# Patient Record
Sex: FEMALE | Race: WHITE | Employment: OTHER | URBAN - METROPOLITAN AREA
[De-identification: names, ages, dates, MRNs, and addresses within clinical notes are randomized per-mention and may not be internally consistent; named-entity substitution may affect disease eponyms.]

---

## 2017-02-11 ENCOUNTER — HOSPITAL ENCOUNTER (INPATIENT)
Age: 72
LOS: 3 days | Discharge: HOME | DRG: 603 | End: 2017-02-14
Attending: EMERGENCY MEDICINE | Admitting: INTERNAL MEDICINE
Payer: MEDICARE

## 2017-02-11 DIAGNOSIS — N18.9 ACUTE ON CHRONIC KIDNEY FAILURE (HCC): ICD-10-CM

## 2017-02-11 DIAGNOSIS — E16.2 HYPOGLYCEMIA: ICD-10-CM

## 2017-02-11 DIAGNOSIS — L03.116 CELLULITIS OF LEFT LOWER EXTREMITY: Primary | ICD-10-CM

## 2017-02-11 DIAGNOSIS — N17.9 ACUTE ON CHRONIC KIDNEY FAILURE (HCC): ICD-10-CM

## 2017-02-11 PROBLEM — L03.90 CELLULITIS: Status: ACTIVE | Noted: 2017-02-11

## 2017-02-11 LAB
ALBUMIN SERPL BCP-MCNC: 2.7 G/DL (ref 3.5–5)
ALBUMIN/GLOB SERPL: 0.6 {RATIO} (ref 1.1–2.2)
ALP SERPL-CCNC: 104 U/L (ref 45–117)
ALT SERPL-CCNC: 26 U/L (ref 12–78)
ANION GAP BLD CALC-SCNC: 8 MMOL/L (ref 5–15)
AST SERPL W P-5'-P-CCNC: 26 U/L (ref 15–37)
BASOPHILS # BLD AUTO: 0 K/UL (ref 0–0.1)
BASOPHILS # BLD: 0 % (ref 0–1)
BILIRUB SERPL-MCNC: 0.3 MG/DL (ref 0.2–1)
BUN SERPL-MCNC: 89 MG/DL (ref 6–20)
BUN/CREAT SERPL: 28 (ref 12–20)
CALCIUM SERPL-MCNC: 9 MG/DL (ref 8.5–10.1)
CHLORIDE SERPL-SCNC: 101 MMOL/L (ref 97–108)
CO2 SERPL-SCNC: 31 MMOL/L (ref 21–32)
CREAT SERPL-MCNC: 3.19 MG/DL (ref 0.55–1.02)
EOSINOPHIL # BLD: 0.5 K/UL (ref 0–0.4)
EOSINOPHIL NFR BLD: 5 % (ref 0–7)
ERYTHROCYTE [DISTWIDTH] IN BLOOD BY AUTOMATED COUNT: 15.2 % (ref 11.5–14.5)
GLOBULIN SER CALC-MCNC: 4.7 G/DL (ref 2–4)
GLUCOSE BLD STRIP.AUTO-MCNC: 121 MG/DL (ref 65–100)
GLUCOSE BLD STRIP.AUTO-MCNC: 134 MG/DL (ref 65–100)
GLUCOSE BLD STRIP.AUTO-MCNC: 145 MG/DL (ref 65–100)
GLUCOSE BLD STRIP.AUTO-MCNC: 175 MG/DL (ref 65–100)
GLUCOSE BLD STRIP.AUTO-MCNC: 39 MG/DL (ref 65–100)
GLUCOSE BLD STRIP.AUTO-MCNC: 48 MG/DL (ref 65–100)
GLUCOSE BLD STRIP.AUTO-MCNC: 49 MG/DL (ref 65–100)
GLUCOSE SERPL-MCNC: 74 MG/DL (ref 65–100)
HCT VFR BLD AUTO: 30.3 % (ref 35–47)
HGB BLD-MCNC: 9.2 G/DL (ref 11.5–16)
LYMPHOCYTES # BLD AUTO: 7 % (ref 12–49)
LYMPHOCYTES # BLD: 0.6 K/UL (ref 0.8–3.5)
MCH RBC QN AUTO: 27.9 PG (ref 26–34)
MCHC RBC AUTO-ENTMCNC: 30.4 G/DL (ref 30–36.5)
MCV RBC AUTO: 91.8 FL (ref 80–99)
MONOCYTES # BLD: 0.5 K/UL (ref 0–1)
MONOCYTES NFR BLD AUTO: 5 % (ref 5–13)
NEUTS SEG # BLD: 7.4 K/UL (ref 1.8–8)
NEUTS SEG NFR BLD AUTO: 83 % (ref 32–75)
PLATELET # BLD AUTO: 277 K/UL (ref 150–400)
POTASSIUM SERPL-SCNC: 3.8 MMOL/L (ref 3.5–5.1)
PROT SERPL-MCNC: 7.4 G/DL (ref 6.4–8.2)
RBC # BLD AUTO: 3.3 M/UL (ref 3.8–5.2)
RBC MORPH BLD: ABNORMAL
SERVICE CMNT-IMP: ABNORMAL
SODIUM SERPL-SCNC: 140 MMOL/L (ref 136–145)
WBC # BLD AUTO: 9 K/UL (ref 3.6–11)

## 2017-02-11 PROCEDURE — 74011250636 HC RX REV CODE- 250/636: Performed by: INTERNAL MEDICINE

## 2017-02-11 PROCEDURE — 96375 TX/PRO/DX INJ NEW DRUG ADDON: CPT

## 2017-02-11 PROCEDURE — 36415 COLL VENOUS BLD VENIPUNCTURE: CPT | Performed by: EMERGENCY MEDICINE

## 2017-02-11 PROCEDURE — 74011000250 HC RX REV CODE- 250: Performed by: INTERNAL MEDICINE

## 2017-02-11 PROCEDURE — 87040 BLOOD CULTURE FOR BACTERIA: CPT | Performed by: EMERGENCY MEDICINE

## 2017-02-11 PROCEDURE — 74011000250 HC RX REV CODE- 250

## 2017-02-11 PROCEDURE — 80053 COMPREHEN METABOLIC PANEL: CPT | Performed by: EMERGENCY MEDICINE

## 2017-02-11 PROCEDURE — 74011250637 HC RX REV CODE- 250/637: Performed by: INTERNAL MEDICINE

## 2017-02-11 PROCEDURE — 82962 GLUCOSE BLOOD TEST: CPT

## 2017-02-11 PROCEDURE — 74011250636 HC RX REV CODE- 250/636: Performed by: EMERGENCY MEDICINE

## 2017-02-11 PROCEDURE — 96365 THER/PROPH/DIAG IV INF INIT: CPT

## 2017-02-11 PROCEDURE — 74011000258 HC RX REV CODE- 258: Performed by: EMERGENCY MEDICINE

## 2017-02-11 PROCEDURE — 85025 COMPLETE CBC W/AUTO DIFF WBC: CPT | Performed by: EMERGENCY MEDICINE

## 2017-02-11 PROCEDURE — 65270000029 HC RM PRIVATE

## 2017-02-11 PROCEDURE — 99284 EMERGENCY DEPT VISIT MOD MDM: CPT

## 2017-02-11 PROCEDURE — 74011000258 HC RX REV CODE- 258: Performed by: FAMILY MEDICINE

## 2017-02-11 PROCEDURE — 83036 HEMOGLOBIN GLYCOSYLATED A1C: CPT | Performed by: INTERNAL MEDICINE

## 2017-02-11 RX ORDER — GUAIFENESIN 100 MG/5ML
81 LIQUID (ML) ORAL DAILY
Status: DISCONTINUED | OUTPATIENT
Start: 2017-02-12 | End: 2017-02-14 | Stop reason: HOSPADM

## 2017-02-11 RX ORDER — DIPHENHYDRAMINE HCL 25 MG
25 CAPSULE ORAL
Status: DISCONTINUED | OUTPATIENT
Start: 2017-02-11 | End: 2017-02-14 | Stop reason: HOSPADM

## 2017-02-11 RX ORDER — FENOFIBRATE 145 MG/1
145 TABLET, COATED ORAL DAILY
Status: DISCONTINUED | OUTPATIENT
Start: 2017-02-12 | End: 2017-02-14 | Stop reason: HOSPADM

## 2017-02-11 RX ORDER — AMOXICILLIN 250 MG
2 CAPSULE ORAL DAILY
Status: DISCONTINUED | OUTPATIENT
Start: 2017-02-12 | End: 2017-02-14 | Stop reason: HOSPADM

## 2017-02-11 RX ORDER — DEXTROSE MONOHYDRATE AND SODIUM CHLORIDE 5; .9 G/100ML; G/100ML
50 INJECTION, SOLUTION INTRAVENOUS CONTINUOUS
Status: DISCONTINUED | OUTPATIENT
Start: 2017-02-11 | End: 2017-02-12

## 2017-02-11 RX ORDER — NALOXONE HYDROCHLORIDE 0.4 MG/ML
0.4 INJECTION, SOLUTION INTRAMUSCULAR; INTRAVENOUS; SUBCUTANEOUS AS NEEDED
Status: DISCONTINUED | OUTPATIENT
Start: 2017-02-11 | End: 2017-02-14 | Stop reason: HOSPADM

## 2017-02-11 RX ORDER — DOCUSATE SODIUM 100 MG/1
100 CAPSULE, LIQUID FILLED ORAL 2 TIMES DAILY
Status: DISCONTINUED | OUTPATIENT
Start: 2017-02-11 | End: 2017-02-11

## 2017-02-11 RX ORDER — LOSARTAN POTASSIUM 25 MG/1
25 TABLET ORAL DAILY
COMMUNITY

## 2017-02-11 RX ORDER — SODIUM CHLORIDE 0.9 % (FLUSH) 0.9 %
5-10 SYRINGE (ML) INJECTION AS NEEDED
Status: DISCONTINUED | OUTPATIENT
Start: 2017-02-11 | End: 2017-02-14 | Stop reason: HOSPADM

## 2017-02-11 RX ORDER — DEXTROSE 50 % IN WATER (D50W) INTRAVENOUS SYRINGE
Status: COMPLETED
Start: 2017-02-11 | End: 2017-02-11

## 2017-02-11 RX ORDER — MAGNESIUM SULFATE 100 %
4 CRYSTALS MISCELLANEOUS AS NEEDED
Status: DISCONTINUED | OUTPATIENT
Start: 2017-02-11 | End: 2017-02-14 | Stop reason: HOSPADM

## 2017-02-11 RX ORDER — DEXTROSE 50 % IN WATER (D50W) INTRAVENOUS SYRINGE
25
Status: COMPLETED | OUTPATIENT
Start: 2017-02-11 | End: 2017-02-11

## 2017-02-11 RX ORDER — ONDANSETRON 2 MG/ML
4 INJECTION INTRAMUSCULAR; INTRAVENOUS
Status: DISCONTINUED | OUTPATIENT
Start: 2017-02-11 | End: 2017-02-14 | Stop reason: HOSPADM

## 2017-02-11 RX ORDER — AMLODIPINE BESYLATE 5 MG/1
5 TABLET ORAL DAILY
COMMUNITY
End: 2017-06-10

## 2017-02-11 RX ORDER — ACETAMINOPHEN 325 MG/1
650 TABLET ORAL
Status: DISCONTINUED | OUTPATIENT
Start: 2017-02-11 | End: 2017-02-14 | Stop reason: HOSPADM

## 2017-02-11 RX ORDER — SODIUM CHLORIDE 0.9 % (FLUSH) 0.9 %
5-10 SYRINGE (ML) INJECTION EVERY 8 HOURS
Status: DISCONTINUED | OUTPATIENT
Start: 2017-02-11 | End: 2017-02-14 | Stop reason: HOSPADM

## 2017-02-11 RX ORDER — HEPARIN SODIUM 5000 [USP'U]/ML
5000 INJECTION, SOLUTION INTRAVENOUS; SUBCUTANEOUS EVERY 8 HOURS
Status: DISCONTINUED | OUTPATIENT
Start: 2017-02-11 | End: 2017-02-14 | Stop reason: HOSPADM

## 2017-02-11 RX ORDER — CARVEDILOL 3.12 MG/1
3.12 TABLET ORAL 2 TIMES DAILY WITH MEALS
Status: DISCONTINUED | OUTPATIENT
Start: 2017-02-11 | End: 2017-02-14 | Stop reason: HOSPADM

## 2017-02-11 RX ORDER — DOXAZOSIN 2 MG/1
2 TABLET ORAL DAILY
COMMUNITY

## 2017-02-11 RX ORDER — DEXTROSE 50 % IN WATER (D50W) INTRAVENOUS SYRINGE
12.5-25 AS NEEDED
Status: DISCONTINUED | OUTPATIENT
Start: 2017-02-11 | End: 2017-02-14 | Stop reason: HOSPADM

## 2017-02-11 RX ORDER — BISMUTH SUBSALICYLATE 262 MG
1 TABLET,CHEWABLE ORAL DAILY
COMMUNITY

## 2017-02-11 RX ORDER — PANTOPRAZOLE SODIUM 40 MG/1
40 TABLET, DELAYED RELEASE ORAL
Status: DISCONTINUED | OUTPATIENT
Start: 2017-02-12 | End: 2017-02-14 | Stop reason: HOSPADM

## 2017-02-11 RX ORDER — INSULIN GLARGINE 100 [IU]/ML
20 INJECTION, SOLUTION SUBCUTANEOUS DAILY
Status: DISCONTINUED | OUTPATIENT
Start: 2017-02-12 | End: 2017-02-11

## 2017-02-11 RX ORDER — IPRATROPIUM BROMIDE AND ALBUTEROL SULFATE 2.5; .5 MG/3ML; MG/3ML
3 SOLUTION RESPIRATORY (INHALATION)
Status: DISCONTINUED | OUTPATIENT
Start: 2017-02-11 | End: 2017-02-14 | Stop reason: HOSPADM

## 2017-02-11 RX ORDER — AMOXICILLIN 250 MG
1 CAPSULE ORAL DAILY
Status: DISCONTINUED | OUTPATIENT
Start: 2017-02-12 | End: 2017-02-11

## 2017-02-11 RX ORDER — HYDROCODONE BITARTRATE AND ACETAMINOPHEN 5; 325 MG/1; MG/1
1 TABLET ORAL
Status: DISCONTINUED | OUTPATIENT
Start: 2017-02-11 | End: 2017-02-14 | Stop reason: HOSPADM

## 2017-02-11 RX ORDER — LEVOFLOXACIN 5 MG/ML
750 INJECTION, SOLUTION INTRAVENOUS
Status: DISCONTINUED | OUTPATIENT
Start: 2017-02-11 | End: 2017-02-14 | Stop reason: HOSPADM

## 2017-02-11 RX ORDER — FLUTICASONE FUROATE AND VILANTEROL 100; 25 UG/1; UG/1
1 POWDER RESPIRATORY (INHALATION) DAILY
Status: DISCONTINUED | OUTPATIENT
Start: 2017-02-12 | End: 2017-02-14 | Stop reason: HOSPADM

## 2017-02-11 RX ORDER — SERTRALINE HYDROCHLORIDE 50 MG/1
25 TABLET, FILM COATED ORAL DAILY
Status: DISCONTINUED | OUTPATIENT
Start: 2017-02-12 | End: 2017-02-11

## 2017-02-11 RX ORDER — INSULIN LISPRO 100 [IU]/ML
INJECTION, SOLUTION INTRAVENOUS; SUBCUTANEOUS
Status: DISCONTINUED | OUTPATIENT
Start: 2017-02-11 | End: 2017-02-14 | Stop reason: HOSPADM

## 2017-02-11 RX ORDER — ATORVASTATIN CALCIUM 10 MG/1
40 TABLET, FILM COATED ORAL
Status: DISCONTINUED | OUTPATIENT
Start: 2017-02-11 | End: 2017-02-14 | Stop reason: HOSPADM

## 2017-02-11 RX ORDER — ALPRAZOLAM 0.25 MG/1
0.25 TABLET ORAL
Status: DISCONTINUED | OUTPATIENT
Start: 2017-02-11 | End: 2017-02-14 | Stop reason: HOSPADM

## 2017-02-11 RX ADMIN — DEXTROSE MONOHYDRATE AND SODIUM CHLORIDE 50 ML/HR: 5; .9 INJECTION, SOLUTION INTRAVENOUS at 16:59

## 2017-02-11 RX ADMIN — HYDROCODONE BITARTRATE AND ACETAMINOPHEN 1 TABLET: 5; 325 TABLET ORAL at 23:56

## 2017-02-11 RX ADMIN — DEXTROSE 50 % IN WATER (D50W) INTRAVENOUS SYRINGE 25 G: at 12:53

## 2017-02-11 RX ADMIN — CEFAZOLIN SODIUM 1 G: 1 INJECTION, POWDER, FOR SOLUTION INTRAMUSCULAR; INTRAVENOUS at 16:57

## 2017-02-11 RX ADMIN — CARVEDILOL 3.12 MG: 3.12 TABLET, FILM COATED ORAL at 16:56

## 2017-02-11 RX ADMIN — LEVOFLOXACIN 750 MG: 5 INJECTION, SOLUTION INTRAVENOUS at 17:46

## 2017-02-11 RX ADMIN — HEPARIN SODIUM 5000 UNITS: 5000 INJECTION, SOLUTION INTRAVENOUS; SUBCUTANEOUS at 15:41

## 2017-02-11 RX ADMIN — ATORVASTATIN CALCIUM 40 MG: 10 TABLET, FILM COATED ORAL at 21:39

## 2017-02-11 RX ADMIN — HYDROCODONE BITARTRATE AND ACETAMINOPHEN 1 TABLET: 5; 325 TABLET ORAL at 15:41

## 2017-02-11 RX ADMIN — DEXTROSE MONOHYDRATE 25 G: 25 INJECTION, SOLUTION INTRAVENOUS at 16:27

## 2017-02-11 RX ADMIN — HEPARIN SODIUM 5000 UNITS: 5000 INJECTION, SOLUTION INTRAVENOUS; SUBCUTANEOUS at 23:00

## 2017-02-11 RX ADMIN — DEXTROSE MONOHYDRATE 25 G: 25 INJECTION, SOLUTION INTRAVENOUS at 12:53

## 2017-02-11 RX ADMIN — VANCOMYCIN HYDROCHLORIDE 2500 MG: 10 INJECTION, POWDER, LYOPHILIZED, FOR SOLUTION INTRAVENOUS at 11:54

## 2017-02-11 NOTE — ED TRIAGE NOTES
Patient arrived with c/o ongoing cellulitis to left leg with vomiting and fever x 3 days.   Has had cellulitis and sepsis in the past.

## 2017-02-11 NOTE — IP AVS SNAPSHOT
Shirin Salinas [address] 
[phone] Patient: Wil Lay MRN: [id number] You are allergic to the following Allergen Reactions Latex, Natural Rubber Rash Per pt, had a reaction to latex gloves when an RN administered lotion Darvon (Propoxyphene) Itching Peanut Cough Pineapple Itching Sulfa (Sulfonamide Antibiotics) Swelling Tape (Adhesive) Itching Recent Documentation Height Weight BMI OB Status Smoking Status 1.549 m 126.6 kg 52.72 kg/m2 Hysterectomy Never Smoker Unresulted Labs Order Current Status CULTURE, BLOOD Preliminary result Emergency Contacts Name Discharge Info Relation Home Work Mobile Ronnie Campbell DISCHARGE CAREGIVER [3] Spouse [3] About your hospitalization You were admitted on:  February 11, 2017 You last received care in the:  [facility]  MED SURG 2 You were discharged on:  February 14, 2017 Unit phone number:  [phone] Why you were hospitalized Your primary diagnosis was:  Cellulitis Your diagnoses also included:  Acute Renal Failure (Arf) ([facility]), Chronic Bilateral Low Back Pain Without Sciatica, Ckd (Chronic Kidney Disease) Stage 4, Gfr 15-29 Ml/Min ([facility]), Dm Type 2, Uncontrolled, With Renal Complications (Hcc), Generalized Anxiety Disorder, Hypertension, Iron Deficiency Anemia, Saul (Obstructive Sleep Apnea), Saul On Cpap, Hyperlipidemia, Asthma, Anxiety And Depression, Hypoglycemia Providers Seen During Your Hospitalizations Provider Role Specialty Primary office phone Colin Roth MD Attending Provider Emergency Medicine [phone] Ami Raman MD Attending Provider [facility] [phone] Your Primary Care Physician (PCP) Primary Care Physician Office Phone Office Fax Kimberley Gould [phone] Follow-up Information Follow up With Details Comments Contact Info Davina Johnson MD   [address] 
[phone] Current Discharge Medication List  
  
START taking these medications Dose & Instructions Dispensing Information Comments Morning Noon Evening Bedtime  
 levoFLOXacin 500 mg tablet Commonly known as:  Elizabeth Cantwell Your next dose is: Today, Tomorrow Other:  _________ Dose:  500 mg Take 1 Tab by mouth daily for 7 days. Quantity:  7 Tab Refills:  0  
     
   
   
   
  
 mupirocin 2 % ointment Commonly known as:  [employer] Your next dose is: Today, Tomorrow Other:  _________ Apply  to affected area three (3) times daily. Quantity:  66 g Refills:  3 CONTINUE these medications which have NOT CHANGED Dose & Instructions Dispensing Information Comments Morning Noon Evening Bedtime  
 acetaminophen 500 mg tablet Commonly known as:  TYLENOL Your next dose is: Today, Tomorrow Other:  _________ Dose:  1000 mg Take 1,000 mg by mouth two (2) times daily as needed for Pain. Refills:  0  
     
   
   
   
  
 albuterol 90 mcg/actuation inhaler Commonly known as:  PROVENTIL HFA, VENTOLIN HFA, PROAIR HFA Your next dose is: Today, Tomorrow Other:  _________ Dose:  2 Puff Take 2 Puffs by inhalation every four (4) hours as needed for Wheezing. Refills:  0 ALPRAZolam 0.25 mg tablet Commonly known as:  Martinez January Your next dose is: Today, Tomorrow Other:  _________ Dose:  0.25 mg Take 0.25 mg by mouth two (2) times a day. Refills:  0  
     
   
   
   
  
 amLODIPine 5 mg tablet Commonly known as:  Rut Silva Your next dose is: Today, Tomorrow Other:  _________ Dose:  5 mg Take 5 mg by mouth daily. Refills:  0  
     
   
   
   
  
 aspirin 81 mg chewable tablet Your next dose is: Today, Tomorrow Other:  _________ Dose:  81 mg Take 81 mg by mouth daily. Refills:  0  
     
   
   
   
  
 atorvastatin 40 mg tablet Commonly known as:  LIPITOR Your next dose is: Today, Tomorrow Other:  _________ Dose:  40 mg Take 40 mg by mouth nightly. Refills:  0  
     
   
   
   
  
 bumetanide 2 mg tablet Commonly known as:  Buelah Bake Your next dose is: Today, Tomorrow Other:  _________ Dose:  2 mg Take 1 Tab by mouth two (2) times a day. Quantity:  100 Tab Refills:  0  
     
   
   
   
  
 calcium-cholecalciferol (D3) tablet Commonly known as:  CALTRATE 600+D Your next dose is: Today, Tomorrow Other:  _________ Dose:  1 Tab Take 1 Tab by mouth two (2) times a day. Refills:  0 COREG 3.125 mg tablet Generic drug:  carvedilol Your next dose is: Today, Tomorrow Other:  _________ Dose:  3.125 mg Take 3.125 mg by mouth two (2) times daily (with meals). Refills:  0  
     
   
   
   
  
 doxazosin 2 mg tablet Commonly known as:  CARDURA Your next dose is: Today, Tomorrow Other:  _________ Dose:  2 mg Take 2 mg by mouth daily. Refills:  0  
     
   
   
   
  
 ergocalciferol 50,000 unit capsule Commonly known as:  ERGOCALCIFEROL Your next dose is: Today, Tomorrow Other:  _________ Dose:  33679 Units Take 50,000 Units by mouth every month. Refills:  0  
     
   
   
   
  
 fenofibrate nanocrystallized 145 mg tablet Commonly known as:  [employer] Your next dose is: Today, Tomorrow Other:  _________ Dose:  145 mg Take 145 mg by mouth daily. Refills:  0 FIBER LAXATIVE (CA POLYCARBO) 625 mg tablet Generic drug:  calcium polycarbophil Your next dose is: Today, Tomorrow Other:  _________ Dose:  625 mg Take 625 mg by mouth daily. Refills:  0  
     
   
   
   
  
 fluticasone-vilanterol 100-25 mcg/dose inhaler Commonly known as:  BREO ELLIPTA Your next dose is: Today, Tomorrow Other:  _________ Dose:  1 Puff Take 1 Puff by inhalation daily. Quantity:  1 Inhaler Refills:  0 HumuLIN R U-100 100 unit/mL injection Generic drug:  insulin regular Your next dose is: Today, Tomorrow Other:  _________ Dose:  6 Units 6 Units by SubCUTAneous route Before breakfast and dinner. Refills:  0  
     
   
   
   
  
 loratadine 10 mg tablet Commonly known as:  Rommel Blowers Your next dose is: Today, Tomorrow Other:  _________ Dose:  10 mg Take 10 mg by mouth daily. Refills:  0  
     
   
   
   
  
 losartan 25 mg tablet Commonly known as:  COZAAR Your next dose is: Today, Tomorrow Other:  _________ Dose:  25 mg Take 25 mg by mouth daily. Refills:  0  
     
   
   
   
  
 multivitamin tablet Commonly known as:  ONE A DAY Your next dose is: Today, Tomorrow Other:  _________ Dose:  1 Tab Take 1 Tab by mouth daily. Refills:  0  
     
   
   
   
  
 nystatin 500,000 unit Tab Commonly known as:  MYCOSTATIN Your next dose is: Today, Tomorrow Other:  _________ Dose:  1 Tab Take 1 Tab by mouth daily. Refills:  0  
     
   
   
   
  
 nystatin-triamcinolone 100,000-0.1 unit/gram-% ointment Commonly known as:  Lizzie Del Your next dose is: Today, Tomorrow Other:  _________ Apply  to affected area daily. Apply to sores Refills:  0 Omeprazole delayed release 20 mg tablet Commonly known as:  PRILOSEC D/R Your next dose is: Today, Tomorrow Other:  _________ Dose:  20 mg Take 20 mg by mouth daily. Refills:  0 Senna-C 8.6-50 mg per tablet Generic drug:  senna-docusate Your next dose is: Today, Tomorrow Other:  _________ Dose:  2 Tab Take 2 Tabs by mouth every evening. Refills:  0  
     
   
   
   
  
 TOUJEO SOLOSTAR 300 unit/mL (1.5 mL) Inpn Generic drug:  insulin glargine Your next dose is: Today, Tomorrow Other:  _________ Dose:  26 Units 26 Units by SubCUTAneous route daily. Refills:  0  
     
   
   
   
  
 triamcinolone acetonide 0.1 % ointment Commonly known as:  KENALOG Your next dose is: Today, Tomorrow Other:  _________ Apply  to affected area daily as needed. use thin layer on affected areas Refills:  0 Where to Get Your Medications Information on where to get these meds will be given to you by the nurse or doctor. ! Ask your nurse or doctor about these medications  
  levoFLOXacin 500 mg tablet  
 mupirocin 2 % ointment Discharge Instructions Cellulitis: Care Instructions Your Care Instructions Cellulitis is a skin infection. It often occurs after a break in the skin from a scrape, cut, bite, or puncture, or after a rash. The doctor has checked you carefully, but problems can develop later. If you notice any problems or new symptoms, get medical treatment right away. Follow-up care is a key part of your treatment and safety. Be sure to make and go to all appointments, and call your doctor if you are having problems. It's also a good idea to know your test results and keep a list of the medicines you take. How can you care for yourself at home? · Take your antibiotics as directed. Do not stop taking them just because you feel better. You need to take the full course of antibiotics. · Prop up the infected area on pillows to reduce pain and swelling. Try to keep the area above the level of your heart as often as you can. · If your doctor told you how to care for your wound, follow your doctor's instructions. If you did not get instructions, follow this general advice: ¨ Wash the wound with clean water 2 times a day. Don't use hydrogen peroxide or alcohol, which can slow healing. ¨ You may cover the wound with a thin layer of petroleum jelly, such as Vaseline, and a nonstick bandage. ¨ Apply more petroleum jelly and replace the bandage as needed. · Be safe with medicines. Take pain medicines exactly as directed. ¨ If the doctor gave you a prescription medicine for pain, take it as prescribed. ¨ If you are not taking a prescription pain medicine, ask your doctor if you can take an over-the-counter medicine. To prevent cellulitis in the future · Try to prevent cuts, scrapes, or other injuries to your skin. Cellulitis most often occurs where there is a break in the skin. · If you get a scrape, cut, mild burn, or bite, wash the wound with clean water as soon as you can to help avoid infection. Don't use hydrogen peroxide or alcohol, which can slow healing. · If you have swelling in your legs (edema), support stockings and good skin care may help prevent leg sores and cellulitis. · Take care of your feet, especially if you have diabetes or other conditions that increase the risk of infection. Wear shoes and socks. Do not go barefoot. If you have athlete's foot or other skin problems on your feet, talk to your doctor about how to treat them. When should you call for help? Call your doctor now or seek immediate medical care if: 
· You have signs that your infection is getting worse, such as: 
¨ Increased pain, swelling, warmth, or redness. ¨ Red streaks leading from the area. ¨ Pus draining from the area. ¨ A fever. · You get a rash. Watch closely for changes in your health, and be sure to contact your doctor if: 
· You are not getting better after 1 day (24 hours). · You do not get better as expected. Where can you learn more? Go to http://trish-anastacia.info/. Nancy Roche in the search box to learn more about \"Cellulitis: Care Instructions. \" Current as of: February 5, 2016 Content Version: 11.1 © 3610-6949 [employer]. Care instructions adapted under license by [employer] (which disclaims liability or warranty for this information). If you have questions about a medical condition or this instruction, always ask your healthcare professional. [address] any warranty or liability for your use of this information. We hope that we have addressed all of your medical concerns. The examination and treatment you received in the Emergency Department were for an emergent problem and were not intended as complete care. It is important that you follow up with your healthcare provider(s) for ongoing care. If your symptoms worsen or do not improve as expected, and you are unable to reach your usual health care provider(s), you should return to the Emergency Department. Today's healthcare is undergoing tremendous change, and patient satisfaction surveys are one of the many tools to assess the quality of medical care. You may receive a survey from the [employer] regarding your experience in the Emergency Department. I hope that your experience has been completely positive, particularly the medical care that I provided. As such, please participate in the survey; anything less than excellent does not meet my expectations or intentions. [facility] and [facility] participate in nationally recognized quality of care measures.   If your blood pressure is greater than 120/80, as reported below, we urge that you seek medical care to address the potential of high blood pressure, commonly known as hypertension. Hypertension can be hereditary or can be caused by certain medical conditions, pain, stress, or \"white coat syndrome. \" Please make an appointment with your health care provider(s) for follow up of your Emergency Department visit. VITALS:  
Patient Vitals for the past 8 hrs: 
 Temp Pulse Resp BP SpO2  
02/11/17 1034 97.5 °F (36.4 °C) 76 16 134/60 100 % Thank you for allowing us to provide you with medical care today. We realize that you have many choices for your emergency care needs. Please choose us in the future for any continued health care needs. Bevelyn Nyhan Tyrus Loan, [address] [place] [address] 20.  
Office: [phone] Recent Results (from the past 24 hour(s)) CBC WITH AUTOMATED DIFF Collection Time: 02/11/17 11:17 AM  
Result Value Ref Range WBC 9.0 3.6 - 11.0 K/uL  
 RBC 3.30 (L) 3.80 - 5.20 M/uL HGB 9.2 (L) 11.5 - 16.0 g/dL HCT 30.3 (L) 35.0 - 47.0 % MCV 91.8 80.0 - 99.0 FL  
 MCH 27.9 26.0 - 34.0 PG  
 MCHC 30.4 30.0 - 36.5 g/dL  
 RDW 15.2 (H) 11.5 - 14.5 % PLATELET 234 922 - 423 K/uL NEUTROPHILS 83 (H) 32 - 75 % LYMPHOCYTES 7 (L) 12 - 49 % MONOCYTES 5 5 - 13 % EOSINOPHILS 5 0 - 7 % BASOPHILS 0 0 - 1 %  
 ABS. NEUTROPHILS 7.4 1.8 - 8.0 K/UL  
 ABS. LYMPHOCYTES 0.6 (L) 0.8 - 3.5 K/UL  
 ABS. MONOCYTES 0.5 0.0 - 1.0 K/UL  
 ABS. EOSINOPHILS 0.5 (H) 0.0 - 0.4 K/UL  
 ABS. BASOPHILS 0.0 0.0 - 0.1 K/UL  
 RBC COMMENTS NORMOCYTIC, NORMOCHROMIC METABOLIC PANEL, COMPREHENSIVE Collection Time: 02/11/17 11:17 AM  
Result Value Ref Range Sodium 140 136 - 145 mmol/L Potassium 3.8 3.5 - 5.1 mmol/L Chloride 101 97 - 108 mmol/L  
 CO2 31 21 - 32 mmol/L Anion gap 8 5 - 15 mmol/L Glucose 74 65 - 100 mg/dL BUN 89 (H) 6 - 20 MG/DL  Creatinine 3.19 (H) 0.55 - 1.02 MG/DL  
 BUN/Creatinine ratio 28 (H) 12 - 20 GFR est AA 17 (L) >60 ml/min/1.73m2 GFR est non-AA 14 (L) >60 ml/min/1.73m2 Calcium 9.0 8.5 - 10.1 MG/DL Bilirubin, total 0.3 0.2 - 1.0 MG/DL  
 ALT (SGPT) 26 12 - 78 U/L  
 AST (SGOT) 26 15 - 37 U/L Alk. phosphatase 104 45 - 117 U/L Protein, total 7.4 6.4 - 8.2 g/dL Albumin 2.7 (L) 3.5 - 5.0 g/dL Globulin 4.7 (H) 2.0 - 4.0 g/dL A-G Ratio 0.6 (L) 1.1 - 2.2 No results found. Patient Discharge Instructions Gianni Mere / [id number] : 1945 Admitted 2017 Discharged: 2017 7:23 AM  
 
ACUTE DIAGNOSES: 
Cellulitis CHRONIC MEDICAL DIAGNOSES: 
Problem List as of 2017  Date Reviewed: 2017 Codes Class Noted - Resolved * (Principal)Cellulitis ICD-10-CM: L03.90 ICD-9-CM: 682.9  2017 - Present Asthma ICD-10-CM: F51.871 ICD-9-CM: 493.90  Unknown - Present Hyperlipidemia ICD-10-CM: E78.5 ICD-9-CM: 272.4  Unknown - Present RAI on CPAP ICD-10-CM: G47.33 
ICD-9-CM: 327.23  Unknown - Present Anxiety and depression ICD-10-CM: F41.9, F32.9 ICD-9-CM: 300.00, 311  Unknown - Present Hypoglycemia ICD-10-CM: E16.2 ICD-9-CM: 251.2  2017 - Present Chronic bilateral low back pain without sciatica ICD-10-CM: M54.5, G89.29 ICD-9-CM: 724.2, 338.29  11/3/2016 - Present Generalized anxiety disorder ICD-10-CM: F41.1 ICD-9-CM: 300.02  11/3/2016 - Present Acute renal failure (ARF) (HCC) ICD-10-CM: N17.9 ICD-9-CM: 584.9  2016 - Present CKD (chronic kidney disease) stage 4, GFR 15-29 ml/min (HCC) (Chronic) ICD-10-CM: N18.4 ICD-9-CM: 585.4  2016 - Present Hypertension (Chronic) ICD-10-CM: I10 
ICD-9-CM: 401.9  2016 - Present DM type 2, uncontrolled, with renal complications (HCC) (Chronic) ICD-10-CM: E11.29, E11.65 ICD-9-CM: 250.42  2016 - Present Iron deficiency anemia (Chronic) ICD-10-CM: D50.9 ICD-9-CM: 280.9  8/14/2016 - Present RESOLVED: CKD stage 4 due to type 2 diabetes mellitus ([email or web address] 75.) ICD-10-CM: E11.22, N18.4 ICD-9-CM: 250.40, 585.4  Unknown - 2/11/2017 RESOLVED: DM type 2 causing renal disease ([facility] [[email or web address] 75.) ICD-10-CM: E11.29 ICD-9-CM: 250.40  Unknown - 2/11/2017 RESOLVED: Hyperkalemia ICD-10-CM: E87.5 ICD-9-CM: 276.7  11/3/2016 - 2/11/2017 RESOLVED: Elevated serum creatinine ICD-10-CM: R79.89 ICD-9-CM: 790.99  11/3/2016 - 2/11/2017 RESOLVED: Left leg cellulitis ICD-10-CM: L03.116 ICD-9-CM: 682.6  8/14/2016 - 2/11/2017 RESOLVED: Sepsis ([facility] [[email or web address] 75.) ICD-10-CM: A41.9 ICD-9-CM: 038.9, 995.91  8/14/2016 - 2/11/2017 RESOLVED: RAI (obstructive sleep apnea) (Chronic) ICD-10-CM: I05.23 
ICD-9-CM: 327.23  8/14/2016 - 2/11/2017 DISCHARGE MEDICATIONS:  
 
 
 
· It is important that you take the medication exactly as they are prescribed. · Keep your medication in the bottles provided by the pharmacist and keep a list of the medication names, dosages, and times to be taken in your wallet. · Do not take other medications without consulting your doctor. DIET:  Diabetic Diet ACTIVITY: Activity as tolerated ADDITIONAL INFORMATION: If you experience any of the following symptoms then please call your primary care physician or return to the emergency room if you cannot get hold of your doctor: Fever, chills, nausea, vomiting, diarrhea, change in mentation, falling, bleeding, shortness of breath. FOLLOW UP CARE: 
Dr. Jose Panda MD  you are to call and set up an appointment to see them with in 1 week. Follow-up with specialists at directed by them Information obtained by : 
I understand that if any problems occur once I am at home I am to contact my physician. I understand and acknowledge receipt of the instructions indicated above. Physician's or R.N.'s Signature                                                                  Date/Time Patient or Representative Signature                                                          Date/Time Discharge Orders None [employer] Announcement We are excited to announce that we are making your provider's discharge notes available to you in [employer]. You will see these notes when they are completed and signed by the physician that discharged you from your recent hospital stay. If you have any questions or concerns about any information you see in [employer], please call the Health Information Department where you were seen or reach out to your Primary Care Provider for more information about your plan of care. Introducing [address]! Camilo Tam introduces [employer] patient portal. Now you can access parts of your medical record, email your doctor's office, and request medication refills online. 1. In your internet browser, go to [email or web address] 2. Click on the First Time User? Click Here link in the Sign In box. You will see the New Member Sign Up page. 3. Enter your [employer] Access Code exactly as it appears below. You will not need to use this code after youve completed the sign-up process. If you do not sign up before the expiration date, you must request a new code. · [employer] Access Code: [email or web address] Expires: 5/15/2017  9:37 AM 
 
4. Enter the last four digits of your Social Security Number (xxxx) and Date of Birth (mm/dd/yyyy) as indicated and click Submit. You will be taken to the next sign-up page. 5. Create a [employer] ID. This will be your [employer] login ID and cannot be changed, so think of one that is secure and easy to remember. 6. Create a StayNTouch password. You can change your password at any time. 7. Enter your Password Reset Question and Answer. This can be used at a later time if you forget your password. 8. Enter your e-mail address. You will receive e-mail notification when new information is available in [address] 9. Click Sign Up. You can now view and download portions of your medical record. 10. Click the Download Summary menu link to download a portable copy of your medical information. If you have questions, please visit the Frequently Asked Questions section of the [employer] website. Remember, Stay[employer]ouch is NOT to be used for urgent needs. For medical emergencies, dial 911. Now available from your iPhone and Android! General Information Please provide this summary of care documentation to your next provider. Patient Signature:  ____________________________________________________________ Date:  ____________________________________________________________  
  
Julio Chandra Provider Signature:  ____________________________________________________________ Date:  ____________________________________________________________

## 2017-02-11 NOTE — H&P
[address]  [phone]    Hospitalist Admission Note      NAME:  Mary Nguyen   :   1945   MRN:  [id number]     PCP:  Isaac Quijano MD     Date/Time:  2017 1:12 PM          Subjective:     CHIEF COMPLAINT: leg infection    HISTORY OF PRESENT ILLNESS:     Ms. Belinda Irby is a 80 y.o.  female who presented to the Emergency Department complaining of leg infection. She was sent to ER by her PCP who wanted admission for IV Abx. She had gone to PCP due to worsening pain and redness over days, not responding to local steroids cream  Patient has persistent skin issues, and recurrent Dx of cellulitis, with recurrent Rx of Abx. She picks at her leg skin constantly, due to itching. She meets no SIRS criteria. ER workup shows mildly worse CKD, and she had mild hypoglycemia on presentation. We will admit the patient for management.       Past Medical History   Diagnosis Date    Anxiety and depression     Asthma     CKD stage 4 due to type 2 diabetes mellitus ([facility] [address]     DM type 2 causing renal disease (HCC)     Hyperlipidemia     Hypertension     RAI on CPAP         Past Surgical History   Procedure Laterality Date    Hx cholecystectomy      Hx heent       tonsilectomy    Hx gyn       hysterectomy    Pr breast surgery procedure unlisted       mastectomy, bilat       Social History   Substance Use Topics    Smoking status: Never Smoker    Smokeless tobacco: Not on file    Alcohol use No        Family History   Problem Relation Age of Onset    Hypertension Other     Diabetes Other         Allergies   Allergen Reactions    Latex, Natural Rubber Rash     Per pt, had a reaction to latex gloves when an RN administered lotion     Darvon [Propoxyphene] Itching    Peanut Cough    Pineapple Itching    Sulfa (Sulfonamide Antibiotics) Swelling    Tape [Adhesive] Itching        Prior to Admission medications    Medication Sig Start Date End Date Taking? Authorizing Provider   insulin glargine (TOUJEO SOLOSTAR) 300 unit/mL (1.5 mL) inpn 20 Units by SubCUTAneous route daily. 11/17/16   Cassy Ramirez MD   albuterol-ipratropium (DUO-NEB) 2.5 mg-0.5 mg/3 ml nebu 3 mL by Nebulization route every 4 hours daily while awake resp. 11/17/16   Cassy Ramirez MD   bumetanide (BUMEX) 2 mg tablet Take 1 Tab by mouth two (2) times a day. 11/17/16   Cassy Ramirez MD   docusate sodium (COLACE) 100 mg capsule Take 1 Cap by mouth two (2) times a day for 90 days. 11/17/16 2/15/17  Cassy Ramirez MD   epoetin casey (EPOGEN;PROCRIT) 10,000 unit/mL injection 1 mL by SubCUTAneous route Every Tuesday, Thursday and Saturday. Indications: RENAL DIALYSIS 11/17/16   Cassy Ramirez MD   nystatin (MYCOSTATIN) 100,000 unit/mL suspension Take 10 mL by mouth four (4) times daily. swish and spit 11/17/16   Cassy Ramirez MD   oxyCODONE IR (ROXICODONE) 5 mg immediate release tablet Take 0.5 Tabs by mouth every four (4) hours as needed. Max Daily Amount: 15 mg. 11/17/16   Cassy Ramirez MD   polyethylene glycol [facility]) 17 gram packet Take 1 Packet by mouth daily. 11/17/16   Cassy Ramirez MD   sertraline (ZOLOFT) 25 mg tablet Take 1 Tab by mouth daily. 11/17/16   Cassy Ramirez MD   acetaminophen (TYLENOL) 500 mg tablet Take 1,000 mg by mouth two (2) times daily as needed for Pain. Historical Provider   nystatin (MYCOSTATIN) 500,000 unit tab Take 1 Tab by mouth daily. Historical Provider   HYDROcodone-acetaminophen (NORCO) 5-325 mg per tablet Take 1 Tab by mouth every four (4) hours as needed for Pain. Historical Provider   ALPRAZolam Dorean Floro) 0.25 mg tablet Take 0.25 mg by mouth daily as needed for Anxiety. Historical Provider   carvedilol (COREG) 3.125 mg tablet Take 3.125 mg by mouth two (2) times daily (with meals).     Historical Provider   fluticasone-vilanterol (BREO ELLIPTA) 100-25 mcg/dose inhaler Take 1 Puff by inhalation daily. 8/23/16   Abdon Black MD   calcium-cholecalciferol, D3, (CALTRATE 600+D) tablet Take 1 Tab by mouth two (2) times a day. Historical Provider   fenofibrate nanocrystallized (TRICOR) 145 mg tablet Take 145 mg by mouth daily. Historical Provider   loratadine (CLARITIN) 10 mg tablet Take 10 mg by mouth daily. Historical Provider   Omeprazole delayed release (PRILOSEC D/R) 20 mg tablet Take 20 mg by mouth daily. Historical Provider   triamcinolone acetonide (KENALOG) 0.1 % ointment Apply  to affected area daily as needed. use thin layer on affected areas     Historical Provider   calcium polycarbophil (FIBER LAXATIVE, CA POLYCARBO,) 625 mg tablet Take 625 mg by mouth daily. Historical Provider   atorvastatin (LIPITOR) 40 mg tablet Take 40 mg by mouth nightly. Ruben Romero MD   insulin regular (HUMULIN R) 100 unit/mL injection 6 Units by SubCUTAneous route Daily (before breakfast). Ruben Romero MD   nystatin-triamcinolone (MYCOLOG) 100,000-0.1 unit/gram-% ointment Apply  to affected area daily as needed. Ruben Romero MD   ergocalciferol (ERGOCALCIFEROL) 50,000 unit capsule Take 50,000 Units by mouth every month. Ruben Romero MD   aspirin 81 mg chewable tablet Take 81 mg by mouth daily. Ruben Romero MD   senna-docusate (SENNA-C) 8.6-50 mg per tablet Take 1 Tab by mouth daily. Ruben Romero MD   albuterol (PROVENTIL HFA, VENTOLIN HFA) 90 mcg/actuation inhaler Take 2 Puffs by inhalation every four (4) hours as needed for Wheezing.     Ruben Romero MD       Review of Systems:  (bold if positive, if negative)    Gen:  fatigueEyes:  ENT:  CVS:  Pulm:  GI:    :    MS:  arthritisSkin:  Rash, erythema,woundPsych:  Endo:    Hem:  Renal:    Neuro:        Objective:      VITALS:    Vital signs reviewed; most recent are:    Visit Vitals    /53    Pulse 76    Temp 97.5 °F (36.4 °C)    Resp 16    Ht 5' 1\" (1.549 m)    Wt 126.6 kg (279 lb)    SpO2 98%    BMI 52.72 kg/m2     SpO2 Readings from Last 6 Encounters:   02/11/17 98%   11/17/16 97%   08/23/16 96%   01/02/14 95%        No intake or output data in the 24 hours ending 02/11/17 1312     Exam:     Physical Exam:    Gen: Morbidly obese, in no acute distress  HEENT:  Pink conjunctivae, PERRL, hearing intact to voice, moist mucous membranes  Neck:  Supple, without masses, thyroid non-tender  Resp:  No accessory muscle use, clear breath sounds without wheezes rales or rhonchi  Card:  No murmurs, normal S1, S2 without thrills, bruits or peripheral edema  Abd:  Soft, non-tender, non-distended, normoactive bowel sounds are present, no mass  Lymph:  No cervical or inguinal adenopathy  Musc:  No cyanosis or clubbing  Skin:  LLE redness into thigh, chronic lymphedema changes, multiple excoriations, skin turgor is good  Neuro:  Cranial nerves are grossly intact, general motor weakness, follows commands appropriately  Psych:  Good insight, oriented to person, place and time, alert     Labs:    Recent Labs      02/11/17   1117   WBC  9.0   HGB  9.2*   HCT  30.3*   PLT  277     Recent Labs      02/11/17   1117   NA  140   K  3.8   CL  101   CO2  31   GLU  74   BUN  89*   CREA  3.19*   CA  9.0   ALB  2.7*   TBILI  0.3   SGOT  26   ALT  26     Lab Results   Component Value Date/Time    Glucose (POC) 121 02/11/2017 01:06 PM    Glucose (POC) 39 02/11/2017 12:44 PM     No results for input(s): PH, PCO2, PO2, HCO3, FIO2 in the last 72 hours. No results for input(s): INR in the last 72 hours. No lab exists for component: INREXT  All Micro Results     Procedure Component Value Units Date/Time    CULTURE, MRSA [[id number]]     Order Status:  Sent Specimen:  Nares     CULTURE, BLOOD [[id number]] Collected:  02/11/17 1117    Order Status:  Completed Specimen:  Blood from Blood Updated:  02/11/17 1150          I have reviewed previous records       Assessment and Plan:      Cellulitis, LLE - POA, recurrent.  She meets no SIRS criteria at all. May have started with excoriations due to scratching herself. Difficult to tell if really cellulitis at all. May just reflect skin irritation from chronic edema, steroids, etc.  For now, empiric Vancomycin and levaquin. Wound care consult. Acute renal failure/ CKD (chronic kidney disease) stage 4 - Mildly worse Cr and BUN. For now, with edema, no IVF, but I will hold her bumex and not restrict fluid. Consult renal.    DM type 2, uncontrolled, with renal complications / Hypoglycemia - Low BG on presentation. Hold mealtime scheduled insulin, but only give SSI. Diabetic diet and counseling. SSI per protocol. Continue home Lantus. Check A1c. RAI on CPAP - Continue home CPAP. Oxygen if needed    Hypertension - Continue low dose coreg    Iron deficiency anemia - Renal to manage EPO. Monitor. Chronic bilateral low back pain without sciatica - Continue tylenol or prn norco, with laxatives. NO NSAIDS. Generalized anxiety disorder / Anxiety and depression - Continue zoloft and prn xanax. Asthma - Per HX. Not sure if true asthma or COPD, rather may reflect obesity and reflux. Continue usual Breo, claritin, and prn duonebs.     Hyperlipidemia - Continue tricor and atorvastatin       Telemetry reviewed:   normal sinus rhythm    Risk of deterioration: high      Total time spent with patient: 80 [address] discussed with: Patient, Family, Nursing Staff, Consultant/Specialist and >50% of time spent in counseling and coordination of care    Discussed:  Care Plan       ___________________________________________________    Attending Physician: Chidi Larose MD

## 2017-02-11 NOTE — ED PROVIDER NOTES
HPI Comments: 80 y.o. female with past medical history significant for HTN, DM, asthma, sleep apnea, sepsis, bilateral mastectomy d/t breast CA, tonsillectomy, cholecystectomy, hysterectomy, and  (x2) who presents from home with chief complaint of skin problem. Pt reports to the ED c/o L-leg redness with accompanying swelling and pain that has been more intense over the past week. Pt has also been experiencing vomiting and fever. Pt was seen by her PCP this morning who advised her to visit the ED. Pt's last course of abx was ~6 weeks ago. Pt has Hx of kidney problems d/t diabetes. Pt is allergic to Sulfa. Pt has no pertinent negative symptoms. There are no other acute medical concerns at this time. PCP: Davina Johnson MD    Note written by Kelise Dean, as dictated by Heath Seip, MD 10:50 AM      The history is provided by the patient and the spouse. Past Medical History:   Diagnosis Date    Asthma     Diabetes ([facility] [address]     Hypertension     Sleep disorder      sleep apnea       Past Surgical History:   Procedure Laterality Date    Hx cholecystectomy      Hx heent       tonsilectomy    Hx gyn       hysterectomy    Pr breast surgery procedure unlisted       mastectomy, bilat         Family History:   Problem Relation Age of Onset    Hypertension Other     Diabetes Other        Social History     Social History    Marital status:      Spouse name: N/A    Number of children: N/A    Years of education: N/A     Occupational History    Not on file. Social History Main Topics    Smoking status: Never Smoker    Smokeless tobacco: Not on file    Alcohol use No    Drug use: No    Sexual activity: Not on file     Other Topics Concern    Not on file     Social History Narrative         ALLERGIES: Latex, natural rubber; Darvon [propoxyphene];  Peanut; Pineapple; Sulfa (sulfonamide antibiotics); and Tape [adhesive]    Review of Systems   Constitutional: Positive for fever. Negative for chills. HENT: Negative for ear pain and sore throat. Eyes: Negative for photophobia and pain. Respiratory: Negative for chest tightness and shortness of breath. Cardiovascular: Positive for leg swelling. Negative for chest pain. Gastrointestinal: Positive for vomiting. Negative for abdominal pain and nausea. Genitourinary: Negative for dysuria and flank pain. Musculoskeletal: Negative for back pain and neck pain. Skin: Negative for rash and wound. Neurological: Negative for dizziness, light-headedness and headaches. All other systems reviewed and are negative. Vitals:    02/11/17 1034   BP: 134/60   Pulse: 76   Resp: 16   Temp: 97.5 °F (36.4 °C)   SpO2: 100%   Weight: 126.6 kg (279 lb)   Height: 5' 1\" (1.549 m)            Physical Exam   Constitutional: She is oriented to person, place, and time. She appears well-developed and well-nourished. She appears distressed. HENT:   Head: Normocephalic and atraumatic. Mouth/Throat: Oropharynx is clear and moist.   Eyes: Conjunctivae and EOM are normal.   Neck: Normal range of motion. Cardiovascular: Normal rate, regular rhythm, normal heart sounds and intact distal pulses. No murmur heard. Pulmonary/Chest: Effort normal and breath sounds normal. No stridor. No respiratory distress. Abdominal: Soft. Bowel sounds are normal. There is no tenderness. Musculoskeletal: Normal range of motion. She exhibits edema. She exhibits no deformity. Left lower leg: She exhibits tenderness, swelling and edema. Legs:  Neurological: She is alert and oriented to person, place, and time. No cranial nerve deficit. Skin: Skin is warm and dry. She is not diaphoretic. There is erythema. Multiple excoriations of the lower legs, left leg and foot with erythema   Psychiatric: She has a normal mood and affect. Nursing note and vitals reviewed.        MDM  Number of Diagnoses or Management Options  Acute on chronic kidney failure [facility]):   Cellulitis of left lower extremity:   Hypoglycemia:   Diagnosis management comments: Left leg with cellulitis - check labs and give IV ABX and re-eval.    1300 - admit to hospitalist for further care       Amount and/or Complexity of Data Reviewed  Clinical lab tests: ordered and reviewed    Patient Progress  Patient progress: improved    ED Course       Procedures    CONSULT NOTE:  1:02 PM Jose Armando Her MD spoke with Dr. Tasha Rosa, Consult for Hospitalist.  Discussed available diagnostic tests and clinical findings. He is in agreement with care plans as outlined. Dr. Tasha Rosa will see pt and evaluate for admission.

## 2017-02-11 NOTE — PROGRESS NOTES
[facility]: MED RECONCILIATION    Comments/Recommendations:    Patient is awake, alert, and spouse is knowledgeable about home medications     Verifies allergies   Reports compliance to prescribed regimen   Pharmacy: [facility], Mail order: [facility]   Blood sugar  o Morning fasting 100  o Pre meal 150 - 200   Blood pressure  o Checked regularly at home  o BP range 130/60  to 140/70   Recent changes to home medication regimen  o Toujeo recently reduced from 30 units daily to 26 units daily for low blood sugar  o On Tuesday the Nephrologist stopped Torsemide    Nystatin 500,000 unit tablet is an ongoing prescription for a chronic problem with yeast   Tuojeo (Lantus 300unit/ml) is non-formulary the pharmacy carries Lantus 100units/ml.  To convert Toujeo(R) to Lantus(R): Initiate at 80% of the Toujeo(R) dose   Medications added:     · Amlodipine  · Doxazosin  · Losartan   · Senna/plus    Medications removed:    · Duo-Neb prn  · Docusate  · Epogen  · Hydrocodone/apap  · Nystatin suspension  · Oxycodone  · Sertraline    Medications adjusted:    · Alprazolam changed from prn to scheduled  · Toujeo changed from 20 units daily to 30 units daily  · Mycolog changed from prn to daily    Information obtained from: spouse and medication list    Significant PMH/Disease States:   Patient Active Problem List   Diagnosis Code    Acute renal failure (ARF) ([facility] [address] N17.9    CKD (chronic kidney disease) stage 4, GFR 15-29 ml/min ([facility]) N18.4    Hypertension I10    DM type 2, uncontrolled, with renal complications ([facility]) F18.33, E11.65    Iron deficiency anemia D50.9    Chronic bilateral low back pain without sciatica M54.5, G89.29    Generalized anxiety disorder F41.1    Cellulitis L03.90    Asthma J45.909    Hyperlipidemia E78.5    RAI on CPAP G47.33    Anxiety and depression F41.9, F32.9    Hypoglycemia E16.2     Past Medical History   Diagnosis Date    Anxiety and depression     Asthma     CKD stage 4 due to type 2 diabetes mellitus ([facility] [address]     DM type 2 causing renal disease ([facility] [address]     Hyperlipidemia     Hypertension     RAI on CPAP      Chief Complaint for this Admission:   Chief Complaint   Patient presents with    Skin Problem    Vomiting    Fever     Allergies: Latex, natural rubber; Darvon [propoxyphene]; Peanut; Pineapple; Sulfa (sulfonamide antibiotics); and Tape [adhesive]    Prior to Admission Medications:     Medication Documentation Review Audit       Reviewed by Dion Chacko PHARMD (Pharmacist) on 02/11/17 at 1522         Medication Sig Documenting Provider Last Dose Status Taking?      acetaminophen (TYLENOL) 500 mg tablet Take 1,000 mg by mouth two (2) times daily as needed for Pain. Historical Provider 2/11/2017 Unknown time Active Yes    albuterol (PROVENTIL HFA, VENTOLIN HFA) 90 mcg/actuation inhaler Take 2 Puffs by inhalation every four (4) hours as needed for Wheezing. Ruben Romero MD  Active Yes    ALPRAZolam (XANAX) 0.25 mg tablet Take 0.25 mg by mouth two (2) times a day. Historical Provider 2/11/2017 Unknown time Active Yes    amLODIPine (NORVASC) 5 mg tablet Take 5 mg by mouth daily. Historical Provider 2/11/2017 Unknown time Active Yes    aspirin 81 mg chewable tablet Take 81 mg by mouth daily. Ruben Romero MD 2/11/2017 Unknown time Active Yes    atorvastatin (LIPITOR) 40 mg tablet Take 40 mg by mouth nightly. Ruben Romero MD 2/10/2017 Unknown time Active Yes    bumetanide (BUMEX) 2 mg tablet Take 1 Tab by mouth two (2) times a day. Abdon Black MD 2/11/2017 Unknown time Active Yes    calcium polycarbophil (FIBER LAXATIVE, CA POLYCARBO,) 625 mg tablet Take 625 mg by mouth daily. Historical Provider 2/11/2017 Unknown time Active Yes    calcium-cholecalciferol, D3, (CALTRATE 600+D) tablet Take 1 Tab by mouth two (2) times a day. Historical Provider 2/11/2017 Unknown time Active Yes    carvedilol (COREG) 3.125 mg tablet Take 3.125 mg by mouth two (2) times daily (with meals). Historical Provider 2/11/2017 Unknown time Active Yes    doxazosin (CARDURA) 2 mg tablet Take 2 mg by mouth daily. Historical Provider 2/11/2017 Unknown time Active Yes    ergocalciferol (ERGOCALCIFEROL) 50,000 unit capsule Take 50,000 Units by mouth every month. Ruben Romero MD 2/1/2017 Unknown time Active Yes    fenofibrate nanocrystallized (TRICOR) 145 mg tablet Take 145 mg by mouth daily. Historical Provider 2/11/2017 Unknown time Active Yes    fluticasone-vilanterol (BREO ELLIPTA) 100-25 mcg/dose inhaler Take 1 Puff by inhalation daily. Win John MD 2/11/2017 Unknown time Active Yes    insulin glargine (TOUJEO SOLOSTAR) 300 unit/mL (1.5 mL) inpn 26 Units by SubCUTAneous route daily. Historical Provider 2/11/2017 Unknown time Active Yes    insulin regular (HUMULIN R) 100 unit/mL injection 6 Units by SubCUTAneous route Before breakfast and dinner. Ruben Romero MD 2/11/2017 Unknown time Active Yes    loratadine (CLARITIN) 10 mg tablet Take 10 mg by mouth daily. Historical Provider 2/11/2017 Unknown time Active Yes    losartan (COZAAR) 25 mg tablet Take 25 mg by mouth daily. Historical Provider 2/11/2017 Unknown time Active Yes    multivitamin (ONE A DAY) tablet Take 1 Tab by mouth daily. Historical Provider 2/11/2017 Unknown time Active Yes    nystatin (MYCOSTATIN) 500,000 unit tab Take 1 Tab by mouth daily. Historical Provider 2/11/2017 Unknown time Active Yes             Med Timmy Hairston Nov 3, 2016 10:53 AM): .      nystatin-triamcinolone (MYCOLOG) 100,000-0.1 unit/gram-% ointment Apply  to affected area daily. Apply to sores Ruben Romero MD 2/11/2017 Unknown time Active Yes    Omeprazole delayed release (PRILOSEC D/R) 20 mg tablet Take 20 mg by mouth daily. Historical Provider 2/11/2017 Unknown time Active Yes    senna-docusate (SENNA-C) 8.6-50 mg per tablet Take 2 Tabs by mouth every evening.  Ruben Romero MD 2/10/2017 Unknown time Active Yes    triamcinolone acetonide (KENALOG) 0.1 % ointment Apply  to affected area daily as needed.  use thin layer on affected areas  Historical Provider 2/10/2017 Unknown time Active Yes                  Thank you,    Mary Garrett, PharmD, BCPS

## 2017-02-11 NOTE — ED NOTES
TRANSFER - OUT REPORT:    Verbal report given to Sharla SANDY (name) on Elio Sena  being transferred to 5th floor (unit) for routine progression of care       Report consisted of patients Situation, Background, Assessment and   Recommendations(SBAR). Information from the following report(s) SBAR, ED Summary and MAR was reviewed with the receiving nurse. Lines:   Triple Lumen RT IJ TLC 11/08/16 Right Neck (Active)       Peripheral IV 02/11/17 Right Antecubital (Active)   Site Assessment Clean, dry, & intact 2/11/2017 11:18 AM   Phlebitis Assessment 0 2/11/2017 11:18 AM   Infiltration Assessment 0 2/11/2017 11:18 AM   Dressing Status Clean, dry, & intact 2/11/2017 11:18 AM   Dressing Type Tape;Transparent 2/11/2017 11:18 AM   Hub Color/Line Status Blue 2/11/2017 11:18 AM        Opportunity for questions and clarification was provided.       Patient transported with:   Miroslava Yoo, transport

## 2017-02-11 NOTE — PROGRESS NOTES
[facility] Dosing Services: Antimicrobial Stewardship Progress Note    Consult for antibiotic dosing of Vancomycin by Dr. Graham Pollard. Levofloxacin - dose adjusted per P&T renal protocol. Pharmacist reviewed antibiotic appropriateness for 80year old , female  for indication of cellulitis  Day of Therapy: 1    Plan:  Vancomycin therapy:  Start Vancomycin therapy, with loading dose of 2,500 mg IV - administered at 1154 on 2/11/2017. Maintenance dosing will be based on levels per protocol for patient with CrCl less than 30 mL/min. Dose calculated to approximate a therapeutic trough of 10-15 mcg/mL. Plan for level: Random level ordered to be drawn at 1200 on 2/12/2017. Pharmacist will follow daily and will make changes to dose and/or frequency based on clinical status. Non-Kinetic Antimicrobial Dosing:   Assessment/Plan: CrCl 20.5 mL/min. Begin Levofloxacin 750 mg IV every 48 hours per protocol for patient with CrCl 20-49 mL/min. Serum Creatinine     Lab Results   Component Value Date/Time    Creatinine 3.19 02/11/2017 11:17 AM       Creatinine Clearance Estimated Creatinine Clearance: 20.5 mL/min (based on Cr of 3.19).      Temp   97.5 °F (36.4 °C)    WBC   Lab Results   Component Value Date/Time    WBC 9.0 02/11/2017 11:17 AM       H/H   Lab Results   Component Value Date/Time    HGB 9.2 02/11/2017 11:17 AM        Platelets   Lab Results   Component Value Date/Time    PLATELET 836 10/93/9643 11:17 AM      Mikal De La Cruz, Pharmacist

## 2017-02-11 NOTE — IP AVS SNAPSHOT
Current Discharge Medication List  
  
Take these medications at their scheduled times Dose & Instructions Dispensing Information Comments Morning Noon Evening Bedtime ALPRAZolam 0.25 mg tablet Commonly known as:  Leanna Whatleyin Your next dose is: Today, Tomorrow Other:  ____________ Dose:  0.25 mg Take 0.25 mg by mouth two (2) times a day. Refills:  0  
     
   
   
   
  
 amLODIPine 5 mg tablet Commonly known as:  Arlyss Erick Your next dose is: Today, Tomorrow Other:  ____________ Dose:  5 mg Take 5 mg by mouth daily. Refills:  0  
     
   
   
   
  
 aspirin 81 mg chewable tablet Your next dose is: Today, Tomorrow Other:  ____________ Dose:  81 mg Take 81 mg by mouth daily. Refills:  0  
     
   
   
   
  
 atorvastatin 40 mg tablet Commonly known as:  LIPITOR Your next dose is: Today, Tomorrow Other:  ____________ Dose:  40 mg Take 40 mg by mouth nightly. Refills:  0  
     
   
   
   
  
 bumetanide 2 mg tablet Commonly known as:  Marylen Baas Your next dose is: Today, Tomorrow Other:  ____________ Dose:  2 mg Take 1 Tab by mouth two (2) times a day. Quantity:  100 Tab Refills:  0  
     
   
   
   
  
 calcium-cholecalciferol (D3) tablet Commonly known as:  CALTRATE 600+D Your next dose is: Today, Tomorrow Other:  ____________ Dose:  1 Tab Take 1 Tab by mouth two (2) times a day. Refills:  0 COREG 3.125 mg tablet Generic drug:  carvedilol Your next dose is: Today, Tomorrow Other:  ____________ Dose:  3.125 mg Take 3.125 mg by mouth two (2) times daily (with meals). Refills:  0  
     
   
   
   
  
 doxazosin 2 mg tablet Commonly known as:  CARDURA Your next dose is: Today, Tomorrow Other:  ____________ Dose:  2 mg Take 2 mg by mouth daily. Refills:  0  
     
   
   
   
  
 ergocalciferol 50,000 unit capsule Commonly known as:  ERGOCALCIFEROL Your next dose is: Today, Tomorrow Other:  ____________ Dose:  20434 Units Take 50,000 Units by mouth every month. Refills:  0  
     
   
   
   
  
 fenofibrate nanocrystallized 145 mg tablet Commonly known as:  [employer] Your next dose is: Today, Tomorrow Other:  ____________ Dose:  145 mg Take 145 mg by mouth daily. Refills:  0 FIBER LAXATIVE (CA POLYCARBO) 625 mg tablet Generic drug:  calcium polycarbophil Your next dose is: Today, Tomorrow Other:  ____________ Dose:  625 mg Take 625 mg by mouth daily. Refills:  0  
     
   
   
   
  
 fluticasone-vilanterol 100-25 mcg/dose inhaler Commonly known as:  BREO ELLIPTA Your next dose is: Today, Tomorrow Other:  ____________ Dose:  1 Puff Take 1 Puff by inhalation daily. Quantity:  1 Inhaler Refills:  0 HumuLIN R U-100 100 unit/mL injection Generic drug:  insulin regular Your next dose is: Today, Tomorrow Other:  ____________ Dose:  6 Units 6 Units by SubCUTAneous route Before breakfast and dinner. Refills:  0  
     
   
   
   
  
 levoFLOXacin 500 mg tablet Commonly known as:  Namita Weber Your next dose is: Today, Tomorrow Other:  ____________ Dose:  500 mg Take 1 Tab by mouth daily for 7 days. Quantity:  7 Tab Refills:  0  
     
   
   
   
  
 loratadine 10 mg tablet Commonly known as:  Amber Cecy Your next dose is: Today, Tomorrow Other:  ____________ Dose:  10 mg Take 10 mg by mouth daily. Refills:  0  
     
   
   
   
  
 losartan 25 mg tablet Commonly known as:  COZAAR Your next dose is: Today, Tomorrow Other:  ____________  Dose:  25 mg  
 Take 25 mg by mouth daily. Refills:  0  
     
   
   
   
  
 multivitamin tablet Commonly known as:  ONE A DAY Your next dose is: Today, Tomorrow Other:  ____________ Dose:  1 Tab Take 1 Tab by mouth daily. Refills:  0  
     
   
   
   
  
 mupirocin 2 % ointment Commonly known as:  [employer] Your next dose is: Today, Tomorrow Other:  ____________ Apply  to affected area three (3) times daily. Quantity:  66 g Refills:  3  
     
   
   
   
  
 nystatin 500,000 unit Tab Commonly known as:  MYCOSTATIN Your next dose is: Today, Tomorrow Other:  ____________ Dose:  1 Tab Take 1 Tab by mouth daily. Refills:  0  
     
   
   
   
  
 nystatin-triamcinolone 100,000-0.1 unit/gram-% ointment Commonly known as:  Bulverde Sicks Your next dose is: Today, Tomorrow Other:  ____________ Apply  to affected area daily. Apply to sores Refills:  0 Omeprazole delayed release 20 mg tablet Commonly known as:  PRILOSEC D/R Your next dose is: Today, Tomorrow Other:  ____________ Dose:  20 mg Take 20 mg by mouth daily. Refills:  0 Senna-C 8.6-50 mg per tablet Generic drug:  senna-docusate Your next dose is: Today, Tomorrow Other:  ____________ Dose:  2 Tab Take 2 Tabs by mouth every evening. Refills:  0  
     
   
   
   
  
 TOUJEO SOLOSTAR 300 unit/mL (1.5 mL) Inpn Generic drug:  insulin glargine Your next dose is: Today, Tomorrow Other:  ____________ Dose:  26 Units 26 Units by SubCUTAneous route daily. Refills:  0 Take these medications as needed Dose & Instructions Dispensing Information Comments Morning Noon Evening Bedtime  
 acetaminophen 500 mg tablet Commonly known as:  TYLENOL Your next dose is: Today, Tomorrow Other:  ____________ Dose:  1000 mg Take 1,000 mg by mouth two (2) times daily as needed for Pain. Refills:  0  
     
   
   
   
  
 albuterol 90 mcg/actuation inhaler Commonly known as:  PROVENTIL HFA, VENTOLIN HFA, PROAIR HFA Your next dose is: Today, Tomorrow Other:  ____________ Dose:  2 Puff Take 2 Puffs by inhalation every four (4) hours as needed for Wheezing. Refills:  0  
     
   
   
   
  
 triamcinolone acetonide 0.1 % ointment Commonly known as:  KENALOG Your next dose is: Today, Tomorrow Other:  ____________ Apply  to affected area daily as needed. use thin layer on affected areas Refills:  0 Where to Get Your Medications Information about where to get these medications is not yet available ! Ask your nurse or doctor about these medications  
  levoFLOXacin 500 mg tablet  
 mupirocin 2 % ointment

## 2017-02-11 NOTE — PROGRESS NOTES
TRANSFER - IN REPORT:    Verbal report received from [facility] REHAB MEDICINE, [facility] (name) on Karmen Rios  being received from ED (unit) for routine progression of care      Report consisted of patients Situation, Background, Assessment and   Recommendations(SBAR). Information from the following report(s) SBAR, Kardex, ED Summary, Procedure Summary, Intake/Output, MAR, Accordion, Recent Results and Med Rec Status was reviewed with the receiving nurse. Opportunity for questions and clarification was provided. Per transferring RN, patients antibiotics are behind schedule due to patients IV. Upon transfer, antibiotics were distally occluded. Patients arm situated and arm guard placed on patient. Will continue to administer antibiotics in order of schedule. Per patient she ambulates at home with walker. She stated, \"i am too weak to walk here and get back and forth to the bathroom and I won't use a bed pan. Put a diaper and one of those blue pads underneath me\". Patient and family educated on importance of keeping patient dry to decrease risk of skin breakdown. Patient placed on mobility team to be turned q 2 hours as preventative measure. Wound Care previously consulted. Primary Nurse Irene Bahena RN and Héctor Good RN performed a dual skin assessment on this patient Impairment noted- see wound doc flow sheet  Several open sores on WHOLE BODY.   Patient states she \"itches because of the kidney disease\"'; sacral redness; small open area on patient R gluteus;  L- lower and upper leg cellulitis; abdominal/pannus rash  Luis Felipe score is 14

## 2017-02-11 NOTE — PROGRESS NOTES
[facility] Dosing Services    Pharmacist Dosing Progress Note    Toujeo insulin was changed to Lantus (Insulin glargine) per [facility] P[facility]T Committee Protocol, with respect to renal function. Assessment/Plan: Consult provided for this  80 y.o. , female. Per medication reconciliation, patient takes Toujeo 26 units SC daily. Pharmacist substituted Lantus at ~80% of Toujeo dose. Order entered for Lantus 20 units SC daily. Pt Weight:   Wt Readings from Last 1 Encounters:   02/11/17 126.6 kg (279 lb)     New Regimen Lantus 20 units SC daily   Previous Regimen Toujeo 26 units SC daily   Serum Creatinine Lab Results   Component Value Date/Time    Creatinine 3.19 02/11/2017 11:17 AM       Creatinine Clearance Estimated Creatinine Clearance: 20.5 mL/min (based on Cr of 3.19).    BUN Lab Results   Component Value Date/Time    BUN 89 02/11/2017 11:17 AM       Elina Kassy, Pharmacist

## 2017-02-11 NOTE — PROGRESS NOTES
1619  Patient in bed stating, \"I think my sugar has dropped again\". Patient alert and oriented with no signs or symptoms of low blood sugar. Patients blood sugar 49. Per protocol, blood sugar immediately retaken and BS 48. Per orders 50 ml of D50 given. Dr. Tory Jon called. Orders received to hold 1600 insulin dose, cancel Lantus order, check blood sugars q 4 hours and start patient on D5 NS @ 50 ml/hour. 1637  Patients blood sugar 145.

## 2017-02-12 LAB
ANION GAP BLD CALC-SCNC: 7 MMOL/L (ref 5–15)
BACTERIA SPEC CULT: NORMAL
BACTERIA SPEC CULT: NORMAL
BUN SERPL-MCNC: 84 MG/DL (ref 6–20)
BUN/CREAT SERPL: 27 (ref 12–20)
CALCIUM SERPL-MCNC: 8.3 MG/DL (ref 8.5–10.1)
CHLORIDE SERPL-SCNC: 101 MMOL/L (ref 97–108)
CO2 SERPL-SCNC: 28 MMOL/L (ref 21–32)
CREAT SERPL-MCNC: 3.11 MG/DL (ref 0.55–1.02)
ERYTHROCYTE [DISTWIDTH] IN BLOOD BY AUTOMATED COUNT: 15 % (ref 11.5–14.5)
EST. AVERAGE GLUCOSE BLD GHB EST-MCNC: 160 MG/DL
GLUCOSE BLD STRIP.AUTO-MCNC: 177 MG/DL (ref 65–100)
GLUCOSE BLD STRIP.AUTO-MCNC: 195 MG/DL (ref 65–100)
GLUCOSE BLD STRIP.AUTO-MCNC: 218 MG/DL (ref 65–100)
GLUCOSE BLD STRIP.AUTO-MCNC: 222 MG/DL (ref 65–100)
GLUCOSE BLD STRIP.AUTO-MCNC: 256 MG/DL (ref 65–100)
GLUCOSE SERPL-MCNC: 165 MG/DL (ref 65–100)
HBA1C MFR BLD: 7.2 % (ref 4.2–6.3)
HCT VFR BLD AUTO: 26 % (ref 35–47)
HGB BLD-MCNC: 8.2 G/DL (ref 11.5–16)
MAGNESIUM SERPL-MCNC: 1.8 MG/DL (ref 1.6–2.4)
MCH RBC QN AUTO: 28.2 PG (ref 26–34)
MCHC RBC AUTO-ENTMCNC: 31.5 G/DL (ref 30–36.5)
MCV RBC AUTO: 89.3 FL (ref 80–99)
PHOSPHATE SERPL-MCNC: 3.3 MG/DL (ref 2.6–4.7)
PLATELET # BLD AUTO: 278 K/UL (ref 150–400)
POTASSIUM SERPL-SCNC: 4.2 MMOL/L (ref 3.5–5.1)
RBC # BLD AUTO: 2.91 M/UL (ref 3.8–5.2)
SERVICE CMNT-IMP: ABNORMAL
SERVICE CMNT-IMP: NORMAL
SODIUM SERPL-SCNC: 136 MMOL/L (ref 136–145)
VANCOMYCIN SERPL-MCNC: 21.6 UG/ML
WBC # BLD AUTO: 7.9 K/UL (ref 3.6–11)

## 2017-02-12 PROCEDURE — 84100 ASSAY OF PHOSPHORUS: CPT | Performed by: INTERNAL MEDICINE

## 2017-02-12 PROCEDURE — 74011636637 HC RX REV CODE- 636/637: Performed by: INTERNAL MEDICINE

## 2017-02-12 PROCEDURE — 97116 GAIT TRAINING THERAPY: CPT

## 2017-02-12 PROCEDURE — 74011250637 HC RX REV CODE- 250/637: Performed by: INTERNAL MEDICINE

## 2017-02-12 PROCEDURE — 97161 PT EVAL LOW COMPLEX 20 MIN: CPT

## 2017-02-12 PROCEDURE — 80202 ASSAY OF VANCOMYCIN: CPT | Performed by: INTERNAL MEDICINE

## 2017-02-12 PROCEDURE — 82962 GLUCOSE BLOOD TEST: CPT

## 2017-02-12 PROCEDURE — 74011636637 HC RX REV CODE- 636/637: Performed by: FAMILY MEDICINE

## 2017-02-12 PROCEDURE — 85027 COMPLETE CBC AUTOMATED: CPT | Performed by: INTERNAL MEDICINE

## 2017-02-12 PROCEDURE — 93971 EXTREMITY STUDY: CPT

## 2017-02-12 PROCEDURE — 74011250636 HC RX REV CODE- 250/636: Performed by: INTERNAL MEDICINE

## 2017-02-12 PROCEDURE — 80048 BASIC METABOLIC PNL TOTAL CA: CPT | Performed by: INTERNAL MEDICINE

## 2017-02-12 PROCEDURE — 65270000029 HC RM PRIVATE

## 2017-02-12 PROCEDURE — 83735 ASSAY OF MAGNESIUM: CPT | Performed by: INTERNAL MEDICINE

## 2017-02-12 PROCEDURE — 36415 COLL VENOUS BLD VENIPUNCTURE: CPT | Performed by: INTERNAL MEDICINE

## 2017-02-12 RX ORDER — INSULIN GLARGINE 100 [IU]/ML
10 INJECTION, SOLUTION SUBCUTANEOUS
Status: DISCONTINUED | OUTPATIENT
Start: 2017-02-12 | End: 2017-02-14 | Stop reason: HOSPADM

## 2017-02-12 RX ADMIN — HEPARIN SODIUM 5000 UNITS: 5000 INJECTION, SOLUTION INTRAVENOUS; SUBCUTANEOUS at 15:00

## 2017-02-12 RX ADMIN — ASPIRIN 81 MG CHEWABLE TABLET 81 MG: 81 TABLET CHEWABLE at 09:27

## 2017-02-12 RX ADMIN — HEPARIN SODIUM 5000 UNITS: 5000 INJECTION, SOLUTION INTRAVENOUS; SUBCUTANEOUS at 06:40

## 2017-02-12 RX ADMIN — CARVEDILOL 3.12 MG: 3.12 TABLET, FILM COATED ORAL at 18:11

## 2017-02-12 RX ADMIN — CARVEDILOL 3.12 MG: 3.12 TABLET, FILM COATED ORAL at 09:27

## 2017-02-12 RX ADMIN — Medication 2 TABLET: at 09:27

## 2017-02-12 RX ADMIN — INSULIN LISPRO 4 UNITS: 100 INJECTION, SOLUTION INTRAVENOUS; SUBCUTANEOUS at 12:29

## 2017-02-12 RX ADMIN — FLUTICASONE FUROATE AND VILANTEROL TRIFENATATE 1 PUFF: 100; 25 POWDER RESPIRATORY (INHALATION) at 09:27

## 2017-02-12 RX ADMIN — HEPARIN SODIUM 5000 UNITS: 5000 INJECTION, SOLUTION INTRAVENOUS; SUBCUTANEOUS at 22:25

## 2017-02-12 RX ADMIN — Medication 10 ML: at 09:31

## 2017-02-12 RX ADMIN — Medication 10 ML: at 21:51

## 2017-02-12 RX ADMIN — FENOFIBRATE 145 MG: 145 TABLET ORAL at 09:27

## 2017-02-12 RX ADMIN — INSULIN LISPRO 7 UNITS: 100 INJECTION, SOLUTION INTRAVENOUS; SUBCUTANEOUS at 18:10

## 2017-02-12 RX ADMIN — Medication 10 ML: at 15:01

## 2017-02-12 RX ADMIN — INSULIN GLARGINE 10 UNITS: 100 INJECTION, SOLUTION SUBCUTANEOUS at 22:25

## 2017-02-12 RX ADMIN — INSULIN LISPRO 2 UNITS: 100 INJECTION, SOLUTION INTRAVENOUS; SUBCUTANEOUS at 21:50

## 2017-02-12 RX ADMIN — PANTOPRAZOLE SODIUM 40 MG: 40 TABLET, DELAYED RELEASE ORAL at 06:40

## 2017-02-12 RX ADMIN — ATORVASTATIN CALCIUM 40 MG: 10 TABLET, FILM COATED ORAL at 21:50

## 2017-02-12 NOTE — PROGRESS NOTES
Problem: Mobility Impaired (Adult and Pediatric)  Goal: *Therapy Goal (Edit Goal, Insert Text)  Physical Therapy Goals  Initiated 2/12/2017  1. Patient will move from supine to sit and sit to supine , scoot up and down and roll side to side in bed with modified independence within 3-5 day(s). 2. Patient will transfer from bed to chair and chair to bed with modified independence using the least restrictive device within 3-5 day(s). 3. Patient will perform sit to stand with modified independence within 3-5 day(s). 4. Patient will ambulate with modified independence for 150 feet with the least restrictive device within 3-5 day(s). PHYSICAL THERAPY EVALUATION  Patient: Jeffry Mascorro (33 y.o. female)  Date: 2/12/2017  Primary Diagnosis: Cellulitis        Precautions: fall, L LE cellulitis, contact         ASSESSMENT :  Based on the objective data described below, the patient presents with L LE cellulitis with decreased functional mobility due to above illness. PLOF mod I with RW, spouse assisting her, CPAP at night. Patient had just finished HHPT prior to this admission. Patient lives in [facility] home with ramp entry and lives on 1st floor. Patient presents today with multiple wounds on UE/LE's due to \"picking\" and L LE with erythema from ankle towards groin. Patient c/o constant itching of the skin and has had topical steroid in past. Patient had LE doppler and per chart review noted L neg for DVT. Patient able to perform bed mobility mod A; sit to stand min/mod A x 1; SPT to chair Isrrael x 1. Although patient was min A x 1 she has been inconsistent with nursing staff and PT suggested 2 Assist for safety until she demonstrates consistent safe functional mobility. In the past patient has had a fall due to buckling legs. Patient asked to sit up in chair x 1 hour and to perform LE exercises per below. Patient given fall precautions and call bell nearby.   Patient will benefit from skilled PT to advance functional mobility. She will most likely benefit from HHPT vs SNF rehab. Patient will benefit from skilled intervention to address the above impairments. Patients rehabilitation potential is considered to be Fair  Factors which may influence rehabilitation potential include:   [ ]         None noted  [X]         Mental ability/status  [X]         Medical condition  [X]         Home/family situation and support systems  [ ]         Safety awareness  [ ]         Pain tolerance/management  [ ]         Other:        PLAN :  Recommendations and Planned Interventions:  [X]           Bed Mobility Training             [X]    Neuromuscular Re-Education  [X]           Transfer Training                   [ ]    Orthotic/Prosthetic Training  [X]           Gait Training                         [ ]    Modalities  [X]           Therapeutic Exercises           [X]    Edema Management/Control  [X]           Therapeutic Activities            [X]    Patient and Family Training/Education  [ ]           Other (comment):     Frequency/Duration: Patient will be followed by physical therapy  6 times a week to address goals. Discharge Recommendations: Rehab and Home Health  Further Equipment Recommendations for Discharge: has RW, Rollator       SUBJECTIVE:   Patient stated My legs are itching all the time.       OBJECTIVE DATA SUMMARY:   HISTORY:    Past Medical History   Diagnosis Date    Anxiety and depression      Asthma      CKD stage 4 due to type 2 diabetes mellitus ([facility]      DM type 2 causing renal disease (HCC)      Hyperlipidemia      Hypertension      RAI on CPAP       Past Surgical History   Procedure Laterality Date    Hx cholecystectomy        Hx heent           tonsilectomy    Hx gyn           hysterectomy    Pr breast surgery procedure unlisted           mastectomy, bilat     Prior Level of Function/Home Situation: mod I with RW  Personal factors and/or co morbidities impacting plan of care: several h/o cellulitis and hospitalizations for this same condition. Patient is reliant on spouse for assistance with ADL's due to body habitus. Patient with multiple wound areas on UE/LE's due to \"picking\" of skin and constant scratching. Home Situation  Home Environment: Private residence  Wheelchair Ramp: Yes  One/Two Story Residence: Two story, live on 1st floor  Living Alone: Yes  Support Systems: Spouse/Significant Other/Partner  Patient Expects to be Discharged to[de-identified] Private residence  Current DME Used/Available at Home: Vadnana Stuart, rolling, Vandana Stuart, rollator, Amina Rochester, straight, Wheelchair, [id number] chair, Grab bars, CPAP     EXAMINATION/PRESENTATION/DECISION MAKING:   Critical Behavior:  Neurologic State: Alert  Orientation Level: Oriented X4  Cognition: Appropriate decision making     Skin:  mutliple areas of open bleeding or scabbed areas UE/LE  Strength:    Strength: Generally decreased, functional                    Tone & Sensation:   Tone: Normal              Sensation: Intact               Range Of Motion:  AROM: Generally decreased, functional                       Coordination:  Coordination: Generally decreased, functional     Functional Mobility:  Bed Mobility:     Supine to Sit: Moderate assistance;Assist x1;Other (comment) (HOB elevated)        Transfers:  Sit to Stand: Moderate assistance  Stand to Sit: Minimum assistance                       Balance:   Sitting: Intact  Standing: Intact; With support  Ambulation/Gait Training:  Distance (ft): 3 Feet (ft)  Assistive Device: Walker, rolling;Gait belt  Ambulation - Level of Assistance: Assist x1;Minimal assistance     Gait Description (WDL): Exceptions to WDL           Base of Support: Widened     Speed/Rina: Shuffled  Step Length: Left shortened;Right shortened                                Stairs: Therapeutic Exercises:    Ankle pumps x 10; long arc quads 10     Functional Measure:  Barthel Index:      Bathin  Bladder: 5  Bowels: 5  Groomin  Dressin  Feeding: 10  Mobility: 0  Stairs: 0  Toilet Use: 0  Transfer (Bed to Chair and Back): 5  Total: 30         Barthel and G-code impairment scale:  Percentage of impairment CH  0% CI  1-19% CJ  20-39% CK  40-59% CL  60-79% CM  80-99% CN  100%   Barthel Score 0-100 100 99-80 79-60 59-40 20-39 1-19    0   Barthel Score 0-20 20 17-19 13-16 9-12 5-8 1-4 0      The Barthel ADL Index: Guidelines  1. The index should be used as a record of what a patient does, not as a record of what a patient could do. 2. The main aim is to establish degree of independence from any help, physical or verbal, however minor and for whatever reason. 3. The need for supervision renders the patient not independent. 4. A patient's performance should be established using the best available evidence. Asking the patient, friends/relatives and nurses are the usual sources, but direct observation and common sense are also important. However direct testing is not needed. 5. Usually the patient's performance over the preceding 24-48 hours is important, but occasionally longer periods will be relevant. 6. Middle categories imply that the patient supplies over 50 per cent of the effort. 7. Use of aids to be independent is allowed. Patricia Caceres., Barthel, D.W. (0153). Functional evaluation: the Barthel Index. [address] [facility] (14)2. [facility] leo JOVANNI Guzman, Gali Jay., [facility], [address] [place] [address] (). Measuring the change indisability after inpatient rehabilitation; comparison of the responsiveness of the Barthel Index and Functional Austin Measure. Journal of Neurology, Neurosurgery, and Psychiatry, 66(4), 025-938. Lesly Torres, N.J.A, SCOTTY Viera, & Chalo Aguilar MCLINTON. (2004.) Assessment of post-stroke quality of life in cost-effectiveness studies: The usefulness of the Barthel Index and the EuroQoL-5D. Quality of Life Research, 13, 395-36      G codes:   In compliance with CMSs Claims Based Outcome Reporting, the following G-code set was chosen for this patient based on their primary functional limitation being treated: The outcome measure chosen to determine the severity of the functional limitation was the Barthel  with a score of 30/100 which was correlated with the impairment scale. · Mobility - Walking and Moving Around:               - CURRENT STATUS:    CL - 60%-79% impaired, limited or restricted               - GOAL STATUS:           CK - 40%-59% impaired, limited or restricted               - D/C STATUS:                       ---------------To be determined---------------      Physical Therapy Evaluation Charge Determination   History Examination Presentation Decision-Making   MEDIUM  Complexity : 1-2 comorbidities / personal factors will impact the outcome/ POC  LOW Complexity : 1-2 Standardized tests and measures addressing body structure, function, activity limitation and / or participation in recreation  MEDIUM Complexity : Evolving with changing characteristics  Other outcome measures Barthel 30/100  MEDIUM      Based on the above components, the patient evaluation is determined to be of the following complexity level: LOW      Pain:                    Activity Tolerance:   See above  Please refer to the flowsheet for vital signs taken during this treatment. After treatment:   [X]         Patient left in no apparent distress sitting up in chair  [ ]         Patient left in no apparent distress in bed  [X]         Call bell left within reach  [X]         Nursing notified  [ ]         Caregiver present  [ ]         Bed alarm activated      COMMUNICATION/EDUCATION:   The patients plan of care was discussed with: Registered Nurse.  [X]         Fall prevention education was provided and the patient/caregiver indicated understanding. [ ]         Patient/family have participated as able in goal setting and plan of care.   [X]         Patient/family agree to work toward stated goals and plan of care. [ ]         Patient understands intent and goals of therapy, but is neutral about his/her participation. [ ]         Patient is unable to participate in goal setting and plan of care.      Thank you for this referral.  Maryellen Rhoeds, DPT   Time Calculation: 18 mins

## 2017-02-12 NOTE — PROGRESS NOTES
Physical Therapy  Order received and acknowledged. Spoke with nursing prior to attempting to see the patient for PT evaluation/treatment this day. Per nursing patient is currently having bedside US to rule out DVT. We will check back later today and follow this patient and attempt again as our schedule allows. Thank you.     Lord Sierra, PT, DPT, CLT

## 2017-02-12 NOTE — PROGRESS NOTES
Daily Progress Note: 2/12/2017  Tereza Brown MD    Assessment/Plan:   Cellulitis, LLE - POA, recurrent. Not septic. --empiric Vancomycin and levaquin. Wound care consult. --leg swelling/pain - check venous doppler to r/o DVT     Acute renal failure/ CKD (chronic kidney disease) stage 4 -   --holding bumex  ---renal following     DM type 2, uncontrolled, with renal complications / Hypoglycemia - Low BG on presentation. --holding lantus and stop D5 this morning and see how blood sugar does  - q4h accuchecks  --diabetic diet     RAI on CPAP - Continue home CPAP qhs and with naps     Hypertension - BP good - continue coreg     Iron deficiency anemia - gets EPO and apparently was getting set up for IV iron through [facility] soon [?]     Chronic bilateral low back pain without sciatica - Continue tylenol or prn norco, with laxatives. NO NSAIDS.     Generalized anxiety disorder / Anxiety and depression - Continue zoloft and prn xanax.     Asthma - Per HX. Not sure if true asthma or COPD, rather may reflect obesity and reflux.  Continue usual Breo, claritin, and prn duonebs.     Hyperlipidemia - Continue tricor and atorvastatin    DVT proph - heparin       Problem List:  Problem List as of 2/12/2017  Date Reviewed: 2/11/2017          Codes Class Noted - Resolved    * (Principal)Cellulitis ICD-10-CM: L03.90  ICD-9-CM: 682.9  2/11/2017 - Present        Asthma ICD-10-CM: J45.909  ICD-9-CM: 493.90  Unknown - Present        Hyperlipidemia ICD-10-CM: E78.5  ICD-9-CM: 272.4  Unknown - Present        RAI on CPAP ICD-10-CM: G47.33  ICD-9-CM: 327.23  Unknown - Present        Anxiety and depression ICD-10-CM: F41.9, F32.9  ICD-9-CM: 300.00, 311  Unknown - Present        Hypoglycemia ICD-10-CM: E16.2  ICD-9-CM: 251.2  2/11/2017 - Present        Chronic bilateral low back pain without sciatica ICD-10-CM: M54.5, G89.29  ICD-9-CM: 724.2, 338.29  11/3/2016 - Present        Generalized anxiety disorder ICD-10-CM: F41.1  ICD-9-CM: 300.02  11/3/2016 - Present        Acute renal failure (ARF) (HCC) ICD-10-CM: N17.9  ICD-9-CM: 584.9  8/14/2016 - Present        CKD (chronic kidney disease) stage 4, GFR 15-29 ml/min (HCC) (Chronic) ICD-10-CM: N18.4  ICD-9-CM: 585.4  8/14/2016 - Present        Hypertension (Chronic) ICD-10-CM: I10  ICD-9-CM: 401.9  8/14/2016 - Present        DM type 2, uncontrolled, with renal complications (HCC) (Chronic) ICD-10-CM: E11.29, E11.65  ICD-9-CM: 250.42  8/14/2016 - Present        Iron deficiency anemia (Chronic) ICD-10-CM: D50.9  ICD-9-CM: 280.9  8/14/2016 - Present        RESOLVED: CKD stage 4 due to type 2 diabetes mellitus ([facility] ICD-10-CM: E11.22, N18.4  ICD-9-CM: 250.40, 585.4  Unknown - 2/11/2017        RESOLVED: DM type 2 causing renal disease ([facility] ICD-10-CM: E11.29  ICD-9-CM: 250.40  Unknown - 2/11/2017        RESOLVED: Hyperkalemia ICD-10-CM: E87.5  ICD-9-CM: 276.7  11/3/2016 - 2/11/2017        RESOLVED: Elevated serum creatinine ICD-10-CM: R79.89  ICD-9-CM: 790.99  11/3/2016 - 2/11/2017        RESOLVED: Left leg cellulitis ICD-10-CM: L03.116  ICD-9-CM: 682.6  8/14/2016 - 2/11/2017        RESOLVED: Sepsis ([facility] ICD-10-CM: A41.9  ICD-9-CM: 038.9, 995.91  8/14/2016 - 2/11/2017        RESOLVED: RAI (obstructive sleep apnea) (Chronic) ICD-10-CM: E90.09  ICD-9-CM: 327.23  8/14/2016 - 2/11/2017              Subjective:   Ms. Mikayla Sidhu is a 80 y.o.  female who presented to the Emergency Department complaining of leg infection. She was sent to ER by her PCP who wanted admission for IV Abx. She had gone to PCP due to worsening pain and redness over days, not responding to local steroids cream Patient has persistent skin issues, and recurrent Dx of cellulitis, with recurrent Rx of Abx. She picks at her leg skin constantly, due to itching. She meets no SIRS criteria. ER workup shows mildly worse CKD, and she had mild hypoglycemia on presentation.  We will admit the patient for management. :   She c/o pain in the left leg. She is anxious and upset about being back in the hospital.  She denies CP, SOB at baseline. Adamantly refuses going back to SNF so wants to get out of hospital ASAP before she loses muscle tone in her legs. Review of Systems:   A comprehensive review of systems was negative except for that written in the HPI. Objective:   Physical Exam:     Visit Vitals    /60 (BP 1 Location: Right arm, BP Patient Position: At rest)    Pulse 80    Temp 99.1 °F (37.3 °C)    Resp 16    Ht 5' 1\" (1.549 m)    Wt 126.6 kg (279 lb)    SpO2 93%    BMI 52.72 kg/m2      O2 Device: CPAP mask    Temp (24hrs), Av.2 °F (36.8 °C), Min:97.5 °F (36.4 °C), Max:99.1 °F (37.3 °C)        02/10 1901 -  0700  In: 880 [P.O.:480; I.V.:400]  Out: -     General:  Alert, cooperative, no distress, morbidly obese   Head:  Normocephalic, without obvious abnormality, atraumatic. Throat: Lips, mucosa, and tongue dry   Neck: Supple, no JVD   Lungs:   Clear to auscultation bilaterally. Heart:  Regular rate and rhythm, S1, S2 normal, 2/6 murmur   Abdomen:   Soft, non-tender. Bowel sounds normal. No masses,  No organomegaly. Extremities: Left LE with 1+ edema. Erythema starting at toes, dorsal foot and extending in patchy distribution up into her groin. +ttp diffuse and +warmth. Pulses: 1+ and symmetric all extremities. Skin: Skin color, texture, turgor normal. No rashes or lesions   Neurologic: CNII-XII intact. Alert and oriented X 3.        Data Review:       Recent Days:  Recent Labs      17   0416  17   1117   WBC  7.9  9.0   HGB  8.2*  9.2*   HCT  26.0*  30.3*   PLT  278  277     Recent Labs      17   0416  17   1117   NA  136  140   K  4.2  3.8   CL  101  101   CO2  28  31   GLU  165*  74   BUN  84*  89*   CREA  3.11*  3.19*   CA  8.3*  9.0   MG  1.8   --    PHOS  3.3   --    ALB   --   2.7*   SGOT   --   26   ALT   --   26     No results for input(s): PH, PCO2, PO2, HCO3, FIO2 in the last 72 hours. 24 Hour Results:  Recent Results (from the past 24 hour(s))   CBC WITH AUTOMATED DIFF    Collection Time: 02/11/17 11:17 AM   Result Value Ref Range    WBC 9.0 3.6 - 11.0 K/uL    RBC 3.30 (L) 3.80 - 5.20 M/uL    HGB 9.2 (L) 11.5 - 16.0 g/dL    HCT 30.3 (L) 35.0 - 47.0 %    MCV 91.8 80.0 - 99.0 FL    MCH 27.9 26.0 - 34.0 PG    MCHC 30.4 30.0 - 36.5 g/dL    RDW 15.2 (H) 11.5 - 14.5 %    PLATELET 231 302 - 783 K/uL    NEUTROPHILS 83 (H) 32 - 75 %    LYMPHOCYTES 7 (L) 12 - 49 %    MONOCYTES 5 5 - 13 %    EOSINOPHILS 5 0 - 7 %    BASOPHILS 0 0 - 1 %    ABS. NEUTROPHILS 7.4 1.8 - 8.0 K/UL    ABS. LYMPHOCYTES 0.6 (L) 0.8 - 3.5 K/UL    ABS. MONOCYTES 0.5 0.0 - 1.0 K/UL    ABS. EOSINOPHILS 0.5 (H) 0.0 - 0.4 K/UL    ABS. BASOPHILS 0.0 0.0 - 0.1 K/UL    RBC COMMENTS NORMOCYTIC, NORMOCHROMIC     METABOLIC PANEL, COMPREHENSIVE    Collection Time: 02/11/17 11:17 AM   Result Value Ref Range    Sodium 140 136 - 145 mmol/L    Potassium 3.8 3.5 - 5.1 mmol/L    Chloride 101 97 - 108 mmol/L    CO2 31 21 - 32 mmol/L    Anion gap 8 5 - 15 mmol/L    Glucose 74 65 - 100 mg/dL    BUN 89 (H) 6 - 20 MG/DL    Creatinine 3.19 (H) 0.55 - 1.02 MG/DL    BUN/Creatinine ratio 28 (H) 12 - 20      GFR est AA 17 (L) >60 ml/min/1.73m2    GFR est non-AA 14 (L) >60 ml/min/1.73m2    Calcium 9.0 8.5 - 10.1 MG/DL    Bilirubin, total 0.3 0.2 - 1.0 MG/DL    ALT (SGPT) 26 12 - 78 U/L    AST (SGOT) 26 15 - 37 U/L    Alk.  phosphatase 104 45 - 117 U/L    Protein, total 7.4 6.4 - 8.2 g/dL    Albumin 2.7 (L) 3.5 - 5.0 g/dL    Globulin 4.7 (H) 2.0 - 4.0 g/dL    A-G Ratio 0.6 (L) 1.1 - 2.2     GLUCOSE, POC    Collection Time: 02/11/17 12:44 PM   Result Value Ref Range    Glucose (POC) 39 (LL) 65 - 100 mg/dL    Performed by Alfa Westbrook, POC    Collection Time: 02/11/17  1:06 PM   Result Value Ref Range    Glucose (POC) 121 (H) 65 - 100 mg/dL    Performed by [address] [place] [address] Valentín, MRSA Collection Time: 02/11/17  2:39 PM   Result Value Ref Range    Special Requests: NO SPECIAL REQUESTS      Culture result: MRSA NOT PRESENT  AT 16 HOURS       GLUCOSE, POC    Collection Time: 02/11/17  4:19 PM   Result Value Ref Range    Glucose (POC) 49 (LL) 65 - 100 mg/dL    Performed by [employer]    GLUCOSE, POC    Collection Time: 02/11/17  4:20 PM   Result Value Ref Range    Glucose (POC) 48 (LL) 65 - 100 mg/dL    Performed by [employer]ohubial    GLUCOSE, POC    Collection Time: 02/11/17  4:37 PM   Result Value Ref Range    Glucose (POC) 145 (H) 65 - 100 mg/dL    Performed by Servoy    GLUCOSE, POC    Collection Time: 02/11/17  8:24 PM   Result Value Ref Range    Glucose (POC) 175 (H) 65 - 100 mg/dL    Performed by Mike George (PCT)    GLUCOSE, POC    Collection Time: 02/11/17 11:47 PM   Result Value Ref Range    Glucose (POC) 134 (H) 65 - 100 mg/dL    Performed by Mike George (PCT)    GLUCOSE, POC    Collection Time: 02/12/17  4:03 AM   Result Value Ref Range    Glucose (POC) 177 (H) 65 - 100 mg/dL    Performed by Mike George (PCT)    CBC W/O DIFF    Collection Time: 02/12/17  4:16 AM   Result Value Ref Range    WBC 7.9 3.6 - 11.0 K/uL    RBC 2.91 (L) 3.80 - 5.20 M/uL    HGB 8.2 (L) 11.5 - 16.0 g/dL    HCT 26.0 (L) 35.0 - 47.0 %    MCV 89.3 80.0 - 99.0 FL    MCH 28.2 26.0 - 34.0 PG    MCHC 31.5 30.0 - 36.5 g/dL    RDW 15.0 (H) 11.5 - 14.5 %    PLATELET 669 815 - 730 K/uL   MAGNESIUM    Collection Time: 02/12/17  4:16 AM   Result Value Ref Range    Magnesium 1.8 1.6 - 2.4 mg/dL   METABOLIC PANEL, BASIC    Collection Time: 02/12/17  4:16 AM   Result Value Ref Range    Sodium 136 136 - 145 mmol/L    Potassium 4.2 3.5 - 5.1 mmol/L    Chloride 101 97 - 108 mmol/L    CO2 28 21 - 32 mmol/L    Anion gap 7 5 - 15 mmol/L    Glucose 165 (H) 65 - 100 mg/dL    BUN 84 (H) 6 - 20 MG/DL    Creatinine 3.11 (H) 0.55 - 1.02 MG/DL    BUN/Creatinine ratio 27 (H) 12 - 20      GFR est AA 18 (L) >60 ml/min/1.73m2    GFR est non-AA 15 (L) >60 ml/min/1.73m2    Calcium 8.3 (L) 8.5 - 10.1 MG/DL   PHOSPHORUS    Collection Time: 02/12/17  4:16 AM   Result Value Ref Range    Phosphorus 3.3 2.6 - 4.7 MG/DL   GLUCOSE, POC    Collection Time: 02/12/17  8:11 AM   Result Value Ref Range    Glucose (POC) 195 (H) 65 - 100 mg/dL    Performed by Daisy Smith        Medications reviewed  Current Facility-Administered Medications   Medication Dose Route Frequency    sodium chloride (NS) flush 5-10 mL  5-10 mL IntraVENous Q8H    sodium chloride (NS) flush 5-10 mL  5-10 mL IntraVENous PRN    levoFLOXacin (LEVAQUIN) 750 mg in D5W IVPB  750 mg IntraVENous Q48H    albuterol-ipratropium (DUO-NEB) 2.5 MG-0.5 MG/3 ML  3 mL Nebulization Q6H PRN    ALPRAZolam (XANAX) tablet 0.25 mg  0.25 mg Oral DAILY PRN    aspirin chewable tablet 81 mg  81 mg Oral DAILY    atorvastatin (LIPITOR) tablet 40 mg  40 mg Oral QHS    carvedilol (COREG) tablet 3.125 mg  3.125 mg Oral BID WITH MEALS    fenofibrate nanocrystallized (TRICOR) tablet 145 mg  145 mg Oral DAILY    fluticasone-vilanterol (BREO ELLIPTA) 100mcg-25mcg/puff  1 Puff Inhalation DAILY    HYDROcodone-acetaminophen (NORCO) 5-325 mg per tablet 1 Tab  1 Tab Oral Q6H PRN    pantoprazole (PROTONIX) tablet 40 mg  40 mg Oral ACB    sodium chloride (NS) flush 5-10 mL  5-10 mL IntraVENous Q8H    sodium chloride (NS) flush 5-10 mL  5-10 mL IntraVENous PRN    naloxone (NARCAN) injection 0.4 mg  0.4 mg IntraVENous PRN    glucose chewable tablet 16 g  4 Tab Oral PRN    dextrose (D50W) injection syrg 12.5-25 g  12.5-25 g IntraVENous PRN    glucagon (GLUCAGEN) injection 1 mg  1 mg IntraMUSCular PRN    acetaminophen (TYLENOL) tablet 650 mg  650 mg Oral Q4H PRN    diphenhydrAMINE (BENADRYL) capsule 25 mg  25 mg Oral Q4H PRN    ondansetron (ZOFRAN) injection 4 mg  4 mg IntraVENous Q4H PRN    heparin (porcine) injection 5,000 Units  5,000 Units SubCUTAneous Q8H    insulin lispro (HUMALOG) injection   SubCUTAneous AC&HS    Vancomycin - Pharmacist dosing based on levels.    Other PRN    Vancomycin random level at 1200 on 2/12/2017   Other ONCE    senna-docusate (PERICOLACE) 8.6-50 mg per tablet 2 Tab  2 Tab Oral DAILY    dextrose 5% and 0.9% NaCl infusion  50 mL/hr IntraVENous Samy Nova MD

## 2017-02-12 NOTE — PROGRESS NOTES
[facility] Dosing Services: Antimicrobial Stewardship Daily Doc    Consult for antibiotic dosing of Vancomycin by Dr. Leoncio Billy. Pharmacist reviewed antibiotic appropriateness for 80year old , female for indication of cellulitis  Day of Therapy: 2    Ht Readings from Last 1 Encounters:   02/11/17 154.9 cm (61\")        Wt Readings from Last 1 Encounters:   02/11/17 126.6 kg (279 lb)          Vancomycin therapy:  Pharmacy dosing based on level due to a crcl less than 31 ml/min  Dose calculated to approximate a therapeutic trough of 10 - 15 mcg/mL. Last trough level 21.6 mcg/ml 2/12 Obtained 24 hours after the initial dose  Plan for level / Adjustment in Therapy: Level is supra-therapeutic. Plan for random level with am labs tomorrow      Cultures 2/11 Blood ng pending  2/11 nasal swab not present pending   Serum Creatinine Lab Results   Component Value Date/Time    Creatinine 3.11 02/12/2017 04:16 AM         Creatinine Clearance Estimated Creatinine Clearance: 21.1 mL/min (based on Cr of 3.11).      Temp Temp: 98.8 °F (37.1 °C)       WBC Lab Results   Component Value Date/Time    WBC 7.9 02/12/2017 04:16 AM        H/H Lab Results   Component Value Date/Time    HGB 8.2 02/12/2017 04:16 AM        Platelets    Lab Results   Component Value Date/Time    PLATELET 577 94/04/7257 04:16 AM            Pharmacist Annita Story, PHARMD Contact information: [phone]

## 2017-02-12 NOTE — PROCEDURES
[facility]  *** FINAL REPORT ***    Name: Keyona Bravo  MRN: [id number]    Inpatient  : 29 Sep 1945  HIS Order #: [id number]  [id number] [facility] Visit #: [id number]  Date: 2017    TYPE OF TEST: Peripheral Venous Testing    REASON FOR TEST  Pain in limb, Limb swelling    Left Leg:-  Deep venous thrombosis:           No  Superficial venous thrombosis:    No  Deep venous insufficiency:        No  Superficial venous insufficiency: No      INTERPRETATION/FINDINGS  PROCEDURE:  LEFT LOWER EXTREMITY VENOUS DUPLEX . Evaluation of lower  extremity veins with ultrasound (B-mode imaging, pulsed Doppler, color   Doppler). Includes the common femoral, deep femoral, femoral,  popliteal, posterior tibial, peroneal, and great saphenous veins. Other veins, for example the gastrocnemius and soleal veins, may also  be visualized. FINDINGS: Unable to fully evaluate the paired posterior tibial and  peroneal veins due to patient body habitus and pain during  compression. Gray scale and color flow duplex images of the  remainig  veins in the left lower extremity demonstrate normal compressibility,  spontaneous and augmented flow profiles, and absence of filling  defects throughout the deep and superficial veins in the left lower  extremity. CONCLUSION: Limited study due to technical diffuculty caused by  patient body habitus. Left lower extremity venous duplex negative for  deep venous thrombosis or thrombophlebitis. Right common femoral vein  is thrombus free. ADDITIONAL COMMENTS    I have personally reviewed the data relevant to the interpretation of  this  study. TECHNOLOGIST: Ann Braun RVT  Signed: 2017 11:12 AM    PHYSICIAN: Angeline Ortiz MD  Signed: 2017 08:18 AM

## 2017-02-12 NOTE — CONSULTS
[address]  YOB: 1945          Assessment & Plan:   ARF/CKD  · Pt well known to me. Cr was 2.7 in the office last week. Toward the higher end of her baseline range now. · No acute need for RRT. Has had acute RRT twice. Edema  · Diuretics on hold  · Watch for now. Low threshold to resume due to h/o severe edema. · Currently edema is much better. Cellulitis  · On Abx    HTN   · Controlled       Subjective:   CC: f/u CKD  HPI: Patient known to me. She has CKD 4 with baseline Cr around 3, last measured at 2.7 in the office last week. Admitted with cellulitis and is on ABX. HTN is controlled. She has a history of severe edema, for which she has had dialysis on two different admissions. Her edema is currently much better and her bumex is on hold due to Cr above baseline.    ROS: +anxious, +pain and redness LLE, no n/v/sob  Current Facility-Administered Medications   Medication Dose Route Frequency    sodium chloride (NS) flush 5-10 mL  5-10 mL IntraVENous Q8H    sodium chloride (NS) flush 5-10 mL  5-10 mL IntraVENous PRN    levoFLOXacin (LEVAQUIN) 750 mg in D5W IVPB  750 mg IntraVENous Q48H    albuterol-ipratropium (DUO-NEB) 2.5 MG-0.5 MG/3 ML  3 mL Nebulization Q6H PRN    ALPRAZolam (XANAX) tablet 0.25 mg  0.25 mg Oral DAILY PRN    aspirin chewable tablet 81 mg  81 mg Oral DAILY    atorvastatin (LIPITOR) tablet 40 mg  40 mg Oral QHS    carvedilol (COREG) tablet 3.125 mg  3.125 mg Oral BID WITH MEALS    fenofibrate nanocrystallized (TRICOR) tablet 145 mg  145 mg Oral DAILY    fluticasone-vilanterol (BREO ELLIPTA) 100mcg-25mcg/puff  1 Puff Inhalation DAILY    HYDROcodone-acetaminophen (NORCO) 5-325 mg per tablet 1 Tab  1 Tab Oral Q6H PRN    pantoprazole (PROTONIX) tablet 40 mg  40 mg Oral ACB    sodium chloride (NS) flush 5-10 mL  5-10 mL IntraVENous Q8H    sodium chloride (NS) flush 5-10 mL  5-10 mL IntraVENous PRN    naloxone (NARCAN) injection 0.4 mg  0.4 mg IntraVENous PRN    glucose chewable tablet 16 g  4 Tab Oral PRN    dextrose (D50W) injection syrg 12.5-25 g  12.5-25 g IntraVENous PRN    glucagon (GLUCAGEN) injection 1 mg  1 mg IntraMUSCular PRN    acetaminophen (TYLENOL) tablet 650 mg  650 mg Oral Q4H PRN    diphenhydrAMINE (BENADRYL) capsule 25 mg  25 mg Oral Q4H PRN    ondansetron (ZOFRAN) injection 4 mg  4 mg IntraVENous Q4H PRN    heparin (porcine) injection 5,000 Units  5,000 Units SubCUTAneous Q8H    insulin lispro (HUMALOG) injection   SubCUTAneous AC&HS    Vancomycin - Pharmacist dosing based on levels. Other PRN    Vancomycin random level at 1200 on 2017   Other ONCE    senna-docusate (PERICOLACE) 8.6-50 mg per tablet 2 Tab  2 Tab Oral DAILY          Objective:     Vitals:  Blood pressure 135/63, pulse 84, temperature 99.1 °F (37.3 °C), resp. rate 17, height 5' 1\" (1.549 m), weight 126.6 kg (279 lb), SpO2 96 %. Temp (24hrs), Av.3 °F (36.8 °C), Min:97.5 °F (36.4 °C), Max:99.1 °F (37.3 °C)      Intake and Output:     02/10 1901 -  0700  In: 880 [P.O.:480; I.V.:400]  Out: -     Physical Exam:               GENERAL ASSESSMENT: NAD  CHEST: CTA  HEART: S1S2, reg  ABDOMEN: Soft,NT  EXTREMITY: min EDEMA; erythema of LLE  NEURO:Grossly non focal          ECG/rhythm:    Data Review      No results for input(s): TNIPOC in the last 72 hours. No lab exists for component: ITNL   No results for input(s): CPK, CKMB, TROIQ in the last 72 hours. Recent Labs      17   0416  17   1117   NA  136  140   K  4.2  3.8   CL  101  101   CO2  28  31   BUN  84*  89*   CREA  3.11*  3.19*   GLU  165*  74   PHOS  3.3   --    MG  1.8   --    CA  8.3*  9.0   ALB   --   2.7*   WBC  7.9  9.0   HGB  8.2*  9.2*   HCT  26.0*  30.3*   PLT  278  277      No results for input(s): INR, PTP, APTT in the last 72 hours.     No lab exists for component: INREXT  Needs: urine analysis, urine sodium, protein and creatinine  Lab Results   Component Value Date/Time    Sodium urine, random 50 11/03/2016 12:55 PM    Creatinine, urine 53.84 11/03/2016 12:55 PM           : Livan Bernstein MD  2/12/2017        [facility]:  [email or web address]  Bhakti Daubs office:  [address], [address] [place] [address],[address]  [place], [address] [facility]  Phone: [phone]  Fax :     [phone]    [facility] office:  [address] [facility], [address]  Phone - [phone]  Fax - [phone]

## 2017-02-12 NOTE — PROGRESS NOTES
Bedside and Verbal shift change report given to VIKA Robbins (oncoming nurse) by Anibal Almeida RN (offgoing nurse). Report included the following information SBAR, Kardex, ED Summary, Procedure Summary, Intake/Output, MAR, Accordion, Recent Results and Med Rec Status.

## 2017-02-12 NOTE — PROGRESS NOTES
Patient states she wears diapers at home and believes \"part of her skin issues are that 'they' don't change her often enough. Patient states her  helps her a lot but thinks \"she just needs to go for a month or so without diapers\". Patient states she cannot tolerate the bedpan \"because it is painful\", so she prefers to \"just go\".

## 2017-02-12 NOTE — PROGRESS NOTES
Bedside and Verbal shift change report given to Sully Tang (oncoming nurse) by Elizabeth Manzo RN (offgoing nurse). Report included the following information SBAR, Intake/Output, MAR, Accordion and Recent Results.

## 2017-02-13 LAB
ALBUMIN SERPL BCP-MCNC: 2.3 G/DL (ref 3.5–5)
ANION GAP BLD CALC-SCNC: 7 MMOL/L (ref 5–15)
BASOPHILS # BLD AUTO: 0 K/UL (ref 0–0.1)
BASOPHILS # BLD: 0 % (ref 0–1)
BUN SERPL-MCNC: 84 MG/DL (ref 6–20)
BUN/CREAT SERPL: 29 (ref 12–20)
CALCIUM SERPL-MCNC: 8.3 MG/DL (ref 8.5–10.1)
CHLORIDE SERPL-SCNC: 103 MMOL/L (ref 97–108)
CO2 SERPL-SCNC: 29 MMOL/L (ref 21–32)
CREAT SERPL-MCNC: 2.91 MG/DL (ref 0.55–1.02)
EOSINOPHIL # BLD: 0.3 K/UL (ref 0–0.4)
EOSINOPHIL NFR BLD: 5 % (ref 0–7)
ERYTHROCYTE [DISTWIDTH] IN BLOOD BY AUTOMATED COUNT: 14.6 % (ref 11.5–14.5)
GLUCOSE BLD STRIP.AUTO-MCNC: 121 MG/DL (ref 65–100)
GLUCOSE BLD STRIP.AUTO-MCNC: 197 MG/DL (ref 65–100)
GLUCOSE BLD STRIP.AUTO-MCNC: 217 MG/DL (ref 65–100)
GLUCOSE BLD STRIP.AUTO-MCNC: 244 MG/DL (ref 65–100)
GLUCOSE SERPL-MCNC: 121 MG/DL (ref 65–100)
HCT VFR BLD AUTO: 24.7 % (ref 35–47)
HEMOCCULT STL QL: POSITIVE
HGB BLD-MCNC: 7.9 G/DL (ref 11.5–16)
LYMPHOCYTES # BLD AUTO: 19 % (ref 12–49)
LYMPHOCYTES # BLD: 1.2 K/UL (ref 0.8–3.5)
MAGNESIUM SERPL-MCNC: 1.9 MG/DL (ref 1.6–2.4)
MCH RBC QN AUTO: 28.4 PG (ref 26–34)
MCHC RBC AUTO-ENTMCNC: 32 G/DL (ref 30–36.5)
MCV RBC AUTO: 88.8 FL (ref 80–99)
MONOCYTES # BLD: 0.4 K/UL (ref 0–1)
MONOCYTES NFR BLD AUTO: 7 % (ref 5–13)
NEUTS SEG # BLD: 4.1 K/UL (ref 1.8–8)
NEUTS SEG NFR BLD AUTO: 69 % (ref 32–75)
PHOSPHATE SERPL-MCNC: 3.3 MG/DL (ref 2.6–4.7)
PLATELET # BLD AUTO: 283 K/UL (ref 150–400)
POTASSIUM SERPL-SCNC: 4 MMOL/L (ref 3.5–5.1)
RBC # BLD AUTO: 2.78 M/UL (ref 3.8–5.2)
SERVICE CMNT-IMP: ABNORMAL
SODIUM SERPL-SCNC: 139 MMOL/L (ref 136–145)
VANCOMYCIN SERPL-MCNC: 18.3 UG/ML
WBC # BLD AUTO: 6.1 K/UL (ref 3.6–11)

## 2017-02-13 PROCEDURE — 82962 GLUCOSE BLOOD TEST: CPT

## 2017-02-13 PROCEDURE — 82272 OCCULT BLD FECES 1-3 TESTS: CPT | Performed by: FAMILY MEDICINE

## 2017-02-13 PROCEDURE — 74011250637 HC RX REV CODE- 250/637: Performed by: INTERNAL MEDICINE

## 2017-02-13 PROCEDURE — 97535 SELF CARE MNGMENT TRAINING: CPT

## 2017-02-13 PROCEDURE — 36415 COLL VENOUS BLD VENIPUNCTURE: CPT | Performed by: FAMILY MEDICINE

## 2017-02-13 PROCEDURE — 80069 RENAL FUNCTION PANEL: CPT | Performed by: FAMILY MEDICINE

## 2017-02-13 PROCEDURE — 77030012930 HC DRSG ANTIMIC COLO -B

## 2017-02-13 PROCEDURE — 74011250637 HC RX REV CODE- 250/637: Performed by: FAMILY MEDICINE

## 2017-02-13 PROCEDURE — 97165 OT EVAL LOW COMPLEX 30 MIN: CPT

## 2017-02-13 PROCEDURE — 74011636637 HC RX REV CODE- 636/637: Performed by: FAMILY MEDICINE

## 2017-02-13 PROCEDURE — 80202 ASSAY OF VANCOMYCIN: CPT | Performed by: INTERNAL MEDICINE

## 2017-02-13 PROCEDURE — 65270000029 HC RM PRIVATE

## 2017-02-13 PROCEDURE — 85025 COMPLETE CBC W/AUTO DIFF WBC: CPT | Performed by: FAMILY MEDICINE

## 2017-02-13 PROCEDURE — 97116 GAIT TRAINING THERAPY: CPT

## 2017-02-13 PROCEDURE — 74011636637 HC RX REV CODE- 636/637: Performed by: INTERNAL MEDICINE

## 2017-02-13 PROCEDURE — 97530 THERAPEUTIC ACTIVITIES: CPT

## 2017-02-13 PROCEDURE — 74011250636 HC RX REV CODE- 250/636: Performed by: INTERNAL MEDICINE

## 2017-02-13 PROCEDURE — 83735 ASSAY OF MAGNESIUM: CPT | Performed by: FAMILY MEDICINE

## 2017-02-13 RX ORDER — MUPIROCIN 20 MG/G
OINTMENT TOPICAL 2 TIMES DAILY
Status: DISCONTINUED | OUTPATIENT
Start: 2017-02-13 | End: 2017-02-14 | Stop reason: HOSPADM

## 2017-02-13 RX ORDER — BUMETANIDE 1 MG/1
2 TABLET ORAL 2 TIMES DAILY
Status: DISCONTINUED | OUTPATIENT
Start: 2017-02-13 | End: 2017-02-14 | Stop reason: HOSPADM

## 2017-02-13 RX ORDER — HYDROCORTISONE 1 %
CREAM (GRAM) TOPICAL
Status: DISCONTINUED | OUTPATIENT
Start: 2017-02-13 | End: 2017-02-14 | Stop reason: HOSPADM

## 2017-02-13 RX ORDER — NYSTATIN 100000 [USP'U]/G
POWDER TOPICAL 2 TIMES DAILY
Status: DISCONTINUED | OUTPATIENT
Start: 2017-02-13 | End: 2017-02-14 | Stop reason: HOSPADM

## 2017-02-13 RX ADMIN — INSULIN LISPRO 3 UNITS: 100 INJECTION, SOLUTION INTRAVENOUS; SUBCUTANEOUS at 11:43

## 2017-02-13 RX ADMIN — Medication 10 ML: at 22:13

## 2017-02-13 RX ADMIN — HEPARIN SODIUM 5000 UNITS: 5000 INJECTION, SOLUTION INTRAVENOUS; SUBCUTANEOUS at 06:58

## 2017-02-13 RX ADMIN — INSULIN LISPRO 4 UNITS: 100 INJECTION, SOLUTION INTRAVENOUS; SUBCUTANEOUS at 16:45

## 2017-02-13 RX ADMIN — ASPIRIN 81 MG CHEWABLE TABLET 81 MG: 81 TABLET CHEWABLE at 08:24

## 2017-02-13 RX ADMIN — Medication 10 ML: at 13:59

## 2017-02-13 RX ADMIN — CARVEDILOL 3.12 MG: 3.12 TABLET, FILM COATED ORAL at 16:23

## 2017-02-13 RX ADMIN — Medication 2 TABLET: at 08:24

## 2017-02-13 RX ADMIN — FLUTICASONE FUROATE AND VILANTEROL TRIFENATATE 1 PUFF: 100; 25 POWDER RESPIRATORY (INHALATION) at 08:24

## 2017-02-13 RX ADMIN — BUMETANIDE 2 MG: 1 TABLET ORAL at 18:19

## 2017-02-13 RX ADMIN — PANTOPRAZOLE SODIUM 40 MG: 40 TABLET, DELAYED RELEASE ORAL at 06:58

## 2017-02-13 RX ADMIN — HEPARIN SODIUM 5000 UNITS: 5000 INJECTION, SOLUTION INTRAVENOUS; SUBCUTANEOUS at 22:08

## 2017-02-13 RX ADMIN — ACETAMINOPHEN 650 MG: 325 TABLET ORAL at 19:48

## 2017-02-13 RX ADMIN — INSULIN GLARGINE 10 UNITS: 100 INJECTION, SOLUTION SUBCUTANEOUS at 22:09

## 2017-02-13 RX ADMIN — MUPIROCIN: 20 OINTMENT TOPICAL at 19:52

## 2017-02-13 RX ADMIN — CARVEDILOL 3.12 MG: 3.12 TABLET, FILM COATED ORAL at 08:24

## 2017-02-13 RX ADMIN — HEPARIN SODIUM 5000 UNITS: 5000 INJECTION, SOLUTION INTRAVENOUS; SUBCUTANEOUS at 16:23

## 2017-02-13 RX ADMIN — Medication 10 ML: at 06:57

## 2017-02-13 RX ADMIN — INSULIN LISPRO 2 UNITS: 100 INJECTION, SOLUTION INTRAVENOUS; SUBCUTANEOUS at 22:09

## 2017-02-13 RX ADMIN — NYSTATIN: 100000 POWDER TOPICAL at 18:20

## 2017-02-13 RX ADMIN — ATORVASTATIN CALCIUM 40 MG: 10 TABLET, FILM COATED ORAL at 22:08

## 2017-02-13 RX ADMIN — LEVOFLOXACIN 750 MG: 5 INJECTION, SOLUTION INTRAVENOUS at 16:23

## 2017-02-13 RX ADMIN — FENOFIBRATE 145 MG: 145 TABLET ORAL at 08:24

## 2017-02-13 NOTE — PROGRESS NOTES
Problem: Falls - Risk of  Goal: *Absence of falls  Outcome: Progressing Towards Goal  Patient without fall.

## 2017-02-13 NOTE — PROGRESS NOTES
[address]  YOB: 1945          Assessment & Plan:   ARF/CKD  · Creatinine back to baseline. · No acute need for RRT. Has had acute RRT twice. Edema  · Resume loop diuretic at home dose    Cellulitis  · On Abx    HTN   · Controlled       Subjective:   CC: f/u CKD  HPI: Cellulitis better.  Cr down to 2.9  ROS: No sob/edema  Current Facility-Administered Medications   Medication Dose Route Frequency    bumetanide (BUMEX) tablet 2 mg  2 mg Oral BID    insulin glargine (LANTUS) injection 10 Units  10 Units SubCUTAneous QHS    sodium chloride (NS) flush 5-10 mL  5-10 mL IntraVENous Q8H    sodium chloride (NS) flush 5-10 mL  5-10 mL IntraVENous PRN    levoFLOXacin (LEVAQUIN) 750 mg in D5W IVPB  750 mg IntraVENous Q48H    albuterol-ipratropium (DUO-NEB) 2.5 MG-0.5 MG/3 ML  3 mL Nebulization Q6H PRN    ALPRAZolam (XANAX) tablet 0.25 mg  0.25 mg Oral DAILY PRN    aspirin chewable tablet 81 mg  81 mg Oral DAILY    atorvastatin (LIPITOR) tablet 40 mg  40 mg Oral QHS    carvedilol (COREG) tablet 3.125 mg  3.125 mg Oral BID WITH MEALS    fenofibrate nanocrystallized (TRICOR) tablet 145 mg  145 mg Oral DAILY    fluticasone-vilanterol (BREO ELLIPTA) 100mcg-25mcg/puff  1 Puff Inhalation DAILY    HYDROcodone-acetaminophen (NORCO) 5-325 mg per tablet 1 Tab  1 Tab Oral Q6H PRN    pantoprazole (PROTONIX) tablet 40 mg  40 mg Oral ACB    sodium chloride (NS) flush 5-10 mL  5-10 mL IntraVENous Q8H    sodium chloride (NS) flush 5-10 mL  5-10 mL IntraVENous PRN    naloxone (NARCAN) injection 0.4 mg  0.4 mg IntraVENous PRN    glucose chewable tablet 16 g  4 Tab Oral PRN    dextrose (D50W) injection syrg 12.5-25 g  12.5-25 g IntraVENous PRN    glucagon (GLUCAGEN) injection 1 mg  1 mg IntraMUSCular PRN    acetaminophen (TYLENOL) tablet 650 mg  650 mg Oral Q4H PRN    diphenhydrAMINE (BENADRYL) capsule 25 mg  25 mg Oral Q4H PRN  ondansetron (ZOFRAN) injection 4 mg  4 mg IntraVENous Q4H PRN    heparin (porcine) injection 5,000 Units  5,000 Units SubCUTAneous Q8H    insulin lispro (HUMALOG) injection   SubCUTAneous AC&HS    Vancomycin - Pharmacist dosing based on levels. Other PRN    senna-docusate (PERICOLACE) 8.6-50 mg per tablet 2 Tab  2 Tab Oral DAILY          Objective:     Vitals:  Blood pressure 140/63, pulse 77, temperature 99 °F (37.2 °C), resp. rate 16, height 5' 1\" (1.549 m), weight 126.6 kg (279 lb), SpO2 95 %. Temp (24hrs), Av.8 °F (37.1 °C), Min:98.1 °F (36.7 °C), Max:99.1 °F (37.3 °C)      Intake and Output:      1901 -  0700  In: 4361 [P.O.:1410; I.V.:420]  Out: -     Physical Exam:               GENERAL ASSESSMENT: NAD  CHEST: CTA  HEART: S1S2, reg  ABDOMEN: Soft,NT  EXTREMITY: min EDEMA; erythema of LLE is improved  NEURO:Grossly non focal          ECG/rhythm:    Data Review      No results for input(s): TNIPOC in the last 72 hours. No lab exists for component: ITNL   No results for input(s): CPK, CKMB, TROIQ in the last 72 hours. Recent Labs      17   0349  17   0416  17   1117   NA  139  136  140   K  4.0  4.2  3.8   CL  103  101  101   CO2  29  28  31   BUN  84*  84*  89*   CREA  2.91*  3.11*  3.19*   GLU  121*  165*  74   PHOS  3.3  3.3   --    MG  1.9  1.8   --    CA  8.3*  8.3*  9.0   ALB  2.3*   --   2.7*   WBC  6.1  7.9  9.0   HGB  7.9*  8.2*  9.2*   HCT  24.7*  26.0*  30.3*   PLT  283  278  277      No results for input(s): INR, PTP, APTT in the last 72 hours. No lab exists for component: INREXT, INREXT  Needs: urine analysis, urine sodium, protein and creatinine  Lab Results   Component Value Date/Time    Sodium urine, random 50 2016 12:55 PM    Creatinine, urine 53.84 2016 12:55 PM           : Anibal Traore MD  2017        [facility]:  [email or web address]  Shawna Moran office:  [address] [place] [address] [place], [address] [facility],[address]  [place], [address] [facility]  Phone: [phone]  Fax :     [phone]    [facility] office:  [address] [facility], [address]   Phone - [phone]  Fax - [phone]

## 2017-02-13 NOTE — PROGRESS NOTES
Problem: Falls - Risk of  Goal: *Knowledge of fall prevention  Outcome: Progressing Towards Goal  Patient and  provided education on fall prevention, bed alarm, and Call don't fall.

## 2017-02-13 NOTE — PROGRESS NOTES
Bedside and Verbal shift change report given to Elen Melendez (oncoming nurse) by Xiomara Frias RN (offgoing nurse). Report included the following information SBAR, Intake/Output, MAR and Accordion.

## 2017-02-13 NOTE — PROGRESS NOTES
2/13/2017   CARE MANAGEMENT NOTE:  CM is following pt for initial discharge planning. EMR reviewed. CM met with pt who was her own historian for this needs assessment. Reportedly, pt resides with her  in a two story home with bedroom on the ground floor, however she sleeps in a recliner. There is a ramp to the home. PTA, pt was ambulatory with a rollator, and she is indepn with ADLs. Pt has housekeeping assistance 1x monthly. She has RX drug coverage and uses [facility] pharmacy. Pt had home health services recently and those have now been  discontinued. She has been to [facility] for short rehab 2x in the past. DME in the home includes a cane, rolling walker, rollator, shower bench, BSC, and Cpap. She has a glucometer. PCP is Dr. Jose Eduardo Tobin. Plan is to return home when medically stable. She does not anticipate any post discharge needs.   Nestor

## 2017-02-13 NOTE — DIABETES MGMT
DTC Consult Note    Recommendations/ Comments: Met with pt and  for consult. Per pt her PCP recommended she adjust her Toujeo and Humulin R doses depending on how much food she is eating that day. Per pt if her meals are less than 45 gm of carbohydrates she will sometimes give 4-5 units of the Humulin R. Pt's  stated that Mrs. Aurora Carmona does not like for him to know how much insulin she is giving so he is unsure of her regimen. Reviewed hypoglycemia treatment as pt was using milkshakes to treat lows, pt verbalized understanding. Provided education material and outpatient DTC contact info for patient. Addendum @ [phone]: If appropriate, please consider increasing Lantus dose to 25 units daily    *Using weight based calculation 0.2 units of insulin/kg - 0.2 x 126.6 = 25 units basal insulin    Consult received for:  [x]             Assessment of home management                []      Medication Recommendations                []             Meter/monitoring     []             Insulin instruction     []             New diagnosis     []             Outpatient education     []             Insulin pump patient     []             Insulin infusion     []             DKA/HHS    Chart reviewed and initial evaluation complete on Karmen Rios. Patient is a 80 y.o. female with known history of Type 2 Diabetes on Toujeo 26 units daily and Humulin R - 6 units ac breakfast and dinner at home. BG monitoring at home 5-6 times per day. Patient reports BG levels at home \"all over the place\" per patient.     Assessed and instructed patient on the following:   ·  interpretation of lab results, blood sugar goals, hypoglycemia prevention and treatment, nutrition, referred to Diabetes Educator and use of insulin pen    Encouraged the following:   · increased exercise, dietary modifications: eliminating juice consumption with meals    Provided patient with the following: [x]             Dm Self-Care Guide education materials               []             Insulin education materials               []             CHO counting education materials               [x]             Outpatient DTC contact number               []             Glucometer                 Discussed with patient and/or family need for follow up appointment for diabetes management after discharge. A1c:   Lab Results   Component Value Date/Time    Hemoglobin A1c 7.2 02/11/2017 11:17 AM       Recent Glucose Results: Lab Results   Component Value Date/Time     (H) 02/13/2017 03:49 AM    GLUCPOC 197 (H) 02/13/2017 11:26 AM    GLUCPOC 121 (H) 02/13/2017 07:19 AM    GLUCPOC 222 (H) 02/12/2017 08:55 PM        Lab Results   Component Value Date/Time    Creatinine 2.91 02/13/2017 03:49 AM       Active Orders   Diet    DIET CARDIAC Regular; Consistent Carb 1800kcal        PO intake: Patient Vitals for the past 72 hrs:   % Diet Eaten   02/12/17 1810 75 %   02/12/17 1409 100 %       Current hospital DM medication:   -Humalog insulin resistant correction  -Lantus 10 units at bedtime    Will continue to follow as needed. Thank you.     Evert Alba, [address] [facility]

## 2017-02-13 NOTE — PROGRESS NOTES
Daily Progress Note: 2/13/2017  Harsh Ibarra MD    Assessment/Plan:   Cellulitis, LLE - POA, recurrent. Not septic. --empiric Vancomycin and levaquin. Wound care consult. --leg swelling/pain - LE Dopplers: negative with limited study     Acute renal failure/ CKD (chronic kidney disease) stage 4 -   --holding bumex  ---renal following     DM type 2, uncontrolled, with renal complications / Hypoglycemia - Low BG on presentation. --holding lantus   - q4h accuchecks  --diabetic diet     RAI on CPAP - Continue home CPAP qhs and with naps     Hypertension - BP good - continue coreg     Iron deficiency anemia - gets EPO and apparently was getting set up for IV iron through [facility] soon [?]     Chronic bilateral low back pain without sciatica - Continue tylenol or prn norco, with laxatives. NO NSAIDS.     Generalized anxiety disorder / Anxiety and depression - Continue zoloft and prn xanax.     Asthma - Per HX. Not sure if true asthma or COPD, rather may reflect obesity and reflux.  Continue usual Breo, claritin, and prn duonebs.     Hyperlipidemia - Continue tricor and atorvastatin    DVT proph - heparin       Problem List:  Problem List as of 2/13/2017  Date Reviewed: 2/11/2017          Codes Class Noted - Resolved    * (Principal)Cellulitis ICD-10-CM: L03.90  ICD-9-CM: 682.9  2/11/2017 - Present        Asthma ICD-10-CM: J45.909  ICD-9-CM: 493.90  Unknown - Present        Hyperlipidemia ICD-10-CM: E78.5  ICD-9-CM: 272.4  Unknown - Present        RAI on CPAP ICD-10-CM: G47.33  ICD-9-CM: 327.23  Unknown - Present        Anxiety and depression ICD-10-CM: F41.9, F32.9  ICD-9-CM: 300.00, 311  Unknown - Present        Hypoglycemia ICD-10-CM: E16.2  ICD-9-CM: 251.2  2/11/2017 - Present        Chronic bilateral low back pain without sciatica ICD-10-CM: M54.5, G89.29  ICD-9-CM: 724.2, 338.29  11/3/2016 - Present        Generalized anxiety disorder ICD-10-CM: F41.1  ICD-9-CM: 300.02 11/3/2016 - Present        Acute renal failure (ARF) (HCC) ICD-10-CM: N17.9  ICD-9-CM: 584.9  8/14/2016 - Present        CKD (chronic kidney disease) stage 4, GFR 15-29 ml/min (HCC) (Chronic) ICD-10-CM: N18.4  ICD-9-CM: 585.4  8/14/2016 - Present        Hypertension (Chronic) ICD-10-CM: I10  ICD-9-CM: 401.9  8/14/2016 - Present        DM type 2, uncontrolled, with renal complications (HCC) (Chronic) ICD-10-CM: E11.29, E11.65  ICD-9-CM: 250.42  8/14/2016 - Present        Iron deficiency anemia (Chronic) ICD-10-CM: D50.9  ICD-9-CM: 280.9  8/14/2016 - Present        RESOLVED: CKD stage 4 due to type 2 diabetes mellitus ([facility] ICD-10-CM: E11.22, N18.4  ICD-9-CM: 250.40, 585.4  Unknown - 2/11/2017        RESOLVED: DM type 2 causing renal disease ([facility] ICD-10-CM: E11.29  ICD-9-CM: 250.40  Unknown - 2/11/2017        RESOLVED: Hyperkalemia ICD-10-CM: E87.5  ICD-9-CM: 276.7  11/3/2016 - 2/11/2017        RESOLVED: Elevated serum creatinine ICD-10-CM: R79.89  ICD-9-CM: 790.99  11/3/2016 - 2/11/2017        RESOLVED: Left leg cellulitis ICD-10-CM: L03.116  ICD-9-CM: 682.6  8/14/2016 - 2/11/2017        RESOLVED: Sepsis ([facility] ICD-10-CM: A41.9  ICD-9-CM: 038.9, 995.91  8/14/2016 - 2/11/2017        RESOLVED: RAI (obstructive sleep apnea) (Chronic) ICD-10-CM: E71.13  ICD-9-CM: 327.23  8/14/2016 - 2/11/2017              Subjective:   Ms. Raine Augustin is a 80 y.o.  female who presented to the Emergency Department complaining of leg infection. She was sent to ER by her PCP who wanted admission for IV Abx. She had gone to PCP due to worsening pain and redness over days, not responding to local steroids cream Patient has persistent skin issues, and recurrent Dx of cellulitis, with recurrent Rx of Abx. She picks at her leg skin constantly, due to itching. She meets no SIRS criteria. ER workup shows mildly worse CKD, and she had mild hypoglycemia on presentation. We will admit the patient for management.     2/12:   She c/o pain in the left leg. She is anxious and upset about being back in the hospital.  She denies CP, SOB at baseline. Adamantly refuses going back to SNF so wants to get out of hospital ASAP before she loses muscle tone in her legs. :  Less left leg pain. Afeb. Cont antibiotics and add mupirocin. LE Doppler neg but limited study due to morbid obesity. Hb lower today - recheck and SFOB. Review of Systems:   A comprehensive review of systems was negative except for that written in the HPI. Objective:   Physical Exam:     Visit Vitals    /62 (BP 1 Location: Right arm, BP Patient Position: At rest)    Pulse 74    Temp 98.9 °F (37.2 °C)    Resp 16    Ht 5' 1\" (1.549 m)    Wt 126.6 kg (279 lb)    SpO2 94%    BMI 52.72 kg/m2      O2 Device: CPAP mask    Temp (24hrs), Av.8 °F (37.1 °C), Min:98.1 °F (36.7 °C), Max:99.1 °F (37.3 °C)    1901 -  0700  In: 250 [P.O.:250]  Out: -     07 -  190  In: 1360 [P.O.:960; I.V.:400]  Out: -     General:  Alert, cooperative, no distress, morbidly obese   Head:  Normocephalic, without obvious abnormality, atraumatic. Throat: Lips, mucosa, and tongue dry   Neck: Supple, no JVD   Lungs:   Clear to auscultation bilaterally. Heart:  Regular rate and rhythm, S1, S2 normal, 2/6 murmur   Abdomen:   Soft, non-tender. Bowel sounds normal. No masses,  No organomegaly. Extremities: Left LE with 1+ edema. Less Erythema at toes, dorsal foot and less in thigh. less diffuse tenderness, warmth. Pulses: 1+ and symmetric all extremities. Skin: Skin color, texture, turgor normal. No rashes or lesions   Neurologic: CNII-XII intact. Alert and oriented X 3.        Data Review:       Recent Days:  Recent Labs      17   0349  17   0416  17   1117   WBC  6.1  7.9  9.0   HGB  7.9*  8.2*  9.2*   HCT  24.7*  26.0*  30.3*   PLT  283  278  277     Recent Labs      17   0349  17   0416  17   1117   NA  139  136  140   K  4.0 4.2  3.8   CL  103  101  101   CO2  29  28  31   GLU  121*  165*  74   BUN  84*  84*  89*   CREA  2.91*  3.11*  3.19*   CA  8.3*  8.3*  9.0   MG  1.9  1.8   --    PHOS  3.3  3.3   --    ALB  2.3*   --   2.7*   SGOT   --    --   26   ALT   --    --   26     No results for input(s): PH, PCO2, PO2, HCO3, FIO2 in the last 72 hours. 24 Hour Results:  Recent Results (from the past 24 hour(s))   GLUCOSE, POC    Collection Time: 02/12/17  8:11 AM   Result Value Ref Range    Glucose (POC) 195 (H) 65 - 100 mg/dL    Performed by Opal Sheriff    GLUCOSE, POC    Collection Time: 02/12/17 11:27 AM   Result Value Ref Range    Glucose (POC) 218 (H) 65 - 100 mg/dL    Performed by Ryan Roy, RANDOM    Collection Time: 02/12/17 12:15 PM   Result Value Ref Range    VANCOMYCIN,RANDOM 21.6 UG/ML   GLUCOSE, POC    Collection Time: 02/12/17  3:58 PM   Result Value Ref Range    Glucose (POC) 256 (H) 65 - 100 mg/dL    Performed by Opal Sheriff    GLUCOSE, POC    Collection Time: 02/12/17  8:55 PM   Result Value Ref Range    Glucose (POC) 222 (H) 65 - 100 mg/dL    Performed by [facility] ([facility])    CBC WITH AUTOMATED DIFF    Collection Time: 02/13/17  3:49 AM   Result Value Ref Range    WBC 6.1 3.6 - 11.0 K/uL    RBC 2.78 (L) 3.80 - 5.20 M/uL    HGB 7.9 (L) 11.5 - 16.0 g/dL    HCT 24.7 (L) 35.0 - 47.0 %    MCV 88.8 80.0 - 99.0 FL    MCH 28.4 26.0 - 34.0 PG    MCHC 32.0 30.0 - 36.5 g/dL    RDW 14.6 (H) 11.5 - 14.5 %    PLATELET 669 382 - 227 K/uL    NEUTROPHILS 69 32 - 75 %    LYMPHOCYTES 19 12 - 49 %    MONOCYTES 7 5 - 13 %    EOSINOPHILS 5 0 - 7 %    BASOPHILS 0 0 - 1 %    ABS. NEUTROPHILS 4.1 1.8 - 8.0 K/UL    ABS. LYMPHOCYTES 1.2 0.8 - 3.5 K/UL    ABS. MONOCYTES 0.4 0.0 - 1.0 K/UL    ABS. EOSINOPHILS 0.3 0.0 - 0.4 K/UL    ABS.  BASOPHILS 0.0 0.0 - 0.1 K/UL   RENAL FUNCTION PANEL    Collection Time: 02/13/17  3:49 AM   Result Value Ref Range    Sodium 139 136 - 145 mmol/L    Potassium 4.0 3.5 - 5.1 mmol/L    Chloride 103 97 - 108 mmol/L    CO2 29 21 - 32 mmol/L    Anion gap 7 5 - 15 mmol/L    Glucose 121 (H) 65 - 100 mg/dL    BUN 84 (H) 6 - 20 MG/DL    Creatinine 2.91 (H) 0.55 - 1.02 MG/DL    BUN/Creatinine ratio 29 (H) 12 - 20      GFR est AA 19 (L) >60 ml/min/1.73m2    GFR est non-AA 16 (L) >60 ml/min/1.73m2    Calcium 8.3 (L) 8.5 - 10.1 MG/DL    Phosphorus 3.3 2.6 - 4.7 MG/DL    Albumin 2.3 (L) 3.5 - 5.0 g/dL   MAGNESIUM    Collection Time: 02/13/17  3:49 AM   Result Value Ref Range    Magnesium 1.9 1.6 - 2.4 mg/dL       Medications reviewed  Current Facility-Administered Medications   Medication Dose Route Frequency    Vancomycin Level with am labs on 2/13/17   Other ONCE    insulin glargine (LANTUS) injection 10 Units  10 Units SubCUTAneous QHS    sodium chloride (NS) flush 5-10 mL  5-10 mL IntraVENous Q8H    sodium chloride (NS) flush 5-10 mL  5-10 mL IntraVENous PRN    levoFLOXacin (LEVAQUIN) 750 mg in D5W IVPB  750 mg IntraVENous Q48H    albuterol-ipratropium (DUO-NEB) 2.5 MG-0.5 MG/3 ML  3 mL Nebulization Q6H PRN    ALPRAZolam (XANAX) tablet 0.25 mg  0.25 mg Oral DAILY PRN    aspirin chewable tablet 81 mg  81 mg Oral DAILY    atorvastatin (LIPITOR) tablet 40 mg  40 mg Oral QHS    carvedilol (COREG) tablet 3.125 mg  3.125 mg Oral BID WITH MEALS    fenofibrate nanocrystallized (TRICOR) tablet 145 mg  145 mg Oral DAILY    fluticasone-vilanterol (BREO ELLIPTA) 100mcg-25mcg/puff  1 Puff Inhalation DAILY    HYDROcodone-acetaminophen (NORCO) 5-325 mg per tablet 1 Tab  1 Tab Oral Q6H PRN    pantoprazole (PROTONIX) tablet 40 mg  40 mg Oral ACB    sodium chloride (NS) flush 5-10 mL  5-10 mL IntraVENous Q8H    sodium chloride (NS) flush 5-10 mL  5-10 mL IntraVENous PRN    naloxone (NARCAN) injection 0.4 mg  0.4 mg IntraVENous PRN    glucose chewable tablet 16 g  4 Tab Oral PRN    dextrose (D50W) injection syrg 12.5-25 g  12.5-25 g IntraVENous PRN    glucagon (GLUCAGEN) injection 1 mg  1 mg IntraMUSCular PRN    acetaminophen (TYLENOL) tablet 650 mg  650 mg Oral Q4H PRN    diphenhydrAMINE (BENADRYL) capsule 25 mg  25 mg Oral Q4H PRN    ondansetron (ZOFRAN) injection 4 mg  4 mg IntraVENous Q4H PRN    heparin (porcine) injection 5,000 Units  5,000 Units SubCUTAneous Q8H    insulin lispro (HUMALOG) injection   SubCUTAneous AC&HS    Vancomycin - Pharmacist dosing based on levels.    Other PRN    senna-docusate (PERICOLACE) 8.6-50 mg per tablet 2 Tab  2 Tab Oral DAILY       Moises Hill MD

## 2017-02-13 NOTE — PROGRESS NOTES
NUTRITION         Diet: Cardiac, Diabetic 1800  Body mass index is 52.72 kg/(m^2). Skin: Cellulitis  PO Intake:   Patient Vitals for the past 100 hrs:   % Diet Eaten   02/12/17 1810 75 %   02/12/17 1409 100 %       Estimated Daily Nutrition Requirements:  Kcals: 1982 (MSJ - 1524 x 1.3 AF)                 Protein:   113 g                Fluid:   2000 mL    Pt screened for MST received for wt loss and decreased appetite pta. Visited pt this afternoon. Reports wt loss was related to shifts in fluid status and hx of need for dialysis. States appetite is \"very healthy\" now and pta. Pt is tolerating po diet very well w/ adequate po intake and meeting estimated nutrition needs. Provided Diabetic diet education d/t pt stating BG is not controlled. Nutrition Diagnosis: Altered nutrition-related laboratory values related to endocrine dysfunction as evidenced by BG not WDL. Nutrition Intervention: Diabetic diet education  Goal: n/a  Monitoring and Evaluation: n/a     RD will continue to monitor and will f/u for further nutrition assessment and intervention as appropriate.     Education & Discharge Needs:   [x] Nutrition related discharge needs addressed:     [] Supplements (on d/c instruction &/or coupons provided)  [x] Education    []No nutrition related discharge needs at this time     Cultural, Latter day and ethnic food preferences identified    [x]None   [] Yes     Huan Amaya RD

## 2017-02-13 NOTE — PROGRESS NOTES
Problem: Mobility Impaired (Adult and Pediatric)  Goal: *Therapy Goal (Edit Goal, Insert Text)  Physical Therapy Goals  Initiated 2/12/2017  1. Patient will move from supine to sit and sit to supine , scoot up and down and roll side to side in bed with modified independence within 3-5 day(s). 2. Patient will transfer from bed to chair and chair to bed with modified independence using the least restrictive device within 3-5 day(s). 3. Patient will perform sit to stand with modified independence within 3-5 day(s). 4. Patient will ambulate with modified independence for 150 feet with the least restrictive device within 3-5 day(s). PHYSICAL THERAPY TREATMENT  Patient: Khoa Hughes (16 y.o. female)  Date: 2/13/2017  Diagnosis: Cellulitis Cellulitis       Precautions:        ASSESSMENT:  Patient able to increase her ambulation distance to 110 feet with RW and CGA. Patient now at a decreased assistance level, only required MIN A for bed mobility and CGA for transfers and upright activity. Patient is close to her baseline for mobility,  present and confirms. Patient recently ceased with home health PT, might benefit from home safety check via home health for consistency with performance with return to home. Progression toward goals:  [X]    Improving appropriately and progressing toward goals  [ ]    Improving slowly and progressing toward goals  [ ]    Not making progress toward goals and plan of care will be adjusted       PLAN:  Patient continues to benefit from skilled intervention to address the above impairments. Continue treatment per established plan of care. Discharge Recommendations:  Home Health  Further Equipment Recommendations for Discharge:         SUBJECTIVE:   Patient stated i am doing much better.       OBJECTIVE DATA SUMMARY:   Critical Behavior:  Neurologic State: Alert  Orientation Level: Oriented X4  Cognition: Appropriate for age attention/concentration, Follows commands  Safety/Judgement: Awareness of environment  Functional Mobility Training:  Bed Mobility:  Rolling: Supervision  Supine to Sit: Minimum assistance  Sit to Supine: Assist x1;Additional time; Moderate assistance (managment of bilateral LEs into bed )           Transfers:  Sit to Stand: Contact guard assistance  Stand to Sit: Contact guard assistance        Bed to Chair: Contact guard assistance      x3 to bedside commode and then to bed and back to chair at end of session              Balance:  Sitting: Intact  Standing: Intact  Ambulation/Gait Training:  Distance (ft): 110 Feet (ft)  Assistive Device: Walker, rolling;Gait belt  Ambulation - Level of Assistance: Contact guard assistance                       Speed/Rina: Slow                                  Stairs:                       Neuro Re-Education:     Therapeutic Exercises:      Pain:  Pain Scale 1: Numeric (0 - 10)  Pain Intensity 1: 0              Activity Tolerance:   Good- no complications with upright activity  Please refer to the flowsheet for vital signs taken during this treatment.   After treatment:   [X]    Patient left in no apparent distress sitting up in chair  [ ]    Patient left in no apparent distress in bed  [X]    Call bell left within reach  [X]    Nursing notified  [X]    Caregiver present  [X]    Chair alarm activated      COMMUNICATION/COLLABORATION:   The patients plan of care was discussed with: Registered Nurse     Yovany Haq PT, DPT   Time Calculation: 25 mins

## 2017-02-13 NOTE — ROUTINE PROCESS
Bedside shift change report given to [address] [place] [address],[address], RN (oncoming nurse) by Mihai Mckinley. Tanya Mccormick   (offgoing nurse). Report included the following information SBAR, Kardex and MAR.

## 2017-02-13 NOTE — PROGRESS NOTES
Interdisciplinary team rounds were held 2/13/2017 with the following team members:Care Management, Hospice, Nursing, Palliative Care, Pharmacy and Physician and the primary RN. Plan of care discussed. See clinical pathway and/or care plan for interventions and desired outcomes.     Discharge unknown at this time

## 2017-02-13 NOTE — PROGRESS NOTES
Problem: Self Care Deficits Care Plan (Adult)  Goal: *Acute Goals and Plan of Care (Insert Text)  Occupational Therapy Goals  Initiated 2/13/2017  1. Patient will perform lower body dressing with moderate assistance, using most appropriate DME, within 7 day(s). 2. Patient will perform upper body dressing with supervision/set-up within 7 day(s). 3. Patient will perform toilet transfers with supervision/set-up within 7 day(s). 4. Patient will perform all aspects of toileting with moderate assistance within 7 day(s). 5. Patient will participate in upper extremity therapeutic exercise/activities with supervision/set-up for 10 minutes within 7 day(s). OCCUPATIONAL THERAPY EVALUATION  Patient: Maria Torres (72 y.o. female)  Date: 2/13/2017  Primary Diagnosis: Cellulitis        Precautions: fall         ASSESSMENT :  Based on the objective data described below, the patient presents with total assistance for toileting, max assistance for LE dressing and bathing, min assistance for UE dressing, and minimum assistance for functional transfers. Per chart report, patient with knee buckling and past falls, but patient without incident during therapy evaluation. Patient spouse assists patient at home and patient without initiation of task today, but does not verbally request spouse assist.  He eagerly performs tasks for patient, though both report he has his own medical problems and is tired. Patient agreeable to attempting all tasks and knows she needs to get stronger. Patient will benefit from continued OT services to increase safety and independence with ADL routine. Patient will benefit from skilled intervention to address the above impairments.   Patients rehabilitation potential is considered to be Good  Factors which may influence rehabilitation potential include:   [ ]             None noted  [ ]             Mental ability/status  [X]             Medical condition  [ ]             Home/family situation and support systems  [ ]             Safety awareness  [ ]             Pain tolerance/management  [ ]             Other:        PLAN :  Recommendations and Planned Interventions:  [X]               Self Care Training                  [X]        Therapeutic Activities  [X]               Functional Mobility Training    [ ]        Cognitive Retraining  [X]               Therapeutic Exercises           [X]        Endurance Activities  [X]               Balance Training                   [ ]        Neuromuscular Re-Education  [ ]               Visual/Perceptual Training     [X]   Home Safety Training  [X]               Patient Education                 [X]        Family Training/Education  [ ]               Other (comment):     Frequency/Duration: Patient will be followed by occupational therapy 5 times a week to address goals. Discharge Recommendations: Rehab vs. Home Health pending patient progress   Further Equipment Recommendations for Discharge: none noted        SUBJECTIVE:   Patient stated I am making a blanket for my granddaugther.       OBJECTIVE DATA SUMMARY:   HISTORY:   Past Medical History   Diagnosis Date    Anxiety and depression      Asthma      CKD stage 4 due to type 2 diabetes mellitus ([facility]      DM type 2 causing renal disease (HCC)      Hyperlipidemia      Hypertension      RAI on CPAP       Past Surgical History   Procedure Laterality Date    Hx cholecystectomy        Hx heent           tonsilectomy    Hx gyn           hysterectomy    Pr breast surgery procedure unlisted           mastectomy, bilat        Prior Level of Function/Home Situation: Patient reports able to complete some ADL tasks but mostly has assistance with ADL tasks from spouse.   He completes all IADLs and they have cleaning services 1x per month  Expanded or extensive additional review of patient history:      Home Situation  Home Environment: Private residence  # Steps to Enter: 0  Wheelchair Ramp: Yes  One/Two Story Residence: Two story, live on 1st floor  Living Alone: No  Support Systems: Spouse/Significant Other/Partner  Patient Expects to be Discharged to[de-identified] Private residence  Current DME Used/Available at Home: Vikram Danker, rolling, Walker, rollator, Alli Ashlee, straight, Wheelchair, CPAP, Grab bars, Raised toilet seat, Shower chair  Tub or Shower Type: Tub/Shower combination  [X]  Right hand dominant             [ ]  Left hand dominant     EXAMINATION OF PERFORMANCE DEFICITS:  Cognitive/Behavioral Status:  Neurologic State: Alert  Orientation Level: Oriented X4  Cognition: Appropriate for age attention/concentration; Follows commands  Perception: Appears intact  Perseveration: No perseveration noted  Safety/Judgement: Awareness of environment  Skin: scratches to bilateral forearms, redness to LLE  (patient seen scratching back and arms while in room)   Edema: LLE   Vision/Perceptual:              Corrective Lenses: Glasses  Range of Motion:  AROM: Within functional limits      Strength:  Strength: Generally decreased, functional        Coordination:     Fine Motor Skills-Upper: Left Intact; Right Intact    Gross Motor Skills-Upper: Left Intact; Right Intact  Tone & Sensation:  Tone: Normal  Sensation: Intact        Balance:  Sitting: Intact  Standing: With support     Functional Mobility and Transfers for ADLs:  Bed Mobility:  Supine to Sit:  (not tested, OOB in chair )  Sit to Supine: Assist x1;Additional time; Moderate assistance (managment of bilateral LEs into bed )     Transfers:  Sit to Stand: Assist x1;Additional time;Minimum assistance (patient stands from sitting on 3)  Stand to Sit: Minimum assistance;Assist x1;Additional time  Toilet Transfer : Minimum assistance;Assist x1;Additional time     ADL Assessment:  Feeding: Independent     Oral Facial Hygiene/Grooming: Setup;Supervision     Bathing: Maximum assistance (patient able to perform anterior chest and under arms.  )     Upper Body Dressing: Minimum assistance     Lower Body Dressing: Maximum assistance     Toileting: Total assistance (spouse performs hygiene. reports bottom xander)     ADL Intervention and task modifications:     Cognitive Retraining  Safety/Judgement: Awareness of environment     Functional Measure:  Barthel Index:      Bathin  Bladder: 5  Bowels: 5  Groomin  Dressin  Feeding: 10  Mobility: 0  Stairs: 0  Toilet Use: 0  Transfer (Bed to Chair and Back): 5  Total: 30         Barthel and G-code impairment scale:  Percentage of impairment CH  0% CI  1-19% CJ  20-39% CK  40-59% CL  60-79% CM  80-99% CN  100%   Barthel Score 0-100 100 99-80 79-60 59-40 20-39 1-19    0   Barthel Score 0-20 20 17-19 13-16 9-12 5-8 1-4 0      The Barthel ADL Index: Guidelines  1. The index should be used as a record of what a patient does, not as a record of what a patient could do. 2. The main aim is to establish degree of independence from any help, physical or verbal, however minor and for whatever reason. 3. The need for supervision renders the patient not independent. 4. A patient's performance should be established using the best available evidence. Asking the patient, friends/relatives and nurses are the usual sources, but direct observation and common sense are also important. However direct testing is not needed. 5. Usually the patient's performance over the preceding 24-48 hours is important, but occasionally longer periods will be relevant. 6. Middle categories imply that the patient supplies over 50 per cent of the effort. 7. Use of aids to be independent is allowed. Eduardo Perkins., Barthel, D.W. (3099). Functional evaluation: the Barthel Index. [address] [facility] (14)2. Anna Woo leo JOVANNI Guzman, Vicenta Montanez., Alex Valencia., [facility], [address] (). Measuring the change indisability after inpatient rehabilitation; comparison of the responsiveness of the Barthel Index and Functional Lookout Measure.  Journal of Neurology, Neurosurgery, and Psychiatry, 66(4), 483-381. CHARU Lewis, SCOTTY Viera, & Wilda Arteaga M.A. (2004.) Assessment of post-stroke quality of life in cost-effectiveness studies: The usefulness of the Barthel Index and the EuroQoL-5D. Quality of Life Research, 13, 948-50         G codes: In compliance with CMSs Claims Based Outcome Reporting, the following G-code set was chosen for this patient based on their primary functional limitation being treated: The outcome measure chosen to determine the severity of the functional limitation was the Barthel Index with a score of 30/100 which was correlated with the impairment scale. · Self Care:               - CURRENT STATUS:    CL - 60%-79% impaired, limited or restricted               - GOAL STATUS:           CK - 40%-59% impaired, limited or restricted               - D/C STATUS:                       ---------------To be determined---------------      Occupational Therapy Evaluation Charge Determination   History Examination Decision-Making   LOW Complexity : Brief history review  LOW Complexity : 1-3 performance deficits relating to physical, cognitive , or psychosocial skils that result in activity limitations and / or participation restrictions  LOW Complexity : No comorbidities that affect functional and no verbal or physical assistance needed to complete eval tasks       Based on the above components, the patient evaluation is determined to be of the following complexity level: LOW   Pain:  Pain Scale 1: Numeric (0 - 10)  Pain Intensity 1: 0              Activity Tolerance:   vss  Please refer to the flowsheet for vital signs taken during this treatment.   After treatment:   [ ] Patient left in no apparent distress sitting up in chair  [X] Patient left in no apparent distress in bed  [X] Call bell left within reach  [X] Nursing notified  [X] Caregiver present  [ ] Bed alarm activated      COMMUNICATION/EDUCATION:   The patients plan of care was discussed with: Registered Nurse.  [X] Home safety education was provided and the patient/caregiver indicated understanding. [X] Patient/family have participated as able in goal setting and plan of care. [X] Patient/family agree to work toward stated goals and plan of care. [ ] Patient understands intent and goals of therapy, but is neutral about his/her participation. [ ] Patient is unable to participate in goal setting and plan of care. This patients plan of care is appropriate for delegation to \[facility]\"".      Thank you for this referral.  Rosanne Dillon OTR/L  Time Calculation: 34 mins

## 2017-02-14 VITALS
HEIGHT: 61 IN | WEIGHT: 279 LBS | TEMPERATURE: 98.1 F | RESPIRATION RATE: 17 BRPM | BODY MASS INDEX: 52.67 KG/M2 | OXYGEN SATURATION: 96 % | DIASTOLIC BLOOD PRESSURE: 62 MMHG | SYSTOLIC BLOOD PRESSURE: 149 MMHG | HEART RATE: 79 BPM

## 2017-02-14 LAB
ALBUMIN SERPL BCP-MCNC: 2.3 G/DL (ref 3.5–5)
ALBUMIN/GLOB SERPL: 0.5 {RATIO} (ref 1.1–2.2)
ALP SERPL-CCNC: 81 U/L (ref 45–117)
ALT SERPL-CCNC: 15 U/L (ref 12–78)
ANION GAP BLD CALC-SCNC: 8 MMOL/L (ref 5–15)
AST SERPL W P-5'-P-CCNC: 19 U/L (ref 15–37)
BASOPHILS # BLD AUTO: 0 K/UL (ref 0–0.1)
BASOPHILS # BLD: 0 % (ref 0–1)
BILIRUB SERPL-MCNC: 0.3 MG/DL (ref 0.2–1)
BUN SERPL-MCNC: 73 MG/DL (ref 6–20)
BUN/CREAT SERPL: 28 (ref 12–20)
CALCIUM SERPL-MCNC: 8.8 MG/DL (ref 8.5–10.1)
CHLORIDE SERPL-SCNC: 104 MMOL/L (ref 97–108)
CO2 SERPL-SCNC: 28 MMOL/L (ref 21–32)
CREAT SERPL-MCNC: 2.58 MG/DL (ref 0.55–1.02)
EOSINOPHIL # BLD: 0.3 K/UL (ref 0–0.4)
EOSINOPHIL NFR BLD: 5 % (ref 0–7)
ERYTHROCYTE [DISTWIDTH] IN BLOOD BY AUTOMATED COUNT: 14.5 % (ref 11.5–14.5)
GLOBULIN SER CALC-MCNC: 4.2 G/DL (ref 2–4)
GLUCOSE BLD STRIP.AUTO-MCNC: 167 MG/DL (ref 65–100)
GLUCOSE BLD STRIP.AUTO-MCNC: 233 MG/DL (ref 65–100)
GLUCOSE SERPL-MCNC: 149 MG/DL (ref 65–100)
HCT VFR BLD AUTO: 26.5 % (ref 35–47)
HGB BLD-MCNC: 8.3 G/DL (ref 11.5–16)
LYMPHOCYTES # BLD AUTO: 22 % (ref 12–49)
LYMPHOCYTES # BLD: 1.2 K/UL (ref 0.8–3.5)
MCH RBC QN AUTO: 27.9 PG (ref 26–34)
MCHC RBC AUTO-ENTMCNC: 31.3 G/DL (ref 30–36.5)
MCV RBC AUTO: 89.2 FL (ref 80–99)
MONOCYTES # BLD: 0.3 K/UL (ref 0–1)
MONOCYTES NFR BLD AUTO: 6 % (ref 5–13)
NEUTS SEG # BLD: 3.5 K/UL (ref 1.8–8)
NEUTS SEG NFR BLD AUTO: 67 % (ref 32–75)
PLATELET # BLD AUTO: 305 K/UL (ref 150–400)
POTASSIUM SERPL-SCNC: 4 MMOL/L (ref 3.5–5.1)
PROT SERPL-MCNC: 6.5 G/DL (ref 6.4–8.2)
RBC # BLD AUTO: 2.97 M/UL (ref 3.8–5.2)
SERVICE CMNT-IMP: ABNORMAL
SERVICE CMNT-IMP: ABNORMAL
SODIUM SERPL-SCNC: 140 MMOL/L (ref 136–145)
WBC # BLD AUTO: 5.3 K/UL (ref 3.6–11)

## 2017-02-14 PROCEDURE — 82962 GLUCOSE BLOOD TEST: CPT

## 2017-02-14 PROCEDURE — 36415 COLL VENOUS BLD VENIPUNCTURE: CPT | Performed by: FAMILY MEDICINE

## 2017-02-14 PROCEDURE — 74011250636 HC RX REV CODE- 250/636: Performed by: INTERNAL MEDICINE

## 2017-02-14 PROCEDURE — 74011250637 HC RX REV CODE- 250/637: Performed by: INTERNAL MEDICINE

## 2017-02-14 PROCEDURE — 74011636637 HC RX REV CODE- 636/637: Performed by: INTERNAL MEDICINE

## 2017-02-14 PROCEDURE — 80053 COMPREHEN METABOLIC PANEL: CPT | Performed by: FAMILY MEDICINE

## 2017-02-14 PROCEDURE — 85025 COMPLETE CBC W/AUTO DIFF WBC: CPT | Performed by: FAMILY MEDICINE

## 2017-02-14 RX ORDER — MUPIROCIN 20 MG/G
OINTMENT TOPICAL 3 TIMES DAILY
Qty: 66 G | Refills: 3 | Status: SHIPPED | OUTPATIENT
Start: 2017-02-14 | End: 2017-06-02

## 2017-02-14 RX ORDER — LEVOFLOXACIN 500 MG/1
500 TABLET, FILM COATED ORAL DAILY
Qty: 7 TAB | Refills: 0 | Status: SHIPPED | OUTPATIENT
Start: 2017-02-14 | End: 2017-02-21

## 2017-02-14 RX ADMIN — FENOFIBRATE 145 MG: 145 TABLET ORAL at 09:53

## 2017-02-14 RX ADMIN — INSULIN LISPRO 4 UNITS: 100 INJECTION, SOLUTION INTRAVENOUS; SUBCUTANEOUS at 12:07

## 2017-02-14 RX ADMIN — Medication 2 TABLET: at 09:54

## 2017-02-14 RX ADMIN — INSULIN LISPRO 3 UNITS: 100 INJECTION, SOLUTION INTRAVENOUS; SUBCUTANEOUS at 09:54

## 2017-02-14 RX ADMIN — FLUTICASONE FUROATE AND VILANTEROL TRIFENATATE 1 PUFF: 100; 25 POWDER RESPIRATORY (INHALATION) at 09:59

## 2017-02-14 RX ADMIN — NYSTATIN: 100000 POWDER TOPICAL at 09:59

## 2017-02-14 RX ADMIN — ASPIRIN 81 MG CHEWABLE TABLET 81 MG: 81 TABLET CHEWABLE at 09:54

## 2017-02-14 RX ADMIN — CARVEDILOL 3.12 MG: 3.12 TABLET, FILM COATED ORAL at 09:54

## 2017-02-14 RX ADMIN — HEPARIN SODIUM 5000 UNITS: 5000 INJECTION, SOLUTION INTRAVENOUS; SUBCUTANEOUS at 06:37

## 2017-02-14 RX ADMIN — PANTOPRAZOLE SODIUM 40 MG: 40 TABLET, DELAYED RELEASE ORAL at 06:37

## 2017-02-14 RX ADMIN — MUPIROCIN: 20 OINTMENT TOPICAL at 10:00

## 2017-02-14 RX ADMIN — Medication 10 ML: at 06:37

## 2017-02-14 RX ADMIN — BUMETANIDE 2 MG: 1 TABLET ORAL at 09:54

## 2017-02-14 NOTE — PROGRESS NOTES
Discussed discharge instructions with patient and her . They verbalized understanding. Copy given. Two prescriptions given. ELINOR DC'd. Discharged via wheelchair.

## 2017-02-14 NOTE — PROGRESS NOTES
Bedside and Verbal shift change report given to Bonnie Lantigua (oncoming nurse) by Anastasiya Santamaria RN (offgoing nurse). Report included the following information SBAR, Kardex, Intake/Output, MAR, Accordion and Recent Results.

## 2017-02-14 NOTE — DISCHARGE INSTRUCTIONS
Cellulitis: Care Instructions  Your Care Instructions    Cellulitis is a skin infection. It often occurs after a break in the skin from a scrape, cut, bite, or puncture, or after a rash. The doctor has checked you carefully, but problems can develop later. If you notice any problems or new symptoms, get medical treatment right away. Follow-up care is a key part of your treatment and safety. Be sure to make and go to all appointments, and call your doctor if you are having problems. It's also a good idea to know your test results and keep a list of the medicines you take. How can you care for yourself at home? · Take your antibiotics as directed. Do not stop taking them just because you feel better. You need to take the full course of antibiotics. · Prop up the infected area on pillows to reduce pain and swelling. Try to keep the area above the level of your heart as often as you can. · If your doctor told you how to care for your wound, follow your doctor's instructions. If you did not get instructions, follow this general advice:  ¨ Wash the wound with clean water 2 times a day. Don't use hydrogen peroxide or alcohol, which can slow healing. ¨ You may cover the wound with a thin layer of petroleum jelly, such as Vaseline, and a nonstick bandage. ¨ Apply more petroleum jelly and replace the bandage as needed. · Be safe with medicines. Take pain medicines exactly as directed. ¨ If the doctor gave you a prescription medicine for pain, take it as prescribed. ¨ If you are not taking a prescription pain medicine, ask your doctor if you can take an over-the-counter medicine. To prevent cellulitis in the future  · Try to prevent cuts, scrapes, or other injuries to your skin. Cellulitis most often occurs where there is a break in the skin. · If you get a scrape, cut, mild burn, or bite, wash the wound with clean water as soon as you can to help avoid infection.  Don't use hydrogen peroxide or alcohol, which can slow healing. · If you have swelling in your legs (edema), support stockings and good skin care may help prevent leg sores and cellulitis. · Take care of your feet, especially if you have diabetes or other conditions that increase the risk of infection. Wear shoes and socks. Do not go barefoot. If you have athlete's foot or other skin problems on your feet, talk to your doctor about how to treat them. When should you call for help? Call your doctor now or seek immediate medical care if:  · You have signs that your infection is getting worse, such as:  ¨ Increased pain, swelling, warmth, or redness. ¨ Red streaks leading from the area. ¨ Pus draining from the area. ¨ A fever. · You get a rash. Watch closely for changes in your health, and be sure to contact your doctor if:  · You are not getting better after 1 day (24 hours). · You do not get better as expected. Where can you learn more? Go to http://trish-anastacia.info/. Sawyer Small in the search box to learn more about \"Cellulitis: Care Instructions. \"  Current as of: February 5, 2016  Content Version: 11.1  © 6870-7932 [employer]. Care instructions adapted under license by [employer] (which disclaims liability or warranty for this information). If you have questions about a medical condition or this instruction, always ask your healthcare professional. [place] [address] any warranty or liability for your use of this information. We hope that we have addressed all of your medical concerns. The examination and treatment you received in the Emergency Department were for an emergent problem and were not intended as complete care. It is important that you follow up with your healthcare provider(s) for ongoing care. If your symptoms worsen or do not improve as expected, and you are unable to reach your usual health care provider(s), you should return to the Emergency Department. Today's healthcare is undergoing tremendous change, and patient satisfaction surveys are one of the many tools to assess the quality of medical care. You may receive a survey from the [employer] regarding your experience in the Emergency Department. I hope that your experience has been completely positive, particularly the medical care that I provided. As such, please participate in the survey; anything less than excellent does not meet my expectations or intentions. [facility] and [facility] participate in nationally recognized quality of care measures. If your blood pressure is greater than 120/80, as reported below, we urge that you seek medical care to address the potential of high blood pressure, commonly known as hypertension. Hypertension can be hereditary or can be caused by certain medical conditions, pain, stress, or \"white coat syndrome. \"       Please make an appointment with your health care provider(s) for follow up of your Emergency Department visit. VITALS:   Patient Vitals for the past 8 hrs:   Temp Pulse Resp BP SpO2   02/11/17 1034 97.5 °F (36.4 °C) 76 16 134/60 100 %          Thank you for allowing us to provide you with medical care today. We realize that you have many choices for your emergency care needs. Please choose us in the future for any continued health care needs. Olga Simons, [address] [place].   Office: [phone]            Recent Results (from the past 24 hour(s))   CBC WITH AUTOMATED DIFF    Collection Time: 02/11/17 11:17 AM   Result Value Ref Range    WBC 9.0 3.6 - 11.0 K/uL    RBC 3.30 (L) 3.80 - 5.20 M/uL    HGB 9.2 (L) 11.5 - 16.0 g/dL    HCT 30.3 (L) 35.0 - 47.0 %    MCV 91.8 80.0 - 99.0 FL    MCH 27.9 26.0 - 34.0 PG    MCHC 30.4 30.0 - 36.5 g/dL    RDW 15.2 (H) 11.5 - 14.5 %    PLATELET 881 809 - 156 K/uL    NEUTROPHILS 83 (H) 32 - 75 % LYMPHOCYTES 7 (L) 12 - 49 %    MONOCYTES 5 5 - 13 %    EOSINOPHILS 5 0 - 7 %    BASOPHILS 0 0 - 1 %    ABS. NEUTROPHILS 7.4 1.8 - 8.0 K/UL    ABS. LYMPHOCYTES 0.6 (L) 0.8 - 3.5 K/UL    ABS. MONOCYTES 0.5 0.0 - 1.0 K/UL    ABS. EOSINOPHILS 0.5 (H) 0.0 - 0.4 K/UL    ABS. BASOPHILS 0.0 0.0 - 0.1 K/UL    RBC COMMENTS NORMOCYTIC, NORMOCHROMIC     METABOLIC PANEL, COMPREHENSIVE    Collection Time: 17 11:17 AM   Result Value Ref Range    Sodium 140 136 - 145 mmol/L    Potassium 3.8 3.5 - 5.1 mmol/L    Chloride 101 97 - 108 mmol/L    CO2 31 21 - 32 mmol/L    Anion gap 8 5 - 15 mmol/L    Glucose 74 65 - 100 mg/dL    BUN 89 (H) 6 - 20 MG/DL    Creatinine 3.19 (H) 0.55 - 1.02 MG/DL    BUN/Creatinine ratio 28 (H) 12 - 20      GFR est AA 17 (L) >60 ml/min/1.73m2    GFR est non-AA 14 (L) >60 ml/min/1.73m2    Calcium 9.0 8.5 - 10.1 MG/DL    Bilirubin, total 0.3 0.2 - 1.0 MG/DL    ALT (SGPT) 26 12 - 78 U/L    AST (SGOT) 26 15 - 37 U/L    Alk. phosphatase 104 45 - 117 U/L    Protein, total 7.4 6.4 - 8.2 g/dL    Albumin 2.7 (L) 3.5 - 5.0 g/dL    Globulin 4.7 (H) 2.0 - 4.0 g/dL    A-G Ratio 0.6 (L) 1.1 - 2.2         No results found.      Patient Discharge Instructions    [place] Christie / [id number] : 1945    Admitted 2017 Discharged: 2017 7:23 AM     ACUTE DIAGNOSES:  Cellulitis    CHRONIC MEDICAL DIAGNOSES:  Problem List as of 2017  Date Reviewed: 2017          Codes Class Noted - Resolved    * (Principal)Cellulitis ICD-10-CM: L03.90  ICD-9-CM: 682.9  2017 - Present        Asthma ICD-10-CM: J45.909  ICD-9-CM: 493.90  Unknown - Present        Hyperlipidemia ICD-10-CM: E78.5  ICD-9-CM: 272.4  Unknown - Present        RAI on CPAP ICD-10-CM: G47.33  ICD-9-CM: 327.23  Unknown - Present        Anxiety and depression ICD-10-CM: F41.9, F32.9  ICD-9-CM: 300.00, 311  Unknown - Present        Hypoglycemia ICD-10-CM: E16.2  ICD-9-CM: 251.2  2017 - Present        Chronic bilateral low back pain without sciatica ICD-10-CM: M54.5, G89.29  ICD-9-CM: 724.2, 338.29  11/3/2016 - Present        Generalized anxiety disorder ICD-10-CM: F41.1  ICD-9-CM: 300.02  11/3/2016 - Present        Acute renal failure (ARF) (HCC) ICD-10-CM: N17.9  ICD-9-CM: 584.9  8/14/2016 - Present        CKD (chronic kidney disease) stage 4, GFR 15-29 ml/min (HCC) (Chronic) ICD-10-CM: N18.4  ICD-9-CM: 585.4  8/14/2016 - Present        Hypertension (Chronic) ICD-10-CM: I10  ICD-9-CM: 401.9  8/14/2016 - Present        DM type 2, uncontrolled, with renal complications (HCC) (Chronic) ICD-10-CM: E11.29, E11.65  ICD-9-CM: 250.42  8/14/2016 - Present        Iron deficiency anemia (Chronic) ICD-10-CM: D50.9  ICD-9-CM: 280.9  8/14/2016 - Present        RESOLVED: CKD stage 4 due to type 2 diabetes mellitus ([facility] ICD-10-CM: E11.22, N18.4  ICD-9-CM: 250.40, 585.4  Unknown - 2/11/2017        RESOLVED: DM type 2 causing renal disease ([facility] ICD-10-CM: E11.29  ICD-9-CM: 250.40  Unknown - 2/11/2017        RESOLVED: Hyperkalemia ICD-10-CM: E87.5  ICD-9-CM: 276.7  11/3/2016 - 2/11/2017        RESOLVED: Elevated serum creatinine ICD-10-CM: R79.89  ICD-9-CM: 790.99  11/3/2016 - 2/11/2017        RESOLVED: Left leg cellulitis ICD-10-CM: L03.116  ICD-9-CM: 682.6  8/14/2016 - 2/11/2017        RESOLVED: Sepsis ([facility] ICD-10-CM: A41.9  ICD-9-CM: 038.9, 995.91  8/14/2016 - 2/11/2017        RESOLVED: RAI (obstructive sleep apnea) (Chronic) ICD-10-CM: N75.99  ICD-9-CM: 327.23  8/14/2016 - 2/11/2017              DISCHARGE MEDICATIONS:         · It is important that you take the medication exactly as they are prescribed. · Keep your medication in the bottles provided by the pharmacist and keep a list of the medication names, dosages, and times to be taken in your wallet. · Do not take other medications without consulting your doctor.        DIET:  Diabetic Diet    ACTIVITY: Activity as tolerated    ADDITIONAL INFORMATION: If you experience any of the following symptoms then please call your primary care physician or return to the emergency room if you cannot get hold of your doctor: Fever, chills, nausea, vomiting, diarrhea, change in mentation, falling, bleeding, shortness of breath. FOLLOW UP CARE:  Dr. Angela Shetty MD  you are to call and set up an appointment to see them with in 1 week. Follow-up with specialists at directed by them      Information obtained by :  I understand that if any problems occur once I am at home I am to contact my physician. I understand and acknowledge receipt of the instructions indicated above.                                                                                                                                            Physician's or R.N.'s Signature                                                                  Date/Time                                                                                                                                              Patient or Representative Signature                                                          Date/Time

## 2017-02-14 NOTE — DISCHARGE SUMMARY
Physician Discharge Summary     Patient ID:    Carolina Petit  [id number]  80 y.o.  1945  Claudia Watson MD    Admit date: 2/11/2017    Discharge date and time: 2/14/2017    Admission Diagnoses: Cellulitis    Chronic Diagnoses:    Problem List as of 2/14/2017  Date Reviewed: 2/11/2017          Codes Class Noted - Resolved    * (Principal)Cellulitis ICD-10-CM: L03.90  ICD-9-CM: 682.9  2/11/2017 - Present        Asthma ICD-10-CM: J45.909  ICD-9-CM: 493.90  Unknown - Present        Hyperlipidemia ICD-10-CM: E78.5  ICD-9-CM: 272.4  Unknown - Present        RAI on CPAP ICD-10-CM: G47.33  ICD-9-CM: 327.23  Unknown - Present        Anxiety and depression ICD-10-CM: F41.9, F32.9  ICD-9-CM: 300.00, 311  Unknown - Present        Hypoglycemia ICD-10-CM: E16.2  ICD-9-CM: 251.2  2/11/2017 - Present        Chronic bilateral low back pain without sciatica ICD-10-CM: M54.5, G89.29  ICD-9-CM: 724.2, 338.29  11/3/2016 - Present        Generalized anxiety disorder ICD-10-CM: F41.1  ICD-9-CM: 300.02  11/3/2016 - Present        Acute renal failure (ARF) (HCC) ICD-10-CM: N17.9  ICD-9-CM: 584.9  8/14/2016 - Present        CKD (chronic kidney disease) stage 4, GFR 15-29 ml/min (HCC) (Chronic) ICD-10-CM: N18.4  ICD-9-CM: 585.4  8/14/2016 - Present        Hypertension (Chronic) ICD-10-CM: I10  ICD-9-CM: 401.9  8/14/2016 - Present        DM type 2, uncontrolled, with renal complications (HCC) (Chronic) ICD-10-CM: E11.29, E11.65  ICD-9-CM: 250.42  8/14/2016 - Present        Iron deficiency anemia (Chronic) ICD-10-CM: D50.9  ICD-9-CM: 280.9  8/14/2016 - Present        RESOLVED: CKD stage 4 due to type 2 diabetes mellitus ([facility] ICD-10-CM: E11.22, N18.4  ICD-9-CM: 250.40, 585.4  Unknown - 2/11/2017        RESOLVED: DM type 2 causing renal disease ([facility] ICD-10-CM: E11.29  ICD-9-CM: 250.40  Unknown - 2/11/2017        RESOLVED: Hyperkalemia ICD-10-CM: E87.5  ICD-9-CM: 276.7  11/3/2016 - 2/11/2017        RESOLVED: Elevated serum creatinine ICD-10-CM: R79.89  ICD-9-CM: 790.99  11/3/2016 - 2/11/2017        RESOLVED: Left leg cellulitis ICD-10-CM: L03.116  ICD-9-CM: 682.6  8/14/2016 - 2/11/2017        RESOLVED: Sepsis ([facility] ICD-10-CM: A41.9  ICD-9-CM: 038.9, 995.91  8/14/2016 - 2/11/2017        RESOLVED: RAI (obstructive sleep apnea) (Chronic) ICD-10-CM: G03.19  ICD-9-CM: 327.23  8/14/2016 - 2/11/2017              Discharge Medications:   Current Discharge Medication List      START taking these medications    Details   mupirocin (BACTROBAN) 2 % ointment Apply  to affected area three (3) times daily. Qty: 66 g, Refills: 3      levoFLOXacin (LEVAQUIN) 500 mg tablet Take 1 Tab by mouth daily for 7 days. Qty: 7 Tab, Refills: 0         CONTINUE these medications which have NOT CHANGED    Details   doxazosin (CARDURA) 2 mg tablet Take 2 mg by mouth daily. losartan (COZAAR) 25 mg tablet Take 25 mg by mouth daily. amLODIPine (NORVASC) 5 mg tablet Take 5 mg by mouth daily. insulin glargine (TOUJEO SOLOSTAR) 300 unit/mL (1.5 mL) inpn 26 Units by SubCUTAneous route daily. multivitamin (ONE A DAY) tablet Take 1 Tab by mouth daily. bumetanide (BUMEX) 2 mg tablet Take 1 Tab by mouth two (2) times a day. Qty: 100 Tab, Refills: 0      acetaminophen (TYLENOL) 500 mg tablet Take 1,000 mg by mouth two (2) times daily as needed for Pain. nystatin (MYCOSTATIN) 500,000 unit tab Take 1 Tab by mouth daily. ALPRAZolam (XANAX) 0.25 mg tablet Take 0.25 mg by mouth two (2) times a day. carvedilol (COREG) 3.125 mg tablet Take 3.125 mg by mouth two (2) times daily (with meals). fluticasone-vilanterol (BREO ELLIPTA) 100-25 mcg/dose inhaler Take 1 Puff by inhalation daily. Qty: 1 Inhaler, Refills: 0      calcium-cholecalciferol, D3, (CALTRATE 600+D) tablet Take 1 Tab by mouth two (2) times a day. fenofibrate nanocrystallized (TRICOR) 145 mg tablet Take 145 mg by mouth daily.       loratadine (CLARITIN) 10 mg tablet Take 10 mg by mouth daily. Omeprazole delayed release (PRILOSEC D/R) 20 mg tablet Take 20 mg by mouth daily. triamcinolone acetonide (KENALOG) 0.1 % ointment Apply  to affected area daily as needed. use thin layer on affected areas       calcium polycarbophil (FIBER LAXATIVE, CA POLYCARBO,) 625 mg tablet Take 625 mg by mouth daily. atorvastatin (LIPITOR) 40 mg tablet Take 40 mg by mouth nightly. insulin regular (HUMULIN R) 100 unit/mL injection 6 Units by SubCUTAneous route Before breakfast and dinner. nystatin-triamcinolone (MYCOLOG) 100,000-0.1 unit/gram-% ointment Apply  to affected area daily. Apply to sores      ergocalciferol (ERGOCALCIFEROL) 50,000 unit capsule Take 50,000 Units by mouth every month. aspirin 81 mg chewable tablet Take 81 mg by mouth daily. senna-docusate (SENNA-C) 8.6-50 mg per tablet Take 2 Tabs by mouth every evening. albuterol (PROVENTIL HFA, VENTOLIN HFA) 90 mcg/actuation inhaler Take 2 Puffs by inhalation every four (4) hours as needed for Wheezing. STOP taking these medications       albuterol-ipratropium (DUO-NEB) 2.5 mg-0.5 mg/3 ml nebu Comments:   Reason for Stopping:         HYDROcodone-acetaminophen (NORCO) 5-325 mg per tablet Comments:   Reason for Stopping: Follow up Care:    1. Vicenta Mireles MD with in 1 weeks  2.  specialists as directed. Diet:  Diabetic Diet    Disposition:  Home.     Advanced Directive:    Discharge Exam:  [See today's progress note.]    CONSULTATIONS: Neph    Significant Diagnostic Studies:   Recent Labs      02/14/17 0325 02/13/17 0349   WBC  5.3  6.1   HGB  8.3*  7.9*   HCT  26.5*  24.7*   PLT  305  283     Recent Labs      02/14/17 0325 02/13/17 0349 02/12/17   0416   NA  140  139  136   K  4.0  4.0  4.2   CL  104  103  101   CO2  28  29  28   BUN  73*  84*  84*   CREA  2.58*  2.91*  3.11*   GLU  149*  121*  165*   CA  8.8  8.3*  8.3*   MG   --   1.9  1.8   PHOS   --   3.3  3.3 Recent Labs      02/14/17   0325  02/13/17   0349  02/11/17   1117   SGOT  19   --   26   ALT  15   --   26   AP  81   --   104   TBILI  0.3   --   0.3   TP  6.5   --   7.4   ALB  2.3*  2.3*  2.7*   GLOB  4.2*   --   4.7*     No results for input(s): INR, PTP, APTT in the last 72 hours. No lab exists for component: INREXT   No results for input(s): FE, TIBC, PSAT, FERR in the last 72 hours. No results for input(s): PH, PCO2, PO2 in the last 72 hours. No results for input(s): CPK, CKMB in the last 72 hours. No lab exists for component: TROPONINI  Lab Results   Component Value Date/Time    Glucose (POC) 244 02/13/2017 09:17 PM    Glucose (POC) 217 02/13/2017 04:08 PM    Glucose (POC) 197 02/13/2017 11:26 AM    Glucose (POC) 121 02/13/2017 07:19 AM    Glucose (POC) 222 02/12/2017 08:55 PM     No results found for: TSH, TSH2, TSH3, TSHP, TSHELE, TT3, T3U, T3UP, FRT3, FT3, FT4, FT4P, T4, T4P, FT4T, TT7, TSHEXT          HOSPITAL COURSE & TREATMENT RENDERED:   1. See today's progress note:    Daily Progress Note and Discharge Note: 2/14/2017  Bairon Forman MD         Assessment/Plan:   Cellulitis, LLE - POA, recurrent. Not septic. --empiric Vancomycin and levaquin initially. Wound care consulted. Will DC on po Levaquin until seen by PCP  --leg swelling/pain - LE Dopplers: negative with limited study due to obesity      Acute renal failure/ CKD (chronic kidney disease) stage 4 - grad improving  -- bumex as ordered by renal  ---follow up with renal as they direct      DM type 2, uncontrolled, with renal complications / Hypoglycemia - Low BG on presentation.    --resume lantus at lower doses for now and follow up with PCP to titrate  --diabetic diet discussed  --dicussed diet and morbid obesity but admits she is not likely to be diet compliant.      RAI on CPAP - Continue home CPAP qhs and with naps  --discussed wt loss as a treatment for sleep apnea      Hypertension - BP good - continue home meds      Iron deficiency anemia - gets EPO and apparently was getting set up for IV iron through [employer] soon - follow up with them      Chronic bilateral low back pain without sciatica - Continue tylenol or prn norco, with laxatives. NO NSAIDS.      Generalized anxiety disorder / Anxiety and depression - Continue zoloft and prn xanax.      Asthma - Per HX. Not sure if true asthma or COPD, rather may reflect obesity and reflux. Continue usual Breo, claritin, and prn duonebs.      Hyperlipidemia - Continue tricor and atorvastatin     DVT proph - heparin      Problem List:  Problem List as of 2/14/2017  Date Reviewed: 2/11/2017             Codes Class Noted - Resolved     * (Principal)Cellulitis ICD-10-CM: L03.90  ICD-9-CM: 252. 9   2/11/2017 - Present           Asthma ICD-10-CM: J45.909  ICD-9-CM: 493.90   Unknown - Present           Hyperlipidemia ICD-10-CM: E78.5  ICD-9-CM: 272.4   Unknown - Present           RAI on CPAP ICD-10-CM: G47.33  ICD-9-CM: 327.23   Unknown - Present           Anxiety and depression ICD-10-CM: F41.9, F32.9  ICD-9-CM: 300.00, 311   Unknown - Present           Hypoglycemia ICD-10-CM: E16.2  ICD-9-CM: 251. 2   2/11/2017 - Present           Chronic bilateral low back pain without sciatica ICD-10-CM: M54.5, G89.29  ICD-9-CM: 724.2, 338.29   11/3/2016 - Present           Generalized anxiety disorder ICD-10-CM: F41.1  ICD-9-CM: 300.02   11/3/2016 - Present           Acute renal failure (ARF) ([facility] ICD-10-CM: N17.9  ICD-9-CM: 584. 9   8/14/2016 - Present           CKD (chronic kidney disease) stage 4, GFR 15-29 ml/min (HCC) (Chronic) ICD-10-CM: N18.4  ICD-9-CM: 585. 4   8/14/2016 - Present           Hypertension (Chronic) ICD-10-CM: I10  ICD-9-CM: 145. 9   8/14/2016 - Present           DM type 2, uncontrolled, with renal complications (HCC) (Chronic) ICD-10-CM: E11.29, E11.65  ICD-9-CM: 250.42   8/14/2016 - Present           Iron deficiency anemia (Chronic) ICD-10-CM: D50.9  ICD-9-CM: 280. 9   8/14/2016 - Present           RESOLVED: CKD stage 4 due to type 2 diabetes mellitus ([facility] ICD-10-CM: E11.22, N18.4  ICD-9-CM: 250.40, 585. 4   Unknown - 2/11/2017           RESOLVED: DM type 2 causing renal disease ([facility] ICD-10-CM: E11.29  ICD-9-CM: 250.40   Unknown - 2/11/2017           RESOLVED: Hyperkalemia ICD-10-CM: E87.5  ICD-9-CM: 276.7   11/3/2016 - 2/11/2017           RESOLVED: Elevated serum creatinine ICD-10-CM: R79.89  ICD-9-CM: 790.99   11/3/2016 - 2/11/2017           RESOLVED: Left leg cellulitis ICD-10-CM: L03.116  ICD-9-CM: 682. 6   8/14/2016 - 2/11/2017           RESOLVED: Sepsis ([facility] ICD-10-CM: A41.9  ICD-9-CM: 038.9, 995.91   8/14/2016 - 2/11/2017           RESOLVED: RAI (obstructive sleep apnea) (Chronic) ICD-10-CM: G47.33  ICD-9-CM: 327.23   8/14/2016 - 2/11/2017                  Subjective:   Ms. Catherine Marrero is a 80 y.o.  female who presented to the Emergency Department complaining of leg infection. She was sent to ER by her PCP who wanted admission for IV Abx. She had gone to PCP due to worsening pain and redness over days, not responding to local steroids cream Patient has persistent skin issues, and recurrent Dx of cellulitis, with recurrent Rx of Abx. She picks at her leg skin constantly, due to itching. She meets no SIRS criteria. ER workup shows mildly worse CKD, and she had mild hypoglycemia on presentation. We will admit the patient for management.     2/12: She c/o pain in the left leg. She is anxious and upset about being back in the hospital. She denies CP, SOB at baseline. Adamantly refuses going back to SNF so wants to get out of hospital ASAP before she loses muscle tone in her legs.     2/13: Less left leg pain. Afeb. Cont antibiotics and add mupirocin. LE Doppler neg but limited study due to morbid obesity. Hb lower today - recheck and SFOB.      2/14: She reports she is much better, no longer has leg pain and insists on leaving today.  Hb is stable at 7.8 and discussed w/u but she reports long hx of anemia, does not want further inpt work up and wants to follow up with this outpt. Discussed morbid obesity and wt loss. Discussed tx of her chronic cellulitis and will rx Levaquin and Mupirocin outpt until seen by PCP. Discussed there is a degree of stasis dermatitis and elevation and support hose recommended. Discussed follow up of elevated Creat and she will see PCP w/i a wk.         Review of Systems:   A comprehensive review of systems was negative except for that written in the HPI.     Objective:   Physical Exam:           Visit Vitals    /64 (BP 1 Location: Right arm, BP Patient Position: At rest)    Pulse 78    Temp 98.2 °F (36.8 °C)    Resp 17    Ht 5' 1\" (1.549 m)    Wt 126.6 kg (279 lb)    SpO2 97%    BMI 52.72 kg/m2      O2 Device: CPAP mask     Temp (24hrs), Av.5 °F (36.9 °C), Min:98.2 °F (36.8 °C), Max:99 °F (37.2 °C)      07 -  1900  In: 950 [P.O.:930; I.V.:20]  Out: -      General:  Alert, cooperative, no distress, morbidly obese   Head:  Normocephalic, without obvious abnormality, atraumatic. Throat: Lips, mucosa, and tongue dry   Neck: Supple, no JVD   Lungs:  Clear to auscultation bilaterally. Heart:  Regular rate and rhythm, S1, S2 normal, 2/6 murmur   Abdomen:  Soft, non-tender. Bowel sounds normal. No masses, No organomegaly. Extremities: Left LE with 1+ edema. Much Less Erythema at toes, dorsal foot and less in thigh. less diffuse tenderness, no warmth. Pulses: 1+ and symmetric all extremities. Skin: turgor normal. No rashes or lesions otherwise. Neurologic: CNII-XII intact.  Alert and oriented X 3.         Signed:  Carl Dahl MD  2017  7:24 AM

## 2017-02-14 NOTE — PROGRESS NOTES
Daily Progress Note and Discharge Note: 2/14/2017  Rj Carter MD    Assessment/Plan:   Cellulitis, LLE - POA, recurrent. Not septic. --empiric Vancomycin and levaquin initially. Wound care consulted. Will DC on po Levaquin until seen by PCP  --leg swelling/pain - LE Dopplers: negative with limited study due to obesity     Acute renal failure/ CKD (chronic kidney disease) stage 4 - grad improving  --bumex as ordered by renal  ---follow up with renal as they direct     DM type 2, uncontrolled, with renal complications / Hypoglycemia - Low BG on presentation. --resume lantus at lower doses for now and follow up with PCP to titrate  --diabetic diet discussed  --dicussed diet and morbid obesity but admits she is not likely to be diet compliant. RAI on CPAP - Continue home CPAP qhs and with naps  --discussed wt loss as a treatment for sleep apnea     Hypertension - BP good - continue home meds     Iron deficiency anemia - gets EPO and apparently was getting set up for IV iron through [facility] soon - follow up with them     Chronic bilateral low back pain without sciatica - Continue tylenol or prn norco, with laxatives. NO NSAIDS.     Generalized anxiety disorder / Anxiety and depression - Continue zoloft and prn xanax.     Asthma - Per HX. Not sure if true asthma or COPD, rather may reflect obesity and reflux.  Continue usual Breo, claritin, and prn duonebs.     Hyperlipidemia - Continue tricor and atorvastatin    DVT proph - heparin       Problem List:  Problem List as of 2/14/2017  Date Reviewed: 2/11/2017          Codes Class Noted - Resolved    * (Principal)Cellulitis ICD-10-CM: L03.90  ICD-9-CM: 682.9  2/11/2017 - Present        Asthma ICD-10-CM: J45.909  ICD-9-CM: 493.90  Unknown - Present        Hyperlipidemia ICD-10-CM: E78.5  ICD-9-CM: 272.4  Unknown - Present        RAI on CPAP ICD-10-CM: G47.33  ICD-9-CM: 327.23  Unknown - Present        Anxiety and depression ICD-10-CM: F41.9, F32.9  ICD-9-CM: 300.00, 311  Unknown - Present        Hypoglycemia ICD-10-CM: E16.2  ICD-9-CM: 251.2  2/11/2017 - Present        Chronic bilateral low back pain without sciatica ICD-10-CM: M54.5, G89.29  ICD-9-CM: 724.2, 338.29  11/3/2016 - Present        Generalized anxiety disorder ICD-10-CM: F41.1  ICD-9-CM: 300.02  11/3/2016 - Present        Acute renal failure (ARF) (HCC) ICD-10-CM: N17.9  ICD-9-CM: 584.9  8/14/2016 - Present        CKD (chronic kidney disease) stage 4, GFR 15-29 ml/min (HCC) (Chronic) ICD-10-CM: N18.4  ICD-9-CM: 585.4  8/14/2016 - Present        Hypertension (Chronic) ICD-10-CM: I10  ICD-9-CM: 401.9  8/14/2016 - Present        DM type 2, uncontrolled, with renal complications (HCC) (Chronic) ICD-10-CM: E11.29, E11.65  ICD-9-CM: 250.42  8/14/2016 - Present        Iron deficiency anemia (Chronic) ICD-10-CM: D50.9  ICD-9-CM: 280.9  8/14/2016 - Present        RESOLVED: CKD stage 4 due to type 2 diabetes mellitus ([facility] ICD-10-CM: E11.22, N18.4  ICD-9-CM: 250.40, 585.4  Unknown - 2/11/2017        RESOLVED: DM type 2 causing renal disease ([facility] ICD-10-CM: E11.29  ICD-9-CM: 250.40  Unknown - 2/11/2017        RESOLVED: Hyperkalemia ICD-10-CM: E87.5  ICD-9-CM: 276.7  11/3/2016 - 2/11/2017        RESOLVED: Elevated serum creatinine ICD-10-CM: R79.89  ICD-9-CM: 790.99  11/3/2016 - 2/11/2017        RESOLVED: Left leg cellulitis ICD-10-CM: L03.116  ICD-9-CM: 682.6  8/14/2016 - 2/11/2017        RESOLVED: Sepsis ([address] ICD-10-CM: A41.9  ICD-9-CM: 038.9, 995.91  8/14/2016 - 2/11/2017        RESOLVED: RAI (obstructive sleep apnea) (Chronic) ICD-10-CM: T99.02  ICD-9-CM: 327.23  8/14/2016 - 2/11/2017              Subjective:   Ms. Nicky Pritchett is a 80 y.o.  female who presented to the Emergency Department complaining of leg infection. She was sent to ER by her PCP who wanted admission for IV Abx.  She had gone to PCP due to worsening pain and redness over days, not responding to local steroids cream Patient has persistent skin issues, and recurrent Dx of cellulitis, with recurrent Rx of Abx. She picks at her leg skin constantly, due to itching. She meets no SIRS criteria. ER workup shows mildly worse CKD, and she had mild hypoglycemia on presentation. We will admit the patient for management. :   She c/o pain in the left leg. She is anxious and upset about being back in the hospital.  She denies CP, SOB at baseline. Adamantly refuses going back to SNF so wants to get out of hospital ASAP before she loses muscle tone in her legs. :  Less left leg pain. Afeb. Cont antibiotics and add mupirocin. LE Doppler neg but limited study due to morbid obesity. Hb lower today - recheck and SFOB. :  She reports she is much better, no longer has leg pain and insists on leaving today. Hb is stable at 7.8 and discussed w/u but she reports long hx of anemia, does not want further inpt work up and wants to follow up with this outpt. Discussed morbid obesity and wt loss. Discussed tx of her chronic cellulitis and will rx Levaquin and Mupirocin outpt until seen by PCP. Discussed there is a degree of stasis dermatitis and elevation and support hose recommended. Discussed follow up of elevated Creat and she will see PCP w/i a wk. Review of Systems:   A comprehensive review of systems was negative except for that written in the HPI. Objective:   Physical Exam:     Visit Vitals    /64 (BP 1 Location: Right arm, BP Patient Position: At rest)    Pulse 78    Temp 98.2 °F (36.8 °C)    Resp 17    Ht 5' 1\" (1.549 m)    Wt 126.6 kg (279 lb)    SpO2 97%    BMI 52.72 kg/m2      O2 Device: CPAP mask    Temp (24hrs), Av.5 °F (36.9 °C), Min:98.2 °F (36.8 °C), Max:99 °F (37.2 °C)         07 -  1900  In: 950 [P.O.:930; I.V.:20]  Out: -     General:  Alert, cooperative, no distress, morbidly obese   Head:  Normocephalic, without obvious abnormality, atraumatic. Throat: Lips, mucosa, and tongue dry   Neck: Supple, no JVD   Lungs:   Clear to auscultation bilaterally. Heart:  Regular rate and rhythm, S1, S2 normal, 2/6 murmur   Abdomen:   Soft, non-tender. Bowel sounds normal. No masses,  No organomegaly. Extremities: Left LE with 1+ edema. Much Less Erythema at toes, dorsal foot and less in thigh. less diffuse tenderness, no warmth. Pulses: 1+ and symmetric all extremities. Skin:  turgor normal. No rashes or lesions otherwise. Neurologic: CNII-XII intact. Alert and oriented X 3. Data Review:       Recent Days:  Recent Labs      02/14/17 0325 02/13/17 0349 02/12/17 0416   WBC  5.3  6.1  7.9   HGB  8.3*  7.9*  8.2*   HCT  26.5*  24.7*  26.0*   PLT  305  283  278     Recent Labs      02/14/17 0325 02/13/17 0349 02/12/17 0416  02/11/17   1117   NA  140  139  136  140   K  4.0  4.0  4.2  3.8   CL  104  103  101  101   CO2  28  29  28  31   GLU  149*  121*  165*  74   BUN  73*  84*  84*  89*   CREA  2.58*  2.91*  3.11*  3.19*   CA  8.8  8.3*  8.3*  9.0   MG   --   1.9  1.8   --    PHOS   --   3.3  3.3   --    ALB  2.3*  2.3*   --   2.7*   SGOT  19   --    --   26   ALT  15   --    --   26     No results for input(s): PH, PCO2, PO2, HCO3, FIO2 in the last 72 hours.     24 Hour Results:  Recent Results (from the past 24 hour(s))   GLUCOSE, POC    Collection Time: 02/13/17  7:19 AM   Result Value Ref Range    Glucose (POC) 121 (H) 65 - 100 mg/dL    Performed by Mitesh Gee    OCCULT BLOOD, STOOL    Collection Time: 02/13/17 10:37 AM   Result Value Ref Range    Occult blood, stool POSITIVE (A) NEG     GLUCOSE, POC    Collection Time: 02/13/17 11:26 AM   Result Value Ref Range    Glucose (POC) 197 (H) 65 - 100 mg/dL    Performed by Gini BIRD, POC    Collection Time: 02/13/17  4:08 PM   Result Value Ref Range    Glucose (POC) 217 (H) 65 - 100 mg/dL    Performed by Wilman Randolph, POC    Collection Time: 02/13/17  9:17 PM Result Value Ref Range    Glucose (POC) 244 (H) 65 - 100 mg/dL    Performed by Van Warner ([facility])    CBC WITH AUTOMATED DIFF    Collection Time: 02/14/17  3:25 AM   Result Value Ref Range    WBC 5.3 3.6 - 11.0 K/uL    RBC 2.97 (L) 3.80 - 5.20 M/uL    HGB 8.3 (L) 11.5 - 16.0 g/dL    HCT 26.5 (L) 35.0 - 47.0 %    MCV 89.2 80.0 - 99.0 FL    MCH 27.9 26.0 - 34.0 PG    MCHC 31.3 30.0 - 36.5 g/dL    RDW 14.5 11.5 - 14.5 %    PLATELET 760 983 - 347 K/uL    NEUTROPHILS 67 32 - 75 %    LYMPHOCYTES 22 12 - 49 %    MONOCYTES 6 5 - 13 %    EOSINOPHILS 5 0 - 7 %    BASOPHILS 0 0 - 1 %    ABS. NEUTROPHILS 3.5 1.8 - 8.0 K/UL    ABS. LYMPHOCYTES 1.2 0.8 - 3.5 K/UL    ABS. MONOCYTES 0.3 0.0 - 1.0 K/UL    ABS. EOSINOPHILS 0.3 0.0 - 0.4 K/UL    ABS. BASOPHILS 0.0 0.0 - 0.1 K/UL   METABOLIC PANEL, COMPREHENSIVE    Collection Time: 02/14/17  3:25 AM   Result Value Ref Range    Sodium 140 136 - 145 mmol/L    Potassium 4.0 3.5 - 5.1 mmol/L    Chloride 104 97 - 108 mmol/L    CO2 28 21 - 32 mmol/L    Anion gap 8 5 - 15 mmol/L    Glucose 149 (H) 65 - 100 mg/dL    BUN 73 (H) 6 - 20 MG/DL    Creatinine 2.58 (H) 0.55 - 1.02 MG/DL    BUN/Creatinine ratio 28 (H) 12 - 20      GFR est AA 22 (L) >60 ml/min/1.73m2    GFR est non-AA 18 (L) >60 ml/min/1.73m2    Calcium 8.8 8.5 - 10.1 MG/DL    Bilirubin, total 0.3 0.2 - 1.0 MG/DL    ALT (SGPT) 15 12 - 78 U/L    AST (SGOT) 19 15 - 37 U/L    Alk.  phosphatase 81 45 - 117 U/L    Protein, total 6.5 6.4 - 8.2 g/dL    Albumin 2.3 (L) 3.5 - 5.0 g/dL    Globulin 4.2 (H) 2.0 - 4.0 g/dL    A-G Ratio 0.5 (L) 1.1 - 2.2         Medications reviewed  Current Facility-Administered Medications   Medication Dose Route Frequency    bumetanide (BUMEX) tablet 2 mg  2 mg Oral BID    mupirocin (BACTROBAN) 2 % ointment   Topical BID    hydrocortisone (CORTAID) 1 % cream   Topical QID PRN    nystatin (MYCOSTATIN) 100,000 unit/gram powder   Topical BID    insulin glargine (LANTUS) injection 10 Units  10 Units SubCUTAneous QHS    sodium chloride (NS) flush 5-10 mL  5-10 mL IntraVENous Q8H    sodium chloride (NS) flush 5-10 mL  5-10 mL IntraVENous PRN    levoFLOXacin (LEVAQUIN) 750 mg in D5W IVPB  750 mg IntraVENous Q48H    albuterol-ipratropium (DUO-NEB) 2.5 MG-0.5 MG/3 ML  3 mL Nebulization Q6H PRN    ALPRAZolam (XANAX) tablet 0.25 mg  0.25 mg Oral DAILY PRN    aspirin chewable tablet 81 mg  81 mg Oral DAILY    atorvastatin (LIPITOR) tablet 40 mg  40 mg Oral QHS    carvedilol (COREG) tablet 3.125 mg  3.125 mg Oral BID WITH MEALS    fenofibrate nanocrystallized (TRICOR) tablet 145 mg  145 mg Oral DAILY    fluticasone-vilanterol (BREO ELLIPTA) 100mcg-25mcg/puff  1 Puff Inhalation DAILY    HYDROcodone-acetaminophen (NORCO) 5-325 mg per tablet 1 Tab  1 Tab Oral Q6H PRN    pantoprazole (PROTONIX) tablet 40 mg  40 mg Oral ACB    sodium chloride (NS) flush 5-10 mL  5-10 mL IntraVENous Q8H    sodium chloride (NS) flush 5-10 mL  5-10 mL IntraVENous PRN    naloxone (NARCAN) injection 0.4 mg  0.4 mg IntraVENous PRN    glucose chewable tablet 16 g  4 Tab Oral PRN    dextrose (D50W) injection syrg 12.5-25 g  12.5-25 g IntraVENous PRN    glucagon (GLUCAGEN) injection 1 mg  1 mg IntraMUSCular PRN    acetaminophen (TYLENOL) tablet 650 mg  650 mg Oral Q4H PRN    diphenhydrAMINE (BENADRYL) capsule 25 mg  25 mg Oral Q4H PRN    ondansetron (ZOFRAN) injection 4 mg  4 mg IntraVENous Q4H PRN    heparin (porcine) injection 5,000 Units  5,000 Units SubCUTAneous Q8H    insulin lispro (HUMALOG) injection   SubCUTAneous AC&HS    Vancomycin - Pharmacist dosing based on levels.    Other PRN    senna-docusate (PERICOLACE) 8.6-50 mg per tablet 2 Tab  2 Tab Oral DAILY       Win John MD

## 2017-02-14 NOTE — PROGRESS NOTES
Bedside and Verbal shift change report given to [facility] Ranjana (oncoming nurse) by Benito Osler (offgoing nurse). Report included the following information SBAR, Kardex, MAR and Recent Results.

## 2017-02-17 LAB
BACTERIA SPEC CULT: NORMAL
SERVICE CMNT-IMP: NORMAL

## 2017-06-02 ENCOUNTER — APPOINTMENT (OUTPATIENT)
Dept: GENERAL RADIOLOGY | Age: 72
DRG: 872 | End: 2017-06-02
Attending: EMERGENCY MEDICINE
Payer: MEDICARE

## 2017-06-02 ENCOUNTER — HOSPITAL ENCOUNTER (INPATIENT)
Age: 72
LOS: 8 days | Discharge: NURSING, REHAB OR LONG-TERM CARE | DRG: 872 | End: 2017-06-10
Attending: EMERGENCY MEDICINE | Admitting: INTERNAL MEDICINE
Payer: MEDICARE

## 2017-06-02 ENCOUNTER — APPOINTMENT (OUTPATIENT)
Dept: CT IMAGING | Age: 72
DRG: 872 | End: 2017-06-02
Attending: EMERGENCY MEDICINE
Payer: MEDICARE

## 2017-06-02 DIAGNOSIS — L03.115 CELLULITIS OF RIGHT LOWER EXTREMITY: Primary | ICD-10-CM

## 2017-06-02 DIAGNOSIS — A41.9 SEPSIS, DUE TO UNSPECIFIED ORGANISM: ICD-10-CM

## 2017-06-02 PROBLEM — J45.901 ASTHMA WITH ACUTE EXACERBATION: Status: ACTIVE | Noted: 2017-06-02

## 2017-06-02 LAB
ALBUMIN SERPL BCP-MCNC: 2.7 G/DL (ref 3.5–5)
ALBUMIN/GLOB SERPL: 0.6 {RATIO} (ref 1.1–2.2)
ALP SERPL-CCNC: 70 U/L (ref 45–117)
ALT SERPL-CCNC: 21 U/L (ref 12–78)
ANION GAP BLD CALC-SCNC: 13 MMOL/L (ref 5–15)
APPEARANCE UR: ABNORMAL
AST SERPL W P-5'-P-CCNC: 37 U/L (ref 15–37)
BACTERIA URNS QL MICRO: NEGATIVE /HPF
BASOPHILS # BLD AUTO: 0 K/UL (ref 0–0.1)
BASOPHILS # BLD: 0 % (ref 0–1)
BILIRUB SERPL-MCNC: 0.7 MG/DL (ref 0.2–1)
BILIRUB UR QL: NEGATIVE
BUN SERPL-MCNC: 84 MG/DL (ref 6–20)
BUN/CREAT SERPL: 24 (ref 12–20)
CALCIUM SERPL-MCNC: 8.6 MG/DL (ref 8.5–10.1)
CHLORIDE SERPL-SCNC: 95 MMOL/L (ref 97–108)
CO2 SERPL-SCNC: 26 MMOL/L (ref 21–32)
COLOR UR: YELLOW
CREAT SERPL-MCNC: 3.54 MG/DL (ref 0.55–1.02)
DIFFERENTIAL METHOD BLD: ABNORMAL
EOSINOPHIL # BLD: 0 K/UL (ref 0–0.4)
EOSINOPHIL NFR BLD: 0 % (ref 0–7)
EPITH CASTS URNS QL MICRO: ABNORMAL /LPF
ERYTHROCYTE [DISTWIDTH] IN BLOOD BY AUTOMATED COUNT: 15.1 % (ref 11.5–14.5)
GLOBULIN SER CALC-MCNC: 4.4 G/DL (ref 2–4)
GLUCOSE SERPL-MCNC: 245 MG/DL (ref 65–100)
GLUCOSE UR STRIP.AUTO-MCNC: NEGATIVE MG/DL
HCT VFR BLD AUTO: 28.4 % (ref 35–47)
HGB BLD-MCNC: 8.8 G/DL (ref 11.5–16)
HGB UR QL STRIP: ABNORMAL
KETONES UR QL STRIP.AUTO: NEGATIVE MG/DL
LACTATE SERPL-SCNC: 1.8 MMOL/L (ref 0.4–2)
LEUKOCYTE ESTERASE UR QL STRIP.AUTO: ABNORMAL
LYMPHOCYTES # BLD AUTO: 1 % (ref 12–49)
LYMPHOCYTES # BLD: 0.1 K/UL (ref 0.8–3.5)
MCH RBC QN AUTO: 28.7 PG (ref 26–34)
MCHC RBC AUTO-ENTMCNC: 31 G/DL (ref 30–36.5)
MCV RBC AUTO: 92.5 FL (ref 80–99)
MONOCYTES # BLD: 0.3 K/UL (ref 0–1)
MONOCYTES NFR BLD AUTO: 2 % (ref 5–13)
NEUTS SEG # BLD: 12.3 K/UL (ref 1.8–8)
NEUTS SEG NFR BLD AUTO: 97 % (ref 32–75)
NITRITE UR QL STRIP.AUTO: NEGATIVE
PH UR STRIP: 5 [PH] (ref 5–8)
PLATELET # BLD AUTO: 212 K/UL (ref 150–400)
POTASSIUM SERPL-SCNC: 3.7 MMOL/L (ref 3.5–5.1)
PROT SERPL-MCNC: 7.1 G/DL (ref 6.4–8.2)
PROT UR STRIP-MCNC: NEGATIVE MG/DL
RBC # BLD AUTO: 3.07 M/UL (ref 3.8–5.2)
RBC #/AREA URNS HPF: ABNORMAL /HPF (ref 0–5)
RBC MORPH BLD: ABNORMAL
SODIUM SERPL-SCNC: 134 MMOL/L (ref 136–145)
SP GR UR REFRACTOMETRY: 1.02 (ref 1–1.03)
UA: UC IF INDICATED,UAUC: ABNORMAL
UROBILINOGEN UR QL STRIP.AUTO: 0.2 EU/DL (ref 0.2–1)
WBC # BLD AUTO: 12.7 K/UL (ref 3.6–11)
WBC URNS QL MICRO: ABNORMAL /HPF (ref 0–4)

## 2017-06-02 PROCEDURE — 51702 INSERT TEMP BLADDER CATH: CPT

## 2017-06-02 PROCEDURE — 87077 CULTURE AEROBIC IDENTIFY: CPT | Performed by: EMERGENCY MEDICINE

## 2017-06-02 PROCEDURE — 81001 URINALYSIS AUTO W/SCOPE: CPT | Performed by: EMERGENCY MEDICINE

## 2017-06-02 PROCEDURE — 87086 URINE CULTURE/COLONY COUNT: CPT | Performed by: EMERGENCY MEDICINE

## 2017-06-02 PROCEDURE — 77030013140 HC MSK NEB VYRM -A

## 2017-06-02 PROCEDURE — 80053 COMPREHEN METABOLIC PANEL: CPT | Performed by: EMERGENCY MEDICINE

## 2017-06-02 PROCEDURE — 96361 HYDRATE IV INFUSION ADD-ON: CPT

## 2017-06-02 PROCEDURE — 36415 COLL VENOUS BLD VENIPUNCTURE: CPT | Performed by: EMERGENCY MEDICINE

## 2017-06-02 PROCEDURE — 74176 CT ABD & PELVIS W/O CONTRAST: CPT

## 2017-06-02 PROCEDURE — 71010 XR CHEST PORT: CPT

## 2017-06-02 PROCEDURE — 93971 EXTREMITY STUDY: CPT

## 2017-06-02 PROCEDURE — 74011000258 HC RX REV CODE- 258: Performed by: EMERGENCY MEDICINE

## 2017-06-02 PROCEDURE — 85025 COMPLETE CBC W/AUTO DIFF WBC: CPT | Performed by: EMERGENCY MEDICINE

## 2017-06-02 PROCEDURE — 99285 EMERGENCY DEPT VISIT HI MDM: CPT

## 2017-06-02 PROCEDURE — 93005 ELECTROCARDIOGRAM TRACING: CPT

## 2017-06-02 PROCEDURE — 74011250637 HC RX REV CODE- 250/637: Performed by: EMERGENCY MEDICINE

## 2017-06-02 PROCEDURE — 65660000000 HC RM CCU STEPDOWN

## 2017-06-02 PROCEDURE — 96375 TX/PRO/DX INJ NEW DRUG ADDON: CPT

## 2017-06-02 PROCEDURE — 74011250636 HC RX REV CODE- 250/636: Performed by: EMERGENCY MEDICINE

## 2017-06-02 PROCEDURE — 87186 SC STD MICRODIL/AGAR DIL: CPT | Performed by: EMERGENCY MEDICINE

## 2017-06-02 PROCEDURE — 83605 ASSAY OF LACTIC ACID: CPT | Performed by: EMERGENCY MEDICINE

## 2017-06-02 PROCEDURE — 77030034848

## 2017-06-02 PROCEDURE — 87040 BLOOD CULTURE FOR BACTERIA: CPT | Performed by: EMERGENCY MEDICINE

## 2017-06-02 PROCEDURE — 94640 AIRWAY INHALATION TREATMENT: CPT

## 2017-06-02 PROCEDURE — 87147 CULTURE TYPE IMMUNOLOGIC: CPT | Performed by: EMERGENCY MEDICINE

## 2017-06-02 PROCEDURE — 96365 THER/PROPH/DIAG IV INF INIT: CPT

## 2017-06-02 PROCEDURE — 96360 HYDRATION IV INFUSION INIT: CPT

## 2017-06-02 PROCEDURE — 74011250636 HC RX REV CODE- 250/636: Performed by: INTERNAL MEDICINE

## 2017-06-02 PROCEDURE — 74011000250 HC RX REV CODE- 250: Performed by: EMERGENCY MEDICINE

## 2017-06-02 RX ORDER — ACETAMINOPHEN 500 MG
1000 TABLET ORAL
Status: COMPLETED | OUTPATIENT
Start: 2017-06-02 | End: 2017-06-02

## 2017-06-02 RX ORDER — PROMETHAZINE HYDROCHLORIDE 25 MG/1
25 TABLET ORAL
COMMUNITY
End: 2017-11-02

## 2017-06-02 RX ORDER — IPRATROPIUM BROMIDE AND ALBUTEROL SULFATE 2.5; .5 MG/3ML; MG/3ML
3 SOLUTION RESPIRATORY (INHALATION)
Status: DISCONTINUED | OUTPATIENT
Start: 2017-06-03 | End: 2017-06-10 | Stop reason: HOSPADM

## 2017-06-02 RX ORDER — SODIUM CHLORIDE 0.9 % (FLUSH) 0.9 %
5-10 SYRINGE (ML) INJECTION AS NEEDED
Status: DISCONTINUED | OUTPATIENT
Start: 2017-06-02 | End: 2017-06-10 | Stop reason: HOSPADM

## 2017-06-02 RX ORDER — HEPARIN SODIUM 5000 [USP'U]/ML
5000 INJECTION, SOLUTION INTRAVENOUS; SUBCUTANEOUS EVERY 8 HOURS
Status: DISCONTINUED | OUTPATIENT
Start: 2017-06-02 | End: 2017-06-10 | Stop reason: HOSPADM

## 2017-06-02 RX ORDER — CITALOPRAM 40 MG/1
40 TABLET, FILM COATED ORAL DAILY
COMMUNITY

## 2017-06-02 RX ORDER — MUPIROCIN 20 MG/G
OINTMENT TOPICAL
Status: ON HOLD | COMMUNITY
End: 2017-06-03 | Stop reason: ALTCHOICE

## 2017-06-02 RX ORDER — NYSTATIN AND TRIAMCINOLONE ACETONIDE 100000; 1 [USP'U]/G; MG/G
OINTMENT TOPICAL DAILY
COMMUNITY
End: 2017-06-10

## 2017-06-02 RX ADMIN — HEPARIN SODIUM 5000 UNITS: 5000 INJECTION, SOLUTION INTRAVENOUS; SUBCUTANEOUS at 22:53

## 2017-06-02 RX ADMIN — METHYLPREDNISOLONE SODIUM SUCCINATE 40 MG: 40 INJECTION, POWDER, FOR SOLUTION INTRAMUSCULAR; INTRAVENOUS at 22:53

## 2017-06-02 RX ADMIN — SODIUM CHLORIDE 3741 ML: 900 INJECTION, SOLUTION INTRAVENOUS at 19:42

## 2017-06-02 RX ADMIN — SODIUM CHLORIDE 3 G: 900 INJECTION, SOLUTION INTRAVENOUS at 19:42

## 2017-06-02 RX ADMIN — ALBUTEROL SULFATE 1 DOSE: 2.5 SOLUTION RESPIRATORY (INHALATION) at 21:46

## 2017-06-02 RX ADMIN — ACETAMINOPHEN 1000 MG: 500 TABLET, FILM COATED ORAL at 20:48

## 2017-06-02 RX ADMIN — VANCOMYCIN HYDROCHLORIDE 2000 MG: 10 INJECTION, POWDER, LYOPHILIZED, FOR SOLUTION INTRAVENOUS at 21:16

## 2017-06-02 NOTE — IP AVS SNAPSHOT
Current Discharge Medication List  
  
START taking these medications Dose & Instructions Dispensing Information Comments Morning Noon Evening Bedtime  
 cephALEXin 250 mg capsule Commonly known as:  Daniela Griffin Your last dose was: Your next dose is:    
   
   
 Dose:  250 mg Take 1 Cap by mouth three (3) times daily for 7 days. Quantity:  21 Cap Refills:  0  
     
   
   
   
  
 * nystatin powder Commonly known as:  MYCOSTATIN Replaces:  nystatin 500,000 unit Tab Your last dose was: Your next dose is:    
   
   
 Apply  to affected area two (2) times a day. APPLY TO inner groins. Lower skin folds and perineal area. Quantity:  1 Bottle Refills:  0  
     
   
   
   
  
 * nystatin 100,000 unit/mL suspension Commonly known as:  MYCOSTATIN Your last dose was: Your next dose is:    
   
   
 Dose:  302498 Units Take 5 mL by mouth four (4) times daily. swish and spit Quantity:  10 mL Refills:  0  
     
   
   
   
  
 predniSONE 10 mg tablet Commonly known as:  Milus Mile Your last dose was: Your next dose is:    
   
   
 Dose:  10 mg Take 1 Tab by mouth daily (with breakfast) for 5 doses. Quantity:  5 Tab Refills:  0  
     
   
   
   
  
 * Notice: This list has 2 medication(s) that are the same as other medications prescribed for you. Read the directions carefully, and ask your doctor or other care provider to review them with you. CONTINUE these medications which have CHANGED Dose & Instructions Dispensing Information Comments Morning Noon Evening Bedtime  
 amLODIPine 5 mg tablet Commonly known as:  Codie Anthony What changed:  when to take this Your last dose was: Your next dose is:    
   
   
 Dose:  5 mg Take 1 Tab by mouth two (2) times a day. Quantity:  60 Tab Refills:  0 CONTINUE these medications which have NOT CHANGED Dose & Instructions Dispensing Information Comments Morning Noon Evening Bedtime  
 acetaminophen 500 mg tablet Commonly known as:  TYLENOL Your last dose was: Your next dose is:    
   
   
 Dose:  1000 mg Take 1,000 mg by mouth two (2) times daily as needed for Pain. Refills:  0  
     
   
   
   
  
 albuterol 90 mcg/actuation inhaler Commonly known as:  PROVENTIL HFA, VENTOLIN HFA, PROAIR HFA Your last dose was: Your next dose is:    
   
   
 Dose:  2 Puff Take 2 Puffs by inhalation every four (4) hours as needed for Wheezing. Refills:  0  
     
   
   
   
  
 aspirin 81 mg chewable tablet Your last dose was: Your next dose is:    
   
   
 Dose:  81 mg Take 81 mg by mouth daily. Refills:  0  
     
   
   
   
  
 atorvastatin 40 mg tablet Commonly known as:  LIPITOR Your last dose was: Your next dose is:    
   
   
 Dose:  40 mg Take 40 mg by mouth nightly. Refills:  0  
     
   
   
   
  
 bumetanide 2 mg tablet Commonly known as:  Shelva Mulligan Your last dose was: Your next dose is:    
   
   
 Dose:  2 mg Take 1 Tab by mouth two (2) times a day. Quantity:  100 Tab Refills:  0  
     
   
   
   
  
 calcium-cholecalciferol (D3) tablet Commonly known as:  CALTRATE 600+D Your last dose was: Your next dose is:    
   
   
 Dose:  1 Tab Take 1 Tab by mouth two (2) times a day. Refills:  0  
     
   
   
   
  
 citalopram 40 mg tablet Commonly known as:  Williams Martyr Your last dose was: Your next dose is:    
   
   
 Dose:  40 mg Take 40 mg by mouth daily. Refills:  0 COREG 3.125 mg tablet Generic drug:  carvedilol Your last dose was: Your next dose is:    
   
   
 Dose:  3.125 mg Take 3.125 mg by mouth two (2) times daily (with meals). Refills:  0  
     
   
   
   
  
 doxazosin 2 mg tablet Commonly known as:  CARDURA Your last dose was: Your next dose is:    
   
   
 Dose:  2 mg Take 2 mg by mouth daily. Refills:  0  
     
   
   
   
  
 ergocalciferol 50,000 unit capsule Commonly known as:  ERGOCALCIFEROL Your last dose was: Your next dose is:    
   
   
 Dose:  59390 Units Take 50,000 Units by mouth every month. Refills:  0  
     
   
   
   
  
 fenofibrate nanocrystallized 145 mg tablet Commonly known as:  [employer] Your last dose was: Your next dose is:    
   
   
 Dose:  145 mg Take 145 mg by mouth daily. Refills:  0 FIBER LAXATIVE (CA POLYCARBO) 625 mg tablet Generic drug:  calcium polycarbophil Your last dose was: Your next dose is:    
   
   
 Dose:  625 mg Take 625 mg by mouth daily. Refills:  0  
     
   
   
   
  
 fluticasone-vilanterol 100-25 mcg/dose inhaler Commonly known as:  BREO ELLIPTA Your last dose was: Your next dose is:    
   
   
 Dose:  1 Puff Take 1 Puff by inhalation daily. Quantity:  1 Inhaler Refills:  0 HumuLIN R U-100 100 unit/mL injection Generic drug:  insulin regular Your last dose was: Your next dose is:    
   
   
 Dose:  6 Units 6 Units by SubCUTAneous route Before breakfast and dinner. Refills:  0  
     
   
   
   
  
 loratadine 10 mg tablet Commonly known as:  Anderson Dominic Your last dose was: Your next dose is:    
   
   
 Dose:  10 mg Take 10 mg by mouth daily. Refills:  0  
     
   
   
   
  
 losartan 25 mg tablet Commonly known as:  COZAAR Your last dose was: Your next dose is:    
   
   
 Dose:  25 mg Take 25 mg by mouth daily. Refills:  0  
     
   
   
   
  
 multivitamin tablet Commonly known as:  ONE A DAY Your last dose was: Your next dose is:    
   
   
 Dose:  1 Tab Take 1 Tab by mouth daily. Refills:  0 Omeprazole delayed release 20 mg tablet Commonly known as:  PRILOSEC D/R Your last dose was: Your next dose is:    
   
   
 Dose:  20 mg Take 20 mg by mouth daily. Refills:  0  
     
   
   
   
  
 promethazine 25 mg tablet Commonly known as:  PHENERGAN Your last dose was: Your next dose is:    
   
   
 Dose:  25 mg Take 25 mg by mouth every six (6) hours as needed for Nausea. Refills:  0 Senna-C 8.6-50 mg per tablet Generic drug:  senna-docusate Your last dose was: Your next dose is:    
   
   
 Dose:  2 Tab Take 2 Tabs by mouth every evening. Refills:  0  
     
   
   
   
  
 TOUJEO SOLOSTAR 300 unit/mL (1.5 mL) Inpn Generic drug:  insulin glargine Your last dose was: Your next dose is:    
   
   
 Dose:  26 Units 26 Units by SubCUTAneous route daily. Refills:  0 STOP taking these medications ALPRAZolam 0.25 mg tablet Commonly known as:  XANAX  
   
  
 mupirocin 2 % ointment Commonly known as:  BACTROBAN  
   
  
 nystatin 500,000 unit Tab Commonly known as:  MYCOSTATIN Replaced by:  nystatin powder  
   
  
 nystatin-triamcinolone 100,000-0.1 unit/gram-% ointment Commonly known as:  MYCOLOG  
   
  
 triamcinolone acetonide 0.1 % ointment Commonly known as:  KENALOG Where to Get Your Medications Information on where to get these meds will be given to you by the nurse or doctor. ! Ask your nurse or doctor about these medications  
  amLODIPine 5 mg tablet  
 cephALEXin 250 mg capsule  
 nystatin 100,000 unit/mL suspension  
 nystatin powder  
 predniSONE 10 mg tablet

## 2017-06-02 NOTE — IP AVS SNAPSHOT
Favio Whitt 
 
 
 [address] [facility] [address] [facility] 
[phone] Patient: Alondra Cronin MRN: [id number] GLE:1/33/4311 You are allergic to the following Allergen Reactions Latex, Natural Rubber Rash Per pt, had a reaction to latex gloves when an RN administered lotion Darvon (Propoxyphene) Itching Peanut Cough Pineapple Itching Sulfa (Sulfonamide Antibiotics) Swelling Tape (Adhesive) Itching Recent Documentation Height Weight BMI OB Status Smoking Status 1.549 m 126.9 kg 52.86 kg/m2 Hysterectomy Never Smoker Unresulted Labs Order Current Status CULTURE, BLOOD, PAIRED In process Emergency Contacts Name Discharge Info Relation Home Work Mobile Ronnie Campbell DISCHARGE CAREGIVER [3] Spouse [3] About your hospitalization You were admitted on:  June 2, 2017 You last received care in the:  [facility]  MED SURG 2 You were discharged on:  Jessica 10, 2017 Unit phone number:  [phone] Why you were hospitalized Your primary diagnosis was:  Cellulitis Of Right Lower Extremity Your diagnoses also included:  Anxiety And Depression, Ckd (Chronic Kidney Disease) Stage 4, Gfr 15-29 Ml/Min (Hcc), Dm Type 2, Uncontrolled, With Renal Complications (Hcc), Generalized Anxiety Disorder, Hyperlipidemia, Htn (Hypertension), Benign, Saul On Cpap, Iron Deficiency Anemia, Asthma With Acute Exacerbation Providers Seen During Your Hospitalizations Provider Role Specialty Primary office phone Aileen Opitz. Debi Rowan MD Attending Provider Emergency Medicine [phone] Jayden Baker MD Attending Provider Internal Medicine [phone] George Mayes MD Attending Provider [facility] [phone] Your Primary Care Physician (PCP) Primary Care Physician Office Phone Office Fax Neha Kaylyn [phone] Follow-up Information Follow up With Details Comments Contact Info Zofia Cortez MD In 2 weeks  [address] 
[phone] Current Discharge Medication List  
  
START taking these medications Dose & Instructions Dispensing Information Comments Morning Noon Evening Bedtime  
 cephALEXin 250 mg capsule Commonly known as:  Kaden Callahan Your last dose was: Your next dose is:    
   
   
 Dose:  250 mg Take 1 Cap by mouth three (3) times daily for 7 days. Quantity:  21 Cap Refills:  0  
     
   
   
   
  
 * nystatin powder Commonly known as:  MYCOSTATIN Replaces:  nystatin 500,000 unit Tab Your last dose was: Your next dose is:    
   
   
 Apply  to affected area two (2) times a day. APPLY TO inner groins. Lower skin folds and perineal area. Quantity:  1 Bottle Refills:  0  
     
   
   
   
  
 * nystatin 100,000 unit/mL suspension Commonly known as:  MYCOSTATIN Your last dose was: Your next dose is:    
   
   
 Dose:  778295 Units Take 5 mL by mouth four (4) times daily. swish and spit Quantity:  10 mL Refills:  0  
     
   
   
   
  
 predniSONE 10 mg tablet Commonly known as:  Rebeca Barbie Your last dose was: Your next dose is:    
   
   
 Dose:  10 mg Take 1 Tab by mouth daily (with breakfast) for 5 doses. Quantity:  5 Tab Refills:  0  
     
   
   
   
  
 * Notice: This list has 2 medication(s) that are the same as other medications prescribed for you. Read the directions carefully, and ask your doctor or other care provider to review them with you. CONTINUE these medications which have CHANGED Dose & Instructions Dispensing Information Comments Morning Noon Evening Bedtime  
 amLODIPine 5 mg tablet Commonly known as:  Katherine De Paz What changed:  when to take this Your last dose was: Your next dose is:    
   
   
 Dose:  5 mg Take 1 Tab by mouth two (2) times a day. Quantity:  60 Tab Refills:  0 CONTINUE these medications which have NOT CHANGED Dose & Instructions Dispensing Information Comments Morning Noon Evening Bedtime  
 acetaminophen 500 mg tablet Commonly known as:  TYLENOL Your last dose was: Your next dose is:    
   
   
 Dose:  1000 mg Take 1,000 mg by mouth two (2) times daily as needed for Pain. Refills:  0  
     
   
   
   
  
 albuterol 90 mcg/actuation inhaler Commonly known as:  PROVENTIL HFA, VENTOLIN HFA, PROAIR HFA Your last dose was: Your next dose is:    
   
   
 Dose:  2 Puff Take 2 Puffs by inhalation every four (4) hours as needed for Wheezing. Refills:  0  
     
   
   
   
  
 aspirin 81 mg chewable tablet Your last dose was: Your next dose is:    
   
   
 Dose:  81 mg Take 81 mg by mouth daily. Refills:  0  
     
   
   
   
  
 atorvastatin 40 mg tablet Commonly known as:  LIPITOR Your last dose was: Your next dose is:    
   
   
 Dose:  40 mg Take 40 mg by mouth nightly. Refills:  0  
     
   
   
   
  
 bumetanide 2 mg tablet Commonly known as:  Amarjit Montero Your last dose was: Your next dose is:    
   
   
 Dose:  2 mg Take 1 Tab by mouth two (2) times a day. Quantity:  100 Tab Refills:  0  
     
   
   
   
  
 calcium-cholecalciferol (D3) tablet Commonly known as:  CALTRATE 600+D Your last dose was: Your next dose is:    
   
   
 Dose:  1 Tab Take 1 Tab by mouth two (2) times a day. Refills:  0  
     
   
   
   
  
 citalopram 40 mg tablet Commonly known as:  Indigo Couch Your last dose was: Your next dose is:    
   
   
 Dose:  40 mg Take 40 mg by mouth daily. Refills:  0 COREG 3.125 mg tablet Generic drug:  carvedilol Your last dose was: Your next dose is:    
   
   
 Dose:  3.125 mg Take 3.125 mg by mouth two (2) times daily (with meals). Refills:  0  
     
   
   
   
  
 doxazosin 2 mg tablet Commonly known as:  CARDURA Your last dose was: Your next dose is:    
   
   
 Dose:  2 mg Take 2 mg by mouth daily. Refills:  0  
     
   
   
   
  
 ergocalciferol 50,000 unit capsule Commonly known as:  ERGOCALCIFEROL Your last dose was: Your next dose is:    
   
   
 Dose:  04693 Units Take 50,000 Units by mouth every month. Refills:  0  
     
   
   
   
  
 fenofibrate nanocrystallized 145 mg tablet Commonly known as:  [employer] Your last dose was: Your next dose is:    
   
   
 Dose:  145 mg Take 145 mg by mouth daily. Refills:  0 FIBER LAXATIVE (CA POLYCARBO) 625 mg tablet Generic drug:  calcium polycarbophil Your last dose was: Your next dose is:    
   
   
 Dose:  625 mg Take 625 mg by mouth daily. Refills:  0  
     
   
   
   
  
 fluticasone-vilanterol 100-25 mcg/dose inhaler Commonly known as:  BREO ELLIPTA Your last dose was: Your next dose is:    
   
   
 Dose:  1 Puff Take 1 Puff by inhalation daily. Quantity:  1 Inhaler Refills:  0 HumuLIN R U-100 100 unit/mL injection Generic drug:  insulin regular Your last dose was: Your next dose is:    
   
   
 Dose:  6 Units 6 Units by SubCUTAneous route Before breakfast and dinner. Refills:  0  
     
   
   
   
  
 loratadine 10 mg tablet Commonly known as:  Jannice Cande Your last dose was: Your next dose is:    
   
   
 Dose:  10 mg Take 10 mg by mouth daily. Refills:  0  
     
   
   
   
  
 losartan 25 mg tablet Commonly known as:  COZAAR Your last dose was:     
   
Your next dose is:    
   
   
 Dose:  25 mg  
 Take 25 mg by mouth daily. Refills:  0  
     
   
   
   
  
 multivitamin tablet Commonly known as:  ONE A DAY Your last dose was: Your next dose is:    
   
   
 Dose:  1 Tab Take 1 Tab by mouth daily. Refills:  0 Omeprazole delayed release 20 mg tablet Commonly known as:  PRILOSEC D/R Your last dose was: Your next dose is:    
   
   
 Dose:  20 mg Take 20 mg by mouth daily. Refills:  0  
     
   
   
   
  
 promethazine 25 mg tablet Commonly known as:  PHENERGAN Your last dose was: Your next dose is:    
   
   
 Dose:  25 mg Take 25 mg by mouth every six (6) hours as needed for Nausea. Refills:  0 Senna-C 8.6-50 mg per tablet Generic drug:  senna-docusate Your last dose was: Your next dose is:    
   
   
 Dose:  2 Tab Take 2 Tabs by mouth every evening. Refills:  0  
     
   
   
   
  
 TOUJEO SOLOSTAR 300 unit/mL (1.5 mL) Inpn Generic drug:  insulin glargine Your last dose was: Your next dose is:    
   
   
 Dose:  26 Units 26 Units by SubCUTAneous route daily. Refills:  0 STOP taking these medications ALPRAZolam 0.25 mg tablet Commonly known as:  XANAX  
   
  
 mupirocin 2 % ointment Commonly known as:  BACTROBAN  
   
  
 nystatin 500,000 unit Tab Commonly known as:  MYCOSTATIN Replaced by:  nystatin powder  
   
  
 nystatin-triamcinolone 100,000-0.1 unit/gram-% ointment Commonly known as:  MYCOLOG  
   
  
 triamcinolone acetonide 0.1 % ointment Commonly known as:  KENALOG Where to Get Your Medications Information on where to get these meds will be given to you by the nurse or doctor. ! Ask your nurse or doctor about these medications  
  amLODIPine 5 mg tablet  
 cephALEXin 250 mg capsule  
 nystatin 100,000 unit/mL suspension  
 nystatin powder  
 predniSONE 10 mg tablet Discharge Instructions Cellulitis: Care Instructions Your Care Instructions Cellulitis is a skin infection. It often occurs after a break in the skin from a scrape, cut, bite, or puncture, or after a rash. The doctor has checked you carefully, but problems can develop later. If you notice any problems or new symptoms, get medical treatment right away. Follow-up care is a key part of your treatment and safety. Be sure to make and go to all appointments, and call your doctor if you are having problems. It's also a good idea to know your test results and keep a list of the medicines you take. How can you care for yourself at home? · Take your antibiotics as directed. Do not stop taking them just because you feel better. You need to take the full course of antibiotics. · Prop up the infected area on pillows to reduce pain and swelling. Try to keep the area above the level of your heart as often as you can. · If your doctor told you how to care for your wound, follow your doctor's instructions. If you did not get instructions, follow this general advice: ¨ Wash the wound with clean water 2 times a day. Don't use hydrogen peroxide or alcohol, which can slow healing. ¨ You may cover the wound with a thin layer of petroleum jelly, such as Vaseline, and a nonstick bandage. ¨ Apply more petroleum jelly and replace the bandage as needed. · Be safe with medicines. Take pain medicines exactly as directed. ¨ If the doctor gave you a prescription medicine for pain, take it as prescribed. ¨ If you are not taking a prescription pain medicine, ask your doctor if you can take an over-the-counter medicine. To prevent cellulitis in the future · Try to prevent cuts, scrapes, or other injuries to your skin. Cellulitis most often occurs where there is a break in the skin.  
· If you get a scrape, cut, mild burn, or bite, wash the wound with clean water as soon as you can to help avoid infection. Don't use hydrogen peroxide or alcohol, which can slow healing. · If you have swelling in your legs (edema), support stockings and good skin care may help prevent leg sores and cellulitis. · Take care of your feet, especially if you have diabetes or other conditions that increase the risk of infection. Wear shoes and socks. Do not go barefoot. If you have athlete's foot or other skin problems on your feet, talk to your doctor about how to treat them. When should you call for help? Call your doctor now or seek immediate medical care if: 
· You have signs that your infection is getting worse, such as: 
¨ Increased pain, swelling, warmth, or redness. ¨ Red streaks leading from the area. ¨ Pus draining from the area. ¨ A fever. · You get a rash. Watch closely for changes in your health, and be sure to contact your doctor if: 
· You are not getting better after 1 day (24 hours). · You do not get better as expected. Where can you learn more? Go to http://trish-anastacia.info/. Paula Mustafa in the search box to learn more about \"Cellulitis: Care Instructions. \" Current as of: 2016 Content Version: 11.2 © 5502-2330 [employer]. Care instructions adapted under license by [employer] (which disclaims liability or warranty for this information). If you have questions about a medical condition or this instruction, always ask your healthcare professional. [place] [address] any warranty or liability for your use of this information. Patient Discharge Instructions Erika Irby / [id number] : 1945 Admitted 2017 Discharged: 6/10/2017 9:19 AM  
 
ACUTE DIAGNOSES: 
Cellulitis of right lower extremity CHRONIC MEDICAL DIAGNOSES: 
Problem List as of 6/10/2017  Date Reviewed: 2017 Codes Class Noted - Resolved * (Principal)Cellulitis of right lower extremity ICD-10-CM: L03.115 ICD-9-CM: 682.6  6/2/2017 - Present Asthma with acute exacerbation ICD-10-CM: J45.901 ICD-9-CM: 493.92  6/2/2017 - Present Asthma ICD-10-CM: Q08.887 ICD-9-CM: 493.90  Unknown - Present Hyperlipidemia ICD-10-CM: E78.5 ICD-9-CM: 272.4  Unknown - Present RAI on CPAP ICD-10-CM: G47.33, Z99.89 ICD-9-CM: 327.23, V46.8  Unknown - Present Anxiety and depression ICD-10-CM: F41.9, F32.9 ICD-9-CM: 300.00, 311  Unknown - Present Hypoglycemia ICD-10-CM: E16.2 ICD-9-CM: 251.2  2/11/2017 - Present Chronic bilateral low back pain without sciatica ICD-10-CM: M54.5, G89.29 ICD-9-CM: 724.2, 338.29  11/3/2016 - Present Generalized anxiety disorder ICD-10-CM: F41.1 ICD-9-CM: 300.02  11/3/2016 - Present Acute renal failure (ARF) (HCC) ICD-10-CM: N17.9 ICD-9-CM: 584.9  8/14/2016 - Present CKD (chronic kidney disease) stage 4, GFR 15-29 ml/min (HCC) (Chronic) ICD-10-CM: N18.4 ICD-9-CM: 585.4  8/14/2016 - Present HTN (hypertension), benign (Chronic) ICD-10-CM: I10 
ICD-9-CM: 401.1  8/14/2016 - Present DM type 2, uncontrolled, with renal complications (HCC) (Chronic) ICD-10-CM: E11.29, E11.65 ICD-9-CM: 250.42  8/14/2016 - Present Iron deficiency anemia (Chronic) ICD-10-CM: D50.9 ICD-9-CM: 280.9  8/14/2016 - Present RESOLVED: CKD stage 4 due [facility] type 2 [facility] ICD-10-CM: E11.22, N18.4 ICD-9-CM: 250.40, 585.4  Unknown - 2/11/2017 RESOLVED: DM type 2 causing renal disease [facility] ICD-10-CM: E11.29 ICD-9-CM: 250.40  Unknown - 2/11/2017 RESOLVED: Hyperkalemia ICD-10-CM: E87.5 ICD-9-CM: 276.7  11/3/2016 - 2/11/2017 RESOLVED: Elevated serum creatinine ICD-10-CM: R79.89 ICD-9-CM: 790.99  11/3/2016 - 2/11/2017 RESOLVED: Left leg cellulitis ICD-10-CM: L03.116 ICD-9-CM: 682.6  8/14/2016 - 2/11/2017 RESOLVED: Sepsis ([facility] ICD-10-CM: A41.9 ICD-9-CM: 038.9, 995.91  8/14/2016 - 2/11/2017 RESOLVED: RAI (obstructive sleep apnea) (Chronic) ICD-10-CM: S20.79 
ICD-9-CM: 327.23  8/14/2016 - 2/11/2017 DISCHARGE MEDICATIONS:  
 
 
· It is important that you take the medication exactly as they are prescribed. · Keep your medication in the bottles provided by the pharmacist and keep a list of the medication names, dosages, and times to be taken in your wallet. · Do not take other medications without consulting your doctor. DIET:  Diabetic Diet ACTIVITY: Activity as tolerated ADDITIONAL INFORMATION: If you experience any of the following symptoms then please call your primary care physician or return to the emergency room if you cannot get hold of your doctor: Fever, chills, nausea, vomiting, diarrhea, change in mentation, falling, bleeding, shortness of breath. FOLLOW UP CARE: 
Dr. Liliya Foley MD  you are to call and set up an appointment to see them in 2 weeks. Information obtained by : 
I understand that if any problems occur once I am at home I am to contact my physician. I understand and acknowledge receipt of the instructions indicated above. Physician's or R.N.'s Signature                                                                  Date/Time Patient or Representative Signature                                                          Date/Time Discharge Orders None [facility] Announcement We are excited to announce that we are making your provider's discharge notes available to you in [employer].   You will see these notes when they are completed and signed by the physician that discharged you from your recent hospital stay. If you have any questions or concerns about any information you see in [employer], please call the Health Information Department where you were seen or reach out to your Primary Care Provider for more information about your plan of care. Introducing [facility]! [employer] introduces [employer] patient portal. Now you can access parts of your medical record, email your doctor's office, and request medication refills online. 1. In your internet browser, go to [email or web address] 2. Click on the First Time User? Click Here link in the Sign In box. You will see the New Member Sign Up page. 3. Enter your [employer] Access Code exactly as it appears below. You will not need to use this code after youve completed the sign-up process. If you do not sign up before the expiration date, you must request a new code. · [employer] Access Code: [facility] ALONDRA Expires: 9/6/2017 11:42 AM 
 
4. Enter the last four digits of your Social Security Number (xxxx) and Date of Birth (mm/dd/yyyy) as indicated and click Submit. You will be taken to the next sign-up page. 5. Create a [employer] ID. This will be your [employer] login ID and cannot be changed, so think of one that is secure and easy to remember. 6. Create a [employer] password. You can change your password at any time. 7. Enter your Password Reset Question and Answer. This can be used at a later time if you forget your password. 8. Enter your e-mail address. You will receive e-mail notification when new information is available in [address] 9. Click Sign Up. You can now view and download portions of your medical record. 10. Click the Download Summary menu link to download a portable copy of your medical information.  
 
If you have questions, please visit the Frequently Asked Questions section of the [employer]. Remember, MyChart is NOT to be used for urgent needs. For medical emergencies, dial 911. Now available from your iPhone and Android! General Information Please provide this summary of care documentation to your next provider. Patient Signature:  ____________________________________________________________ Date:  ____________________________________________________________  
  
Geary Abelson Provider Signature:  ____________________________________________________________ Date:  ____________________________________________________________

## 2017-06-02 NOTE — ED PROVIDER NOTES
HPI Comments: 80 y.o. wheelchair bound female with past medical history significant for HTN, asthma, hyperlipidemia, DM type II, anxiety/depression, CKD stage 4, and RAI on CPAP who presents from home, accompanied by spouse, with chief complaint of leg pain. Pt complains of right leg pain with associated lethargy, fever, nausea, SOB, productive cough, and decreased PO progressively worsening over the past 3 days. Pt reports going to PCP for lab work this morning, but pt's spouse describes pt condition declined after PCP, prompting them to come to ED for evaluation tonight. Pt reports hx of cellulitis in LLE and reports similar pain. Pt reports recently elevated blood sugar. Per spouse, pt had ~5-6 hours of vomiting 2 days PTA, and diarrhea yesterday. Pt reports ASA daily but no other anticoagulants. Pt uses CPAP at night but denies other supplemental O2 at home. Pt denies hx of CHF, COPD, or VTE. There are no other acute medical concerns at this time. Social hx: No tobacco or alcohol use. PCP: Wil Mendoza MD    Note written by Carlos Rodriguez. America Auguste, as dictated by Tera Cruz. Ruby Yates MD 7:08 PM      The history is provided by the patient and the spouse. No  was used.         Past Medical History:   Diagnosis Date    Anxiety and depression     Asthma     CKD stage 4 due to type 2 diabetes mellitus ([address]     DM type 2 causing renal disease (HCC)     Hyperlipidemia     Hypertension     RAI on CPAP        Past Surgical History:   Procedure Laterality Date    BREAST SURGERY PROCEDURE UNLISTED      mastectomy, bilat    HX CHOLECYSTECTOMY      HX GYN      hysterectomy    HX HEENT      tonsilectomy         Family History:   Problem Relation Age of Onset    Hypertension Other     Diabetes Other        Social History     Social History    Marital status:      Spouse name: N/A    Number of children: N/A    Years of education: N/A     Occupational History    Not on file.     Social History Main Topics    Smoking status: Never Smoker    Smokeless tobacco: Not on file    Alcohol use No    Drug use: No    Sexual activity: Not on file     Other Topics Concern    Not on file     Social History Narrative         ALLERGIES: Latex, natural rubber; Darvon [propoxyphene]; Peanut; Pineapple; Sulfa (sulfonamide antibiotics); and Tape [adhesive]    Review of Systems   Constitutional: Positive for appetite change (decreased), fatigue and fever. Negative for chills. HENT: Negative for ear pain and sore throat. Eyes: Negative for pain. Respiratory: Positive for shortness of breath. Negative for chest tightness. Cardiovascular: Negative for chest pain and leg swelling. Gastrointestinal: Positive for nausea. Negative for abdominal pain and vomiting. Genitourinary: Negative for dysuria and flank pain. Musculoskeletal: Negative for back pain. Right leg pain w/ swelling   Skin: Negative for rash. Neurological: Negative for headaches. All other systems reviewed and are negative. Vitals:    06/02/17 1833   BP: 142/65   Pulse: 93   Resp: 24   Temp: 99.9 °F (37.7 °C)   SpO2: (!) 88%   Weight: 124.7 kg (275 lb)   Height: 5' 1\" (1.549 m)            Physical Exam   Constitutional: She appears well-developed and well-nourished. Morbidly obese female. HENT:   Head: Normocephalic and atraumatic. Mouth/Throat: Oropharynx is clear and moist. Mucous membranes are dry. Eyes: Conjunctivae and EOM are normal. Pupils are equal, round, and reactive to light. No scleral icterus. Neck: Neck supple. No tracheal deviation present. Cardiovascular: Normal rate, regular rhythm, normal heart sounds and intact distal pulses. Pulmonary/Chest:   Pursed lip breathing and wheezes throughout. Abdominal: Soft. She exhibits no distension. There is no tenderness. There is no rebound and no guarding.    Genitourinary:   Genitourinary Comments: deferred   Musculoskeletal: She exhibits edema (RLE 3+ pitting; LLE 1+ pitting). Neurological: She is alert. Skin: Skin is warm and dry. There is erythema (RLE.). Abdomen with excoriations. Erythema of the panis diffusely. Small amount of drainage. Psychiatric: She has a normal mood and affect. Nursing note and vitals reviewed. Note written by Alejo Leach. Luke Tran, as dictated by Sapphire Vela. Marlo Hussein MD 7:18 PM        MDM  Number of Diagnoses or Management Options  Diagnosis management comments: 79 yo morbidly obese female presents with fever, leg pain, lethargy, and hypoxia  Differential diagnosis includes sepsis, cellulitis, pneumonia, UTI    ED EKG interpretation:  Rhythm: normal sinus rhythm and RBBB;  Rate (approx.): 89; Axis: left axis deviation; QRS interval: prolonged; ST/T wave: normal; Other findings: abnormal ekg. ED Course     9:14 PM  Patient is being admitted to the hospital.  The results of their tests and reasons for their admission have been discussed with them and/or available family. They convey agreement and understanding for the need to be admitted and for their admission diagnosis. Consultation has been made with the inpatient physician specialist for hospitalization.     LABORATORY TESTS:  Recent Results (from the past 12 hour(s))   LACTIC ACID, PLASMA    Collection Time: 06/02/17  7:10 PM   Result Value Ref Range    Lactic acid 1.8 0.4 - 2.0 MMOL/L   METABOLIC PANEL, COMPREHENSIVE    Collection Time: 06/02/17  7:10 PM   Result Value Ref Range    Sodium 134 (L) 136 - 145 mmol/L    Potassium 3.7 3.5 - 5.1 mmol/L    Chloride 95 (L) 97 - 108 mmol/L    CO2 26 21 - 32 mmol/L    Anion gap 13 5 - 15 mmol/L    Glucose 245 (H) 65 - 100 mg/dL    BUN 84 (H) 6 - 20 MG/DL    Creatinine 3.54 (H) 0.55 - 1.02 MG/DL    BUN/Creatinine ratio 24 (H) 12 - 20      GFR est AA 15 (L) >60 ml/min/1.73m2    GFR est non-AA 13 (L) >60 ml/min/1.73m2    Calcium 8.6 8.5 - 10.1 MG/DL    Bilirubin, total 0.7 0.2 - 1.0 MG/DL    ALT (SGPT) 21 12 - 78 U/L    AST (SGOT) 37 15 - 37 U/L    Alk. phosphatase 70 45 - 117 U/L    Protein, total 7.1 6.4 - 8.2 g/dL    Albumin 2.7 (L) 3.5 - 5.0 g/dL    Globulin 4.4 (H) 2.0 - 4.0 g/dL    A-G Ratio 0.6 (L) 1.1 - 2.2     CBC WITH AUTOMATED DIFF    Collection Time: 06/02/17  7:10 PM   Result Value Ref Range    WBC 12.7 (H) 3.6 - 11.0 K/uL    RBC 3.07 (L) 3.80 - 5.20 M/uL    HGB 8.8 (L) 11.5 - 16.0 g/dL    HCT 28.4 (L) 35.0 - 47.0 %    MCV 92.5 80.0 - 99.0 FL    MCH 28.7 26.0 - 34.0 PG    MCHC 31.0 30.0 - 36.5 g/dL    RDW 15.1 (H) 11.5 - 14.5 %    PLATELET 537 454 - 955 K/uL    NEUTROPHILS 97 (H) 32 - 75 %    LYMPHOCYTES 1 (L) 12 - 49 %    MONOCYTES 2 (L) 5 - 13 %    EOSINOPHILS 0 0 - 7 %    BASOPHILS 0 0 - 1 %    ABS. NEUTROPHILS 12.3 (H) 1.8 - 8.0 K/UL    ABS. LYMPHOCYTES 0.1 (L) 0.8 - 3.5 K/UL    ABS. MONOCYTES 0.3 0.0 - 1.0 K/UL    ABS. EOSINOPHILS 0.0 0.0 - 0.4 K/UL    ABS.  BASOPHILS 0.0 0.0 - 0.1 K/UL    DF SMEAR SCANNED      RBC COMMENTS NORMOCYTIC, NORMOCHROMIC     URINALYSIS W/ REFLEX CULTURE    Collection Time: 06/02/17  7:27 PM   Result Value Ref Range    Color YELLOW      Appearance CLOUDY (A) CLEAR      Specific gravity 1.020 1.003 - 1.030      pH (UA) 5.0 5.0 - 8.0      Protein NEGATIVE  NEG mg/dL    Glucose NEGATIVE  NEG mg/dL    Ketone NEGATIVE  NEG mg/dL    Bilirubin NEGATIVE  NEG      Blood SMALL (A) NEG      Urobilinogen 0.2 0.2 - 1.0 EU/dL    Nitrites NEGATIVE  NEG      Leukocyte Esterase TRACE (A) NEG      WBC 5-10 0 - 4 /hpf    RBC 5-10 0 - 5 /hpf    Epithelial cells MODERATE (A) FEW /lpf    Bacteria NEGATIVE  NEG /hpf    UA:UC IF INDICATED URINE CULTURE ORDERED (A) CNI         IMAGING RESULTS:  CT ABD PELV WO CONT   Final Result      DUPLEX LOWER EXT VENOUS RIGHT         XR CHEST PORT   Final Result        Ct Abd Pelv Wo Cont    Result Date: 6/2/2017  INDICATION: Abd pain, fever, no recent surgery  fever, sepsis COMPARISON: None TECHNIQUE: Thin axial images were obtained through the abdomen and pelvis. Coronal and sagittal reconstructions were generated. Oral contrast was not administered. CT dose reduction was achieved through use of a standardized protocol tailored for this examination and automatic exposure control for dose modulation. The absence of intravenous contrast material reduces the sensitivity for evaluation of the solid parenchymal organs of the abdomen. FINDINGS: LUNG BASES: Small bilateral effusions with bibasilar airspace disease. INCIDENTALLY IMAGED HEART AND MEDIASTINUM: Moderate pericardial effusion. LIVER: No mass or biliary dilatation. GALLBLADDER: Surgically removed. SPLEEN: No mass. PANCREAS: No mass or ductal dilatation. ADRENALS: Unremarkable. KIDNEYS/URETERS: Left renal cyst. STOMACH: Unremarkable. SMALL BOWEL: No dilatation or wall thickening. COLON: No dilatation or wall thickening. APPENDIX: Unremarkable. PERITONEUM: No ascites or pneumoperitoneum. RETROPERITONEUM: No lymphadenopathy or aortic aneurysm. REPRODUCTIVE ORGANS: URINARY BLADDER: Decompressed by Hanks catheter. BONES: No destructive bone lesion. ADDITIONAL COMMENTS: N/A     IMPRESSION: 1. No acute findings in the abdomen or pelvis. 2. Small bilateral effusions with atelectasis in the lower lobes. 3. Pericardial effusion. Xr Chest Port    Result Date: 6/2/2017  INDICATION: . Sepsis COMPARISON: Previous chest xray, 1/2/2016. LIMITATIONS: Portable technique. Patient body habitus limits evaluation. Apical lordotic angulation. Chales Minus FINDINGS: Single frontal view of the chest. . Lines/tubes/surgical:  Cardiac monitor leads overly the patient. Heart/mediastinum: Calcifications in the aortic arch. Lungs/pleura: Low lung volumes. Bibasilar opacities at least partially explained by overlying soft tissue density. No visualized pleural effusion or pneumothorax. Additional Comments: None. Chales Minus IMPRESSION: 1. Low lung volumes. No radiographic evidence of acute cardiopulmonary disease. 2. Atherosclerosis. MEDICATIONS GIVEN:  Medications   sodium chloride (NS) flush 5-10 mL (not administered)   ampicillin-sulbactam (UNASYN) 3 g in 0.9% sodium chloride (MBP/ADV) 100 mL (3 g IntraVENous New Bag 6/2/17 1942)   vancomycin (VANCOCIN) 2,000 mg in 0.9% sodium chloride 500 mL IVPB (not administered)   sodium chloride 0.9 % bolus infusion 3,741 mL (3,741 mL IntraVENous New Bag 6/2/17 1942)   acetaminophen (TYLENOL) tablet 1,000 mg (1,000 mg Oral Given 6/2/17 2048)       IMPRESSION:  1. Cellulitis of right lower extremity    2. Sepsis, due to unspecified organism ([facility] [address]        PLAN:  1. Admit to hospitalist    Total critical care time spent exclusive of procedures:  37 minutes      Jacob Ibarra MD      Procedures

## 2017-06-02 NOTE — ROUTINE PROCESS
Bedside and Verbal shift change report given to Edelmira Banegas (oncoming nurse) by Cleopatra Pretty (offgoing nurse). Report included the following information SBAR and ED Summary.

## 2017-06-03 LAB
ALBUMIN SERPL BCP-MCNC: 2.2 G/DL (ref 3.5–5)
ALBUMIN/GLOB SERPL: 0.5 {RATIO} (ref 1.1–2.2)
ALP SERPL-CCNC: 59 U/L (ref 45–117)
ALT SERPL-CCNC: 21 U/L (ref 12–78)
ANION GAP BLD CALC-SCNC: 14 MMOL/L (ref 5–15)
AST SERPL W P-5'-P-CCNC: 38 U/L (ref 15–37)
BASOPHILS # BLD AUTO: 0 K/UL (ref 0–0.1)
BASOPHILS # BLD: 0 % (ref 0–1)
BILIRUB SERPL-MCNC: 0.6 MG/DL (ref 0.2–1)
BUN SERPL-MCNC: 78 MG/DL (ref 6–20)
BUN/CREAT SERPL: 25 (ref 12–20)
CALCIUM SERPL-MCNC: 7.4 MG/DL (ref 8.5–10.1)
CHLORIDE SERPL-SCNC: 102 MMOL/L (ref 97–108)
CO2 SERPL-SCNC: 24 MMOL/L (ref 21–32)
CREAT SERPL-MCNC: 3.13 MG/DL (ref 0.55–1.02)
DIFFERENTIAL METHOD BLD: ABNORMAL
EOSINOPHIL # BLD: 0 K/UL (ref 0–0.4)
EOSINOPHIL NFR BLD: 0 % (ref 0–7)
ERYTHROCYTE [DISTWIDTH] IN BLOOD BY AUTOMATED COUNT: 15.1 % (ref 11.5–14.5)
GLOBULIN SER CALC-MCNC: 4.2 G/DL (ref 2–4)
GLUCOSE BLD STRIP.AUTO-MCNC: 244 MG/DL (ref 65–100)
GLUCOSE BLD STRIP.AUTO-MCNC: 256 MG/DL (ref 65–100)
GLUCOSE BLD STRIP.AUTO-MCNC: 293 MG/DL (ref 65–100)
GLUCOSE BLD STRIP.AUTO-MCNC: 302 MG/DL (ref 65–100)
GLUCOSE SERPL-MCNC: 256 MG/DL (ref 65–100)
HCT VFR BLD AUTO: 25 % (ref 35–47)
HGB BLD-MCNC: 7.9 G/DL (ref 11.5–16)
LYMPHOCYTES # BLD AUTO: 2 % (ref 12–49)
LYMPHOCYTES # BLD: 0.2 K/UL (ref 0.8–3.5)
MCH RBC QN AUTO: 28.8 PG (ref 26–34)
MCHC RBC AUTO-ENTMCNC: 31.6 G/DL (ref 30–36.5)
MCV RBC AUTO: 91.2 FL (ref 80–99)
MONOCYTES # BLD: 0.2 K/UL (ref 0–1)
MONOCYTES NFR BLD AUTO: 2 % (ref 5–13)
NEUTS BAND NFR BLD MANUAL: 3 % (ref 0–6)
NEUTS SEG # BLD: 11.9 K/UL (ref 1.8–8)
NEUTS SEG NFR BLD AUTO: 93 % (ref 32–75)
PLATELET # BLD AUTO: 184 K/UL (ref 150–400)
POTASSIUM SERPL-SCNC: 3.7 MMOL/L (ref 3.5–5.1)
PROT SERPL-MCNC: 6.4 G/DL (ref 6.4–8.2)
RBC # BLD AUTO: 2.74 M/UL (ref 3.8–5.2)
RBC MORPH BLD: ABNORMAL
SERVICE CMNT-IMP: ABNORMAL
SODIUM SERPL-SCNC: 140 MMOL/L (ref 136–145)
WBC # BLD AUTO: 12.3 K/UL (ref 3.6–11)

## 2017-06-03 PROCEDURE — 94640 AIRWAY INHALATION TREATMENT: CPT

## 2017-06-03 PROCEDURE — 74011000250 HC RX REV CODE- 250: Performed by: INTERNAL MEDICINE

## 2017-06-03 PROCEDURE — 80053 COMPREHEN METABOLIC PANEL: CPT | Performed by: INTERNAL MEDICINE

## 2017-06-03 PROCEDURE — 74011250637 HC RX REV CODE- 250/637: Performed by: FAMILY MEDICINE

## 2017-06-03 PROCEDURE — 65660000000 HC RM CCU STEPDOWN

## 2017-06-03 PROCEDURE — 74011636637 HC RX REV CODE- 636/637: Performed by: INTERNAL MEDICINE

## 2017-06-03 PROCEDURE — 36415 COLL VENOUS BLD VENIPUNCTURE: CPT | Performed by: INTERNAL MEDICINE

## 2017-06-03 PROCEDURE — 85025 COMPLETE CBC W/AUTO DIFF WBC: CPT | Performed by: INTERNAL MEDICINE

## 2017-06-03 PROCEDURE — 82962 GLUCOSE BLOOD TEST: CPT

## 2017-06-03 PROCEDURE — 74011636637 HC RX REV CODE- 636/637: Performed by: FAMILY MEDICINE

## 2017-06-03 PROCEDURE — 74011250636 HC RX REV CODE- 250/636: Performed by: EMERGENCY MEDICINE

## 2017-06-03 PROCEDURE — 74011250636 HC RX REV CODE- 250/636: Performed by: INTERNAL MEDICINE

## 2017-06-03 PROCEDURE — 74011000258 HC RX REV CODE- 258: Performed by: EMERGENCY MEDICINE

## 2017-06-03 PROCEDURE — 97163 PT EVAL HIGH COMPLEX 45 MIN: CPT

## 2017-06-03 RX ORDER — NALOXONE HYDROCHLORIDE 0.4 MG/ML
0.4 INJECTION, SOLUTION INTRAMUSCULAR; INTRAVENOUS; SUBCUTANEOUS AS NEEDED
Status: DISCONTINUED | OUTPATIENT
Start: 2017-06-03 | End: 2017-06-10 | Stop reason: HOSPADM

## 2017-06-03 RX ORDER — INSULIN LISPRO 100 [IU]/ML
INJECTION, SOLUTION INTRAVENOUS; SUBCUTANEOUS
Status: DISCONTINUED | OUTPATIENT
Start: 2017-06-03 | End: 2017-06-06

## 2017-06-03 RX ORDER — SODIUM CHLORIDE 0.9 % (FLUSH) 0.9 %
5-10 SYRINGE (ML) INJECTION AS NEEDED
Status: DISCONTINUED | OUTPATIENT
Start: 2017-06-03 | End: 2017-06-10 | Stop reason: HOSPADM

## 2017-06-03 RX ORDER — INSULIN GLARGINE 100 [IU]/ML
20 INJECTION, SOLUTION SUBCUTANEOUS
Status: DISCONTINUED | OUTPATIENT
Start: 2017-06-03 | End: 2017-06-08

## 2017-06-03 RX ORDER — INSULIN GLARGINE 100 [IU]/ML
15 INJECTION, SOLUTION SUBCUTANEOUS
Status: DISCONTINUED | OUTPATIENT
Start: 2017-06-03 | End: 2017-06-03 | Stop reason: ALTCHOICE

## 2017-06-03 RX ORDER — MORPHINE SULFATE 2 MG/ML
1 INJECTION, SOLUTION INTRAMUSCULAR; INTRAVENOUS
Status: DISCONTINUED | OUTPATIENT
Start: 2017-06-03 | End: 2017-06-03

## 2017-06-03 RX ORDER — ACETAMINOPHEN 500 MG
1000 TABLET ORAL
Status: DISCONTINUED | OUTPATIENT
Start: 2017-06-03 | End: 2017-06-10 | Stop reason: HOSPADM

## 2017-06-03 RX ORDER — LOSARTAN POTASSIUM 25 MG/1
25 TABLET ORAL DAILY
Status: DISCONTINUED | OUTPATIENT
Start: 2017-06-03 | End: 2017-06-10 | Stop reason: HOSPADM

## 2017-06-03 RX ORDER — PROCHLORPERAZINE EDISYLATE 5 MG/ML
5 INJECTION INTRAMUSCULAR; INTRAVENOUS
Status: DISCONTINUED | OUTPATIENT
Start: 2017-06-03 | End: 2017-06-10 | Stop reason: HOSPADM

## 2017-06-03 RX ORDER — SODIUM CHLORIDE 0.9 % (FLUSH) 0.9 %
5-10 SYRINGE (ML) INJECTION EVERY 8 HOURS
Status: DISCONTINUED | OUTPATIENT
Start: 2017-06-03 | End: 2017-06-10 | Stop reason: HOSPADM

## 2017-06-03 RX ORDER — CARVEDILOL 3.12 MG/1
3.12 TABLET ORAL 2 TIMES DAILY WITH MEALS
Status: DISCONTINUED | OUTPATIENT
Start: 2017-06-03 | End: 2017-06-10 | Stop reason: HOSPADM

## 2017-06-03 RX ORDER — ACETAMINOPHEN 325 MG/1
650 TABLET ORAL
Status: DISCONTINUED | OUTPATIENT
Start: 2017-06-03 | End: 2017-06-10 | Stop reason: HOSPADM

## 2017-06-03 RX ORDER — PANTOPRAZOLE SODIUM 40 MG/1
40 TABLET, DELAYED RELEASE ORAL DAILY
Status: DISCONTINUED | OUTPATIENT
Start: 2017-06-03 | End: 2017-06-10 | Stop reason: HOSPADM

## 2017-06-03 RX ORDER — DEXTROSE 50 % IN WATER (D50W) INTRAVENOUS SYRINGE
12.5-25 AS NEEDED
Status: DISCONTINUED | OUTPATIENT
Start: 2017-06-03 | End: 2017-06-10 | Stop reason: HOSPADM

## 2017-06-03 RX ORDER — MAGNESIUM SULFATE 100 %
4 CRYSTALS MISCELLANEOUS AS NEEDED
Status: DISCONTINUED | OUTPATIENT
Start: 2017-06-03 | End: 2017-06-10 | Stop reason: HOSPADM

## 2017-06-03 RX ORDER — DIPHENHYDRAMINE HYDROCHLORIDE 50 MG/ML
12.5 INJECTION, SOLUTION INTRAMUSCULAR; INTRAVENOUS
Status: DISCONTINUED | OUTPATIENT
Start: 2017-06-03 | End: 2017-06-03

## 2017-06-03 RX ORDER — FLUTICASONE FUROATE AND VILANTEROL 100; 25 UG/1; UG/1
1 POWDER RESPIRATORY (INHALATION) DAILY
Status: DISCONTINUED | OUTPATIENT
Start: 2017-06-03 | End: 2017-06-10 | Stop reason: HOSPADM

## 2017-06-03 RX ADMIN — HEPARIN SODIUM 5000 UNITS: 5000 INJECTION, SOLUTION INTRAVENOUS; SUBCUTANEOUS at 16:33

## 2017-06-03 RX ADMIN — Medication 10 ML: at 22:37

## 2017-06-03 RX ADMIN — HEPARIN SODIUM 5000 UNITS: 5000 INJECTION, SOLUTION INTRAVENOUS; SUBCUTANEOUS at 22:38

## 2017-06-03 RX ADMIN — INSULIN GLARGINE 20 UNITS: 100 INJECTION, SOLUTION SUBCUTANEOUS at 22:37

## 2017-06-03 RX ADMIN — PANTOPRAZOLE SODIUM 40 MG: 40 TABLET, DELAYED RELEASE ORAL at 11:38

## 2017-06-03 RX ADMIN — IPRATROPIUM BROMIDE AND ALBUTEROL SULFATE 3 ML: .5; 3 SOLUTION RESPIRATORY (INHALATION) at 07:07

## 2017-06-03 RX ADMIN — HEPARIN SODIUM 5000 UNITS: 5000 INJECTION, SOLUTION INTRAVENOUS; SUBCUTANEOUS at 08:42

## 2017-06-03 RX ADMIN — INSULIN LISPRO 6 UNITS: 100 INJECTION, SOLUTION INTRAVENOUS; SUBCUTANEOUS at 16:40

## 2017-06-03 RX ADMIN — IPRATROPIUM BROMIDE AND ALBUTEROL SULFATE 3 ML: .5; 3 SOLUTION RESPIRATORY (INHALATION) at 15:15

## 2017-06-03 RX ADMIN — CARVEDILOL 3.12 MG: 3.12 TABLET, FILM COATED ORAL at 16:33

## 2017-06-03 RX ADMIN — SODIUM CHLORIDE 3 G: 900 INJECTION, SOLUTION INTRAVENOUS at 11:37

## 2017-06-03 RX ADMIN — IPRATROPIUM BROMIDE AND ALBUTEROL SULFATE 3 ML: .5; 3 SOLUTION RESPIRATORY (INHALATION) at 23:40

## 2017-06-03 RX ADMIN — IPRATROPIUM BROMIDE AND ALBUTEROL SULFATE 3 ML: .5; 3 SOLUTION RESPIRATORY (INHALATION) at 19:12

## 2017-06-03 RX ADMIN — Medication 10 ML: at 00:22

## 2017-06-03 RX ADMIN — Medication 10 ML: at 06:00

## 2017-06-03 RX ADMIN — METHYLPREDNISOLONE SODIUM SUCCINATE 40 MG: 40 INJECTION, POWDER, FOR SOLUTION INTRAMUSCULAR; INTRAVENOUS at 06:49

## 2017-06-03 RX ADMIN — INSULIN LISPRO 10 UNITS: 100 INJECTION, SOLUTION INTRAVENOUS; SUBCUTANEOUS at 08:52

## 2017-06-03 RX ADMIN — IPRATROPIUM BROMIDE AND ALBUTEROL SULFATE 3 ML: .5; 3 SOLUTION RESPIRATORY (INHALATION) at 00:04

## 2017-06-03 RX ADMIN — FLUTICASONE FUROATE AND VILANTEROL TRIFENATATE 1 PUFF: 100; 25 POWDER RESPIRATORY (INHALATION) at 11:27

## 2017-06-03 RX ADMIN — METHYLPREDNISOLONE SODIUM SUCCINATE 40 MG: 40 INJECTION, POWDER, FOR SOLUTION INTRAMUSCULAR; INTRAVENOUS at 16:33

## 2017-06-03 RX ADMIN — INSULIN LISPRO 3 UNITS: 100 INJECTION, SOLUTION INTRAVENOUS; SUBCUTANEOUS at 22:36

## 2017-06-03 RX ADMIN — SODIUM CHLORIDE 3 G: 900 INJECTION, SOLUTION INTRAVENOUS at 06:47

## 2017-06-03 RX ADMIN — INSULIN LISPRO 8 UNITS: 100 INJECTION, SOLUTION INTRAVENOUS; SUBCUTANEOUS at 11:42

## 2017-06-03 RX ADMIN — INSULIN GLARGINE 15 UNITS: 100 INJECTION, SOLUTION SUBCUTANEOUS at 00:38

## 2017-06-03 RX ADMIN — Medication 10 ML: at 16:47

## 2017-06-03 RX ADMIN — ACETAMINOPHEN 1000 MG: 500 TABLET, FILM COATED ORAL at 20:27

## 2017-06-03 RX ADMIN — LOSARTAN POTASSIUM 25 MG: 25 TABLET, FILM COATED ORAL at 11:27

## 2017-06-03 RX ADMIN — CARVEDILOL 3.12 MG: 3.12 TABLET, FILM COATED ORAL at 11:27

## 2017-06-03 RX ADMIN — METHYLPREDNISOLONE SODIUM SUCCINATE 40 MG: 40 INJECTION, POWDER, FOR SOLUTION INTRAMUSCULAR; INTRAVENOUS at 22:37

## 2017-06-03 RX ADMIN — SODIUM CHLORIDE 3 G: 900 INJECTION, SOLUTION INTRAVENOUS at 01:20

## 2017-06-03 RX ADMIN — IPRATROPIUM BROMIDE AND ALBUTEROL SULFATE 3 ML: .5; 3 SOLUTION RESPIRATORY (INHALATION) at 04:35

## 2017-06-03 NOTE — PROGRESS NOTES
[facility] Dosing Services: Antimicrobial Stewardship Progress Note    Consult for antibiotic dosing of Vancomycin by Dr. Sneha Martin  Pharmacist reviewed antibiotic appropriateness for 80year old , female  for indication of Cellulitis  Day of Therapy 1    Plan:  Vancomycin therapy:  Vancomycin therapy loading dose of 2000 (mg) received in ED at ~2100. Will re-dose once level < 15mcg/mL  Will follow-up tomorrow to see if patient is getting Hemodialysis to dose 500mg post HD session otherwise  Dose calculated to approximate a therapeutic trough of 10-15mcg/mL. Last trough level / Plan for level: Random in 24-48 hours (not ordered yet)  Pharmacy to follow daily and will make changes to dose and/or frequency based on clinical status. Non-Kinetic Antimicrobial Dosing:     Other Antimicrobial  (not dosed by pharmacist)   Unasyn   Cultures     6/2 Blood x2: pending  6/2 Urine: pending   Serum Creatinine     Lab Results   Component Value Date/Time    Creatinine 3.54 06/02/2017 07:10 PM       Creatinine Clearance Estimated Creatinine Clearance: 18.3 mL/min (based on Cr of 3.54).      Temp   (!) 102.2 °F (39 °C)      WBC   Lab Results   Component Value Date/Time    WBC 12.7 06/02/2017 07:10 PM       H/H   Lab Results   Component Value Date/Time    HGB 8.8 06/02/2017 07:10 PM        Platelets   Lab Results   Component Value Date/Time    PLATELET 338 29/70/1882 07:10 PM          Thank you,  Lenora Cassidy PharmD     Contact: [phone]

## 2017-06-03 NOTE — PROCEDURES
[address]  *** FINAL REPORT ***    Name: Lauren Merrill  MRN: [id number]    Outpatient  : 29 Sep 1945  HIS Order #: [id number]  [id number] [facility] Visit #: [id number]  Date: 2017    TYPE OF TEST: Peripheral Venous Testing    REASON FOR TEST  Pain in limb, Limb swelling    Right Leg:-  Deep venous thrombosis:           No  Superficial venous thrombosis:    No  Deep venous insufficiency:        No  Superficial venous insufficiency: No      INTERPRETATION/FINDINGS  PROCEDURE:  RIGHT LOWER EXTREMITY  VENOUS DUPLEX. Evaluation of lower  extremity veins with ultrasound (B-mode imaging, pulsed Doppler, color   Doppler). Includes the common femoral, deep femoral, femoral,  popliteal, posterior tibial, peroneal, and great saphenous veins. Other veins, for example the gastrocnemius and soleal veins, may also  be visualized. FINDINGS: Technically difficult exam due to patient body habitus. Gray   scale and color imaging suboptimal. Limited study. The right proximal   femoral vein, profunda, calf veins and left common femoral veins were   not adequately visualized. 2D compression was not tolerated by  patient in the distal femoral vein. Visualized veins demonstrate  compressability and color fill. CONCLUSION: Limited study. Right lower extremity venous duplex  negative for deep venous thrombosis or thrombophlebitis in the veins  visualized. Right calf veins and left common femoral vein was not  observed due to habitus. Technically difficult study. ADDITIONAL COMMENTS    I have personally reviewed the data relevant to the interpretation of  this  study. TECHNOLOGIST: Anuradha Ayers. Kavon  Signed: 2017 08:53 PM    PHYSICIAN: Almaz Easton.  Natalya May MD  Signed: 2017 10:46 AM

## 2017-06-03 NOTE — ROUTINE PROCESS
Bedside shift change report given to [place] (oncoming nurse) by Antoni Aparicio (offgoing nurse). Report included the following information SBAR, Kardex, Intake/Output, MAR, Accordion and Recent Results.

## 2017-06-03 NOTE — PROGRESS NOTES
Patient was admitted in coverage for the [address] [place] [address] service, and was signed out to Dr. Andrey Hamman at 6/3/2017 6:48 AM.

## 2017-06-03 NOTE — PROGRESS NOTES
Daily Progress Note: 6/3/2017  Mayda Reeves MD    Assessment/Plan:   Cellulitis of right lower extremity (6/2/2017): trigger seems to be persistent scratching due to uremia. Lactate is normal.   ---Admit to a stepdown unit. --- Blood cultures. ---Start IV Unasyn and Vancomycin. Monitor clinically.     Asthma with acute exacerbation (6/2/2017):   ---start Duoneb,  --- IV solumedrol,   ---Mucinex,   ---Albuterol as needed     CKD (chronic kidney disease) stage 4, GFR 15-29 ml/min ([facility]) (8/14/2016): has had intermittent hemodialysis before.   ---Consult nephrology     HTN (hypertension), benign (8/14/2016): Due to severe illness.   ---Holding BP meds. ---Restart Cozaar and Coreg- holding Amlodipine      DM type 2, uncontrolled, with renal complications ([facility]) (7/43/7484): last A1c 7.2.   ---Monitor blood glucose. Appetite low.   ---Start  Lantus. ---SSI per protocol     Iron deficiency anemia (8/14/2016)/ Anemia due to CKD POA:   ---monitor Hgb closely.      Generalized anxiety disorder (11/3/2016)/ Anxiety and depression: lethargic at this time.   --- Hold all sedating medications     Hyperlipidemia: hold Lipitor     RAI on CPAP: resume CPAP at night      Physical debility POA: she is totally dependent on .   ---Consult PT, OT.   ---Needs [facility]     Code Status: Full       Problem List:  Problem List as of 6/3/2017  Date Reviewed: 6/2/2017          Codes Class Noted - Resolved    * (Principal)Cellulitis of right lower extremity ICD-10-CM: L03.115  ICD-9-CM: 682.6  6/2/2017 - Present        Asthma with acute exacerbation ICD-10-CM: J45.901  ICD-9-CM: 493.92  6/2/2017 - Present        Asthma ICD-10-CM: J45.909  ICD-9-CM: 493.90  Unknown - Present        Hyperlipidemia ICD-10-CM: E78.5  ICD-9-CM: 272.4  Unknown - Present        RAI on CPAP ICD-10-CM: G47.33, Z99.89  ICD-9-CM: 327.23, V46.8  Unknown - Present        Anxiety and depression ICD-10-CM: F41.9, F32.9  ICD-9-CM: 300.00, 311  Unknown - Present        Hypoglycemia ICD-10-CM: E16.2  ICD-9-CM: 251.2  2/11/2017 - Present        Chronic bilateral low back pain without sciatica ICD-10-CM: M54.5, G89.29  ICD-9-CM: 724.2, 338.29  11/3/2016 - Present        Generalized anxiety disorder ICD-10-CM: F41.1  ICD-9-CM: 300.02  11/3/2016 - Present        Acute renal failure (ARF) (HCC) ICD-10-CM: N17.9  ICD-9-CM: 584.9  8/14/2016 - Present        CKD (chronic kidney disease) stage 4, GFR 15-29 ml/min (HCC) (Chronic) ICD-10-CM: N18.4  ICD-9-CM: 585.4  8/14/2016 - Present        HTN (hypertension), benign (Chronic) ICD-10-CM: I10  ICD-9-CM: 401.1  8/14/2016 - Present        DM type 2, uncontrolled, with renal complications (HCC) (Chronic) ICD-10-CM: E11.29, E11.65  ICD-9-CM: 250.42  8/14/2016 - Present        Iron deficiency anemia (Chronic) ICD-10-CM: D50.9  ICD-9-CM: 280.9  8/14/2016 - Present        RESOLVED: CKD stage 4 due to type 2 diabetes mellitus ([facility] ICD-10-CM: E11.22, N18.4  ICD-9-CM: 250.40, 585.4  Unknown - 2/11/2017        RESOLVED: DM type 2 causing renal disease ([facility] ICD-10-CM: E11.29  ICD-9-CM: 250.40  Unknown - 2/11/2017        RESOLVED: Hyperkalemia ICD-10-CM: E87.5  ICD-9-CM: 276.7  11/3/2016 - 2/11/2017        RESOLVED: Elevated serum creatinine ICD-10-CM: R79.89  ICD-9-CM: 790.99  11/3/2016 - 2/11/2017        RESOLVED: Left leg cellulitis ICD-10-CM: L03.116  ICD-9-CM: 682.6  8/14/2016 - 2/11/2017        RESOLVED: Sepsis ([address] ICD-10-CM: A41.9  ICD-9-CM: 038.9, 995.91  8/14/2016 - 2/11/2017        RESOLVED: RAI (obstructive sleep apnea) (Chronic) ICD-10-CM: O67.03  ICD-9-CM: 327.23  8/14/2016 - 2/11/2017              HPI:   Ms. Severiano Deans is a 80 y.o. female who is being admitted for cellulitis of right lower extremity + asthma exacerbation. Ms. Severiano Deans presented to our Emergency Department today complaining of progressive weakness and a right lower extremity redness as well as fever and chills.  She has a hx of CKD and has a persistent generalized body itching including the right lower extremity. She is bedridden and her  is her primary caregiver. She is too weak to give any meaningful hx and I have discussed with her  for collaborative hx. She was found to have extensive cellulitis right lower extremity with asthma exacerbation. She will be admitted to hospital for further management. (Dr Hector Goes)    6/3: Feeling lousy. Had temp of 101 last night. She is having more pain. Still weak. Poor appetite. Breathing is a little better.        Review of Systems:   A comprehensive review of systems was negative except for that written in the HPI. Objective:   Physical Exam:     Visit Vitals    /52 (BP 1 Location: Right arm, BP Patient Position: At rest)    Pulse 86    Temp 98.1 °F (36.7 °C)    Resp 24    Ht 5' 1\" (1.549 m)    Wt 284 lb 6.4 oz (129 kg)    SpO2 94%    BMI 53.74 kg/m2    O2 Flow Rate (L/min): 3 l/min O2 Device: Nasal cannula    Temp (24hrs), Av °F (38.3 °C), Min:98.1 °F (36.7 °C), Max:102.5 °F (39.2 °C)         1901 -  0700  In: -   Out: 500 [Urine:500]    General:  Alert, cooperative, appears weak and fatigued,  Morbidly obese. Head:  Normocephalic, without obvious abnormality, atraumatic. Eyes:  Conjunctivae/corneas clear. Nose: Nares normal. Septum midline. Mucosa normal. No drainage or sinus tenderness. Throat: Lips, mucosa, and tongue normal. Teeth and gums normal.   Neck: Supple, symmetrical, trachea midline, no adenopathy, thyroid: no enlargement/tenderness/nodules, no carotid bruit and no JVD. Lungs:   Clear to auscultation bilaterally. Breath sounds are diminished throughout   Heart:  Regular rate and rhythm, S1, S2 normal, no murmur, click, rub or gallop. Abdomen:   Soft, non-tender. Bowel sounds normal. No masses,  No organomegaly. Scattered scabbed areas on her abdomen.     Extremities: RLE with erythema and warmth from the mid shin to her ankle, no cyanosis, 1+ edema. No calf tenderness or cords. Pulses: 2+ and symmetric all extremities. Skin:  No rashes, multiple excoriations are present on her abd and upper ext   Neurologic: CNII-XII intact. Alert and oriented X 3. Fine motor of hands and fingers normal.   equal.  No cogwheeling or rigidity. Gait not tested at this time. Sensation grossly normal to touch. Gross motor of extremities normal.       Data Review:       Recent Days:  Recent Labs      06/03/17 0326 06/02/17 1910   WBC  12.3*  12.7*   HGB  7.9*  8.8*   HCT  25.0*  28.4*   PLT  184  212     Recent Labs      06/03/17 0326 06/02/17 1910   NA  140  134*   K  3.7  3.7   CL  102  95*   CO2  24  26   GLU  256*  245*   BUN  78*  84*   CREA  3.13*  3.54*   CA  7.4*  8.6   ALB  2.2*  2.7*   TBILI  0.6  0.7   SGOT  38*  37   ALT  21  21       24 Hour Results:  Recent Results (from the past 24 hour(s))   LACTIC ACID, PLASMA    Collection Time: 06/02/17  7:10 PM   Result Value Ref Range    Lactic acid 1.8 0.4 - 2.0 MMOL/L   CULTURE, BLOOD    Collection Time: 06/02/17  7:10 PM   Result Value Ref Range    Special Requests: NO SPECIAL REQUESTS      Culture result:        TWO OF TWO BOTTLES HAVE BEEN FLAGGED POSITIVE BY INSTRUMENT. BOTTLES HAVE BEEN SENT TO [facility] TO ASSESS FOR POSSIBLE GROWTH. METABOLIC PANEL, COMPREHENSIVE    Collection Time: 06/02/17  7:10 PM   Result Value Ref Range    Sodium 134 (L) 136 - 145 mmol/L    Potassium 3.7 3.5 - 5.1 mmol/L    Chloride 95 (L) 97 - 108 mmol/L    CO2 26 21 - 32 mmol/L    Anion gap 13 5 - 15 mmol/L    Glucose 245 (H) 65 - 100 mg/dL    BUN 84 (H) 6 - 20 MG/DL    Creatinine 3.54 (H) 0.55 - 1.02 MG/DL    BUN/Creatinine ratio 24 (H) 12 - 20      GFR est AA 15 (L) >60 ml/min/1.73m2    GFR est non-AA 13 (L) >60 ml/min/1.73m2    Calcium 8.6 8.5 - 10.1 MG/DL    Bilirubin, total 0.7 0.2 - 1.0 MG/DL    ALT (SGPT) 21 12 - 78 U/L    AST (SGOT) 37 15 - 37 U/L    Alk.  phosphatase 70 45 - 117 U/L    Protein, total 7.1 6.4 - 8.2 g/dL    Albumin 2.7 (L) 3.5 - 5.0 g/dL    Globulin 4.4 (H) 2.0 - 4.0 g/dL    A-G Ratio 0.6 (L) 1.1 - 2.2     CBC WITH AUTOMATED DIFF    Collection Time: 06/02/17  7:10 PM   Result Value Ref Range    WBC 12.7 (H) 3.6 - 11.0 K/uL    RBC 3.07 (L) 3.80 - 5.20 M/uL    HGB 8.8 (L) 11.5 - 16.0 g/dL    HCT 28.4 (L) 35.0 - 47.0 %    MCV 92.5 80.0 - 99.0 FL    MCH 28.7 26.0 - 34.0 PG    MCHC 31.0 30.0 - 36.5 g/dL    RDW 15.1 (H) 11.5 - 14.5 %    PLATELET 035 179 - 049 K/uL    NEUTROPHILS 97 (H) 32 - 75 %    LYMPHOCYTES 1 (L) 12 - 49 %    MONOCYTES 2 (L) 5 - 13 %    EOSINOPHILS 0 0 - 7 %    BASOPHILS 0 0 - 1 %    ABS. NEUTROPHILS 12.3 (H) 1.8 - 8.0 K/UL    ABS. LYMPHOCYTES 0.1 (L) 0.8 - 3.5 K/UL    ABS. MONOCYTES 0.3 0.0 - 1.0 K/UL    ABS. EOSINOPHILS 0.0 0.0 - 0.4 K/UL    ABS. BASOPHILS 0.0 0.0 - 0.1 K/UL    DF SMEAR SCANNED      RBC COMMENTS NORMOCYTIC, NORMOCHROMIC     CULTURE, BLOOD    Collection Time: 06/02/17  7:23 PM   Result Value Ref Range    Special Requests: NO SPECIAL REQUESTS      Culture result:        TWO OF TWO BOTTLES HAVE BEEN FLAGGED POSITIVE BY INSTRUMENT. BOTTLES HAVE BEEN SENT TO [facility] TO ASSESS FOR POSSIBLE GROWTH.    URINALYSIS W/ REFLEX CULTURE    Collection Time: 06/02/17  7:27 PM   Result Value Ref Range    Color YELLOW      Appearance CLOUDY (A) CLEAR      Specific gravity 1.020 1.003 - 1.030      pH (UA) 5.0 5.0 - 8.0      Protein NEGATIVE  NEG mg/dL    Glucose NEGATIVE  NEG mg/dL    Ketone NEGATIVE  NEG mg/dL    Bilirubin NEGATIVE  NEG      Blood SMALL (A) NEG      Urobilinogen 0.2 0.2 - 1.0 EU/dL    Nitrites NEGATIVE  NEG      Leukocyte Esterase TRACE (A) NEG      WBC 5-10 0 - 4 /hpf    RBC 5-10 0 - 5 /hpf    Epithelial cells MODERATE (A) FEW /lpf    Bacteria NEGATIVE  NEG /hpf    UA:UC IF INDICATED URINE CULTURE ORDERED (A) CNI     METABOLIC PANEL, COMPREHENSIVE    Collection Time: 06/03/17  3:26 AM   Result Value Ref Range    Sodium 140 136 - 145 mmol/L    Potassium 3.7 3.5 - 5.1 mmol/L    Chloride 102 97 - 108 mmol/L    CO2 24 21 - 32 mmol/L    Anion gap 14 5 - 15 mmol/L    Glucose 256 (H) 65 - 100 mg/dL    BUN 78 (H) 6 - 20 MG/DL    Creatinine 3.13 (H) 0.55 - 1.02 MG/DL    BUN/Creatinine ratio 25 (H) 12 - 20      GFR est AA 18 (L) >60 ml/min/1.73m2    GFR est non-AA 15 (L) >60 ml/min/1.73m2    Calcium 7.4 (L) 8.5 - 10.1 MG/DL    Bilirubin, total 0.6 0.2 - 1.0 MG/DL    ALT (SGPT) 21 12 - 78 U/L    AST (SGOT) 38 (H) 15 - 37 U/L    Alk. phosphatase 59 45 - 117 U/L    Protein, total 6.4 6.4 - 8.2 g/dL    Albumin 2.2 (L) 3.5 - 5.0 g/dL    Globulin 4.2 (H) 2.0 - 4.0 g/dL    A-G Ratio 0.5 (L) 1.1 - 2.2     CBC WITH AUTOMATED DIFF    Collection Time: 06/03/17  3:26 AM   Result Value Ref Range    WBC 12.3 (H) 3.6 - 11.0 K/uL    RBC 2.74 (L) 3.80 - 5.20 M/uL    HGB 7.9 (L) 11.5 - 16.0 g/dL    HCT 25.0 (L) 35.0 - 47.0 %    MCV 91.2 80.0 - 99.0 FL    MCH 28.8 26.0 - 34.0 PG    MCHC 31.6 30.0 - 36.5 g/dL    RDW 15.1 (H) 11.5 - 14.5 %    PLATELET 673 074 - 988 K/uL    NEUTROPHILS 93 (H) 32 - 75 %    BAND NEUTROPHILS 3 0 - 6 %    LYMPHOCYTES 2 (L) 12 - 49 %    MONOCYTES 2 (L) 5 - 13 %    EOSINOPHILS 0 0 - 7 %    BASOPHILS 0 0 - 1 %    ABS. NEUTROPHILS 11.9 (H) 1.8 - 8.0 K/UL    ABS. LYMPHOCYTES 0.2 (L) 0.8 - 3.5 K/UL    ABS. MONOCYTES 0.2 0.0 - 1.0 K/UL    ABS. EOSINOPHILS 0.0 0.0 - 0.4 K/UL    ABS.  BASOPHILS 0.0 0.0 - 0.1 K/UL    DF MANUAL      RBC COMMENTS NORMOCYTIC, NORMOCHROMIC     GLUCOSE, POC    Collection Time: 06/03/17  7:47 AM   Result Value Ref Range    Glucose (POC) 302 (H) 65 - 100 mg/dL    Performed by Gigi Wood (PCT)        Medications reviewed  Current Facility-Administered Medications   Medication Dose Route Frequency    sodium chloride (NS) flush 5-10 mL  5-10 mL IntraVENous Q8H    sodium chloride (NS) flush 5-10 mL  5-10 mL IntraVENous PRN    acetaminophen (TYLENOL) tablet 650 mg  650 mg Oral Q4H PRN    naloxone (NARCAN) injection 0.4 mg  0.4 mg IntraVENous PRN    prochlorperazine (COMPAZINE) injection 5 mg  5 mg IntraVENous Q8H PRN    insulin glargine (LANTUS) injection 15 Units  15 Units SubCUTAneous QHS    insulin lispro (HUMALOG) injection   SubCUTAneous AC&HS    glucose chewable tablet 16 g  4 Tab Oral PRN    dextrose (D50W) injection syrg 12.5-25 g  12.5-25 g IntraVENous PRN    glucagon (GLUCAGEN) injection 1 mg  1 mg IntraMUSCular PRN    sodium chloride (NS) flush 5-10 mL  5-10 mL IntraVENous PRN    ampicillin-sulbactam (UNASYN) 3 g in 0.9% sodium chloride (MBP/ADV) 100 mL  3 g IntraVENous Q6H    heparin (porcine) injection 5,000 Units  5,000 Units SubCUTAneous Q8H    methylPREDNISolone (PF) (SOLU-MEDROL) injection 40 mg  40 mg IntraVENous Q8H    albuterol-ipratropium (DUO-NEB) 2.5 MG-0.5 MG/3 ML  3 mL Nebulization Q6H RT       Care Plan discussed with: Patient/Family and Nurse    Total time spent with patient and review of records: 30 minutes.     Aric Robbins MD

## 2017-06-03 NOTE — PROGRESS NOTES
gram positive cocci present in all bottle collected on 6/2 reported off to Dr. Ector Proctor. No new order.

## 2017-06-03 NOTE — PROGRESS NOTES
Problem: Mobility Impaired (Adult and Pediatric)  Goal: *Acute Goals and Plan of Care (Insert Text)  Physical Therapy Goals  Initiated 6/3/2017  1. Patient will roll side to side in bed using bed rails with maximal assistance x 1 within 7 day(s). 2. Patient will move supine to sitting with maximum assistance x 2 within 7 days. 3. Patient will tolerate 5 minutes of sitting edge of bed with support under BLEs and SBA x 2 within 7 days. 4. After achieving goal #3 patient will attempt stance with EZ stand support and minimal assist x 2 within 7 days. 5. Within 7 days, patient will demonstrate during single treatment understanding of and tolerance to perform 10 reps of following BLE exercises: bilateral ankle pumps, bilateral heel slides within available ROM, gluteal sets and quad sets. PHYSICAL THERAPY EVALUATION  Patient: Carmela Brown (18 y.o. female)  Date: 6/3/2017  Primary Diagnosis: Cellulitis of right lower extremity        Precautions:   Fall, Skin      ASSESSMENT :  Based on the objective data described below, the patient is a 79 yo female admitted yesterday with 3 day history of lethargy, fever, chills,and pain and erythema right leg and ankle. Patient admitted with diagnosis of cellulitis right leg likely as result of persistent scratching due to azotemia. Patient has history of hospital admissions in last year and has been to SNF twice in last 9 months. Patient with significant PMH including: CKD stage 4 with intermittent dialysis, T2DM, acute exacerbation of asthma, morbid obesity, HTN,and RAI. Patient's affect flat but she did vocalize once \"NO! \" when PT mentioned possible need for SNF at discharge from [facility]. Review of past admissions reveal patient with hospital admissions in Aug 2016, Nov 2016, Feb 2017. Patient's  very supportive and devoted to wife and he reports that ~8 days ago patient ambulated from indoors, out of house, down ramp and to car for doctor appt. with her RW. He reports over last several days at home, he has been unable to assist her to stand from recliner chair where she sleeps and unable to transfer her to [facility]. He reports patient does not have hospital bed, lift chair, akash lift, power wheelchair or home aide. They do own RW, Rollator, BSC and manual wheelchair. He reports he has been managing her total care and for last 8 days she's been bed bound. He states that when she returned home from second stay at Rehab/SNF, pt was instructed  to continue amb several times daily ( [place] path within their home).  reports patient stopped ambulated and became more and more sedentary and since Feb 2017 has needed more assistance for basic transfers. Per chart review, patient last recorded ability to amb 110' with RW min assist x 1 on Feb 11th 2017. Patient with morbid obesity, large panniculus, multiple areas of scratched open skin tears and scabs, redness and edema as well as pain to touch right ankle and foot. Patient negative for DVT. Patient with RLE pain, limited AAROM/PROM, poor tolerance for any activity, dependent for bed mobility, and incontinent of bowel and bladder at this time. She requires total assist x 2 to attempt rolling in bed and found soiled with BM. Patient yells when attempting rolling or AAROM BLEs. Pain too severe today to attempt further mobility. Patient is extremely debilitated and will be very slow to rehab. Uncertain if patient understands degree of her deficits and if rehab again is her goal. Appears she is ademently against discharge to SNF. She is oriented to place only. Recommend PT 3x week to determine if patient can participate in PT to improve overall level of function as cellulitis RLE improves. PT to focus on possible DME that may be needed in patient's home for caregiver's safety and more long term care. Suggesting akash or EZ stand lift, possible lift chair, motorized wheelchair.  Currently patient requires a minimum of total assist x 2 just to mobilize in bed and she would need to progress to a level of one assist for bed mobility and basic transfers in order to be safe being cared for at home. Does not appear patient understands this concept. Fall prevention as well as caregiver injury discussed with patient and -  indicates understanding.  needs assistance in the home to care for patient in order to minimize caregiver burn out. Patient will benefit from skilled intervention to address the above impairments. Patients rehabilitation potential is considered to be Poor -   Factors which may influence rehabilitation potential include:   [ ]         None noted  [X]         Mental ability/status  [X]         Medical condition  [X]         Home/family situation and support systems  [ ]         Safety awareness  [X]         Pain tolerance/management  [ ]         Other:        PLAN :  Recommendations and Planned Interventions:  [X]           Bed Mobility Training             [ ]    Neuromuscular Re-Education  [X]           Transfer Training                   [ ]    Orthotic/Prosthetic Training  [ ]           Gait Training                         [ ]    Modalities  [X]           Therapeutic Exercises           [ ]    Edema Management/Control  [X]           Therapeutic Activities            [X]    Patient and Family Training/Education  [ ]           Other (comment):     Frequency/Duration: Patient will be followed by physical therapy  3 times a week to address goals. Discharge Recommendations: Skilled Nursing Facilityor home with assistance 24/7   Further Equipment Recommendations for Discharge: possibly mechanical lift and power wheelchair        SUBJECTIVE:   Patient stated No!\" I dont want a SNF.       OBJECTIVE DATA SUMMARY:   HISTORY:    Past Medical History:   Diagnosis Date    Anxiety and depression      Asthma      CKD stage 4 due to type 2 diabetes mellitus ([facility] [address]      [facility]  Hyperlipidemia      Hypertension      RAI on CPAP       Past Surgical History:   Procedure Laterality Date    BREAST SURGERY PROCEDURE UNLISTED         mastectomy, bilat    HX CHOLECYSTECTOMY        HX GYN         hysterectomy    HX HEENT         tonsilectomy     Prior Level of Function/Home Situation: see above   Personal factors and/or comorbidities impacting plan of care: above     Home Situation  Home Environment: Private residence  Wheelchair Ramp: Yes  One/Two Story Residence: Two story, live on 1st floor  Living Alone: No  Support Systems: Spouse/Significant Other/Partner  Patient Expects to be Discharged to[de-identified] Private residence  Current DME Used/Available at Home: Commode, bedside, Walker, rolling, Wheelchair     EXAMINATION/PRESENTATION/DECISION MAKING:   Critical Behavior:  Neurologic State: Lethargic  Orientation Level: Disoriented to place, Disoriented to situation, Disoriented to time, Oriented to person (disoriented - does not answer questions: stares)  Cognition: Decreased attention/concentration, Decreased command following  Safety/Judgement: Decreased awareness of environment, Decreased awareness of need for assistance, Decreased awareness of need for safety, Decreased insight into deficits  Hearing: Auditory  Auditory Impairment: Hard of hearing, bilateral  Skin:  mutiple areas of skins tears, scabs, oozing sites BUEs  Edema: Bilateral LEs and hands  Range Of Motion:  AROM: Grossly decreased, non-functional  PROM: Grossly decreased, non-functional  Strength:    Strength: Grossly decreased, non-functional  Sensation:   Sensation: Impaired        Functional Mobility:  Bed Mobility:  Rolling: Total assistance; Additional time;Assist x2 patient yells with attempts  Supine to Sit:  (unable to assess)  Sit to Supine:  (unable to assess)  Scooting:  (unable to assess)  Transfers:  Sit to Stand:  (unable at this time)  Balance:   Sitting:  (unable to assess)  Standing:  (unable)  Ambulation/Gait Training:  (non ambulatory at this time)     Functional Measure:  Barthel Index:      Bathin  Bladder: 0  Bowels: 0  Groomin  Dressin  Feedin  Mobility: 0  Stairs: 0  Toilet Use: 0  Transfer (Bed to Chair and Back): 0  Total: 0         Barthel and G-code impairment scale:  Percentage of impairment CH  0% CI  1-19% CJ  20-39% CK  40-59% CL  60-79% CM  80-99% CN  100%   Barthel Score 0-100 100 99-80 79-60 59-40 20-39 1-19    0   Barthel Score 0-20 20 17-19 13-16 9-12 5-8 1-4 0      The Barthel ADL Index: Guidelines  1. The index should be used as a record of what a patient does, not as a record of what a patient could do. 2. The main aim is to establish degree of independence from any help, physical or verbal, however minor and for whatever reason. 3. The need for supervision renders the patient not independent. 4. A patient's performance should be established using the best available evidence. Asking the patient, friends/relatives and nurses are the usual sources, but direct observation and common sense are also important. However direct testing is not needed. 5. Usually the patient's performance over the preceding 24-48 hours is important, but occasionally longer periods will be relevant. 6. Middle categories imply that the patient supplies over 50 per cent of the effort. 7. Use of aids to be independent is allowed. Satnam Reid, Barthel, D.W. ([id number]). Functional evaluation: the Barthel Index. [address] [facility] (14)2. Carl Prasad, DIDIERJ.MBRITTANY, Alvester Seymour.[facility], [address] [place] [address] (). Measuring the change indisability after inpatient rehabilitation; comparison of the responsiveness of the Barthel Index and Functional Ceiba Measure. Journal of Neurology, Neurosurgery, and Psychiatry, 66(4), 364-028. Maki Carrillo NKariJCLINTON, SCOTTY Viera, & Alcira Yoo MCLINTON. (2004.) Assessment of post-stroke quality of life in cost-effectiveness studies:  The usefulness of the Barthel Index and the EuroQoL-5D. Quality of Life Research, 13, 634-07         G codes: In compliance with CMSs Claims Based Outcome Reporting, the following G-code set was chosen for this patient based on their primary functional limitation being treated: The outcome measure chosen to determine the severity of the functional limitation was the Barthel Index with a score of 0/100 which was correlated with the impairment scale. · Mobility - Walking and Moving Around:               - CURRENT STATUS:    CN - 100% impaired, limited or restricted               - GOAL STATUS:           CM - 80%-99% impaired, limited or restricted               - D/C STATUS:                       ---------------To be determined---------------      Physical Therapy Evaluation Charge Determination   History Examination Presentation Decision-Making   HIGH Complexity :3+ comorbidities / personal factors will impact the outcome/ POC  HIGH Complexity : 4+ Standardized tests and measures addressing body structure, function, activity limitation and / or participation in recreation  HIGH Complexity : Unstable and unpredictable characteristics  Other outcome measures Barthel Index HIGH       Based on the above components, the patient evaluation is determined to be of the following complexity level: HIGH   Activity Tolerance:   Extremely poor, essentially bed bound  Please refer to the flowsheet for vital signs taken during this treatment. After treatment:   [ ]         Patient left in no apparent distress sitting up in chair  [X]         Patient left in no apparent distress in bed  [X]         Call bell left within reach  [X]         Nursing notified  [X]         Caregiver present  [ ]         Bed alarm activated      COMMUNICATION/EDUCATION:   The patients plan of care was discussed with: Registered Nurse.  [X]         Fall prevention education was provided and the caregiver indicated understanding.   [ ]         Patient/family have participated as able in goal setting and plan of care. [ ]         Patient/family agree to work toward stated goals and plan of care. [ ]         Patient understands intent and goals of therapy, but is neutral about his/her participation. [X]         Patient is unable to participate in goal setting and plan of care.      Thank you for this referral.  Lisseth House, PT   Time Calculation: 15 mins

## 2017-06-03 NOTE — PROGRESS NOTES
[facility] Dosing Services: 6/3/17    Pharmacist Renal Dosing Progress Note     The following medication: Unasyn was automatically dose-adjusted per [facility] P&T Committee Protocol, with respect to renal function. Pt Weight:   Wt Readings from Last 1 Encounters:   06/03/17 129 kg (284 lb 6.4 oz)     Previous Regimen 3 gm every 6 hours   Serum Creatinine Lab Results   Component Value Date/Time    Creatinine 3.13 06/03/2017 03:26 AM       Creatinine Clearance Estimated Creatinine Clearance: 21.2 mL/min (based on Cr of 3.13). BUN Lab Results   Component Value Date/Time    BUN 78 06/03/2017 03:26 AM       Dosage changed to:  3 gm every 12 hours    Additional notes:protocol CrCl 15-30 mL/min      Pharmacy to continue to monitor patient daily. Will make dosage adjustments based upon changing renal function. Signed Floridalma Mitchell.  Contact information:  [phone]

## 2017-06-03 NOTE — ED NOTES
Patient wheezing, dyspnea with exertion O2 saturations 98% on 2L  Provider notified   Order placed for duoneb

## 2017-06-03 NOTE — PROGRESS NOTES
[facility]: MED RECONCILIATION    Comments/Recommendations:   · Med rec completed by RN with patient's  who had a typed up medication list. Medications on med list matched up/ verified against refill history as well. Medications added:     · Citalopram    Medications removed:    · None    Medications adjusted:    · None    Information obtained from: Patient, EMR, Rx Query    Allergies: Latex, natural rubber; Darvon [propoxyphene]; Peanut; Pineapple; Sulfa (sulfonamide antibiotics); and Tape [adhesive]    Prior to Admission Medications:   Prior to Admission Medications   Prescriptions Last Dose Informant Patient Reported? Taking? ALPRAZolam (XANAX) 0.25 mg tablet 6/1/2017 at Unknown time Significant Other Yes Yes   Sig: Take 0.25 mg by mouth two (2) times a day. Omeprazole delayed release (PRILOSEC D/R) 20 mg tablet 6/1/2017 at Unknown time Significant Other Yes Yes   Sig: Take 20 mg by mouth daily. acetaminophen (TYLENOL) 500 mg tablet 6/2/2017 at 0800 Significant Other Yes Yes   Sig: Take 1,000 mg by mouth two (2) times daily as needed for Pain. albuterol (PROVENTIL HFA, VENTOLIN HFA) 90 mcg/actuation inhaler 5/26/2017 at Unknown time Significant Other Yes Yes   Sig: Take 2 Puffs by inhalation every four (4) hours as needed for Wheezing. amLODIPine (NORVASC) 5 mg tablet 6/1/2017 at Unknown time Significant Other Yes Yes   Sig: Take 5 mg by mouth daily. aspirin 81 mg chewable tablet 6/1/2017 at Unknown time Significant Other Yes Yes   Sig: Take 81 mg by mouth daily. atorvastatin (LIPITOR) 40 mg tablet 6/1/2017 at Unknown time Significant Other Yes Yes   Sig: Take 40 mg by mouth nightly. bumetanide (BUMEX) 2 mg tablet 6/1/2017 at Unknown time Significant Other No Yes   Sig: Take 1 Tab by mouth two (2) times a day. calcium polycarbophil (FIBER LAXATIVE, CA POLYCARBO,) 625 mg tablet 6/1/2017 at Unknown time Significant Other Yes Yes   Sig: Take 625 mg by mouth daily.    calcium-cholecalciferol, D3, (CALTRATE 600+D) tablet 2017 at Unknown time Significant Other Yes Yes   Sig: Take 1 Tab by mouth two (2) times a day. carvedilol (COREG) 3.125 mg tablet 2017 at Unknown time Significant Other Yes Yes   Sig: Take 3.125 mg by mouth two (2) times daily (with meals). citalopram (CELEXA) 40 mg tablet 2017 at Unknown time Significant Other Yes Yes   Sig: Take 40 mg by mouth daily. doxazosin (CARDURA) 2 mg tablet 2017 at Unknown time Significant Other Yes Yes   Sig: Take 2 mg by mouth daily. ergocalciferol (ERGOCALCIFEROL) 50,000 unit capsule  Significant Other Yes Yes   Sig: Take 50,000 Units by mouth every month. fenofibrate nanocrystallized (TRICOR) 145 mg tablet 2017 at Unknown time Significant Other Yes Yes   Sig: Take 145 mg by mouth daily. fluticasone-vilanterol (BREO ELLIPTA) 100-25 mcg/dose inhaler 2017 at Unknown time Significant Other No Yes   Sig: Take 1 Puff by inhalation daily. insulin glargine (TOUJEO SOLOSTAR) 300 unit/mL (1.5 mL) inpn 2017 at Unknown time Significant Other Yes Yes   Si Units by SubCUTAneous route daily. insulin regular (HUMULIN R) 100 unit/mL injection 2017 at Unknown time Significant Other Yes Yes   Si Units by SubCUTAneous route Before breakfast and dinner. loratadine (CLARITIN) 10 mg tablet 2017 at Unknown time Significant Other Yes Yes   Sig: Take 10 mg by mouth daily. losartan (COZAAR) 25 mg tablet 2017 at Unknown time Significant Other Yes Yes   Sig: Take 25 mg by mouth daily. multivitamin (ONE A DAY) tablet 2017 at Unknown time Significant Other Yes Yes   Sig: Take 1 Tab by mouth daily. mupirocin (BACTROBAN) 2 % ointment 2017 at Unknown time Significant Other Yes Yes   Sig: Apply  to affected area three (3) times daily as needed. nystatin (MYCOSTATIN) 500,000 unit tab 2017 at Unknown time Significant Other Yes Yes   Sig: Take 1 Tab by mouth daily.    nystatin-triamcinolone (MYCOLOG) 100,000-0.1 unit/gram-% ointment 6/2/2017 at Unknown time Significant Other Yes Yes   Sig: Apply  to affected area daily. promethazine (PHENERGAN) 25 mg tablet 6/2/2017 at 0800 Significant Other Yes Yes   Sig: Take 25 mg by mouth every six (6) hours as needed for Nausea. senna-docusate (SENNA-C) 8.6-50 mg per tablet 5/26/2017 at Unknown time Significant Other Yes Yes   Sig: Take 2 Tabs by mouth every evening. triamcinolone acetonide (KENALOG) 0.1 % ointment 5/26/2017 at Unknown time Significant Other Yes Yes   Sig: Apply  to affected area daily as needed.  use thin layer on affected areas       Facility-Administered Medications: None       Thank you,  Alexander Nowak, PharmD     Contact: [phone]

## 2017-06-03 NOTE — ED NOTES
Patient has excoriation and weeping to pannus, groin, sacrum   Red, flaky BLE   Scattered healing abrasions , per patient \" I itch and scratch constantly\"

## 2017-06-03 NOTE — ED NOTES
Hanks cath inserted using sterile technique  Pricila SANDY and Julieta Faye at bedside for assistance

## 2017-06-03 NOTE — ED NOTES
TRANSFER - OUT REPORT:    Verbal report given to Leela Robbins RN(name) on Robel Alexis  being transferred to Step Down 321(unit) for routine progression of care       Report consisted of patients Situation, Background, Assessment and   Recommendations(SBAR). Information from the following report(s) SBAR, Kardex, ED Summary, Intake/Output, MAR, Recent Results and Cardiac Rhythm SR BBB was reviewed with the receiving nurse. Lines:   Peripheral IV 06/02/17 Right Wrist (Active)   Site Assessment Clean, dry, & intact 6/2/2017  8:08 PM   Phlebitis Assessment 0 6/2/2017  8:08 PM   Infiltration Assessment 0 6/2/2017  8:08 PM   Dressing Status Clean, dry, & intact 6/2/2017  8:08 PM   Dressing Type Tape;Transparent 6/2/2017  8:08 PM   Hub Color/Line Status Blue; Infusing 6/2/2017  8:08 PM   Action Taken Blood drawn 6/2/2017  8:08 PM       Peripheral IV 06/02/17 Right Antecubital (Active)   Site Assessment Clean, dry, & intact 6/2/2017  8:10 PM   Phlebitis Assessment 0 6/2/2017  8:10 PM   Infiltration Assessment 0 6/2/2017  8:10 PM   Dressing Status Clean, dry, & intact 6/2/2017  8:10 PM   Dressing Type Tape;Transparent 6/2/2017  8:10 PM   Hub Color/Line Status Pink; Infusing 6/2/2017  8:10 PM        Opportunity for questions and clarification was provided.       Patient transported with:   Monitor  O2 @ 2 liters  Registered Nurse

## 2017-06-03 NOTE — H&P
[address]  [phone]    Admission History and Physical      NAME:              Carlos Enrique Gill   :   1945   MRN:  [id number]     PCP:  Jane Cardona MD     Date:     2017     Chief  Complaint: Weakness, SOB, right lower extremity swelling, redness    History Of Presenting Illness:       Ms. Kerri Lee is a 80 y.o. female who is being admitted for cellulitis of right lower extremity + asthma exacerbation. Ms. Kerri Lee presented to our Emergency Department today complaining of progressive weakness and a right lower extremity redness as well as fever and chills. She has a hx of CKD and has a persistent generalized body itching including the right lower extremity. She is bedridden and her  is her primary caregiver. She is too weak to give any meaningful hx and I have discussed with her  for collaborative hx. She was found to have extensive cellulitis right lower extremity with asthma exacerbation. She will be admitted to hospital for further management. Allergies   Allergen Reactions    Latex, Natural Rubber Rash     Per pt, had a reaction to latex gloves when an RN administered lotion     Darvon [Propoxyphene] Itching    Peanut Cough    Pineapple Itching    Sulfa (Sulfonamide Antibiotics) Swelling    Tape [Adhesive] Itching       Prior to Admission medications    Medication Sig Start Date End Date Taking? Authorizing Provider   promethazine (PHENERGAN) 25 mg tablet Take 25 mg by mouth every six (6) hours as needed for Nausea. Yes Phys Other, MD   doxazosin (CARDURA) 2 mg tablet Take 2 mg by mouth daily. Yes Historical Provider   losartan (COZAAR) 25 mg tablet Take 25 mg by mouth daily. Yes Historical Provider   amLODIPine (NORVASC) 5 mg tablet Take 5 mg by mouth daily.    Yes Historical Provider   insulin glargine (TOUJEO SOLOSTAR) 300 unit/mL (1.5 mL) inpn 26 Units by SubCUTAneous route daily. Yes Historical Provider   multivitamin (ONE A DAY) tablet Take 1 Tab by mouth daily. Yes Historical Provider   bumetanide (BUMEX) 2 mg tablet Take 1 Tab by mouth two (2) times a day. 11/17/16  Yes Rosmery Celis MD   acetaminophen (TYLENOL) 500 mg tablet Take 1,000 mg by mouth two (2) times daily as needed for Pain. Yes Historical Provider   ALPRAZolam (XANAX) 0.25 mg tablet Take 0.25 mg by mouth two (2) times a day. Yes Historical Provider   carvedilol (COREG) 3.125 mg tablet Take 3.125 mg by mouth two (2) times daily (with meals). Yes Historical Provider   fluticasone-vilanterol (BREO ELLIPTA) 100-25 mcg/dose inhaler Take 1 Puff by inhalation daily. 8/23/16  Yes Rosmery Celis MD   fenofibrate nanocrystallized (TRICOR) 145 mg tablet Take 145 mg by mouth daily. Yes Historical Provider   loratadine (CLARITIN) 10 mg tablet Take 10 mg by mouth daily. Yes Historical Provider   Omeprazole delayed release (PRILOSEC D/R) 20 mg tablet Take 20 mg by mouth daily. Yes Historical Provider   calcium polycarbophil (FIBER LAXATIVE, CA POLYCARBO,) 625 mg tablet Take 625 mg by mouth daily. Yes Historical Provider   atorvastatin (LIPITOR) 40 mg tablet Take 40 mg by mouth nightly. Yes Ruben Romero MD   insulin regular (HUMULIN R) 100 unit/mL injection 6 Units by SubCUTAneous route Before breakfast and dinner. Yes Ruben Romero MD   aspirin 81 mg chewable tablet Take 81 mg by mouth daily. Yes Ruben Romero MD   mupirocin (BACTROBAN) 2 % ointment Apply  to affected area three (3) times daily. 2/14/17   Rosmery Celis MD   nystatin (MYCOSTATIN) 500,000 unit tab Take 1 Tab by mouth daily. Historical Provider   calcium-cholecalciferol, D3, (CALTRATE 600+D) tablet Take 1 Tab by mouth two (2) times a day.     Historical Provider   triamcinolone acetonide (KENALOG) 0.1 % ointment Apply  to affected area daily as needed. use thin layer on affected areas     Historical Provider   nystatin-triamcinolone (MYCOLOG) 100,000-0.1 unit/gram-% ointment Apply  to affected area daily. Apply to sores    Ruben Romero MD   ergocalciferol (ERGOCALCIFEROL) 50,000 unit capsule Take 50,000 Units by mouth every month. Ruben Romero MD   senna-docusate (SENNA-C) 8.6-50 mg per tablet Take 2 Tabs by mouth every evening. Ruben Romero MD   albuterol (PROVENTIL HFA, VENTOLIN HFA) 90 mcg/actuation inhaler Take 2 Puffs by inhalation every four (4) hours as needed for Wheezing. Ruben Romero MD       Past Medical History:   Diagnosis Date    Anxiety and depression     Asthma     CKD stage 4 due to type 2 diabetes mellitus [facility] [address]     DM type 2 causing renal disease ([facility] [address]     Hyperlipidemia     Hypertension     RAI on CPAP         Past Surgical History:   Procedure Laterality Date    BREAST SURGERY PROCEDURE UNLISTED      mastectomy, bilat    HX CHOLECYSTECTOMY      HX GYN      hysterectomy    HX HEENT      tonsilectomy       Social History   Substance Use Topics    Smoking status: Never Smoker    Smokeless tobacco: Not on file    Alcohol use No        Family History   Problem Relation Age of Onset    Hypertension Other     Diabetes Other       Review of Systems:    Constitutional ROS: no fever, chills, rigors or night sweats  Respiratory ROS: no sputum, hemoptysis or pleuritic pain. Cardiovascular ROS: no palpitations, orthopnea, PND or syncope  Endocrine ROS: no polydispsia, polyuria, heat or cold intolerance or major weight change.   Gastrointestinal ROS: no dysphagia, abdominal pain, nausea, vomiting, diarrhea or any bleeding   Genito-Urinary ROS: no dysuria, frequency, hematuria, retention or flank pain  Musculoskeletal ROS: no joint pain, swelling or muscular tenderness  Neurological ROS: no headache, focal weakness or any other neurological symptoms  Psychiatric ROS: no depression, anxiety, mood swings  Dermatological ROS: multiple scratch from severe pruritis   Heme-Lymph ROS: no swollen glands, bleeding    Examination:    Constitutional:    Visit Vitals    BP 96/62    Pulse 93    Temp (!) 102.2 °F (39 °C)    Resp 25    Ht 5' 1\" (1.549 m)    Wt 124.7 kg (275 lb)    SpO2 98%    BMI 51.96 kg/m2       General:  Weak, toxic and ill looking patient, uncomfortable  in no acute distress    Eyes: Pink conjunctivae, PERRLA with no discharge. Normal eye movements  Ear, Nose, Mouth & Throat: No ottorrhea, rhinorrhea, non tender sinuses, moist mucous membranes  Respiratory:  No accessory muscle use, clear breath sounds without crackles or wheezes  Cardiovascular:  No JVD or murmurs, regular and normal S1, S2 without thrills, bruits or peripheral edema. Capillary refil+, palpable distal pulses  GI & :  Soft abdomen, obese, non-distended, normoactive bowel sounds with no palpable organomegaly  Heme:  No cervical or axillary adenopathy. Musculoskeletal:  No cyanosis, clubbing, atrophy or deformities  Skin:  No rashes, multiple bruising and scratches. Right lower extremity erythema, tender. Neurological: Lethargic but responds to questions, No facial droop. CNs 2-12 are grossly intact. Limited exam.   Psychiatric:  Has little insight to illness   ________________________________________________________________________    Data Review:    Labs:    Recent Labs      06/02/17 1910   WBC  12.7*   HGB  8.8*   HCT  28.4*   PLT  212     Recent Labs      06/02/17 1910   NA  134*   K  3.7   CL  95*   CO2  26   GLU  245*   BUN  84*   CREA  3.54*   CA  8.6   ALB  2.7*   SGOT  37   ALT  21     No components found for: GLPOC  No results for input(s): PH, PCO2, PO2, HCO3, FIO2 in the last 72 hours. No results for input(s): INR in the last 72 hours. No lab exists for component: INREXT    Radiological Studies:      Venous doppler lower extremity - Limited study.  Right lower extremity venous duplex negative for deep venous thrombosis or thrombophlebitis in the veins visualized. Right calf veins and left common femoral vein was not observed due to habitus. Technically difficult study. CT scan abdomen and pelvis - No acute findings in the abdomen or pelvis. Small bilateral effusions with atelectasis in the lower lobes. Pericardial effusion.     Chest Xray - Low lung volumes. No radiographic evidence of acute cardiopulmonary disease. Atherosclerosis. Other Medical tests:    Personally reviewed EKG - normal sinus, RBBB     I have reviewed old medical records available. Assessment & Impression:     Ms. Javier Gauthier is a 80 y.o. female being evaluated for:     Principal Problem:    Cellulitis of right lower extremity (6/2/2017)    Active Problems:    CKD (chronic kidney disease) stage 4, GFR 15-29 ml/min ([address] (8/14/2016)      HTN (hypertension), benign (8/14/2016)      DM type 2, uncontrolled, with renal complications ([address] (3/06/1679)      Iron deficiency anemia (8/14/2016)      Generalized anxiety disorder (11/3/2016)      Hyperlipidemia ()      RAI on CPAP ()      Anxiety and depression ()      Asthma with acute exacerbation (6/2/2017)         Plan of management:    Cellulitis of right lower extremity (6/2/2017): trigger seems to be persistent scratching due to uremia. Lactate is normal. Admit to a stepdown unit. Blood cultures. Start IV Unasyn and Vancomycin. Monitor clinically. Asthma with acute exacerbation (6/2/2017): start Duoneb, IV solumedrol, Mucinex, Albuterol as needed    CKD (chronic kidney disease) stage 4, GFR 15-29 ml/min ([facility]) (8/14/2016): has had intermittent hemodialysis before. Consult nephrology    HTN (hypertension), benign (8/14/2016): Due to severe illness. Hold BP medications for now. DM type 2, uncontrolled, with renal complications ([address] (9/31/5207): last A1c 7.2. Monitor blood glucose. Appetite low. Start low dose Lantus.  SSI per protocol    Iron deficiency anemia (8/14/2016)/ Anemia due to CKD POA: monitor Hgb closely. Generalized anxiety disorder (11/3/2016)/ Anxiety and depression: lethargic at this time. Hold all sedating medications    Hyperlipidemia: hold Lipitor    RIA on CPAP: resume CPAP at night     Physical debility POA: she is totally dependent on . Consult PT, OT.  Needs SNF    Code Status:  Full    Surrogate decision maker: Family    Risk of deterioration: high      Total time spent for the care of the patient: [address] [facility] discussed with: Patient, Family, Nursing Staff and ED physician    Discussed:  Code Status, Care Plan and D/C Planning    Prophylaxis:  Hep SQ    Probable Disposition:  SNF/LTC           ___________________________________________________    Attending Physician: Shaina Flanagan MD

## 2017-06-04 LAB
ALBUMIN SERPL BCP-MCNC: 2.3 G/DL (ref 3.5–5)
ALBUMIN/GLOB SERPL: 0.5 {RATIO} (ref 1.1–2.2)
ALP SERPL-CCNC: 67 U/L (ref 45–117)
ALT SERPL-CCNC: 22 U/L (ref 12–78)
ANION GAP BLD CALC-SCNC: 12 MMOL/L (ref 5–15)
AST SERPL W P-5'-P-CCNC: 29 U/L (ref 15–37)
ATRIAL RATE: 89 BPM
BACTERIA SPEC CULT: ABNORMAL
BACTERIA SPEC CULT: ABNORMAL
BASOPHILS # BLD AUTO: 0 K/UL (ref 0–0.1)
BASOPHILS # BLD: 0 % (ref 0–1)
BILIRUB SERPL-MCNC: 0.4 MG/DL (ref 0.2–1)
BUN SERPL-MCNC: 93 MG/DL (ref 6–20)
BUN/CREAT SERPL: 29 (ref 12–20)
CALCIUM SERPL-MCNC: 8 MG/DL (ref 8.5–10.1)
CALCULATED P AXIS, ECG09: 53 DEGREES
CALCULATED R AXIS, ECG10: -28 DEGREES
CALCULATED T AXIS, ECG11: 20 DEGREES
CHLORIDE SERPL-SCNC: 104 MMOL/L (ref 97–108)
CO2 SERPL-SCNC: 24 MMOL/L (ref 21–32)
CREAT SERPL-MCNC: 3.25 MG/DL (ref 0.55–1.02)
DIAGNOSIS, 93000: NORMAL
EOSINOPHIL # BLD: 0 K/UL (ref 0–0.4)
EOSINOPHIL NFR BLD: 0 % (ref 0–7)
ERYTHROCYTE [DISTWIDTH] IN BLOOD BY AUTOMATED COUNT: 14.7 % (ref 11.5–14.5)
GLOBULIN SER CALC-MCNC: 4.3 G/DL (ref 2–4)
GLUCOSE BLD STRIP.AUTO-MCNC: 255 MG/DL (ref 65–100)
GLUCOSE BLD STRIP.AUTO-MCNC: 291 MG/DL (ref 65–100)
GLUCOSE BLD STRIP.AUTO-MCNC: 302 MG/DL (ref 65–100)
GLUCOSE BLD STRIP.AUTO-MCNC: 312 MG/DL (ref 65–100)
GLUCOSE SERPL-MCNC: 274 MG/DL (ref 65–100)
HCT VFR BLD AUTO: 25.9 % (ref 35–47)
HGB BLD-MCNC: 8.2 G/DL (ref 11.5–16)
LYMPHOCYTES # BLD AUTO: 3 % (ref 12–49)
LYMPHOCYTES # BLD: 0.3 K/UL (ref 0.8–3.5)
MCH RBC QN AUTO: 28.8 PG (ref 26–34)
MCHC RBC AUTO-ENTMCNC: 31.7 G/DL (ref 30–36.5)
MCV RBC AUTO: 90.9 FL (ref 80–99)
MONOCYTES # BLD: 0.4 K/UL (ref 0–1)
MONOCYTES NFR BLD AUTO: 4 % (ref 5–13)
NEUTS SEG # BLD: 9.4 K/UL (ref 1.8–8)
NEUTS SEG NFR BLD AUTO: 93 % (ref 32–75)
P-R INTERVAL, ECG05: 150 MS
PLATELET # BLD AUTO: 189 K/UL (ref 150–400)
POTASSIUM SERPL-SCNC: 3.8 MMOL/L (ref 3.5–5.1)
PROT SERPL-MCNC: 6.6 G/DL (ref 6.4–8.2)
Q-T INTERVAL, ECG07: 406 MS
QRS DURATION, ECG06: 136 MS
QTC CALCULATION (BEZET), ECG08: 493 MS
RBC # BLD AUTO: 2.85 M/UL (ref 3.8–5.2)
SERVICE CMNT-IMP: ABNORMAL
SODIUM SERPL-SCNC: 140 MMOL/L (ref 136–145)
VANCOMYCIN SERPL-MCNC: 15.9 UG/ML
VENTRICULAR RATE, ECG03: 89 BPM
WBC # BLD AUTO: 10.1 K/UL (ref 3.6–11)

## 2017-06-04 PROCEDURE — 74011250637 HC RX REV CODE- 250/637: Performed by: FAMILY MEDICINE

## 2017-06-04 PROCEDURE — 74011000250 HC RX REV CODE- 250: Performed by: INTERNAL MEDICINE

## 2017-06-04 PROCEDURE — 65660000000 HC RM CCU STEPDOWN

## 2017-06-04 PROCEDURE — 94640 AIRWAY INHALATION TREATMENT: CPT

## 2017-06-04 PROCEDURE — 74011250636 HC RX REV CODE- 250/636: Performed by: FAMILY MEDICINE

## 2017-06-04 PROCEDURE — 85025 COMPLETE CBC W/AUTO DIFF WBC: CPT | Performed by: FAMILY MEDICINE

## 2017-06-04 PROCEDURE — 36415 COLL VENOUS BLD VENIPUNCTURE: CPT | Performed by: FAMILY MEDICINE

## 2017-06-04 PROCEDURE — 74011636637 HC RX REV CODE- 636/637: Performed by: FAMILY MEDICINE

## 2017-06-04 PROCEDURE — 74011000258 HC RX REV CODE- 258: Performed by: FAMILY MEDICINE

## 2017-06-04 PROCEDURE — 82962 GLUCOSE BLOOD TEST: CPT

## 2017-06-04 PROCEDURE — 74011250636 HC RX REV CODE- 250/636: Performed by: INTERNAL MEDICINE

## 2017-06-04 PROCEDURE — 77010033678 HC OXYGEN DAILY

## 2017-06-04 PROCEDURE — 80202 ASSAY OF VANCOMYCIN: CPT | Performed by: INTERNAL MEDICINE

## 2017-06-04 PROCEDURE — 80053 COMPREHEN METABOLIC PANEL: CPT | Performed by: FAMILY MEDICINE

## 2017-06-04 RX ORDER — AMLODIPINE BESYLATE 5 MG/1
5 TABLET ORAL DAILY
Status: DISCONTINUED | OUTPATIENT
Start: 2017-06-04 | End: 2017-06-10

## 2017-06-04 RX ADMIN — METHYLPREDNISOLONE SODIUM SUCCINATE 40 MG: 40 INJECTION, POWDER, FOR SOLUTION INTRAMUSCULAR; INTRAVENOUS at 14:45

## 2017-06-04 RX ADMIN — PANTOPRAZOLE SODIUM 40 MG: 40 TABLET, DELAYED RELEASE ORAL at 10:08

## 2017-06-04 RX ADMIN — HEPARIN SODIUM 5000 UNITS: 5000 INJECTION, SOLUTION INTRAVENOUS; SUBCUTANEOUS at 19:59

## 2017-06-04 RX ADMIN — CARVEDILOL 3.12 MG: 3.12 TABLET, FILM COATED ORAL at 19:58

## 2017-06-04 RX ADMIN — Medication 10 ML: at 05:23

## 2017-06-04 RX ADMIN — INSULIN LISPRO 10 UNITS: 100 INJECTION, SOLUTION INTRAVENOUS; SUBCUTANEOUS at 10:09

## 2017-06-04 RX ADMIN — IPRATROPIUM BROMIDE AND ALBUTEROL SULFATE 3 ML: .5; 3 SOLUTION RESPIRATORY (INHALATION) at 13:10

## 2017-06-04 RX ADMIN — Medication 10 ML: at 21:22

## 2017-06-04 RX ADMIN — SODIUM CHLORIDE 3 G: 900 INJECTION, SOLUTION INTRAVENOUS at 13:33

## 2017-06-04 RX ADMIN — SODIUM CHLORIDE 3 G: 900 INJECTION, SOLUTION INTRAVENOUS at 01:07

## 2017-06-04 RX ADMIN — LOSARTAN POTASSIUM 25 MG: 25 TABLET, FILM COATED ORAL at 10:08

## 2017-06-04 RX ADMIN — INSULIN LISPRO 8 UNITS: 100 INJECTION, SOLUTION INTRAVENOUS; SUBCUTANEOUS at 19:59

## 2017-06-04 RX ADMIN — METHYLPREDNISOLONE SODIUM SUCCINATE 40 MG: 40 INJECTION, POWDER, FOR SOLUTION INTRAMUSCULAR; INTRAVENOUS at 05:23

## 2017-06-04 RX ADMIN — IPRATROPIUM BROMIDE AND ALBUTEROL SULFATE 3 ML: .5; 3 SOLUTION RESPIRATORY (INHALATION) at 07:11

## 2017-06-04 RX ADMIN — METHYLPREDNISOLONE SODIUM SUCCINATE 40 MG: 40 INJECTION, POWDER, FOR SOLUTION INTRAMUSCULAR; INTRAVENOUS at 21:19

## 2017-06-04 RX ADMIN — IPRATROPIUM BROMIDE AND ALBUTEROL SULFATE 3 ML: .5; 3 SOLUTION RESPIRATORY (INHALATION) at 19:11

## 2017-06-04 RX ADMIN — INSULIN LISPRO 4 UNITS: 100 INJECTION, SOLUTION INTRAVENOUS; SUBCUTANEOUS at 21:18

## 2017-06-04 RX ADMIN — AMLODIPINE BESYLATE 5 MG: 5 TABLET ORAL at 11:57

## 2017-06-04 RX ADMIN — INSULIN GLARGINE 20 UNITS: 100 INJECTION, SOLUTION SUBCUTANEOUS at 21:18

## 2017-06-04 RX ADMIN — INSULIN LISPRO 8 UNITS: 100 INJECTION, SOLUTION INTRAVENOUS; SUBCUTANEOUS at 11:54

## 2017-06-04 RX ADMIN — FLUTICASONE FUROATE AND VILANTEROL TRIFENATATE 1 PUFF: 100; 25 POWDER RESPIRATORY (INHALATION) at 10:12

## 2017-06-04 RX ADMIN — HEPARIN SODIUM 5000 UNITS: 5000 INJECTION, SOLUTION INTRAVENOUS; SUBCUTANEOUS at 07:10

## 2017-06-04 RX ADMIN — CARVEDILOL 3.12 MG: 3.12 TABLET, FILM COATED ORAL at 10:08

## 2017-06-04 NOTE — ROUTINE PROCESS
Bedside and Verbal shift change report given to [employer] [place], RN (oncoming nurse) by Markie Lewis RN (offgoing nurse). Report included the following information SBAR, Kardex, Intake/Output, Accordion and Recent Results.

## 2017-06-04 NOTE — CONSULTS
[facility] [address]   [address] [facility], [address]   0 [facility]       Name:  Kiana Segovia   MR#:  [id number]   :  1945   Account #:  [de-identified]    Date of Consultation:  2017   Date of Adm:  2017       REASON FOR CONSULTATION: I was asked to see the patient for   chronic kidney disease. HISTORY OF PRESENT ILLNESS: This is a 80-year-old    female followed by Dr. Cris Martinez in the office with advanced chronic   kidney disease with an access in her left upper extremity. She was   admitted to the hospital apparently on Friday with a creatinine of 3.54   mg %. She was admitted for cellulitis. Creatinine was 3.13 yesterday   and 3.25 today, both of which are better than her admitting creatinine,   but a renal consult was called today regardless. It is not clear exactly   what the question is. I would assume that they want us to address her   chronic kidney disease, which she does indeed have. I have reviewed the other physicians' notes. The patient has cellulitis,   started on Unasyn and vancomycin. Has blood cultures, has chronic   kidney disease, and has been on dialysis in the past. She is   adequately prepped for dialysis at this juncture and we are asked to   see her this afternoon. Apparently they were holding her blood   pressure medications prior to today, but the Cozaar and the Coreg   were resumed today. PAST MEDICAL PROBLEMS: Include    1. Hypertension. 2. Type 2 diabetes mellitus. 3. Asthma. 4. Chronic kidney disease, stage 4 with left upper arm AV fistula in   place. 5. Obstructive sleep apnea. 6. Hypercholesterolemia. MEDICATIONS: As listed in the chart. FAMILY HISTORY: As noted in the chart. SOCIAL HISTORY: As noted in the chart. REVIEW OF SYSTEMS   Shows that she does have some shortness of breath, but no orthopnea   or PND. She denies any chest pain.  She denies any cough, sputum   production or wheezing. She denies any nausea, vomiting, diarrhea or   constipation. There are no uremic symptoms. PHYSICAL EXAMINATION   VITAL SIGNS: Blood pressure is 157/69, pulse 80, respirations 14, O2   saturation vacillating between 95% and 100% on 3 liters of O2. SKIN: Normal.   NODES: Negative. NECK: Supple. No jugular venous distention. LUNGS: Show reduced breath sounds at the bases, dullness to   percussion. HEART: Shows a regular rate and rhythm without clicks, murmurs or   rubs noted. PMI was not felt. ABDOMEN: Revealed a morbidly obese abdomen. The abdomen was   soft, nondistended. No tenderness was appreciated. No masses were   appreciated. EXTREMITIES: Showed 2-3+ lower extremity edema, which is   obviously chronic. NEUROLOGIC: Nonfocal.    LABORATORY DATA: From today showed a hemoglobin of 82. Sodium 140, potassium 3.8, chloride 104, CO2 24, BUN 93, creatinine   3.25, glucose 274. IMPRESSION   1. Stable stage 4-5 chronic kidney disease. 2. Edema. 3. Cellulitis. RECOMMENDATIONS   1. Continue antibiotics. 2. Continue diuretics. 3. Continue other medications as prescribed. 4. We will follow with you.         Bernadette Barthel, MD HBP / JOSE ANTONIO   D:  06/04/2017   15:27   T:  06/04/2017   17:31   Job #:  [id number]

## 2017-06-04 NOTE — CONSULTS
Seen and examined. CKD 4/5  DM nephropathy  Edema  Cellulitis. RECOMMEND:  1. Abs  2. Loops. 3. Resume anti-LEVY meds. 4. No current need for changes or dialysis. Has progressive CKD.

## 2017-06-04 NOTE — PROGRESS NOTES
Shift Summary  [id number]    Bedside and Verbal shift change report given to [address] [place] [address] Missy (oncoming nurse) by Jas Solorio RN (offgoing nurse). Report included the following information SBAR, Kardex, MAR, Recent Results and Cardiac Rhythm NSR. Patient in bed receiving NEB tx at this time. No complaints of pain. Patient requested Diet Pepsi. No SOB or respiratory distress noted. 1613  Lab called to notify that CBC not accepted. Multiple attempts noted at blood draw prior. Patient very sensitive. Oncoming nurse notified. Will notify MD and have phlebotomy try upon arrival     Bedside and Verbal shift change report given to Alex Mejia RN (oncoming nurse) by Vannessa Huitron RN (offgoing nurse).  Report included the following information SBAR, Kardex, MAR, Recent Results and Cardiac Rhythm NR.

## 2017-06-04 NOTE — PROGRESS NOTES
Daily Progress Note: 6/4/2017  Santiago Spencer MD    Assessment/Plan:   Cellulitis of right lower extremity (6/2/2017): trigger seems to be persistent scratching due to uremia. Lactate is normal.   ---Admit to a stepdown unit. --- Blood cultures positive for strep- sensivities pending  ---Started IV Unasyn and Vancomycin. Monitor clinically. Will stop Vanc today     Asthma with acute exacerbation (6/2/2017):   ---start Duoneb,  --- IV solumedrol,   ---Mucinex,   ---Albuterol as needed     CKD (chronic kidney disease) stage 4, GFR 15-29 ml/min ([facility]) (8/14/2016): has had intermittent hemodialysis before.   ---Consult nephrology  ---Holding Bumex     HTN (hypertension), benign (8/14/2016): Due to severe illness. ---Initially held BP meds. ---Restarted Cozaar and Coreg  ---Restart Amlodipine     DM type 2, uncontrolled, with renal complications ([facility] [address] (0/70/9577): last A1c 7.2.   ---Monitor blood glucose. Appetite low.   ---Start  Lantus. ---SSI per protocol     Iron deficiency anemia (8/14/2016)/ Anemia due to CKD POA:   ---monitor Hgb closely.      Generalized anxiety disorder (11/3/2016)/ Anxiety and depression: lethargic at this time.   --- Hold all sedating medications     Hyperlipidemia: hold Lipitor     RAI on CPAP: resume CPAP at night      Physical debility POA: she is totally dependent on .   ---Consult PT, OT.   ---Needs SNF    UTI  ---Culture gram neg rods     Code Status: Full       Problem List:  Problem List as of 6/4/2017  Date Reviewed: 6/2/2017          Codes Class Noted - Resolved    * (Principal)Cellulitis of right lower extremity ICD-10-CM: L03.115  ICD-9-CM: 682.6  6/2/2017 - Present        Asthma with acute exacerbation ICD-10-CM: J45.901  ICD-9-CM: 493.92  6/2/2017 - Present        Asthma ICD-10-CM: J45.909  ICD-9-CM: 493.90  Unknown - Present        Hyperlipidemia ICD-10-CM: E78.5  ICD-9-CM: 272.4  Unknown - Present        RAI on CPAP ICD-10-CM: G47.33, Z99.89  ICD-9-CM: 327.23, V46.8  Unknown - Present        Anxiety and depression ICD-10-CM: F41.9, F32.9  ICD-9-CM: 300.00, 311  Unknown - Present        Hypoglycemia ICD-10-CM: E16.2  ICD-9-CM: 251.2  2/11/2017 - Present        Chronic bilateral low back pain without sciatica ICD-10-CM: M54.5, G89.29  ICD-9-CM: 724.2, 338.29  11/3/2016 - Present        Generalized anxiety disorder ICD-10-CM: F41.1  ICD-9-CM: 300.02  11/3/2016 - Present        Acute renal failure (ARF) (HCC) ICD-10-CM: N17.9  ICD-9-CM: 584.9  8/14/2016 - Present        CKD (chronic kidney disease) stage 4, GFR 15-29 ml/min (HCC) (Chronic) ICD-10-CM: N18.4  ICD-9-CM: 585.4  8/14/2016 - Present        HTN (hypertension), benign (Chronic) ICD-10-CM: I10  ICD-9-CM: 401.1  8/14/2016 - Present        DM type 2, uncontrolled, with renal complications (HCC) (Chronic) ICD-10-CM: E11.29, E11.65  ICD-9-CM: 250.42  8/14/2016 - Present        Iron deficiency anemia (Chronic) ICD-10-CM: D50.9  ICD-9-CM: 280.9  8/14/2016 - Present        RESOLVED: CKD stage 4 due to type 2 diabetes mellitus ([facility] ICD-10-CM: E11.22, N18.4  ICD-9-CM: 250.40, 585.4  Unknown - 2/11/2017        RESOLVED: DM type 2 causing renal disease ([facility] ICD-10-CM: E11.29  ICD-9-CM: 250.40  Unknown - 2/11/2017        RESOLVED: Hyperkalemia ICD-10-CM: E87.5  ICD-9-CM: 276.7  11/3/2016 - 2/11/2017        RESOLVED: Elevated serum creatinine ICD-10-CM: R79.89  ICD-9-CM: 790.99  11/3/2016 - 2/11/2017        RESOLVED: Left leg cellulitis ICD-10-CM: L03.116  ICD-9-CM: 682.6  8/14/2016 - 2/11/2017        RESOLVED: Sepsis ([address] ICD-10-CM: A41.9  ICD-9-CM: 038.9, 995.91  8/14/2016 - 2/11/2017        RESOLVED: RAI (obstructive sleep apnea) (Chronic) ICD-10-CM: I96.86  ICD-9-CM: 327.23  8/14/2016 - 2/11/2017              HPI:   Ms. Jose Ty is a 80 y.o. female who is being admitted for cellulitis of right lower extremity + asthma exacerbation.  Ms. Jose Ty presented to our Emergency Department today complaining of progressive weakness and a right lower extremity redness as well as fever and chills. She has a hx of CKD and has a persistent generalized body itching including the right lower extremity. She is bedridden and her  is her primary caregiver. She is too weak to give any meaningful hx and I have discussed with her  for collaborative hx. She was found to have extensive cellulitis right lower extremity with asthma exacerbation. She will be admitted to hospital for further management. (Dr Bernard Gamboa)    6/3: Feeling lousy. Had temp of 101 last night. She is having more pain. Still weak. Poor appetite. Breathing is a little better. : Feeling a little better. Did not sleep with CPAP last night. Creatinine still greater than 3. Her BC are positive for strep and UC with gram neg rods.        Review of Systems:   A comprehensive review of systems was negative except for that written in the HPI. Objective:   Physical Exam:     Visit Vitals    /62    Pulse 70    Temp 97.5 °F (36.4 °C)    Resp 27    Ht 5' 1\" (1.549 m)    Wt 278 lb 4.8 oz (126.2 kg)    SpO2 100%    BMI 52.58 kg/m2    O2 Flow Rate (L/min): 3 l/min O2 Device: Nasal cannula    Temp (24hrs), Av.4 °F (36.9 °C), Min:97.5 °F (36.4 °C), Max:99.4 °F (37.4 °C)         1901 -  0700  In: 210 [P.O.:50; I.V.:160]  Out: 8132 [Urine:1025]    General:  Alert, cooperative, appears weak and fatigued,  Morbidly obese. Head:  Normocephalic, without obvious abnormality, atraumatic. Eyes:  Conjunctivae/corneas clear. Nose: Nares normal. Septum midline. Mucosa normal. No drainage or sinus tenderness. Throat: Lips, mucosa, and tongue normal. Teeth and gums normal.   Neck: Supple, symmetrical, trachea midline, no adenopathy, thyroid: no enlargement/tenderness/nodules, no carotid bruit and no JVD. Lungs:   Clear to auscultation bilaterally.  Breath sounds are diminished throughout   Heart:  Regular rate and rhythm, S1, S2 normal, no murmur, click, rub or gallop. Abdomen:   Soft, non-tender. Bowel sounds normal. No masses,  No organomegaly. Scattered scabbed areas on her abdomen. Extremities: RLE with erythema and warmth from the mid shin to her ankle, no cyanosis, 1+ edema. No calf tenderness or cords. Pulses: 2+ and symmetric all extremities. Skin:  No rashes, multiple excoriations are present on her abd and upper ext   Neurologic: CNII-XII intact. Alert and oriented X 3. Fine motor of hands and fingers normal.   equal.  No cogwheeling or rigidity. Gait not tested at this time. Sensation grossly normal to touch.   Gross motor of extremities normal.       Data Review:       Recent Days:  Recent Labs      06/04/17 0123 06/03/17 0326 06/02/17 1910   WBC  10.1  12.3*  12.7*   HGB  8.2*  7.9*  8.8*   HCT  25.9*  25.0*  28.4*   PLT  189  184  212     Recent Labs      06/04/17 0123 06/03/17 0326 06/02/17 1910   NA  140  140  134*   K  3.8  3.7  3.7   CL  104  102  95*   CO2  24  24  26   GLU  274*  256*  245*   BUN  93*  78*  84*   CREA  3.25*  3.13*  3.54*   CA  8.0*  7.4*  8.6   ALB  2.3*  2.2*  2.7*   TBILI  0.4  0.6  0.7   SGOT  29  38*  37   ALT  22  21  21       24 Hour Results:  Recent Results (from the past 24 hour(s))   GLUCOSE, POC    Collection Time: 06/03/17 11:42 AM   Result Value Ref Range    Glucose (POC) 256 (H) 65 - 100 mg/dL    Performed by Ko Umanzor    GLUCOSE, POC    Collection Time: 06/03/17  4:27 PM   Result Value Ref Range    Glucose (POC) 244 (H) 65 - 100 mg/dL    Performed by Edmund Garcia (PCT)    GLUCOSE, POC    Collection Time: 06/03/17  8:50 PM   Result Value Ref Range    Glucose (POC) 293 (H) 65 - 100 mg/dL    Performed by Shannan Davis (PCT)    METABOLIC PANEL, COMPREHENSIVE    Collection Time: 06/04/17  1:23 AM   Result Value Ref Range    Sodium 140 136 - 145 mmol/L    Potassium 3.8 3.5 - 5.1 mmol/L    Chloride 104 97 - 108 mmol/L    CO2 24 21 - 32 mmol/L Anion gap 12 5 - 15 mmol/L    Glucose 274 (H) 65 - 100 mg/dL    BUN 93 (H) 6 - 20 MG/DL    Creatinine 3.25 (H) 0.55 - 1.02 MG/DL    BUN/Creatinine ratio 29 (H) 12 - 20      GFR est AA 17 (L) >60 ml/min/1.73m2    GFR est non-AA 14 (L) >60 ml/min/1.73m2    Calcium 8.0 (L) 8.5 - 10.1 MG/DL    Bilirubin, total 0.4 0.2 - 1.0 MG/DL    ALT (SGPT) 22 12 - 78 U/L    AST (SGOT) 29 15 - 37 U/L    Alk. phosphatase 67 45 - 117 U/L    Protein, total 6.6 6.4 - 8.2 g/dL    Albumin 2.3 (L) 3.5 - 5.0 g/dL    Globulin 4.3 (H) 2.0 - 4.0 g/dL    A-G Ratio 0.5 (L) 1.1 - 2.2     CBC WITH AUTOMATED DIFF    Collection Time: 06/04/17  1:23 AM   Result Value Ref Range    WBC 10.1 3.6 - 11.0 K/uL    RBC 2.85 (L) 3.80 - 5.20 M/uL    HGB 8.2 (L) 11.5 - 16.0 g/dL    HCT 25.9 (L) 35.0 - 47.0 %    MCV 90.9 80.0 - 99.0 FL    MCH 28.8 26.0 - 34.0 PG    MCHC 31.7 30.0 - 36.5 g/dL    RDW 14.7 (H) 11.5 - 14.5 %    PLATELET 837 842 - 398 K/uL    NEUTROPHILS 93 (H) 32 - 75 %    LYMPHOCYTES 3 (L) 12 - 49 %    MONOCYTES 4 (L) 5 - 13 %    EOSINOPHILS 0 0 - 7 %    BASOPHILS 0 0 - 1 %    ABS. NEUTROPHILS 9.4 (H) 1.8 - 8.0 K/UL    ABS. LYMPHOCYTES 0.3 (L) 0.8 - 3.5 K/UL    ABS. MONOCYTES 0.4 0.0 - 1.0 K/UL    ABS. EOSINOPHILS 0.0 0.0 - 0.4 K/UL    ABS.  BASOPHILS 0.0 0.0 - 0.1 K/UL   VANCOMYCIN, RANDOM    Collection Time: 06/04/17  1:23 AM   Result Value Ref Range    Vancomycin, random 15.9 UG/ML   GLUCOSE, POC    Collection Time: 06/04/17  7:33 AM   Result Value Ref Range    Glucose (POC) 302 (H) 65 - 100 mg/dL    Performed by Fawn Rebolledo (PCT)        Medications reviewed  Current Facility-Administered Medications   Medication Dose Route Frequency    amLODIPine (NORVASC) tablet 5 mg  5 mg Oral DAILY    sodium chloride (NS) flush 5-10 mL  5-10 mL IntraVENous Q8H    sodium chloride (NS) flush 5-10 mL  5-10 mL IntraVENous PRN    acetaminophen (TYLENOL) tablet 650 mg  650 mg Oral Q4H PRN    naloxone (NARCAN) injection 0.4 mg  0.4 mg IntraVENous PRN    prochlorperazine (COMPAZINE) injection 5 mg  5 mg IntraVENous Q8H PRN    insulin lispro (HUMALOG) injection   SubCUTAneous AC&HS    glucose chewable tablet 16 g  4 Tab Oral PRN    dextrose (D50W) injection syrg 12.5-25 g  12.5-25 g IntraVENous PRN    glucagon (GLUCAGEN) injection 1 mg  1 mg IntraMUSCular PRN    acetaminophen (TYLENOL) tablet 1,000 mg  1,000 mg Oral BID PRN    insulin glargine (LANTUS) injection 20 Units  20 Units SubCUTAneous QHS    losartan (COZAAR) tablet 25 mg  25 mg Oral DAILY    carvedilol (COREG) tablet 3.125 mg  3.125 mg Oral BID WITH MEALS    fluticasone-vilanterol (BREO ELLIPTA) 100mcg-25mcg/puff  1 Puff Inhalation DAILY    pantoprazole (PROTONIX) tablet 40 mg  40 mg Oral DAILY    ampicillin-sulbactam (UNASYN) 3 g in 0.9% sodium chloride (MBP/ADV) 100 mL  3 g IntraVENous Q12H    sodium chloride (NS) flush 5-10 mL  5-10 mL IntraVENous PRN    heparin (porcine) injection 5,000 Units  5,000 Units SubCUTAneous Q8H    methylPREDNISolone (PF) (SOLU-MEDROL) injection 40 mg  40 mg IntraVENous Q8H    albuterol-ipratropium (DUO-NEB) 2.5 MG-0.5 MG/3 ML  3 mL Nebulization Q6H RT       Care Plan discussed with: Patient/Family and Nurse    Total time spent with patient and review of records: 30 minutes.     Lokesh Mims MD

## 2017-06-05 LAB
ALBUMIN SERPL BCP-MCNC: 2.2 G/DL (ref 3.5–5)
ALBUMIN/GLOB SERPL: 0.5 {RATIO} (ref 1.1–2.2)
ALP SERPL-CCNC: 69 U/L (ref 45–117)
ALT SERPL-CCNC: 21 U/L (ref 12–78)
ANION GAP BLD CALC-SCNC: 13 MMOL/L (ref 5–15)
AST SERPL W P-5'-P-CCNC: 23 U/L (ref 15–37)
BACTERIA SPEC CULT: ABNORMAL
BACTERIA SPEC CULT: ABNORMAL
BASOPHILS # BLD AUTO: 0 K/UL (ref 0–0.1)
BASOPHILS # BLD: 0 % (ref 0–1)
BILIRUB SERPL-MCNC: 0.3 MG/DL (ref 0.2–1)
BUN SERPL-MCNC: 108 MG/DL (ref 6–20)
BUN/CREAT SERPL: 35 (ref 12–20)
CALCIUM SERPL-MCNC: 8.3 MG/DL (ref 8.5–10.1)
CC UR VC: ABNORMAL
CHLORIDE SERPL-SCNC: 106 MMOL/L (ref 97–108)
CO2 SERPL-SCNC: 22 MMOL/L (ref 21–32)
CREAT SERPL-MCNC: 3.06 MG/DL (ref 0.55–1.02)
EOSINOPHIL # BLD: 0 K/UL (ref 0–0.4)
EOSINOPHIL NFR BLD: 0 % (ref 0–7)
ERYTHROCYTE [DISTWIDTH] IN BLOOD BY AUTOMATED COUNT: 14.7 % (ref 11.5–14.5)
GLOBULIN SER CALC-MCNC: 4.2 G/DL (ref 2–4)
GLUCOSE BLD STRIP.AUTO-MCNC: 256 MG/DL (ref 65–100)
GLUCOSE BLD STRIP.AUTO-MCNC: 282 MG/DL (ref 65–100)
GLUCOSE BLD STRIP.AUTO-MCNC: 308 MG/DL (ref 65–100)
GLUCOSE BLD STRIP.AUTO-MCNC: 362 MG/DL (ref 65–100)
GLUCOSE SERPL-MCNC: 286 MG/DL (ref 65–100)
HCT VFR BLD AUTO: 29.2 % (ref 35–47)
HGB BLD-MCNC: 9.3 G/DL (ref 11.5–16)
LYMPHOCYTES # BLD AUTO: 3 % (ref 12–49)
LYMPHOCYTES # BLD: 0.3 K/UL (ref 0.8–3.5)
MCH RBC QN AUTO: 29.5 PG (ref 26–34)
MCHC RBC AUTO-ENTMCNC: 31.8 G/DL (ref 30–36.5)
MCV RBC AUTO: 92.7 FL (ref 80–99)
MONOCYTES # BLD: 0.2 K/UL (ref 0–1)
MONOCYTES NFR BLD AUTO: 2 % (ref 5–13)
NEUTS SEG # BLD: 10.2 K/UL (ref 1.8–8)
NEUTS SEG NFR BLD AUTO: 95 % (ref 32–75)
PLATELET # BLD AUTO: 184 K/UL (ref 150–400)
POTASSIUM SERPL-SCNC: 4.1 MMOL/L (ref 3.5–5.1)
PROT SERPL-MCNC: 6.4 G/DL (ref 6.4–8.2)
RBC # BLD AUTO: 3.15 M/UL (ref 3.8–5.2)
SERVICE CMNT-IMP: ABNORMAL
SODIUM SERPL-SCNC: 141 MMOL/L (ref 136–145)
WBC # BLD AUTO: 10.7 K/UL (ref 3.6–11)

## 2017-06-05 PROCEDURE — 94640 AIRWAY INHALATION TREATMENT: CPT

## 2017-06-05 PROCEDURE — 85025 COMPLETE CBC W/AUTO DIFF WBC: CPT | Performed by: FAMILY MEDICINE

## 2017-06-05 PROCEDURE — 97530 THERAPEUTIC ACTIVITIES: CPT

## 2017-06-05 PROCEDURE — 82962 GLUCOSE BLOOD TEST: CPT

## 2017-06-05 PROCEDURE — 74011000250 HC RX REV CODE- 250: Performed by: INTERNAL MEDICINE

## 2017-06-05 PROCEDURE — 65660000000 HC RM CCU STEPDOWN

## 2017-06-05 PROCEDURE — 77010033678 HC OXYGEN DAILY

## 2017-06-05 PROCEDURE — 74011250636 HC RX REV CODE- 250/636: Performed by: FAMILY MEDICINE

## 2017-06-05 PROCEDURE — 80053 COMPREHEN METABOLIC PANEL: CPT | Performed by: FAMILY MEDICINE

## 2017-06-05 PROCEDURE — 74011636637 HC RX REV CODE- 636/637: Performed by: FAMILY MEDICINE

## 2017-06-05 PROCEDURE — 97535 SELF CARE MNGMENT TRAINING: CPT

## 2017-06-05 PROCEDURE — 97165 OT EVAL LOW COMPLEX 30 MIN: CPT

## 2017-06-05 PROCEDURE — 36415 COLL VENOUS BLD VENIPUNCTURE: CPT | Performed by: FAMILY MEDICINE

## 2017-06-05 PROCEDURE — 74011250636 HC RX REV CODE- 250/636: Performed by: INTERNAL MEDICINE

## 2017-06-05 PROCEDURE — 74011000258 HC RX REV CODE- 258: Performed by: FAMILY MEDICINE

## 2017-06-05 PROCEDURE — 74011250637 HC RX REV CODE- 250/637: Performed by: FAMILY MEDICINE

## 2017-06-05 PROCEDURE — 74011250637 HC RX REV CODE- 250/637: Performed by: INTERNAL MEDICINE

## 2017-06-05 RX ORDER — BUMETANIDE 1 MG/1
1 TABLET ORAL DAILY
Status: DISCONTINUED | OUTPATIENT
Start: 2017-06-05 | End: 2017-06-07

## 2017-06-05 RX ORDER — NYSTATIN 100000 [USP'U]/G
POWDER TOPICAL 2 TIMES DAILY
Status: DISCONTINUED | OUTPATIENT
Start: 2017-06-05 | End: 2017-06-10 | Stop reason: HOSPADM

## 2017-06-05 RX ADMIN — HEPARIN SODIUM 5000 UNITS: 5000 INJECTION, SOLUTION INTRAVENOUS; SUBCUTANEOUS at 08:40

## 2017-06-05 RX ADMIN — INSULIN LISPRO 8 UNITS: 100 INJECTION, SOLUTION INTRAVENOUS; SUBCUTANEOUS at 08:39

## 2017-06-05 RX ADMIN — FLUTICASONE FUROATE AND VILANTEROL TRIFENATATE 1 PUFF: 100; 25 POWDER RESPIRATORY (INHALATION) at 08:40

## 2017-06-05 RX ADMIN — Medication 10 ML: at 05:13

## 2017-06-05 RX ADMIN — AMLODIPINE BESYLATE 5 MG: 5 TABLET ORAL at 08:39

## 2017-06-05 RX ADMIN — Medication 10 ML: at 21:45

## 2017-06-05 RX ADMIN — CARVEDILOL 3.12 MG: 3.12 TABLET, FILM COATED ORAL at 16:03

## 2017-06-05 RX ADMIN — IPRATROPIUM BROMIDE AND ALBUTEROL SULFATE 3 ML: .5; 3 SOLUTION RESPIRATORY (INHALATION) at 00:10

## 2017-06-05 RX ADMIN — BUMETANIDE 1 MG: 1 TABLET ORAL at 11:39

## 2017-06-05 RX ADMIN — SODIUM CHLORIDE 3 G: 900 INJECTION, SOLUTION INTRAVENOUS at 03:10

## 2017-06-05 RX ADMIN — METHYLPREDNISOLONE SODIUM SUCCINATE 20 MG: 40 INJECTION, POWDER, FOR SOLUTION INTRAMUSCULAR; INTRAVENOUS at 21:43

## 2017-06-05 RX ADMIN — HEPARIN SODIUM 5000 UNITS: 5000 INJECTION, SOLUTION INTRAVENOUS; SUBCUTANEOUS at 00:30

## 2017-06-05 RX ADMIN — METHYLPREDNISOLONE SODIUM SUCCINATE 40 MG: 40 INJECTION, POWDER, FOR SOLUTION INTRAMUSCULAR; INTRAVENOUS at 05:13

## 2017-06-05 RX ADMIN — METHYLPREDNISOLONE SODIUM SUCCINATE 20 MG: 40 INJECTION, POWDER, FOR SOLUTION INTRAMUSCULAR; INTRAVENOUS at 14:50

## 2017-06-05 RX ADMIN — Medication 5 ML: at 14:50

## 2017-06-05 RX ADMIN — IPRATROPIUM BROMIDE AND ALBUTEROL SULFATE 3 ML: .5; 3 SOLUTION RESPIRATORY (INHALATION) at 13:15

## 2017-06-05 RX ADMIN — IPRATROPIUM BROMIDE AND ALBUTEROL SULFATE 3 ML: .5; 3 SOLUTION RESPIRATORY (INHALATION) at 19:05

## 2017-06-05 RX ADMIN — INSULIN LISPRO 12 UNITS: 100 INJECTION, SOLUTION INTRAVENOUS; SUBCUTANEOUS at 11:39

## 2017-06-05 RX ADMIN — LOSARTAN POTASSIUM 25 MG: 25 TABLET, FILM COATED ORAL at 08:39

## 2017-06-05 RX ADMIN — INSULIN LISPRO 4 UNITS: 100 INJECTION, SOLUTION INTRAVENOUS; SUBCUTANEOUS at 21:44

## 2017-06-05 RX ADMIN — PANTOPRAZOLE SODIUM 40 MG: 40 TABLET, DELAYED RELEASE ORAL at 08:39

## 2017-06-05 RX ADMIN — HEPARIN SODIUM 5000 UNITS: 5000 INJECTION, SOLUTION INTRAVENOUS; SUBCUTANEOUS at 23:30

## 2017-06-05 RX ADMIN — CARVEDILOL 3.12 MG: 3.12 TABLET, FILM COATED ORAL at 08:39

## 2017-06-05 RX ADMIN — CEFTRIAXONE 1 G: 1 INJECTION, POWDER, FOR SOLUTION INTRAMUSCULAR; INTRAVENOUS at 11:39

## 2017-06-05 RX ADMIN — HEPARIN SODIUM 5000 UNITS: 5000 INJECTION, SOLUTION INTRAVENOUS; SUBCUTANEOUS at 14:50

## 2017-06-05 RX ADMIN — INSULIN LISPRO 8 UNITS: 100 INJECTION, SOLUTION INTRAVENOUS; SUBCUTANEOUS at 16:03

## 2017-06-05 RX ADMIN — IPRATROPIUM BROMIDE AND ALBUTEROL SULFATE 3 ML: .5; 3 SOLUTION RESPIRATORY (INHALATION) at 07:56

## 2017-06-05 RX ADMIN — INSULIN GLARGINE 20 UNITS: 100 INJECTION, SOLUTION SUBCUTANEOUS at 21:44

## 2017-06-05 NOTE — PROGRESS NOTES
1784  Met with patient and . Completed Admission questions. Oriented to room and unit. Answered questions. Patient has not other needs at this time. Advised primary nurse.

## 2017-06-05 NOTE — PROGRESS NOTES
Problem: Self Care Deficits Care Plan (Adult)  Goal: *Acute Goals and Plan of Care (Insert Text)  Occupational Therapy Goals  Initiated 6/5/2017  1. Patient will perform lower body dressing with maximum assistance within 7 day(s). 2. Patient will perform upper body bathing with minimal assistance/contact guard assist within 7 day(s). 3. Patient will perform upper body dressing with minimal assistance/contact guard assist within 7 day(s). 4. Patient will perform toilet transfers with moderate assistance within 7 day(s). 5. Patient will participate in upper extremity therapeutic exercise/activities with supervision/set-up for 10 minutes within 7 day(s). OCCUPATIONAL THERAPY EVALUATION  Patient: Kathryn Yarbrough (75 y.o. female)  Date: 6/5/2017  Primary Diagnosis: Cellulitis of right lower extremity        Precautions:  Fall, Skin      ASSESSMENT :  Based on the objective data described below, the patient presents with lethargy, fever, chills and pain to right leg and ankle. Patient admitted with cellulitis of R leg. Patient complains of skin itching and scratching. Patient today with increased difficulty with ADL tasks. Patient reports spouse assists with LE dressing, toileting, bathing, and transfers. She reports able to assist with transfer from recliner chair to [facility], next to chair, but with unable x last 8 days at home. Patient agreeable to in bed activity at this time reporting increased fatigue. Sats at 97% on 3L O2 in room. Patient  has been assisting patient, but admits last days at home were very difficult. Patient declines offers for rehab placement, but she is open to [facility] services. Patient seen by this therapist during last admission and able to transfer to [facility]. Patient agreeable to continue OT services and work toward baseline status. Patient will benefit from skilled intervention to address the above impairments.   Patients rehabilitation potential is considered to be Fair  Factors which may influence rehabilitation potential include:   [ ]             None noted  [ ]             Mental ability/status  [X]             Medical condition  [X]             Home/family situation and support systems  [ ]             Safety awareness  [ ]             Pain tolerance/management  [ ]             Other:        PLAN :  Recommendations and Planned Interventions:  [X]               Self Care Training                  [X]        Therapeutic Activities  [X]               Functional Mobility Training    [ ]        Cognitive Retraining  [X]               Therapeutic Exercises           [X]        Endurance Activities  [X]               Balance Training                   [ ]        Neuromuscular Re-Education  [ ]               Visual/Perceptual Training     [X]   Home Safety Training  [X]               Patient Education                 [X]        Family Training/Education  [ ]               Other (comment):     Frequency/Duration: Patient will be followed by occupational therapy 3 times a week to address goals. Discharge Recommendations: [facility] (however patient declines)  Further Equipment Recommendations for Discharge: none noted        SUBJECTIVE:   Patient stated I have to use the bottom xander when my  goes to [employer].       OBJECTIVE DATA SUMMARY:   HISTORY:   Past Medical History:   Diagnosis Date    Anxiety and depression      Asthma      CKD stage 4 due to type 2 diabetes mellitus ([facility]      DM type 2 causing renal disease ([facility]      Hyperlipidemia      Hypertension      RAI on CPAP       Past Surgical History:   Procedure Laterality Date    BREAST SURGERY PROCEDURE UNLISTED         mastectomy, bilat    HX CHOLECYSTECTOMY        HX GYN         hysterectomy    HX HEENT         tonsilectomy        Prior Level of Function/Home Situation: reports able to perform transfers with assist, decline over last days at home  Expanded or extensive additional review of patient history:      Home Situation  Home Environment: Private residence  Wheelchair Ramp: Yes  One/Two Story Residence: Two story, live on 1st floor  Living Alone: No  Support Systems: Family member(s)  Patient Expects to be Discharged to[de-identified] Private residence  Current DME Used/Available at Home: Commode, bedside, Wheelchair, Walker, rolling  Tub or Shower Type: Tub/Shower combination  [X]  Right hand dominant             [ ]  Left hand dominant     EXAMINATION OF PERFORMANCE DEFICITS:  Cognitive/Behavioral Status:  Neurologic State: Alert  Orientation Level: Oriented X4  Cognition: Follows commands  Perception: Appears intact  Perseveration: No perseveration noted  Safety/Judgement: Awareness of environment; Insight into deficits     Skin: intact as seen     Edema: bilateral LEs      Hearing: Auditory  Auditory Impairment: Hard of hearing, bilateral     Vision/Perceptual:       Corrective Lenses: Glasses (reports floaters to L eye)     Range of Motion:  AROM: Within functional limits  PROM: Within functional limits        Strength:  Strength: Within functional limits        Coordination:  Coordination: Within functional limits  Fine Motor Skills-Upper: Left Intact; Right Intact    Gross Motor Skills-Upper: Left Intact; Right Intact     Tone & Sensation:  Tone: Normal  Sensation: Intact        Balance:  Sitting: Impaired     Functional Mobility and Transfers for ADLs:  Bed Mobility:     Supine to Sit: Maximum assistance;Assist x1 (use for bed for assist )     Transfers:        ADL Assessment:  Feeding: Setup     Oral Facial Hygiene/Grooming: Supervision     Bathing: Maximum assistance (can assist with UE, arms and chest only)     Upper Body Dressing: Moderate assistance     Lower Body Dressing: Total assistance     Toileting: Total assistance                 ADL Intervention and task modifications:        Cognitive Retraining  Safety/Judgement: Awareness of environment; Insight into deficits Therapeutic Exercise:  Functional Measure:  Barthel Index:      Bathin  Bladder: 0  Bowels: 0  Groomin  Dressin  Feedin  Mobility: 0  Stairs: 0  Toilet Use: 0  Transfer (Bed to Chair and Back): 0  Total: 10         Barthel and G-code impairment scale:  Percentage of impairment CH  0% CI  1-19% CJ  20-39% CK  40-59% CL  60-79% CM  80-99% CN  100%   Barthel Score 0-100 100 99-80 79-60 59-40 20-39 1-19    0   Barthel Score 0-20 20 17-19 13-16 9-12 5-8 1-4 0      The Barthel ADL Index: Guidelines  1. The index should be used as a record of what a patient does, not as a record of what a patient could do. 2. The main aim is to establish degree of independence from any help, physical or verbal, however minor and for whatever reason. 3. The need for supervision renders the patient not independent. 4. A patient's performance should be established using the best available evidence. Asking the patient, friends/relatives and nurses are the usual sources, but direct observation and common sense are also important. However direct testing is not needed. 5. Usually the patient's performance over the preceding 24-48 hours is important, but occasionally longer periods will be relevant. 6. Middle categories imply that the patient supplies over 50 per cent of the effort. 7. Use of aids to be independent is allowed. Tonya Cevallos., Barthel, D.W. (0844). Functional evaluation: the Barthel Index. [address] [facility] (14)2. JOVANNI Turner, Imani Dove., Lizzette Ram., [place], [address] [place] [address] (). Measuring the change indisability after inpatient rehabilitation; comparison of the responsiveness of the Barthel Index and Functional Dickinson Measure. Journal of Neurology, Neurosurgery, and Psychiatry, 66(4), 482-069. SUKHI Rogers.WILIAM, SCOTTY Viera, & Steph Lafleur MKariA. (2004.) Assessment of post-stroke quality of life in cost-effectiveness studies:  The usefulness of the Barthel Index and the EuroQoL-5D. Quality of Life Research, 13, 843-25            G codes: In compliance with CMSs Claims Based Outcome Reporting, the following G-code set was chosen for this patient based on their primary functional limitation being treated: The outcome measure chosen to determine the severity of the functional limitation was the Barthel Index with a score of 10/100 which was correlated with the impairment scale. · Self Care:               - CURRENT STATUS:    CM - 80%-99% impaired, limited or restricted               - GOAL STATUS:           CL - 60%-79% impaired, limited or restricted               - D/C STATUS:                       ---------------To be determined---------------      Occupational Therapy Evaluation Charge Determination   History Examination Decision-Making   LOW Complexity : Brief history review  LOW Complexity : 1-3 performance deficits relating to physical, cognitive , or psychosocial skils that result in activity limitations and / or participation restrictions  LOW Complexity : No comorbidities that affect functional and no verbal or physical assistance needed to complete eval tasks       Based on the above components, the patient evaluation is determined to be of the following complexity level: LOW   Pain:  Pain Scale 1: Numeric (0 - 10)  Pain Intensity 1: 0              Activity Tolerance:   VSS  Please refer to the flowsheet for vital signs taken during this treatment. After treatment:   [ ] Patient left in no apparent distress sitting up in chair  [X] Patient left in no apparent distress in bed  [X] Call bell left within reach  [X] Nursing notified  [X] Caregiver present  [ ] Bed alarm activated      COMMUNICATION/EDUCATION:   The patients plan of care was discussed with: Physical Therapist and Registered Nurse.  [X] Home safety education was provided and the patient/caregiver indicated understanding.   [X] Patient/family have participated as able in goal setting and plan of care. [X] Patient/family agree to work toward stated goals and plan of care. [ ] Patient understands intent and goals of therapy, but is neutral about his/her participation. [ ] Patient is unable to participate in goal setting and plan of care. This patients plan of care is appropriate for delegation to [facility].      Thank you for this referral.  Bill De Paz, OTR/L  Time Calculation: 15 mins

## 2017-06-05 NOTE — PROGRESS NOTES
[address]  YOB: 1945          Assessment & Plan:   1. Stable stage 4-5 chronic kidney disease. · Cr stable  · Non oliguric  · Resume loops  · No HD needed  · Left avf  2. Edema. · Rt > left: due to Cellulitis  · loops  3. Cellulitis. · Antibiotics  4. Obesity  5. HTN  · Amlodipine  6.  Anemia  · Iron panel       Subjective:   CC:arf  HPI: Patient seen   ALFONSO is stable, making urine, edema++  SOB +  ROS:as above otherwise neg  Current Facility-Administered Medications   Medication Dose Route Frequency    methylPREDNISolone (PF) (SOLU-MEDROL) injection 20 mg  20 mg IntraVENous Q8H    amLODIPine (NORVASC) tablet 5 mg  5 mg Oral DAILY    sodium chloride (NS) flush 5-10 mL  5-10 mL IntraVENous Q8H    sodium chloride (NS) flush 5-10 mL  5-10 mL IntraVENous PRN    acetaminophen (TYLENOL) tablet 650 mg  650 mg Oral Q4H PRN    naloxone (NARCAN) injection 0.4 mg  0.4 mg IntraVENous PRN    prochlorperazine (COMPAZINE) injection 5 mg  5 mg IntraVENous Q8H PRN    insulin lispro (HUMALOG) injection   SubCUTAneous AC&HS    glucose chewable tablet 16 g  4 Tab Oral PRN    dextrose (D50W) injection syrg 12.5-25 g  12.5-25 g IntraVENous PRN    glucagon (GLUCAGEN) injection 1 mg  1 mg IntraMUSCular PRN    acetaminophen (TYLENOL) tablet 1,000 mg  1,000 mg Oral BID PRN    insulin glargine (LANTUS) injection 20 Units  20 Units SubCUTAneous QHS    losartan (COZAAR) tablet 25 mg  25 mg Oral DAILY    carvedilol (COREG) tablet 3.125 mg  3.125 mg Oral BID WITH MEALS    fluticasone-vilanterol (BREO ELLIPTA) 100mcg-25mcg/puff  1 Puff Inhalation DAILY    pantoprazole (PROTONIX) tablet 40 mg  40 mg Oral DAILY    ampicillin-sulbactam (UNASYN) 3 g in 0.9% sodium chloride (MBP/ADV) 100 mL  3 g IntraVENous Q12H    sodium chloride (NS) flush 5-10 mL  5-10 mL IntraVENous PRN    heparin (porcine) injection 5,000 Units  5,000 Units SubCUTAneous Q8H    albuterol-ipratropium (DUO-NEB) 2.5 MG-0.5 MG/3 ML  3 mL Nebulization Q6H RT          Objective:     Vitals:  Blood pressure 162/62, pulse 76, temperature 97.8 °F (36.6 °C), resp. rate 18, height 5' 1\" (1.549 m), weight 128.5 kg (283 lb 4.8 oz), SpO2 96 %. Temp (24hrs), Av.7 °F (36.5 °C), Min:96.9 °F (36.1 °C), Max:98.9 °F (37.2 °C)      Intake and Output:      1901 -  0700  In: 210 [P.O.:50; I.V.:160]  Out: 3492 [Urine:1225]    Physical Exam:                Patient is intubated:  no    Physical Examination:   GENERAL ASSESSMENT: NAD  HEENT:Nontraumatic   CHEST: CTA  HEART: S1S2  ABDOMEN: Soft,NT,  :Hanks: y  EXTREMITY: EDEMA 2 rt  NEURO:Grossly non focal          ECG/rhythm:    Data Review      No results for input(s): TNIPOC in the last 72 hours. No lab exists for component: ITNL   No results for input(s): CPK, CKMB, TROIQ in the last 72 hours. Recent Labs      17   0538  17   0123  17   0326  17   1910   NA  141  140  140  134*   K  4.1  3.8  3.7  3.7   CL  106  104  102  95*   CO2  22  24  24  26   BUN  108*  93*  78*  84*   CREA  3.06*  3.25*  3.13*  3.54*   GLU  286*  274*  256*  245*   CA  8.3*  8.0*  7.4*  8.6   ALB  2.2*  2.3*  2.2*  2.7*   WBC   --   10.1  12.3*  12.7*   HGB   --   8.2*  7.9*  8.8*   HCT   --   25.9*  25.0*  28.4*   PLT   --   189  184  212      No results for input(s): INR, PTP, APTT in the last 72 hours. No lab exists for component: INREXT  Needs: urine analysis, urine sodium, protein and creatinine  Lab Results   Component Value Date/Time    Sodium urine, random 50 2016 12:55 PM    Creatinine, urine 53.84 2016 12:55 PM         Discussed with:  Family, Colleague, Nursing    : Frida Peace MD  2017        [facility]:  [email or web address]  [place] office:  [address] [address],[facility] Floor [address] [place] [address]  Phone: [phone]  Fax : [phone]    [place] office:  [address] [facility]  [place], [address]  Phone - [phone]  Fax - [phone]

## 2017-06-05 NOTE — CDMP QUERY
Dr. Cyndie Daniel:    Please clarify if this patient is being treated/managed for:    =>Sepsis (POA) due to Group G beta strep bacteremia, UTI, and RLE cellulitis; requiring fluid resuscitation and IV Vanc & IV Unasyn. =>Other Explanation of clinical findings  =>Unable to Determine (no explanation of clinical findings)    The medical record reflects the following clinical findings, treatment, and risk factors:    81 yo female presenting to ER with RLE pain, fever, chills, and lethargy x 3 days. Temp 101.5, , RR 21, WBC 12.7 with ANC 12.3. Paired blood cultures with Group G beta strep 4/4 bottles. Urine culture with >100,000 GNR. RLE with cellulitis. Serum lactate 1.8. Patient given 30 mL/kg NS bolus with 1 gm po Tylenol; started on IV Vancomycin and IV Unasyn. Please clarify and document your clinical opinion in the progress notes and discharge summary including the definitive and/or presumptive diagnosis, (suspected or probable), related to the above clinical findings. Please include clinical findings supporting your diagnosis.     Nallely MitchellM[facility] Hospital, [address] [place] [address]  [email or web address]

## 2017-06-05 NOTE — PROGRESS NOTES
6-5-2017 CASE MANAGEMENT NOTE:  I met with the pt and her , Adam Garcia (d-[phone]), to determine potential discharge needs. The pt lives with her  in a 2-story home that does have a first floor bedroom and an entry ramp. The pt has slept in a reclining chair for approximately 20 years and her  assists her with all ADL's. She has a wheelchair, rolling walker, rollator, cane, bedside commode and CPaP. She has been able, with assistance getting up, to ambulate some but short distances only. Her  does the driving. She has been to [facility] twice in the past for rehab but wants to return home if possible. She has also been open to [place] [address] twice in the past but currently has no services. Her PCP is Dr. Mitchel Mathis. She has prescription drug coverage and gets her medications from [facility] on [address] In [place] or thru mail order. CM will follow and assist with discharge planning as needed. Care Management Interventions  PCP Verified by CM:  Yes (Dr. Mitchel Mathis)  Transition of Care Consult (CM Consult): Discharge Planning  Discharge Durable Medical Equipment: No (Has a wheelchair, rolling walker, rollator, cane, bedside commode and CPaP)  Physical Therapy Consult: Yes  Occupational Therapy Consult: Yes  Speech Therapy Consult: No  Current Support Network: Lives with Spouse, Own Home  Confirm Follow Up Transport: Family  Plan discussed with Pt/Family/Caregiver: Yes  Discharge Location  Discharge Placement:  (TBD)    MACHELLE Shafer, CM

## 2017-06-05 NOTE — PROGRESS NOTES
Problem: Mobility Impaired (Adult and Pediatric)  Goal: *Acute Goals and Plan of Care (Insert Text)  Physical Therapy Goals  Initiated 6/3/2017  1. Patient will roll side to side in bed using bed rails with maximal assistance x 1 within 7 day(s). 2. Patient will move supine to sitting with maximum assistance x 2 within 7 days. 3. Patient will tolerate 5 minutes of sitting edge of bed with support under BLEs and SBA x 2 within 7 days. 4. After achieving goal #3 patient will attempt stance with EZ stand support and minimal assist x 2 within 7 days. 5. Within 7 days, patient will demonstrate during single treatment understanding of and tolerance to perform 10 reps of following BLE exercises: bilateral ankle pumps, bilateral heel slides within available ROM, gluteal sets and quad sets. PHYSICAL THERAPY TREATMENT  Patient: Key Marie (9 y.o. female)  Date: 6/5/2017  Diagnosis: Cellulitis of right lower extremity Cellulitis of right lower extremity       Precautions: Fall, Skin      ASSESSMENT:  Pt received in bed, agreeable to participate with physical therapy. Pt complained of \"floaters\" in L eye, but reports this is not new. Performed bed mobility >sitting EOB with mod Ax2. Able to perform static sitting with occasional support 5min on room air, O2 sats 94%. Able to scoot toward [facility] with min A and  max verbal cues for fwd lean. Increased time to complete task. Pt fatigued and requested to return to supine. Max Ax2 sit>supine with pt expressing skin sensitive during transfer, pt unable to elaborate on areas of sensitivity. Bed placed in semi chair position.  present and encourging throughout session.    Progression toward goals:  [ ]    Improving appropriately and progressing toward goals  [X]    Improving slowly and progressing toward goals  [ ]    Not making progress toward goals and plan of care will be adjusted       PLAN:  Patient continues to benefit from skilled intervention to address the above impairments. Continue treatment per established plan of care. Discharge Recommendations:  Skilled Nursing Facility  Further Equipment Recommendations for Discharge:  none       SUBJECTIVE:   Patient stated I can try.       OBJECTIVE DATA SUMMARY:   Critical Behavior:  Neurologic State: Alert  Orientation Level: Oriented X4  Cognition: Follows commands  Safety/Judgement: Decreased awareness of environment, Decreased awareness of need for assistance, Decreased awareness of need for safety, Decreased insight into deficits  Functional Mobility Training:  Bed Mobility:  Rolling: Maximum assistance  Supine to Sit: Moderate assistance;Assist x2  Sit to Supine: Assist x2;Total assistance  Scooting: Minimum assistance        Transfers:                                   Balance:  Sitting: Impaired  Ambulation/Gait Training:                                                                   Stairs:                       Neuro Re-Education:     Therapeutic Exercises:   Supine thera ex for BLE  Pain:  Pain Scale 1: Numeric (0 - 10)  Pain Intensity 1: 0              Activity Tolerance:   Good  Please refer to the flowsheet for vital signs taken during this treatment.   After treatment:   [ ]    Patient left in no apparent distress sitting up in chair  [X]    Patient left in no apparent distress in bed  [X]    Call bell left within reach  [X]    Nursing notified  [X]    Caregiver present  [ ]    Bed alarm activated      COMMUNICATION/COLLABORATION:   The patients plan of care was discussed with: Registered Nurse     Mike Benton   Time Calculation: 28 mins

## 2017-06-05 NOTE — PROGRESS NOTES
Daily Progress Note: 6/5/2017  Adri Mock MD    Assessment/Plan:   Cellulitis of right lower extremity (6/2/2017): trigger seems to be persistent scratching due to uremia. Lactate is normal.   --- Blood cultures positive for strep- sensitive to Rocephin or Pen G - will cont Unasyn for now while waiting on Urine culture. ---Started IV Unasyn and Vancomycin at admission. stopped Vanc 6/4     Asthma with acute exacerbation (6/2/2017):   ---started Duoneb  --- IV solumedrol - weaning  ---Mucinex prn  ---Albuterol as needed     CKD (chronic kidney disease) stage 4, GFR 15-29 ml/min ([facility]) (8/14/2016): has had intermittent hemodialysis before.   ---Consulted nephrology  ---Bumex as needed     HTN (hypertension), benign (8/14/2016): Due to severe illness. ---Initially held BP meds. ---Restarted Cozaar and Coreg  ---Restarted Amlodipine     DM type 2, uncontrolled, with renal complications ([facility] [address] (7/12/4508): last A1c 7.2.   ---Monitor blood glucose. Appetite low. ---  Lantus and adjust as needed. ---SSI per protocol     Iron deficiency anemia (8/14/2016)/ Anemia due to CKD POA:   ---monitor Hgb closely.      Generalized anxiety disorder (11/3/2016)/ Anxiety and depression: lethargic at this time.   --- Hold all sedating medications     Hyperlipidemia: hold Lipitor     RAI on CPAP: resume CPAP at night      Physical debility POA: she is totally dependent on .   ---Consulted PT, OT.   ---Needs SNF    UTI  ---Culture gram neg rods - cont Unasyn for now until sensitivity back.      Code Status: Full       Problem List:  Problem List as of 6/5/2017  Date Reviewed: 6/2/2017          Codes Class Noted - Resolved    * (Principal)Cellulitis of right lower extremity ICD-10-CM: L03.115  ICD-9-CM: 682.6  6/2/2017 - Present        Asthma with acute exacerbation ICD-10-CM: J45.901  ICD-9-CM: 493.92  6/2/2017 - Present        Asthma ICD-10-CM: J45.909  ICD-9-CM: 493.90  Unknown - Present Hyperlipidemia ICD-10-CM: E78.5  ICD-9-CM: 272.4  Unknown - Present        RAI on CPAP ICD-10-CM: G47.33, Z99.89  ICD-9-CM: 327.23, V46.8  Unknown - Present        Anxiety and depression ICD-10-CM: F41.9, F32.9  ICD-9-CM: 300.00, 311  Unknown - Present        Hypoglycemia ICD-10-CM: E16.2  ICD-9-CM: 251.2  2/11/2017 - Present        Chronic bilateral low back pain without sciatica ICD-10-CM: M54.5, G89.29  ICD-9-CM: 724.2, 338.29  11/3/2016 - Present        Generalized anxiety disorder ICD-10-CM: F41.1  ICD-9-CM: 300.02  11/3/2016 - Present        Acute renal failure (ARF) (HCC) ICD-10-CM: N17.9  ICD-9-CM: 584.9  8/14/2016 - Present        CKD (chronic kidney disease) stage 4, GFR 15-29 ml/min (HCC) (Chronic) ICD-10-CM: N18.4  ICD-9-CM: 585.4  8/14/2016 - Present        HTN (hypertension), benign (Chronic) ICD-10-CM: I10  ICD-9-CM: 401.1  8/14/2016 - Present        DM type 2, uncontrolled, with renal complications (HCC) (Chronic) ICD-10-CM: E11.29, E11.65  ICD-9-CM: 250.42  8/14/2016 - Present        Iron deficiency anemia (Chronic) ICD-10-CM: D50.9  ICD-9-CM: 280.9  8/14/2016 - Present        RESOLVED: CKD stage 4 due to type 2 diabetes mellitus ([facility] ICD-10-CM: E11.22, N18.4  ICD-9-CM: 250.40, 585.4  Unknown - 2/11/2017        RESOLVED: DM type 2 causing renal disease ([facility] ICD-10-CM: E11.29  ICD-9-CM: 250.40  Unknown - 2/11/2017        RESOLVED: Hyperkalemia ICD-10-CM: E87.5  ICD-9-CM: 276.7  11/3/2016 - 2/11/2017        RESOLVED: Elevated serum creatinine ICD-10-CM: R79.89  ICD-9-CM: 790.99  11/3/2016 - 2/11/2017        RESOLVED: Left leg cellulitis ICD-10-CM: L03.116  ICD-9-CM: 682.6  8/14/2016 - 2/11/2017        RESOLVED: Sepsis ([address] ICD-10-CM: A41.9  ICD-9-CM: 038.9, 995.91  8/14/2016 - 2/11/2017        RESOLVED: RAI (obstructive sleep apnea) (Chronic) ICD-10-CM: M51.25  ICD-9-CM: 327.23  8/14/2016 - 2/11/2017              HPI:   Ms. Jose Ty is a 80 y.o. female who is being admitted for cellulitis of right lower extremity + asthma exacerbation. Ms. Bettina Franco presented to our Emergency Department today complaining of progressive weakness and a right lower extremity redness as well as fever and chills. She has a hx of CKD and has a persistent generalized body itching including the right lower extremity. She is bedridden and her  is her primary caregiver. She is too weak to give any meaningful hx and I have discussed with her  for collaborative hx. She was found to have extensive cellulitis right lower extremity with asthma exacerbation. She will be admitted to hospital for further management. (Dr Sneha Martin)    6/3: Feeling lousy. Had temp of 101 last night. She is having more pain. Still weak. Poor appetite. Breathing is a little better. : Feeling a little better. Did not sleep with CPAP last night. Creatinine still greater than 3. Her BC are positive for strep and UC with gram neg rods. :  She reports she continues to improve. Blood culture pos for strep with best sensitivity Pen G and Rocephin but will cont Unasyn for now until sensitivity back from Urine culture. Wean solumedrol from 40 mg to 20 mg today and to po steroids tomorrow. Creat at 3.03.         Review of Systems:   A comprehensive review of systems was negative except for that written in the HPI. Objective:   Physical Exam:     Visit Vitals    /66 (BP 1 Location: Left leg, BP Patient Position: At rest)    Pulse 74    Temp 97.2 °F (36.2 °C)    Resp 16    Ht 5' 1\" (1.549 m)    Wt 128.5 kg (283 lb 4.8 oz)    SpO2 94%    BMI 53.53 kg/m2    O2 Flow Rate (L/min): 3 l/min O2 Device: CPAP mask    Temp (24hrs), Av.7 °F (36.5 °C), Min:96.9 °F (36.1 °C), Max:98.9 °F (37.2 °C)         1901 -  0700  In: 210 [P.O.:50; I.V.:160]  Out: 0515 [Urine:1225]    General:  Alert, cooperative, appears weak and fatigued,  Morbidly obese. Head:  Normocephalic, without obvious abnormality, atraumatic.    Eyes: Conjunctivae/corneas clear. Nose: Nares normal. Septum midline. Mucosa normal. No drainage or sinus tenderness. Throat: Lips, mucosa, and tongue normal. Teeth and gums normal.   Neck: Supple, symmetrical, trachea midline, no adenopathy, thyroid: no enlargement/tenderness/nodules, no carotid bruit and no JVD. Lungs:   Clear to auscultation bilaterally. Breath sounds are diminished throughout   Heart:  Regular rate and rhythm, S1, S2 normal, no murmur, click, rub or gallop. Abdomen:   Soft, non-tender. Bowel sounds normal. No masses,  No organomegaly. Scattered scabbed areas on her abdomen. Extremities: RLE with less erythema and warmth from the mid shin to her ankle, no cyanosis, 1+ edema. No calf tenderness or cords. Pulses: 2+ and symmetric all extremities. Skin:  multiple excoriations are present on her abd and upper ext   Neurologic: CNII-XII intact. Alert and oriented X 3. Fine motor of hands and fingers normal.   equal.  No cogwheeling or rigidity. Gait not tested at this time. Sensation grossly normal to touch.   Gross motor of extremities normal.       Data Review:       Recent Days:  Recent Labs      06/04/17   0123  06/03/17   0326  06/02/17   1910   WBC  10.1  12.3*  12.7*   HGB  8.2*  7.9*  8.8*   HCT  25.9*  25.0*  28.4*   PLT  189  184  212     Recent Labs      06/05/17   0538  06/04/17   0123  06/03/17   0326   NA  141  140  140   K  4.1  3.8  3.7   CL  106  104  102   CO2  22  24  24   GLU  286*  274*  256*   BUN  108*  93*  78*   CREA  3.06*  3.25*  3.13*   CA  8.3*  8.0*  7.4*   ALB  2.2*  2.3*  2.2*   TBILI  0.3  0.4  0.6   SGOT  23  29  38*   ALT  21  22  21       24 Hour Results:  Recent Results (from the past 24 hour(s))   GLUCOSE, POC    Collection Time: 06/04/17  7:33 AM   Result Value Ref Range    Glucose (POC) 302 (H) 65 - 100 mg/dL    Performed by uLx Powers (PCT)    GLUCOSE, POC    Collection Time: 06/04/17 11:28 AM   Result Value Ref Range    Glucose (POC) 291 (H) 65 - 100 mg/dL    Performed by Tala Gotti (PCT)    GLUCOSE, POC    Collection Time: 06/04/17  4:30 PM   Result Value Ref Range    Glucose (POC) 255 (H) 65 - 100 mg/dL    Performed by Tala Gotti (PCT)    GLUCOSE, POC    Collection Time: 06/04/17  9:02 PM   Result Value Ref Range    Glucose (POC) 312 (H) 65 - 100 mg/dL    Performed by [address] [place] [address]    Collection Time: 06/05/17  5:38 AM   Result Value Ref Range    Sodium 141 136 - 145 mmol/L    Potassium 4.1 3.5 - 5.1 mmol/L    Chloride 106 97 - 108 mmol/L    CO2 22 21 - 32 mmol/L    Anion gap 13 5 - 15 mmol/L    Glucose 286 (H) 65 - 100 mg/dL     (H) 6 - 20 MG/DL    Creatinine 3.06 (H) 0.55 - 1.02 MG/DL    BUN/Creatinine ratio 35 (H) 12 - 20      GFR est AA 18 (L) >60 ml/min/1.73m2    GFR est non-AA 15 (L) >60 ml/min/1.73m2    Calcium 8.3 (L) 8.5 - 10.1 MG/DL    Bilirubin, total 0.3 0.2 - 1.0 MG/DL    ALT (SGPT) 21 12 - 78 U/L    AST (SGOT) 23 15 - 37 U/L    Alk.  phosphatase 69 45 - 117 U/L    Protein, total 6.4 6.4 - 8.2 g/dL    Albumin 2.2 (L) 3.5 - 5.0 g/dL    Globulin 4.2 (H) 2.0 - 4.0 g/dL    A-G Ratio 0.5 (L) 1.1 - 2.2         Medications reviewed  Current Facility-Administered Medications   Medication Dose Route Frequency    amLODIPine (NORVASC) tablet 5 mg  5 mg Oral DAILY    sodium chloride (NS) flush 5-10 mL  5-10 mL IntraVENous Q8H    sodium chloride (NS) flush 5-10 mL  5-10 mL IntraVENous PRN    acetaminophen (TYLENOL) tablet 650 mg  650 mg Oral Q4H PRN    naloxone (NARCAN) injection 0.4 mg  0.4 mg IntraVENous PRN    prochlorperazine (COMPAZINE) injection 5 mg  5 mg IntraVENous Q8H PRN    insulin lispro (HUMALOG) injection   SubCUTAneous AC&HS    glucose chewable tablet 16 g  4 Tab Oral PRN    dextrose (D50W) injection syrg 12.5-25 g  12.5-25 g IntraVENous PRN    glucagon (GLUCAGEN) injection 1 mg  1 mg IntraMUSCular PRN    acetaminophen (TYLENOL) tablet 1,000 mg  1,000 mg Oral BID PRN  insulin glargine (LANTUS) injection 20 Units  20 Units SubCUTAneous QHS    losartan (COZAAR) tablet 25 mg  25 mg Oral DAILY    carvedilol (COREG) tablet 3.125 mg  3.125 mg Oral BID WITH MEALS    fluticasone-vilanterol (BREO ELLIPTA) 100mcg-25mcg/puff  1 Puff Inhalation DAILY    pantoprazole (PROTONIX) tablet 40 mg  40 mg Oral DAILY    ampicillin-sulbactam (UNASYN) 3 g in 0.9% sodium chloride (MBP/ADV) 100 mL  3 g IntraVENous Q12H    sodium chloride (NS) flush 5-10 mL  5-10 mL IntraVENous PRN    heparin (porcine) injection 5,000 Units  5,000 Units SubCUTAneous Q8H    methylPREDNISolone (PF) (SOLU-MEDROL) injection 40 mg  40 mg IntraVENous Q8H    albuterol-ipratropium (DUO-NEB) 2.5 MG-0.5 MG/3 ML  3 mL Nebulization Q6H RT     Care Plan discussed with: Patient and Nurse  Total time spent with patient and review of records: 30 minutes.   Tina Carrillo MD

## 2017-06-05 NOTE — WOUND CARE
Wound care consult:  Initial visit for skin assessment, alert, no distress. Family and staff at bedside. Assessment  All skin folds and bony prominences assessed, turned with staff assistance. Lower abdominal skin folds and inner groins are pink and moist, like yeast like rash. Scattered areas of dry red intact and resolving scabs on abdomen, arms and legs. Patient scratches skin repeatedly. Sacral area, heels and elbows intact. Treatment  Repositioned in bed   Heels floated    Recommendations/Plan  Nystatin to yeast like rash, dry pillow case in skin folds to assist with moisture. Moisturize dry skin daily   Turn, reposition every 2 hours as tolerated, float heels, place in prevalon boots as tolerated. Incontinent care with comfort shields. Apply Zinc to all open areas, moisture barrier as needed. Mobility team.  Will follow, reconsult as needed.   Katheryn Melgar

## 2017-06-05 NOTE — PROGRESS NOTES
Shift Summary  6/5/2017  [id number]    Pt awake at time of bedside shift report received from [facility]. AM vitals, assessment, and meds complete without difficulty, AM rhythm strip shows NSR. Reviewed today's plan of care and goals with patient/family; plan of care today includes check CBC when phlebotomy arrives (multiple attempts from night shift staff rejected by lab, not enough blood obtained to process), monitor VS, IV antibiotics. New orders today include change from Unasyn to Ceftriaxone for IV abx, added Bumex PO daily as per PTA meds, decrease IV Solumedrol dose. New order for nystatin powder for skin folds. Pt worked with PT & OT. No new complaints during my shift. Bedside and Verbal shift change report given to Fred (oncoming nurse) by Jason Mauricio (offgoing nurse). Report included the following information SBAR, Kardex, Intake/Output, MAR, Accordion and Cardiac Rhythm NSR.

## 2017-06-06 LAB
ALBUMIN SERPL BCP-MCNC: 2.3 G/DL (ref 3.5–5)
ALBUMIN/GLOB SERPL: 0.6 {RATIO} (ref 1.1–2.2)
ALP SERPL-CCNC: 74 U/L (ref 45–117)
ALT SERPL-CCNC: 21 U/L (ref 12–78)
ANION GAP BLD CALC-SCNC: 10 MMOL/L (ref 5–15)
AST SERPL W P-5'-P-CCNC: 21 U/L (ref 15–37)
BASOPHILS # BLD AUTO: 0 K/UL (ref 0–0.1)
BASOPHILS # BLD: 0 % (ref 0–1)
BILIRUB SERPL-MCNC: 0.3 MG/DL (ref 0.2–1)
BUN SERPL-MCNC: 115 MG/DL (ref 6–20)
BUN/CREAT SERPL: 38 (ref 12–20)
CALCIUM SERPL-MCNC: 8.6 MG/DL (ref 8.5–10.1)
CHLORIDE SERPL-SCNC: 106 MMOL/L (ref 97–108)
CO2 SERPL-SCNC: 25 MMOL/L (ref 21–32)
CREAT SERPL-MCNC: 3.05 MG/DL (ref 0.55–1.02)
EOSINOPHIL # BLD: 0 K/UL (ref 0–0.4)
EOSINOPHIL NFR BLD: 0 % (ref 0–7)
ERYTHROCYTE [DISTWIDTH] IN BLOOD BY AUTOMATED COUNT: 14.4 % (ref 11.5–14.5)
FERRITIN SERPL-MCNC: 1120 NG/ML (ref 8–252)
GLOBULIN SER CALC-MCNC: 4.1 G/DL (ref 2–4)
GLUCOSE BLD STRIP.AUTO-MCNC: 267 MG/DL (ref 65–100)
GLUCOSE BLD STRIP.AUTO-MCNC: 275 MG/DL (ref 65–100)
GLUCOSE BLD STRIP.AUTO-MCNC: 295 MG/DL (ref 65–100)
GLUCOSE BLD STRIP.AUTO-MCNC: 302 MG/DL (ref 65–100)
GLUCOSE SERPL-MCNC: 284 MG/DL (ref 65–100)
HCT VFR BLD AUTO: 28 % (ref 35–47)
HGB BLD-MCNC: 8.8 G/DL (ref 11.5–16)
IRON SATN MFR SERPL: 40 % (ref 20–50)
IRON SERPL-MCNC: 124 UG/DL (ref 35–150)
LYMPHOCYTES # BLD AUTO: 5 % (ref 12–49)
LYMPHOCYTES # BLD: 0.5 K/UL (ref 0.8–3.5)
MAGNESIUM SERPL-MCNC: 2.3 MG/DL (ref 1.6–2.4)
MCH RBC QN AUTO: 28.4 PG (ref 26–34)
MCHC RBC AUTO-ENTMCNC: 31.4 G/DL (ref 30–36.5)
MCV RBC AUTO: 90.3 FL (ref 80–99)
MONOCYTES # BLD: 0.4 K/UL (ref 0–1)
MONOCYTES NFR BLD AUTO: 4 % (ref 5–13)
NEUTS SEG # BLD: 8.7 K/UL (ref 1.8–8)
NEUTS SEG NFR BLD AUTO: 91 % (ref 32–75)
PLATELET # BLD AUTO: 185 K/UL (ref 150–400)
POTASSIUM SERPL-SCNC: 3.9 MMOL/L (ref 3.5–5.1)
PROT SERPL-MCNC: 6.4 G/DL (ref 6.4–8.2)
RBC # BLD AUTO: 3.1 M/UL (ref 3.8–5.2)
SERVICE CMNT-IMP: ABNORMAL
SODIUM SERPL-SCNC: 141 MMOL/L (ref 136–145)
TIBC SERPL-MCNC: 310 UG/DL (ref 250–450)
WBC # BLD AUTO: 9.6 K/UL (ref 3.6–11)

## 2017-06-06 PROCEDURE — 74011250637 HC RX REV CODE- 250/637: Performed by: FAMILY MEDICINE

## 2017-06-06 PROCEDURE — 74011000258 HC RX REV CODE- 258: Performed by: FAMILY MEDICINE

## 2017-06-06 PROCEDURE — 36415 COLL VENOUS BLD VENIPUNCTURE: CPT | Performed by: FAMILY MEDICINE

## 2017-06-06 PROCEDURE — 80053 COMPREHEN METABOLIC PANEL: CPT | Performed by: FAMILY MEDICINE

## 2017-06-06 PROCEDURE — 82962 GLUCOSE BLOOD TEST: CPT

## 2017-06-06 PROCEDURE — 74011636637 HC RX REV CODE- 636/637: Performed by: FAMILY MEDICINE

## 2017-06-06 PROCEDURE — 82728 ASSAY OF FERRITIN: CPT | Performed by: INTERNAL MEDICINE

## 2017-06-06 PROCEDURE — 97110 THERAPEUTIC EXERCISES: CPT

## 2017-06-06 PROCEDURE — 74011250636 HC RX REV CODE- 250/636: Performed by: FAMILY MEDICINE

## 2017-06-06 PROCEDURE — 74011250637 HC RX REV CODE- 250/637: Performed by: INTERNAL MEDICINE

## 2017-06-06 PROCEDURE — 65660000000 HC RM CCU STEPDOWN

## 2017-06-06 PROCEDURE — 77010033678 HC OXYGEN DAILY

## 2017-06-06 PROCEDURE — 74011000250 HC RX REV CODE- 250: Performed by: INTERNAL MEDICINE

## 2017-06-06 PROCEDURE — 94640 AIRWAY INHALATION TREATMENT: CPT

## 2017-06-06 PROCEDURE — 83540 ASSAY OF IRON: CPT | Performed by: INTERNAL MEDICINE

## 2017-06-06 PROCEDURE — 83735 ASSAY OF MAGNESIUM: CPT | Performed by: FAMILY MEDICINE

## 2017-06-06 PROCEDURE — 74011250636 HC RX REV CODE- 250/636: Performed by: INTERNAL MEDICINE

## 2017-06-06 PROCEDURE — 85025 COMPLETE CBC W/AUTO DIFF WBC: CPT | Performed by: FAMILY MEDICINE

## 2017-06-06 RX ORDER — PREDNISONE 20 MG/1
20 TABLET ORAL 2 TIMES DAILY WITH MEALS
Status: DISCONTINUED | OUTPATIENT
Start: 2017-06-06 | End: 2017-06-07

## 2017-06-06 RX ORDER — INSULIN LISPRO 100 [IU]/ML
INJECTION, SOLUTION INTRAVENOUS; SUBCUTANEOUS
Status: DISCONTINUED | OUTPATIENT
Start: 2017-06-06 | End: 2017-06-10 | Stop reason: HOSPADM

## 2017-06-06 RX ADMIN — CARVEDILOL 3.12 MG: 3.12 TABLET, FILM COATED ORAL at 16:32

## 2017-06-06 RX ADMIN — INSULIN LISPRO 8 UNITS: 100 INJECTION, SOLUTION INTRAVENOUS; SUBCUTANEOUS at 08:11

## 2017-06-06 RX ADMIN — INSULIN LISPRO 10 UNITS: 100 INJECTION, SOLUTION INTRAVENOUS; SUBCUTANEOUS at 11:47

## 2017-06-06 RX ADMIN — AMLODIPINE BESYLATE 5 MG: 5 TABLET ORAL at 08:12

## 2017-06-06 RX ADMIN — INSULIN LISPRO 3 UNITS: 100 INJECTION, SOLUTION INTRAVENOUS; SUBCUTANEOUS at 21:44

## 2017-06-06 RX ADMIN — NYSTATIN: 100000 POWDER TOPICAL at 16:33

## 2017-06-06 RX ADMIN — PREDNISONE 20 MG: 20 TABLET ORAL at 16:32

## 2017-06-06 RX ADMIN — INSULIN GLARGINE 20 UNITS: 100 INJECTION, SOLUTION SUBCUTANEOUS at 21:44

## 2017-06-06 RX ADMIN — FLUTICASONE FUROATE AND VILANTEROL TRIFENATATE 1 PUFF: 100; 25 POWDER RESPIRATORY (INHALATION) at 08:11

## 2017-06-06 RX ADMIN — Medication 10 ML: at 06:17

## 2017-06-06 RX ADMIN — HEPARIN SODIUM 5000 UNITS: 5000 INJECTION, SOLUTION INTRAVENOUS; SUBCUTANEOUS at 15:25

## 2017-06-06 RX ADMIN — Medication 10 ML: at 21:45

## 2017-06-06 RX ADMIN — METHYLPREDNISOLONE SODIUM SUCCINATE 20 MG: 40 INJECTION, POWDER, FOR SOLUTION INTRAMUSCULAR; INTRAVENOUS at 06:18

## 2017-06-06 RX ADMIN — CEFTRIAXONE 1 G: 1 INJECTION, POWDER, FOR SOLUTION INTRAMUSCULAR; INTRAVENOUS at 11:46

## 2017-06-06 RX ADMIN — PANTOPRAZOLE SODIUM 40 MG: 40 TABLET, DELAYED RELEASE ORAL at 08:12

## 2017-06-06 RX ADMIN — IPRATROPIUM BROMIDE AND ALBUTEROL SULFATE 3 ML: .5; 3 SOLUTION RESPIRATORY (INHALATION) at 13:35

## 2017-06-06 RX ADMIN — IPRATROPIUM BROMIDE AND ALBUTEROL SULFATE 3 ML: .5; 3 SOLUTION RESPIRATORY (INHALATION) at 19:23

## 2017-06-06 RX ADMIN — HEPARIN SODIUM 5000 UNITS: 5000 INJECTION, SOLUTION INTRAVENOUS; SUBCUTANEOUS at 23:03

## 2017-06-06 RX ADMIN — METHYLPREDNISOLONE SODIUM SUCCINATE 20 MG: 40 INJECTION, POWDER, FOR SOLUTION INTRAMUSCULAR; INTRAVENOUS at 15:25

## 2017-06-06 RX ADMIN — LOSARTAN POTASSIUM 25 MG: 25 TABLET, FILM COATED ORAL at 08:12

## 2017-06-06 RX ADMIN — IPRATROPIUM BROMIDE AND ALBUTEROL SULFATE 3 ML: .5; 3 SOLUTION RESPIRATORY (INHALATION) at 07:38

## 2017-06-06 RX ADMIN — INSULIN LISPRO 8 UNITS: 100 INJECTION, SOLUTION INTRAVENOUS; SUBCUTANEOUS at 08:00

## 2017-06-06 RX ADMIN — HEPARIN SODIUM 5000 UNITS: 5000 INJECTION, SOLUTION INTRAVENOUS; SUBCUTANEOUS at 06:17

## 2017-06-06 RX ADMIN — NYSTATIN: 100000 POWDER TOPICAL at 09:00

## 2017-06-06 RX ADMIN — Medication 10 ML: at 14:00

## 2017-06-06 RX ADMIN — CARVEDILOL 3.12 MG: 3.12 TABLET, FILM COATED ORAL at 08:12

## 2017-06-06 RX ADMIN — BUMETANIDE 1 MG: 1 TABLET ORAL at 08:12

## 2017-06-06 NOTE — PROGRESS NOTES
Problem: Self Care Deficits Care Plan (Adult)  Goal: *Acute Goals and Plan of Care (Insert Text)  Occupational Therapy Goals  Initiated 6/5/2017  1. Patient will perform lower body dressing with maximum assistance within 7 day(s). 2. Patient will perform upper body bathing with minimal assistance/contact guard assist within 7 day(s). 3. Patient will perform upper body dressing with minimal assistance/contact guard assist within 7 day(s). 4. Patient will perform toilet transfers with moderate assistance within 7 day(s). 5. Patient will participate in upper extremity therapeutic exercise/activities with supervision/set-up for 10 minutes within 7 day(s). OCCUPATIONAL THERAPY TREATMENT  Patient: Erum Quigley (89 y.o. female)  Date: 6/6/2017  Diagnosis: Cellulitis of right lower extremity Cellulitis of right lower extremity       Precautions: Fall, Skin  Chart, occupational therapy assessment, plan of care, and goals were reviewed. ASSESSMENT:  Pt only willing to work on bed exercises to increase strength and endurance for functional tasks stating \"It has been a rough day. \" She was able to complete 2 sets before fatigue. She appears to have full range of motion in her upper extremities. She was encouraged to complete these throughout the day. Recommend SNF. Progression toward goals:  [ ]          Improving appropriately and progressing toward goals  [X]          Improving slowly and progressing toward goals  [ ]          Not making progress toward goals and plan of care will be adjusted       PLAN:  Patient continues to benefit from skilled intervention to address the above impairments. Continue treatment per established plan of care. Discharge Recommendations:  SNF  Further Equipment Recommendations for Discharge:  None       SUBJECTIVE:   Patient stated It has been a rough day.       OBJECTIVE DATA SUMMARY:   Cognitive/Behavioral Status:  Neurologic State: Alert  Orientation Level: Oriented X4  Cognition: Appropriate for age attention/concentration           Functional Mobility and Transfers for ADLs:              Bed Mobility:   Not tested                         Transfers:      Not tested     Balance: Not tested     ADL Intervention:     Not tested               Therapeutic Exercises:     EXERCISE   Sets   Reps   Active Active Assist   Passive   Comments   Finger flex/ext 2 10 [X]           [ ]           [ ]               Wrist flex/ext 2 10 [X]           [ ]           [ ]               Forearm supination/pronation 2 10 [X]           [ ]           [ ]               Elbow flex/ext 2 10 [X]           [ ]           [ ]               Chest pressses 2 10 [X]           [ ]           [ ]               Shoulder flex/ext 2 10 [X]           [ ]           [ ]                     [ ]           [ ]           [ ]                     [ ]           [ ]           [ ]                     [ ]           [ ]           [ ]                     [ ]           [ ]           [ ]                     [ ]           [ ]           [ ]                        Pain:  Pain Scale 1: Numeric (0 - 10)  Pain Intensity 1: 0                 Activity Tolerance:    Poor  Please refer to the flowsheet for vital signs taken during this treatment.   After treatment:   [ ]  Patient left in no apparent distress sitting up in chair  [X]  Patient left in no apparent distress in bed  [X]  Call bell left within reach  [ ]  Nursing notified  [ ]  Caregiver present  [ ]  Bed alarm activated      COMMUNICATION/COLLABORATION:   The patients plan of care was discussed with: Occupational Therapist     VIBHA Yang/L  Time Calculation: 16 mins

## 2017-06-06 NOTE — PROGRESS NOTES
Shift Summary  [id number]    Bedside and Verbal shift change report given to [address] [place] [address] Missy (oncoming nurse) by Daniela White RN (offgoing nurse). Report included the following information SBAR, Kardex, MAR, Recent Results and Cardiac Rhythm NSR;BBB       Patient resting quietly. Stated MD office called to let her know she had pancreatitis (shown on labs done in office week prior to admission). Will let MD know. Patient requested Diet Pepsi to drink. BS elevated coverage given per MD order    Bedside and Verbal shift change report given to Holly Pike RN (oncoming nurse) by Kamilah Diaz RN (offgoing nurse). Report included the following information SBAR, Kardex, MAR, Recent Results and Cardiac Rhythm NSR.

## 2017-06-06 NOTE — PROGRESS NOTES
Daily Progress Note: 6/6/2017  Agnieszka Dash MD    Assessment/Plan:   Cellulitis of right lower extremity (6/2/2017): trigger seems to be persistent scratching due to uremia. Lactate is normal.   --- Blood cultures positive for strep- urine and blood sens to Rocephin---Started IV Unasyn and Vancomycin at admission. stopped Vanc 6/4 - switched to Rocephin 6/6.     Asthma with acute exacerbation (6/2/2017):   ---started Duoneb  --- IV solumedrol - weaning to po  ---Mucinex prn   ---Albuterol as needed     CKD (chronic kidney disease) stage 4, GFR 15-29 ml/min ([facility]) (8/14/2016): has had intermittent hemodialysis before.   ---Consulted nephrology  ---Bumex as needed     HTN (hypertension), benign (8/14/2016): Due to severe illness. ---Initially held BP meds. ---Restarted Cozaar and Coreg; restarted Bumex  ---Restarted Amlodipine     DM type 2, uncontrolled, with renal complications ([facility] (0/53/7258): last A1c 7.2.   ---Monitor blood glucose. Appetite low. ---  Lantus and adjust as needed. ---SSI per protocol     Iron deficiency anemia (8/14/2016)/ Anemia due to CKD POA:   ---monitor Hgb closely.      Generalized anxiety disorder (11/3/2016)/ Anxiety and depression: lethargic at this time.   --- Hold all sedating medications     Hyperlipidemia: hold Lipitor     RAI on CPAP: resume CPAP at night      Physical debility POA: she is totally dependent on .   ---Consulted PT, OT.   ---Needs SNF    UTI  ---Culture gram neg rods - cont Unasyn for now until sensitivity back.      Code Status: Full       Problem List:  Problem List as of 6/6/2017  Date Reviewed: 6/2/2017          Codes Class Noted - Resolved    * (Principal)Cellulitis of right lower extremity ICD-10-CM: L03.115  ICD-9-CM: 682.6  6/2/2017 - Present        Asthma with acute exacerbation ICD-10-CM: J45.901  ICD-9-CM: 493.92  6/2/2017 - Present        Asthma ICD-10-CM: J45.909  ICD-9-CM: 493.90  Unknown - Present Hyperlipidemia ICD-10-CM: E78.5  ICD-9-CM: 272.4  Unknown - Present        RAI on CPAP ICD-10-CM: G47.33, Z99.89  ICD-9-CM: 327.23, V46.8  Unknown - Present        Anxiety and depression ICD-10-CM: F41.9, F32.9  ICD-9-CM: 300.00, 311  Unknown - Present        Hypoglycemia ICD-10-CM: E16.2  ICD-9-CM: 251.2  2/11/2017 - Present        Chronic bilateral low back pain without sciatica ICD-10-CM: M54.5, G89.29  ICD-9-CM: 724.2, 338.29  11/3/2016 - Present        Generalized anxiety disorder ICD-10-CM: F41.1  ICD-9-CM: 300.02  11/3/2016 - Present        Acute renal failure (ARF) (HCC) ICD-10-CM: N17.9  ICD-9-CM: 584.9  8/14/2016 - Present        CKD (chronic kidney disease) stage 4, GFR 15-29 ml/min (HCC) (Chronic) ICD-10-CM: N18.4  ICD-9-CM: 585.4  8/14/2016 - Present        HTN (hypertension), benign (Chronic) ICD-10-CM: I10  ICD-9-CM: 401.1  8/14/2016 - Present        DM type 2, uncontrolled, with renal complications (HCC) (Chronic) ICD-10-CM: E11.29, E11.65  ICD-9-CM: 250.42  8/14/2016 - Present        Iron deficiency anemia (Chronic) ICD-10-CM: D50.9  ICD-9-CM: 280.9  8/14/2016 - Present        RESOLVED: CKD stage 4 due to type 2 diabetes mellitus ([facility] ICD-10-CM: E11.22, N18.4  ICD-9-CM: 250.40, 585.4  Unknown - 2/11/2017        RESOLVED: DM type 2 causing renal disease ([facility] ICD-10-CM: E11.29  ICD-9-CM: 250.40  Unknown - 2/11/2017        RESOLVED: Hyperkalemia ICD-10-CM: E87.5  ICD-9-CM: 276.7  11/3/2016 - 2/11/2017        RESOLVED: Elevated serum creatinine ICD-10-CM: R79.89  ICD-9-CM: 790.99  11/3/2016 - 2/11/2017        RESOLVED: Left leg cellulitis ICD-10-CM: L03.116  ICD-9-CM: 682.6  8/14/2016 - 2/11/2017        RESOLVED: Sepsis ([address] ICD-10-CM: A41.9  ICD-9-CM: 038.9, 995.91  8/14/2016 - 2/11/2017        RESOLVED: RAI (obstructive sleep apnea) (Chronic) ICD-10-CM: C92.47  ICD-9-CM: 327.23  8/14/2016 - 2/11/2017              HPI:   Ms. Batsheva Duran is a 80 y.o. female who is being admitted for cellulitis of right lower extremity + asthma exacerbation. Ms. Zoya Chisholm presented to our Emergency Department today complaining of progressive weakness and a right lower extremity redness as well as fever and chills. She has a hx of CKD and has a persistent generalized body itching including the right lower extremity. She is bedridden and her  is her primary caregiver. She is too weak to give any meaningful hx and I have discussed with her  for collaborative hx. She was found to have extensive cellulitis right lower extremity with asthma exacerbation. She will be admitted to hospital for further management. (Dr Qi Torres)    6/3: Feeling lousy. Had temp of 101 last night. She is having more pain. Still weak. Poor appetite. Breathing is a little better. : Feeling a little better. Did not sleep with CPAP last night. Creatinine still greater than 3. Her BC are positive for strep and UC with gram neg rods. :  She reports she continues to improve. Blood culture pos for strep with best sensitivity Pen G and Rocephin but will cont Unasyn for now until sensitivity back from Urine culture. Wean solumedrol from 40 mg to 20 mg today and to po steroids tomorrow. Creat at 3.03.     :  More alert and reports less pain. Urine and Blood can be treated with Rocephin and switched to Rocephin IV. Creat fairly stable at 3.05 today. Will wean steroids to po today. Will likely need SCF.        Review of Systems:   A comprehensive review of systems was negative except for that written in the HPI.     Objective:   Physical Exam:     Visit Vitals    /64 (BP 1 Location: Left leg, BP Patient Position: At rest)    Pulse 82    Temp 97.7 °F (36.5 °C)    Resp 19    Ht 5' 1\" (1.549 m)    Wt 128.4 kg (283 lb)    SpO2 95%    BMI 53.47 kg/m2    O2 Flow Rate (L/min): 3 l/min O2 Device: CPAP mask    Temp (24hrs), Av.3 °F (36.8 °C), Min:97.7 °F (36.5 °C), Max:98.7 °F (37.1 °C)         1901 -  0700  In: 120 [P.O.:120]  Out: 1850 [Urine:1850]    General:  Alert, cooperative, appears weak and fatigued,  Morbidly obese. Head:  Normocephalic, without obvious abnormality, atraumatic. Eyes:  Conjunctivae/corneas clear. Nose: Nares normal. Septum midline. Mucosa normal. No drainage or sinus tenderness. Throat: Lips, mucosa, and tongue normal. Teeth and gums normal.   Neck: Supple, symmetrical, trachea midline, no adenopathy, thyroid: no enlargement/tenderness/nodules, no carotid bruit and no JVD. Lungs:   Clear to auscultation bilaterally. Breath sounds are diminished throughout   Heart:  Regular rate and rhythm, S1, S2 normal, no murmur, click, rub or gallop. Abdomen:   Soft, non-tender. Bowel sounds normal. No masses,  No organomegaly. Scattered scabbed areas on her abdomen. Extremities: RLE with resolving erythema and warmth from the mid shin to her ankle, no cyanosis, 1+ edema. No calf tenderness or cords. Skin:  multiple excoriations are present on her abd and upper ext   Neurologic: CNII-XII intact. Alert and oriented X 3. Fine motor of hands and fingers normal.   equal.  No cogwheeling or rigidity. Gait not tested at this time. Sensation grossly normal to touch.   Gross motor of extremities normal.       Data Review:       Recent Days:  Recent Labs      06/06/17   0044  06/05/17   1011  06/04/17   0123   WBC  9.6  10.7  10.1   HGB  8.8*  9.3*  8.2*   HCT  28.0*  29.2*  25.9*   PLT  185  184  189     Recent Labs      06/06/17   0044  06/05/17   0538  06/04/17   0123   NA  141  141  140   K  3.9  4.1  3.8   CL  106  106  104   CO2  25  22  24   GLU  284*  286*  274*   BUN  115*  108*  93*   CREA  3.05*  3.06*  3.25*   CA  8.6  8.3*  8.0*   MG  2.3   --    --    ALB  2.3*  2.2*  2.3*   TBILI  0.3  0.3  0.4   SGOT  21  23  29   ALT  21  21  22       24 Hour Results:  Recent Results (from the past 24 hour(s))   GLUCOSE, POC    Collection Time: 06/05/17  3:58 PM   Result Value Ref Range    Glucose (POC) 282 (H) 65 - 100 mg/dL    Performed by Daljit Parmar (PCT)    GLUCOSE, POC    Collection Time: 06/05/17  9:01 PM   Result Value Ref Range    Glucose (POC) 308 (H) 65 - 100 mg/dL    Performed by Cinda Conn (PCT)    CBC WITH AUTOMATED DIFF    Collection Time: 06/06/17 12:44 AM   Result Value Ref Range    WBC 9.6 3.6 - 11.0 K/uL    RBC 3.10 (L) 3.80 - 5.20 M/uL    HGB 8.8 (L) 11.5 - 16.0 g/dL    HCT 28.0 (L) 35.0 - 47.0 %    MCV 90.3 80.0 - 99.0 FL    MCH 28.4 26.0 - 34.0 PG    MCHC 31.4 30.0 - 36.5 g/dL    RDW 14.4 11.5 - 14.5 %    PLATELET 138 752 - 709 K/uL    NEUTROPHILS 91 (H) 32 - 75 %    LYMPHOCYTES 5 (L) 12 - 49 %    MONOCYTES 4 (L) 5 - 13 %    EOSINOPHILS 0 0 - 7 %    BASOPHILS 0 0 - 1 %    ABS. NEUTROPHILS 8.7 (H) 1.8 - 8.0 K/UL    ABS. LYMPHOCYTES 0.5 (L) 0.8 - 3.5 K/UL    ABS. MONOCYTES 0.4 0.0 - 1.0 K/UL    ABS. EOSINOPHILS 0.0 0.0 - 0.4 K/UL    ABS. BASOPHILS 0.0 0.0 - 0.1 K/UL   METABOLIC PANEL, COMPREHENSIVE    Collection Time: 06/06/17 12:44 AM   Result Value Ref Range    Sodium 141 136 - 145 mmol/L    Potassium 3.9 3.5 - 5.1 mmol/L    Chloride 106 97 - 108 mmol/L    CO2 25 21 - 32 mmol/L    Anion gap 10 5 - 15 mmol/L    Glucose 284 (H) 65 - 100 mg/dL     (H) 6 - 20 MG/DL    Creatinine 3.05 (H) 0.55 - 1.02 MG/DL    BUN/Creatinine ratio 38 (H) 12 - 20      GFR est AA 18 (L) >60 ml/min/1.73m2    GFR est non-AA 15 (L) >60 ml/min/1.73m2    Calcium 8.6 8.5 - 10.1 MG/DL    Bilirubin, total 0.3 0.2 - 1.0 MG/DL    ALT (SGPT) 21 12 - 78 U/L    AST (SGOT) 21 15 - 37 U/L    Alk.  phosphatase 74 45 - 117 U/L    Protein, total 6.4 6.4 - 8.2 g/dL    Albumin 2.3 (L) 3.5 - 5.0 g/dL    Globulin 4.1 (H) 2.0 - 4.0 g/dL    A-G Ratio 0.6 (L) 1.1 - 2.2     MAGNESIUM    Collection Time: 06/06/17 12:44 AM   Result Value Ref Range    Magnesium 2.3 1.6 - 2.4 mg/dL   IRON PROFILE    Collection Time: 06/06/17 12:44 AM   Result Value Ref Range    Iron 124 35 - 150 ug/dL    TIBC 310 250 - 450 ug/dL    Iron % saturation 40 20 - 50 %   FERRITIN    Collection Time: 06/06/17 12:44 AM   Result Value Ref Range    Ferritin 1120 (H) 8 - 252 NG/ML   GLUCOSE, POC    Collection Time: 06/06/17  7:42 AM   Result Value Ref Range    Glucose (POC) 295 (H) 65 - 100 mg/dL    Performed by Felipe Jamison (PCT)    GLUCOSE, POC    Collection Time: 06/06/17 11:38 AM   Result Value Ref Range    Glucose (POC) 302 (H) 65 - 100 mg/dL    Performed by Felipe Jamison (PCT)        Medications reviewed  Current Facility-Administered Medications   Medication Dose Route Frequency    insulin lispro (HUMALOG) injection   SubCUTAneous AC&HS    methylPREDNISolone (PF) (SOLU-MEDROL) injection 20 mg  20 mg IntraVENous Q8H    cefTRIAXone (ROCEPHIN) 1 g in 0.9% sodium chloride (MBP/ADV) 50 mL  1 g IntraVENous Q24H    bumetanide (BUMEX) tablet 1 mg  1 mg Oral DAILY    nystatin (MYCOSTATIN) 100,000 unit/gram powder   Topical BID    amLODIPine (NORVASC) tablet 5 mg  5 mg Oral DAILY    sodium chloride (NS) flush 5-10 mL  5-10 mL IntraVENous Q8H    sodium chloride (NS) flush 5-10 mL  5-10 mL IntraVENous PRN    acetaminophen (TYLENOL) tablet 650 mg  650 mg Oral Q4H PRN    naloxone (NARCAN) injection 0.4 mg  0.4 mg IntraVENous PRN    prochlorperazine (COMPAZINE) injection 5 mg  5 mg IntraVENous Q8H PRN    glucose chewable tablet 16 g  4 Tab Oral PRN    dextrose (D50W) injection syrg 12.5-25 g  12.5-25 g IntraVENous PRN    glucagon (GLUCAGEN) injection 1 mg  1 mg IntraMUSCular PRN    acetaminophen (TYLENOL) tablet 1,000 mg  1,000 mg Oral BID PRN    insulin glargine (LANTUS) injection 20 Units  20 Units SubCUTAneous QHS    losartan (COZAAR) tablet 25 mg  25 mg Oral DAILY    carvedilol (COREG) tablet 3.125 mg  3.125 mg Oral BID WITH MEALS    fluticasone-vilanterol (BREO ELLIPTA) 100mcg-25mcg/puff  1 Puff Inhalation DAILY    pantoprazole (PROTONIX) tablet 40 mg  40 mg Oral DAILY    sodium chloride (NS) flush 5-10 mL  5-10 mL IntraVENous PRN    heparin (porcine) injection 5,000 Units  5,000 Units SubCUTAneous Q8H    albuterol-ipratropium (DUO-NEB) 2.5 MG-0.5 MG/3 ML  3 mL Nebulization Q6H RT     Care Plan discussed with: Patient and Nurse  Total time spent with patient and review of records: 30 minutes.   Anson Piper MD

## 2017-06-06 NOTE — PROGRESS NOTES
[address]  YOB: 1945          Assessment & Plan:   1. Stable stage 4-5 chronic kidney disease. · Cr stable, BUN worse ( due to steroids)  · Non oliguric  · Resumed loops 6 5 17, if BUN up, dc loops  · No HD needed  · Left avf  2. Edema. · Rt > left: due to Cellulitis  · loops  3. Cellulitis. · Antibiotics  4. Obesity  5. HTN  · Amlodipine  6.  Anemia  · Iron panel       Subjective:   CC:arf  HPI: Patient seen   ALFONSO is stable, making urine, edema++  SOB +  ROS:as above otherwise neg  Current Facility-Administered Medications   Medication Dose Route Frequency    insulin lispro (HUMALOG) injection   SubCUTAneous AC&HS    methylPREDNISolone (PF) (SOLU-MEDROL) injection 20 mg  20 mg IntraVENous Q8H    cefTRIAXone (ROCEPHIN) 1 g in 0.9% sodium chloride (MBP/ADV) 50 mL  1 g IntraVENous Q24H    bumetanide (BUMEX) tablet 1 mg  1 mg Oral DAILY    nystatin (MYCOSTATIN) 100,000 unit/gram powder   Topical BID    amLODIPine (NORVASC) tablet 5 mg  5 mg Oral DAILY    sodium chloride (NS) flush 5-10 mL  5-10 mL IntraVENous Q8H    sodium chloride (NS) flush 5-10 mL  5-10 mL IntraVENous PRN    acetaminophen (TYLENOL) tablet 650 mg  650 mg Oral Q4H PRN    naloxone (NARCAN) injection 0.4 mg  0.4 mg IntraVENous PRN    prochlorperazine (COMPAZINE) injection 5 mg  5 mg IntraVENous Q8H PRN    glucose chewable tablet 16 g  4 Tab Oral PRN    dextrose (D50W) injection syrg 12.5-25 g  12.5-25 g IntraVENous PRN    glucagon (GLUCAGEN) injection 1 mg  1 mg IntraMUSCular PRN    acetaminophen (TYLENOL) tablet 1,000 mg  1,000 mg Oral BID PRN    insulin glargine (LANTUS) injection 20 Units  20 Units SubCUTAneous QHS    losartan (COZAAR) tablet 25 mg  25 mg Oral DAILY    carvedilol (COREG) tablet 3.125 mg  3.125 mg Oral BID WITH MEALS    fluticasone-vilanterol (BREO ELLIPTA) 100mcg-25mcg/puff  1 Puff Inhalation DAILY    pantoprazole (PROTONIX) tablet 40 mg  40 mg Oral DAILY    sodium chloride (NS) flush 5-10 mL  5-10 mL IntraVENous PRN    heparin (porcine) injection 5,000 Units  5,000 Units SubCUTAneous Q8H    albuterol-ipratropium (DUO-NEB) 2.5 MG-0.5 MG/3 ML  3 mL Nebulization Q6H RT          Objective:     Vitals:  Blood pressure 175/68, pulse 76, temperature 98.7 °F (37.1 °C), resp. rate 17, height 5' 1\" (1.549 m), weight 128.4 kg (283 lb), SpO2 97 %. Temp (24hrs), Av.3 °F (36.8 °C), Min:97.9 °F (36.6 °C), Max:98.7 °F (37.1 °C)      Intake and Output:      1901 -  0700  In: 120 [P.O.:120]  Out: 8815 [Urine:1850]    Physical Exam:                Patient is intubated:  no    Physical Examination:   GENERAL ASSESSMENT: NAD  HEENT:Nontraumatic   CHEST: CTA  HEART: S1S2  ABDOMEN: Soft,NT,  :Hanks: y  EXTREMITY: EDEMA 2 rt  NEURO:Grossly non focal          ECG/rhythm:    Data Review      No results for input(s): TNIPOC in the last 72 hours. No lab exists for component: ITNL   No results for input(s): CPK, CKMB, TROIQ in the last 72 hours. Recent Labs      17   0044  17   1011  17   0538  17   0123   NA  141   --   141  140   K  3.9   --   4.1  3.8   CL  106   --   106  104   CO2  25   --   22  24   BUN  115*   --   108*  93*   CREA  3.05*   --   3.06*  3.25*   GLU  284*   --   286*  274*   MG  2.3   --    --    --    CA  8.6   --   8.3*  8.0*   ALB  2.3*   --   2.2*  2.3*   WBC  9.6  10.7   --   10.1   HGB  8.8*  9.3*   --   8.2*   HCT  28.0*  29.2*   --   25.9*   PLT  185  184   --   189      No results for input(s): INR, PTP, APTT in the last 72 hours.     No lab exists for component: INREXT, INREXT  Needs: urine analysis, urine sodium, protein and creatinine  Lab Results   Component Value Date/Time    Sodium urine, random 50 2016 12:55 PM    Creatinine, urine 53.84 2016 12:55 PM         Discussed with:  Family, Colleague, Nursing    : Dhruv Ag MD  2017        [facility]:  [email or web address]  [place] office:  [address], [address] [place] [address],[address]  [place], [address] [place] [address]  Phone: [phone]  Fax :     [phone]    [place] office:  [address] [facility], [address] [place] [address]  Phone - [phone]  Fax - [phone]

## 2017-06-06 NOTE — DIABETES MGMT
DTC Progress Note    Recommendations/ Comments: Chart reviewed d/t elevated BG's; pt on steroids. FBS has been  256 and 295 mg/dl the past 2 days. If appropriate, please consider increasing Lantus to 25 units tonight. Also, please add A1c to next lab draw, if not already done, to better assess pts control PTA     Chart reviewed on Layla Kishan.     Patient is a 80 y.o. female with known Type 2 Diabetes on insulin injections: regular: 6 units at Breakfast and Dinner, [place]- 26 units at home.     A1c:   Lab Results   Component Value Date/Time    Hemoglobin A1c 7.2 02/11/2017 11:17 AM    Hemoglobin A1c 5.9 11/03/2016 04:19 AM       Recent Glucose Results: Lab Results   Component Value Date/Time     (H) 06/06/2017 12:44 AM    GLUCPOC 302 (H) 06/06/2017 11:38 AM    GLUCPOC 295 (H) 06/06/2017 07:42 AM    GLUCPOC 308 (H) 06/05/2017 09:01 PM        Lab Results   Component Value Date/Time    Creatinine 3.05 06/06/2017 12:44 AM     Estimated Creatinine Clearance: 21.7 mL/min (based on Cr of 3.05). Active Orders   Diet    DIET DIABETIC CONSISTENT CARB Regular        PO intake: Patient Vitals for the past 72 hrs:   % Diet Eaten   06/05/17 1902 70 %       Current hospital DM medication: Lantus 20 units; correctional lispro-normal sensitivity    Will continue to follow as needed. Thank you  Kati K. Lelia Aase, RD,CDE

## 2017-06-06 NOTE — PROGRESS NOTES
0700: Bedside shift change report given to Amanda (oncoming nurse) by Julio Dennis (offgoing nurse). Report included the following information SBAR, Kardex and Recent Results. 0730: Patient assessed, resting comfortably    0800: Patient having multiple coughing spells due to breathing treatments. Increased O2 to prevent desat. Productive cough.

## 2017-06-07 LAB
ALBUMIN SERPL BCP-MCNC: 2.4 G/DL (ref 3.5–5)
ALBUMIN/GLOB SERPL: 0.6 {RATIO} (ref 1.1–2.2)
ALP SERPL-CCNC: 70 U/L (ref 45–117)
ALT SERPL-CCNC: 21 U/L (ref 12–78)
ANION GAP BLD CALC-SCNC: 9 MMOL/L (ref 5–15)
AST SERPL W P-5'-P-CCNC: 21 U/L (ref 15–37)
BASOPHILS # BLD AUTO: 0 K/UL (ref 0–0.1)
BASOPHILS # BLD: 0 % (ref 0–1)
BILIRUB SERPL-MCNC: 0.4 MG/DL (ref 0.2–1)
BUN SERPL-MCNC: 121 MG/DL (ref 6–20)
BUN/CREAT SERPL: 42 (ref 12–20)
CALCIUM SERPL-MCNC: 8.7 MG/DL (ref 8.5–10.1)
CHLORIDE SERPL-SCNC: 105 MMOL/L (ref 97–108)
CO2 SERPL-SCNC: 27 MMOL/L (ref 21–32)
CREAT SERPL-MCNC: 2.89 MG/DL (ref 0.55–1.02)
DIFFERENTIAL METHOD BLD: ABNORMAL
EOSINOPHIL # BLD: 0 K/UL (ref 0–0.4)
EOSINOPHIL NFR BLD: 0 % (ref 0–7)
ERYTHROCYTE [DISTWIDTH] IN BLOOD BY AUTOMATED COUNT: 14.5 % (ref 11.5–14.5)
GLOBULIN SER CALC-MCNC: 3.8 G/DL (ref 2–4)
GLUCOSE BLD STRIP.AUTO-MCNC: 258 MG/DL (ref 65–100)
GLUCOSE BLD STRIP.AUTO-MCNC: 284 MG/DL (ref 65–100)
GLUCOSE BLD STRIP.AUTO-MCNC: 286 MG/DL (ref 65–100)
GLUCOSE BLD STRIP.AUTO-MCNC: 348 MG/DL (ref 65–100)
GLUCOSE SERPL-MCNC: 262 MG/DL (ref 65–100)
HCT VFR BLD AUTO: 28.7 % (ref 35–47)
HGB BLD-MCNC: 8.7 G/DL (ref 11.5–16)
LYMPHOCYTES # BLD AUTO: 5 % (ref 12–49)
LYMPHOCYTES # BLD: 0.5 K/UL (ref 0.8–3.5)
MCH RBC QN AUTO: 27.9 PG (ref 26–34)
MCHC RBC AUTO-ENTMCNC: 30.3 G/DL (ref 30–36.5)
MCV RBC AUTO: 92 FL (ref 80–99)
MONOCYTES # BLD: 0.4 K/UL (ref 0–1)
MONOCYTES NFR BLD AUTO: 4 % (ref 5–13)
NEUTS SEG # BLD: 8.5 K/UL (ref 1.8–8)
NEUTS SEG NFR BLD AUTO: 91 % (ref 32–75)
PLATELET # BLD AUTO: 167 K/UL (ref 150–400)
POTASSIUM SERPL-SCNC: 4.1 MMOL/L (ref 3.5–5.1)
PROT SERPL-MCNC: 6.2 G/DL (ref 6.4–8.2)
RBC # BLD AUTO: 3.12 M/UL (ref 3.8–5.2)
RBC MORPH BLD: ABNORMAL
SERVICE CMNT-IMP: ABNORMAL
SODIUM SERPL-SCNC: 141 MMOL/L (ref 136–145)
WBC # BLD AUTO: 9.4 K/UL (ref 3.6–11)

## 2017-06-07 PROCEDURE — 82962 GLUCOSE BLOOD TEST: CPT

## 2017-06-07 PROCEDURE — 85025 COMPLETE CBC W/AUTO DIFF WBC: CPT | Performed by: FAMILY MEDICINE

## 2017-06-07 PROCEDURE — 74011250636 HC RX REV CODE- 250/636: Performed by: INTERNAL MEDICINE

## 2017-06-07 PROCEDURE — 65660000000 HC RM CCU STEPDOWN

## 2017-06-07 PROCEDURE — 94640 AIRWAY INHALATION TREATMENT: CPT

## 2017-06-07 PROCEDURE — 77010033678 HC OXYGEN DAILY

## 2017-06-07 PROCEDURE — 74011250637 HC RX REV CODE- 250/637: Performed by: FAMILY MEDICINE

## 2017-06-07 PROCEDURE — 74011000250 HC RX REV CODE- 250: Performed by: INTERNAL MEDICINE

## 2017-06-07 PROCEDURE — 74011250636 HC RX REV CODE- 250/636: Performed by: FAMILY MEDICINE

## 2017-06-07 PROCEDURE — 74011250637 HC RX REV CODE- 250/637: Performed by: INTERNAL MEDICINE

## 2017-06-07 PROCEDURE — 80053 COMPREHEN METABOLIC PANEL: CPT | Performed by: FAMILY MEDICINE

## 2017-06-07 PROCEDURE — 74011636637 HC RX REV CODE- 636/637: Performed by: FAMILY MEDICINE

## 2017-06-07 PROCEDURE — 97535 SELF CARE MNGMENT TRAINING: CPT

## 2017-06-07 PROCEDURE — 74011000258 HC RX REV CODE- 258: Performed by: FAMILY MEDICINE

## 2017-06-07 PROCEDURE — 36415 COLL VENOUS BLD VENIPUNCTURE: CPT | Performed by: FAMILY MEDICINE

## 2017-06-07 PROCEDURE — 97530 THERAPEUTIC ACTIVITIES: CPT

## 2017-06-07 RX ORDER — PREDNISONE 5 MG/1
10 TABLET ORAL 2 TIMES DAILY WITH MEALS
Status: DISCONTINUED | OUTPATIENT
Start: 2017-06-07 | End: 2017-06-08

## 2017-06-07 RX ADMIN — AMLODIPINE BESYLATE 5 MG: 5 TABLET ORAL at 08:15

## 2017-06-07 RX ADMIN — FLUTICASONE FUROATE AND VILANTEROL TRIFENATATE 1 PUFF: 100; 25 POWDER RESPIRATORY (INHALATION) at 08:21

## 2017-06-07 RX ADMIN — INSULIN LISPRO 8 UNITS: 100 INJECTION, SOLUTION INTRAVENOUS; SUBCUTANEOUS at 11:30

## 2017-06-07 RX ADMIN — IPRATROPIUM BROMIDE AND ALBUTEROL SULFATE 3 ML: .5; 3 SOLUTION RESPIRATORY (INHALATION) at 19:55

## 2017-06-07 RX ADMIN — ERYTHROPOIETIN 10000 UNITS: 10000 INJECTION, SOLUTION INTRAVENOUS; SUBCUTANEOUS at 13:53

## 2017-06-07 RX ADMIN — NYSTATIN: 100000 POWDER TOPICAL at 08:05

## 2017-06-07 RX ADMIN — HEPARIN SODIUM 5000 UNITS: 5000 INJECTION, SOLUTION INTRAVENOUS; SUBCUTANEOUS at 14:51

## 2017-06-07 RX ADMIN — IPRATROPIUM BROMIDE AND ALBUTEROL SULFATE 3 ML: .5; 3 SOLUTION RESPIRATORY (INHALATION) at 07:40

## 2017-06-07 RX ADMIN — NYSTATIN: 100000 POWDER TOPICAL at 17:20

## 2017-06-07 RX ADMIN — CEFTRIAXONE 1 G: 1 INJECTION, POWDER, FOR SOLUTION INTRAMUSCULAR; INTRAVENOUS at 11:03

## 2017-06-07 RX ADMIN — Medication 10 ML: at 05:43

## 2017-06-07 RX ADMIN — PREDNISONE 20 MG: 20 TABLET ORAL at 08:18

## 2017-06-07 RX ADMIN — Medication 10 ML: at 14:47

## 2017-06-07 RX ADMIN — LOSARTAN POTASSIUM 25 MG: 25 TABLET, FILM COATED ORAL at 08:15

## 2017-06-07 RX ADMIN — INSULIN GLARGINE 20 UNITS: 100 INJECTION, SOLUTION SUBCUTANEOUS at 21:33

## 2017-06-07 RX ADMIN — CARVEDILOL 3.12 MG: 3.12 TABLET, FILM COATED ORAL at 08:14

## 2017-06-07 RX ADMIN — INSULIN LISPRO 8 UNITS: 100 INJECTION, SOLUTION INTRAVENOUS; SUBCUTANEOUS at 08:07

## 2017-06-07 RX ADMIN — INSULIN LISPRO 8 UNITS: 100 INJECTION, SOLUTION INTRAVENOUS; SUBCUTANEOUS at 17:19

## 2017-06-07 RX ADMIN — IPRATROPIUM BROMIDE AND ALBUTEROL SULFATE 3 ML: .5; 3 SOLUTION RESPIRATORY (INHALATION) at 13:51

## 2017-06-07 RX ADMIN — Medication 10 ML: at 21:34

## 2017-06-07 RX ADMIN — BUMETANIDE 1 MG: 1 TABLET ORAL at 08:15

## 2017-06-07 RX ADMIN — CARVEDILOL 3.12 MG: 3.12 TABLET, FILM COATED ORAL at 17:19

## 2017-06-07 RX ADMIN — Medication 10 ML: at 11:04

## 2017-06-07 RX ADMIN — HEPARIN SODIUM 5000 UNITS: 5000 INJECTION, SOLUTION INTRAVENOUS; SUBCUTANEOUS at 22:05

## 2017-06-07 RX ADMIN — PREDNISONE 10 MG: 5 TABLET ORAL at 17:19

## 2017-06-07 RX ADMIN — HEPARIN SODIUM 5000 UNITS: 5000 INJECTION, SOLUTION INTRAVENOUS; SUBCUTANEOUS at 06:58

## 2017-06-07 RX ADMIN — INSULIN LISPRO 4 UNITS: 100 INJECTION, SOLUTION INTRAVENOUS; SUBCUTANEOUS at 21:32

## 2017-06-07 RX ADMIN — IPRATROPIUM BROMIDE AND ALBUTEROL SULFATE 3 ML: .5; 3 SOLUTION RESPIRATORY (INHALATION) at 02:53

## 2017-06-07 RX ADMIN — PANTOPRAZOLE SODIUM 40 MG: 40 TABLET, DELAYED RELEASE ORAL at 08:13

## 2017-06-07 NOTE — PROGRESS NOTES
Daily Progress Note: 6/7/2017  Sherry Olvera MD    Assessment/Plan:   Cellulitis of right lower extremity (6/2/2017): trigger seems to be persistent scratching due to uremia. Lactate is normal.   --- Blood cultures positive for strep- urine and blood sens to Rocephin---Started IV Unasyn and Vancomycin at admission. stopped Vanc 6/4 - switched to Rocephin 6/6.     Asthma with acute exacerbation (6/2/2017):   ---started Duoneb  --- IV solumedrol - weaning to po  ---Mucinex prn   ---Albuterol as needed     CKD (chronic kidney disease) stage 4, GFR 15-29 ml/min ([facility]) (8/14/2016): has had intermittent hemodialysis before.   ---Consulted nephrology  ---Bumex as needed     HTN (hypertension), benign (8/14/2016): Due to severe illness. ---Initially held BP meds  ---Restarted Cozaar and Coreg; restarted Bumex  ---Restarted Amlodipine     DM type 2, uncontrolled, with renal complications ([facility] (2/81/7255): last A1c 7.2.   ---Monitor blood glucose. Appetite low.   ---Lantus and adjust as needed. ---SSI per protocol     Iron deficiency anemia (8/14/2016)/ Anemia due to CKD POA:   ---monitor Hgb closely.      Generalized anxiety disorder (11/3/2016)/ Anxiety and depression: lethargic at this time.   --- Hold all sedating medications     Hyperlipidemia: hold Lipitor     RAI on CPAP: resume CPAP at night      Physical debility POA: she is totally dependent on .   ---Consulted PT, OT.   ---Needs SNF    UTI  ---Culture gram neg rods - switched to Rocephin      Code Status: Full       Problem List:  Problem List as of 6/7/2017  Date Reviewed: 6/2/2017          Codes Class Noted - Resolved    * (Principal)Cellulitis of right lower extremity ICD-10-CM: L03.115  ICD-9-CM: 682.6  6/2/2017 - Present        Asthma with acute exacerbation ICD-10-CM: J45.901  ICD-9-CM: 493.92  6/2/2017 - Present        Asthma ICD-10-CM: J45.909  ICD-9-CM: 493.90  Unknown - Present        Hyperlipidemia ICD-10-CM: E78.5  ICD-9-CM: 272.4  Unknown - Present        RAI on CPAP ICD-10-CM: G47.33, Z99.89  ICD-9-CM: 327.23, V46.8  Unknown - Present        Anxiety and depression ICD-10-CM: F41.9, F32.9  ICD-9-CM: 300.00, 311  Unknown - Present        Hypoglycemia ICD-10-CM: E16.2  ICD-9-CM: 251.2  2/11/2017 - Present        Chronic bilateral low back pain without sciatica ICD-10-CM: M54.5, G89.29  ICD-9-CM: 724.2, 338.29  11/3/2016 - Present        Generalized anxiety disorder ICD-10-CM: F41.1  ICD-9-CM: 300.02  11/3/2016 - Present        Acute renal failure (ARF) ([facility] ICD-10-CM: N17.9  ICD-9-CM: 584.9  8/14/2016 - Present        CKD (chronic kidney disease) stage 4, GFR 15-29 ml/min (HCC) (Chronic) ICD-10-CM: N18.4  ICD-9-CM: 585.4  8/14/2016 - Present        HTN (hypertension), benign (Chronic) ICD-10-CM: I10  ICD-9-CM: 401.1  8/14/2016 - Present        DM type 2, uncontrolled, with renal complications (HCC) (Chronic) ICD-10-CM: E11.29, E11.65  ICD-9-CM: 250.42  8/14/2016 - Present        Iron deficiency anemia (Chronic) ICD-10-CM: D50.9  ICD-9-CM: 280.9  8/14/2016 - Present        RESOLVED: CKD stage 4 due to type 2 diabetes mellitus ([facility] ICD-10-CM: E11.22, N18.4  ICD-9-CM: 250.40, 585.4  Unknown - 2/11/2017        RESOLVED: DM type 2 causing renal disease ([facility] ICD-10-CM: E11.29  ICD-9-CM: 250.40  Unknown - 2/11/2017        RESOLVED: Hyperkalemia ICD-10-CM: E87.5  ICD-9-CM: 276.7  11/3/2016 - 2/11/2017        RESOLVED: Elevated serum creatinine ICD-10-CM: R79.89  ICD-9-CM: 790.99  11/3/2016 - 2/11/2017        RESOLVED: Left leg cellulitis ICD-10-CM: L03.116  ICD-9-CM: 682.6  8/14/2016 - 2/11/2017        RESOLVED: Sepsis ([address] ICD-10-CM: A41.9  ICD-9-CM: 038.9, 995.91  8/14/2016 - 2/11/2017        RESOLVED: RAI (obstructive sleep apnea) (Chronic) ICD-10-CM: L50.39  ICD-9-CM: 327.23  8/14/2016 - 2/11/2017              HPI:   Ms. David Calle is a 80 y.o. female who is being admitted for cellulitis of right lower extremity + asthma exacerbation. Ms. Juan Dhillon presented to our Emergency Department today complaining of progressive weakness and a right lower extremity redness as well as fever and chills. She has a hx of CKD and has a persistent generalized body itching including the right lower extremity. She is bedridden and her  is her primary caregiver. She is too weak to give any meaningful hx and I have discussed with her  for collaborative hx. She was found to have extensive cellulitis right lower extremity with asthma exacerbation. She will be admitted to hospital for further management. (Dr Anders Jones)    6/3: Feeling lousy. Had temp of 101 last night. She is having more pain. Still weak. Poor appetite. Breathing is a little better. 6/4: Feeling a little better. Did not sleep with CPAP last night. Creatinine still greater than 3. Her BC are positive for strep and UC with gram neg rods. 6/5:  She reports she continues to improve. Blood culture pos for strep with best sensitivity Pen G and Rocephin but will cont Unasyn for now until sensitivity back from Urine culture. Wean solumedrol from 40 mg to 20 mg today and to po steroids tomorrow. Creat at 3.03.     6/6:  More alert and reports less pain. Urine and Blood can be treated with Rocephin and switched to Rocephin IV. Creat fairly stable at 3.05 today. Will wean steroids to po today. Will likely need SCF.     6/7: She reports she is gradually improving. Creat down to 2.89. Cellulitis nearly resolved. Continuing IV Rocephin for now. Will wean po steroids to 10 mg BID from 20 mg. Will likely need SCF.       Review of Systems:   A comprehensive review of systems was negative except for that written in the HPI.     Objective:   Physical Exam:     Visit Vitals    /63    Pulse 80    Temp 97.9 °F (36.6 °C)    Resp 16    Ht 5' 1\" (1.549 m)    Wt 127.3 kg (280 lb 9.6 oz)    SpO2 97%    BMI 53.02 kg/m2    O2 Flow Rate (L/min): 3 l/min O2 Device: Nasal cannula    Temp (24hrs), Av °F (36.7 °C), Min:97.7 °F (36.5 °C), Max:98.4 °F (36.9 °C)    701 - 1900  In: -   Out: 732 [BLBJV:362]   1901 -  07  In: 240 [P.O.:240]  Out: 8721 [IFEWQ:5666]    General:  Alert, cooperative, appears weak and fatigued,  Morbidly obese. Head:  Normocephalic, without obvious abnormality, atraumatic. Eyes:  Conjunctivae/corneas clear. Nose: Nares normal. Septum midline. Mucosa normal. No drainage or sinus tenderness. Throat: Lips, mucosa, and tongue normal. Teeth and gums normal.   Neck: Supple, symmetrical, trachea midline, no adenopathy, thyroid: no enlargement/tenderness/nodules, no carotid bruit and no JVD. Lungs:   Clear to auscultation bilaterally. Breath sounds are diminished throughout   Heart:  Regular rate and rhythm, S1, S2 normal, no murmur, click, rub or gallop. Abdomen:   Soft, non-tender. Bowel sounds normal. No masses,  No organomegaly. Scattered scabbed areas on her abdomen. Extremities: RLE with resolving erythema and warmth from the mid shin to her ankle, no cyanosis, 1+ edema. No calf tenderness or cords. Skin:  multiple excoriations are present on her abd and upper ext   Neurologic: CNII-XII intact. Alert and oriented X 3. Fine motor of hands and fingers normal.   equal.  No cogwheeling or rigidity. Gait not tested at this time. Sensation grossly normal to touch.   Gross motor of extremities normal.       Data Review:       Recent Days:  Recent Labs      17   0045  17   0044  17   1011   WBC  9.4  9.6  10.7   HGB  8.7*  8.8*  9.3*   HCT  28.7*  28.0*  29.2*   PLT  167  185  184     Recent Labs      17   0045  17   0044  17   0538   NA  141  141  141   K  4.1  3.9  4.1   CL  105  106  106   CO2  27  25  22   GLU  262*  284*  286*   BUN  121*  115*  108*   CREA  2.89*  3.05*  3.06*   CA  8.7  8.6  8.3*   MG   --   2.3   --    ALB  2.4*  2.3*  2.2*   TBILI  0.4  0.3  0.3   SGOT 21  21  23   ALT  21  21  21       24 Hour Results:  Recent Results (from the past 24 hour(s))   GLUCOSE, POC    Collection Time: 06/06/17 11:38 AM   Result Value Ref Range    Glucose (POC) 302 (H) 65 - 100 mg/dL    Performed by Wei Gilliam ([facility])    GLUCOSE, POC    Collection Time: 06/06/17  4:23 PM   Result Value Ref Range    Glucose (POC) 267 (H) 65 - 100 mg/dL    Performed by Wei Gilliam ([facility])    GLUCOSE, POC    Collection Time: 06/06/17  9:07 PM   Result Value Ref Range    Glucose (POC) 275 (H) 65 - 100 mg/dL    Performed by Kera Livingston (PCT)    CBC WITH AUTOMATED DIFF    Collection Time: 06/07/17 12:45 AM   Result Value Ref Range    WBC 9.4 3.6 - 11.0 K/uL    RBC 3.12 (L) 3.80 - 5.20 M/uL    HGB 8.7 (L) 11.5 - 16.0 g/dL    HCT 28.7 (L) 35.0 - 47.0 %    MCV 92.0 80.0 - 99.0 FL    MCH 27.9 26.0 - 34.0 PG    MCHC 30.3 30.0 - 36.5 g/dL    RDW 14.5 11.5 - 14.5 %    PLATELET 557 141 - 723 K/uL    NEUTROPHILS 91 (H) 32 - 75 %    LYMPHOCYTES 5 (L) 12 - 49 %    MONOCYTES 4 (L) 5 - 13 %    EOSINOPHILS 0 0 - 7 %    BASOPHILS 0 0 - 1 %    ABS. NEUTROPHILS 8.5 (H) 1.8 - 8.0 K/UL    ABS. LYMPHOCYTES 0.5 (L) 0.8 - 3.5 K/UL    ABS. MONOCYTES 0.4 0.0 - 1.0 K/UL    ABS. EOSINOPHILS 0.0 0.0 - 0.4 K/UL    ABS. BASOPHILS 0.0 0.0 - 0.1 K/UL    DF SMEAR SCANNED      RBC COMMENTS NORMOCYTIC, NORMOCHROMIC     METABOLIC PANEL, COMPREHENSIVE    Collection Time: 06/07/17 12:45 AM   Result Value Ref Range    Sodium 141 136 - 145 mmol/L    Potassium 4.1 3.5 - 5.1 mmol/L    Chloride 105 97 - 108 mmol/L    CO2 27 21 - 32 mmol/L    Anion gap 9 5 - 15 mmol/L    Glucose 262 (H) 65 - 100 mg/dL     (H) 6 - 20 MG/DL    Creatinine 2.89 (H) 0.55 - 1.02 MG/DL    BUN/Creatinine ratio 42 (H) 12 - 20      GFR est AA 20 (L) >60 ml/min/1.73m2    GFR est non-AA 16 (L) >60 ml/min/1.73m2    Calcium 8.7 8.5 - 10.1 MG/DL    Bilirubin, total 0.4 0.2 - 1.0 MG/DL    ALT (SGPT) 21 12 - 78 U/L    AST (SGOT) 21 15 - 37 U/L    Alk.  phosphatase 70 45 - 117 U/L    Protein, total 6.2 (L) 6.4 - 8.2 g/dL    Albumin 2.4 (L) 3.5 - 5.0 g/dL    Globulin 3.8 2.0 - 4.0 g/dL    A-G Ratio 0.6 (L) 1.1 - 2.2     GLUCOSE, POC    Collection Time: 06/07/17  7:23 AM   Result Value Ref Range    Glucose (POC) 258 (H) 65 - 100 mg/dL    Performed by Ro Keller (PCT)        Medications reviewed  Current Facility-Administered Medications   Medication Dose Route Frequency    insulin lispro (HUMALOG) injection   SubCUTAneous AC&HS    predniSONE (DELTASONE) tablet 20 mg  20 mg Oral BID WITH MEALS    cefTRIAXone (ROCEPHIN) 1 g in 0.9% sodium chloride (MBP/ADV) 50 mL  1 g IntraVENous Q24H    bumetanide (BUMEX) tablet 1 mg  1 mg Oral DAILY    nystatin (MYCOSTATIN) 100,000 unit/gram powder   Topical BID    amLODIPine (NORVASC) tablet 5 mg  5 mg Oral DAILY    sodium chloride (NS) flush 5-10 mL  5-10 mL IntraVENous Q8H    sodium chloride (NS) flush 5-10 mL  5-10 mL IntraVENous PRN    acetaminophen (TYLENOL) tablet 650 mg  650 mg Oral Q4H PRN    naloxone (NARCAN) injection 0.4 mg  0.4 mg IntraVENous PRN    prochlorperazine (COMPAZINE) injection 5 mg  5 mg IntraVENous Q8H PRN    glucose chewable tablet 16 g  4 Tab Oral PRN    dextrose (D50W) injection syrg 12.5-25 g  12.5-25 g IntraVENous PRN    glucagon (GLUCAGEN) injection 1 mg  1 mg IntraMUSCular PRN    acetaminophen (TYLENOL) tablet 1,000 mg  1,000 mg Oral BID PRN    insulin glargine (LANTUS) injection 20 Units  20 Units SubCUTAneous QHS    losartan (COZAAR) tablet 25 mg  25 mg Oral DAILY    carvedilol (COREG) tablet 3.125 mg  3.125 mg Oral BID WITH MEALS    fluticasone-vilanterol (BREO ELLIPTA) 100mcg-25mcg/puff  1 Puff Inhalation DAILY    pantoprazole (PROTONIX) tablet 40 mg  40 mg Oral DAILY    sodium chloride (NS) flush 5-10 mL  5-10 mL IntraVENous PRN    heparin (porcine) injection 5,000 Units  5,000 Units SubCUTAneous Q8H    albuterol-ipratropium (DUO-NEB) 2.5 MG-0.5 MG/3 ML  3 mL Nebulization Q6H RT     Care Plan discussed with: Patient and Nurse  Total time spent with patient and review of records: 30 minutes.   Anibal Lancaster MD

## 2017-06-07 NOTE — PROGRESS NOTES
[address]  YOB: 1945          Assessment & Plan:   1. Stable stage 4-5 chronic kidney disease. · Cr stable, BUN worse ( due to steroids)  · Non oliguric  · Resumed loops 6 5 17,as BUN up, dc loops  · No HD needed  · Left avf  2. Edema. · Rt > left: due to Cellulitis  · Loops: hold for now  3. Cellulitis. · Antibiotics  4. Obesity  5. HTN  · Amlodipine  6.  Anemia  · Iron panel ok  · Add epo       Subjective:   CC:arf  HPI: Patient seen   ALFONSO is stable, making urine, edema++  SOB + better  ROS:as above otherwise neg  Current Facility-Administered Medications   Medication Dose Route Frequency    predniSONE (DELTASONE) tablet 10 mg  10 mg Oral BID WITH MEALS    insulin lispro (HUMALOG) injection   SubCUTAneous AC&HS    cefTRIAXone (ROCEPHIN) 1 g in 0.9% sodium chloride (MBP/ADV) 50 mL  1 g IntraVENous Q24H    bumetanide (BUMEX) tablet 1 mg  1 mg Oral DAILY    nystatin (MYCOSTATIN) 100,000 unit/gram powder   Topical BID    amLODIPine (NORVASC) tablet 5 mg  5 mg Oral DAILY    sodium chloride (NS) flush 5-10 mL  5-10 mL IntraVENous Q8H    sodium chloride (NS) flush 5-10 mL  5-10 mL IntraVENous PRN    acetaminophen (TYLENOL) tablet 650 mg  650 mg Oral Q4H PRN    naloxone (NARCAN) injection 0.4 mg  0.4 mg IntraVENous PRN    prochlorperazine (COMPAZINE) injection 5 mg  5 mg IntraVENous Q8H PRN    glucose chewable tablet 16 g  4 Tab Oral PRN    dextrose (D50W) injection syrg 12.5-25 g  12.5-25 g IntraVENous PRN    glucagon (GLUCAGEN) injection 1 mg  1 mg IntraMUSCular PRN    acetaminophen (TYLENOL) tablet 1,000 mg  1,000 mg Oral BID PRN    insulin glargine (LANTUS) injection 20 Units  20 Units SubCUTAneous QHS    losartan (COZAAR) tablet 25 mg  25 mg Oral DAILY    carvedilol (COREG) tablet 3.125 mg  3.125 mg Oral BID WITH MEALS    fluticasone-vilanterol (BREO ELLIPTA) 100mcg-25mcg/puff  1 Puff Inhalation DAILY  pantoprazole (PROTONIX) tablet 40 mg  40 mg Oral DAILY    sodium chloride (NS) flush 5-10 mL  5-10 mL IntraVENous PRN    heparin (porcine) injection 5,000 Units  5,000 Units SubCUTAneous Q8H    albuterol-ipratropium (DUO-NEB) 2.5 MG-0.5 MG/3 ML  3 mL Nebulization Q6H RT          Objective:     Vitals:  Blood pressure 168/63, pulse 80, temperature 97.9 °F (36.6 °C), resp. rate 16, height 5' 1\" (1.549 m), weight 127.3 kg (280 lb 9.6 oz), SpO2 97 %. Temp (24hrs), Av °F (36.7 °C), Min:97.7 °F (36.5 °C), Max:98.4 °F (36.9 °C)      Intake and Output:  701 - 1900  In: -   Out: 890 [PLEBS:237]  1901 -  07  In: 240 [P.O.:240]  Out: 4499 [ABHSO:2166]    Physical Exam:                Patient is intubated:  no    Physical Examination:   GENERAL ASSESSMENT: NAD  HEENT:Nontraumatic   CHEST: CTA  HEART: S1S2  ABDOMEN: Soft,NT,  :Hanks: y  EXTREMITY: EDEMA 2 rt  NEURO:Grossly non focal          ECG/rhythm:    Data Review      No results for input(s): TNIPOC in the last 72 hours. No lab exists for component: ITNL   No results for input(s): CPK, CKMB, TROIQ in the last 72 hours. Recent Labs      17   0045  17   0044  17   1011  17   0538   NA  141  141   --   141   K  4.1  3.9   --   4.1   CL  105  106   --   106   CO2  27  25   --   22   BUN  121*  115*   --   108*   CREA  2.89*  3.05*   --   3.06*   GLU  262*  284*   --   286*   MG   --   2.3   --    --    CA  8.7  8.6   --   8.3*   ALB  2.4*  2.3*   --   2.2*   WBC  9.4  9.6  10.7   --    HGB  8.7*  8.8*  9.3*   --    HCT  28.7*  28.0*  29.2*   --    PLT  167  185  184   --       No results for input(s): INR, PTP, APTT in the last 72 hours.     No lab exists for component: INREXT, INREXT  Needs: urine analysis, urine sodium, protein and creatinine  Lab Results   Component Value Date/Time    Sodium urine, random 50 2016 12:55 PM    Creatinine, urine 53.84 2016 12:55 PM         Discussed with:  Family, Colleague, Nursing    : Neyda Saunders MD  6/7/2017        [facility]:  [email or web address] Team [email or web address]. [employer]  Irvin Prophet office:  [address], [address] [place] [address],[address]  [place], [address] [place] [address]  Phone: [phone]  Fax :     [phone]    [facility] office:  [address] [facility], [address] [place] [address]  Phone - [phone]  Fax - [phone]

## 2017-06-07 NOTE — PROGRESS NOTES
Problem: Self Care Deficits Care Plan (Adult)  Goal: *Acute Goals and Plan of Care (Insert Text)  Occupational Therapy Goals  Initiated 6/5/2017  1. Patient will perform lower body dressing with maximum assistance within 7 day(s). 2. Patient will perform upper body bathing with minimal assistance/contact guard assist within 7 day(s). 3. Patient will perform upper body dressing with minimal assistance/contact guard assist within 7 day(s). 4. Patient will perform toilet transfers with moderate assistance within 7 day(s). 5. Patient will participate in upper extremity therapeutic exercise/activities with supervision/set-up for 10 minutes within 7 day(s). OCCUPATIONAL THERAPY TREATMENT  Patient: Medhat Cortes (50 y.o. female)  Date: 6/7/2017  Diagnosis: Cellulitis of right lower extremity Cellulitis of right lower extremity       Precautions: Fall, Skin  Chart, occupational therapy assessment, plan of care, and goals were reviewed. ASSESSMENT:  Pt agreeable to sit edge of bed in prep for ADL's.  present for treatment. Pt needed min assist to doff/don hospital gown. She was able to comb her hair with stand by assist.  stated he usually does this for her at home. Pt showed some initiative to scoot forward and towards the head of the bed. Recommend SNF but since she is refusing a SNF recommend akash lift for home, bariatric bedside commode and due to pts body habitus and safety for  and patient. Progression toward goals:  [ ]          Improving appropriately and progressing toward goals  [X]          Improving slowly and progressing toward goals  [ ]          Not making progress toward goals and plan of care will be adjusted       PLAN:  Patient continues to benefit from skilled intervention to address the above impairments. Continue treatment per established plan of care.   Discharge Recommendations: SNF  Further Equipment Recommendations for Discharge:  None        SUBJECTIVE: Patient stated I can't go get my haircut anymore. \"      OBJECTIVE DATA SUMMARY:   Cognitive/Behavioral Status:  Neurologic State: Alert  Orientation Level: Oriented X4  Cognition: Appropriate for age attention/concentration           Functional Mobility and Transfers for ADLs:              Bed Mobility:  Rolling: Moderate assistance;Assist x2  Supine to Sit: Moderate assistance  Sit to Supine: Maximum assistance  Scooting: Contact guard assistance                Transfers:           Balance:  Sitting - Static: Good (unsupported)  Sitting - Dynamic: Fair (occasional)  ADL Intervention:        Grooming  Brushing/Combing Hair: Stand-by assistance (sitting at edge of bed)  Sitting balance fair and with contact guard assist for functional tasks. Upper Body Dressing Assistance  Dressing Assistance: Minimum assistance  Hospital Gown: Minimum  assistance  Cues: Physical assistance              Therapeutic Exercises:   Encouraged pt to perform UE exercises to increase strength and endurance for functional tasks. Pain:  Pain Scale 1: Numeric (0 - 10)  Pain Intensity 1: 0                 Activity Tolerance:    Fair  Please refer to the flowsheet for vital signs taken during this treatment.   After treatment:   [ ]  Patient left in no apparent distress sitting up in chair  [X]  Patient left in no apparent distress in bed  [X]  Call bell left within reach  [ ]  Nursing notified  [X]  Caregiver present  [ ]  Bed alarm activated      COMMUNICATION/COLLABORATION:   The patients plan of care was discussed with: Physical Therapy Assistant and Occupational Therapist     LUISA Conklin  Time Calculation: 15 mins

## 2017-06-07 NOTE — PROGRESS NOTES
Bedside and Verbal shift change report given to Poonam Churchill (oncoming nurse) by Nilsa Garcia (offgoing nurse). Report included the following information SBAR, Kardex and Recent Results.

## 2017-06-07 NOTE — DIABETES MGMT
DTC Progress Note    Recommendations/ Comments: Chart reviewed d/t elevated BG's; pt on steroids. FBS 262mg/dL per AM lab. Pt has required 29 units of correction over the past 24 hours. If appropriate, please consider increasing Lantus to 25 units tonight.        Chart reviewed on [facility] for hyperglycemia.     Patient is a 80 y.o. female with known Type 2 Diabetes on insulin injections: regular: 6 units at Breakfast and Dinner, [place]- 26 units at home.     A1c:   Lab Results   Component Value Date/Time    Hemoglobin A1c 7.2 02/11/2017 11:17 AM    Hemoglobin A1c 5.9 11/03/2016 04:19 AM       Recent Glucose Results:   Lab Results   Component Value Date/Time     (H) 06/07/2017 12:45 AM    GLUCPOC 258 (H) 06/07/2017 07:23 AM    GLUCPOC 275 (H) 06/06/2017 09:07 PM    GLUCPOC 267 (H) 06/06/2017 04:23 PM        Lab Results   Component Value Date/Time    Creatinine 2.89 06/07/2017 12:45 AM     Estimated Creatinine Clearance: 22.8 mL/min (based on Cr of 2.89). Active Orders   Diet    DIET DIABETIC CONSISTENT CARB Regular        PO intake:   Patient Vitals for the past 72 hrs:   % Diet Eaten   06/06/17 1722 100 %   06/05/17 1902 70 %       Current hospital DM medication: Lantus 20 units; correctional lispro-normal sensitivity    Will continue to follow as needed.     Thank you    Albertina Linda, [address] [place] [address]

## 2017-06-07 NOTE — PROGRESS NOTES
Problem: Mobility Impaired (Adult and Pediatric)  Goal: *Acute Goals and Plan of Care (Insert Text)  Physical Therapy Goals  Initiated 6/3/2017  1. Patient will roll side to side in bed using bed rails with maximal assistance x 1 within 7 day(s). 2. Patient will move supine to sitting with maximum assistance x 2 within 7 days. 3. Patient will tolerate 5 minutes of sitting edge of bed with support under BLEs and SBA x 2 within 7 days. 4. After achieving goal #3 patient will attempt stance with EZ stand support and minimal assist x 2 within 7 days. 5. Within 7 days, patient will demonstrate during single treatment understanding of and tolerance to perform 10 reps of following BLE exercises: bilateral ankle pumps, bilateral heel slides within available ROM, gluteal sets and quad sets. PHYSICAL THERAPY TREATMENT  Patient: Erika Irby (59 y.o. female)  Date: 6/7/2017  Diagnosis: Cellulitis of right lower extremity Cellulitis of right lower extremity       Precautions: Fall, Skin      ASSESSMENT:  Pt received in bed,  at bedside, agreeable to participate with physical therapy with encouragement. Mod A for supine >sitting EOB. Performed BLE knee extension, heel/toe raises sitting EOB. Required encouragement to comb hair and change gown. Able to scoot toward [facility] with CGA and increased time to complete task. Pt fatigued with min activity. Pt returned to bed with max A sit>supine. Bed placed in semi-chair position. Pt continues to scratch skin throughout session even with constant reminders. Recommend SNF at this level of function, would require equipment, i.e hospital bed, akash lift  and 24hr assist if to return to home.    Progression toward goals:  [ ]    Improving appropriately and progressing toward goals  [X]    Improving slowly and progressing toward goals  [ ]    Not making progress toward goals and plan of care will be adjusted       PLAN:  Patient continues to benefit from skilled intervention to address the above impairments. Continue treatment per established plan of care. Discharge Recommendations:  [facility]  Further Equipment Recommendations for Discharge:  TBD       SUBJECTIVE:   Patient stated i get so tired.       OBJECTIVE DATA SUMMARY:   Critical Behavior:  Neurologic State: Alert, Appropriate for age  Orientation Level: Oriented X4  Cognition: Appropriate for age attention/concentration  Safety/Judgement: Awareness of environment, Insight into deficits  Functional Mobility Training:  Bed Mobility:  Rolling: Moderate assistance;Assist x2  Supine to Sit: Moderate assistance  Sit to Supine: Maximum assistance  Scooting: Contact guard assistance        Transfers:                                   Balance:  Sitting - Static: Good (unsupported)  Sitting - Dynamic: Fair (occasional)  Ambulation/Gait Training:                                                                   Stairs:                       Neuro Re-Education:     Therapeutic Exercises:   Sitting BLE thera ex   Pain:  Pain Scale 1: Numeric (0 - 10)  Pain Intensity 1: 0              Activity Tolerance:   Fair  Please refer to the flowsheet for vital signs taken during this treatment.   After treatment:   [ ]    Patient left in no apparent distress sitting up in chair  [X]    Patient left in no apparent distress in bed  [X]    Call bell left within reach  [X]    Nursing notified  [X]    Caregiver present  [X]    Bed alarm activated      COMMUNICATION/COLLABORATION:   The patients plan of care was discussed with: Certified Occupational Therapy Assistant and Registered Nurse     Nadeem Spicer   Time Calculation: 30 mins

## 2017-06-08 LAB
ALBUMIN SERPL BCP-MCNC: 2.3 G/DL (ref 3.5–5)
ALBUMIN/GLOB SERPL: 0.6 {RATIO} (ref 1.1–2.2)
ALP SERPL-CCNC: 76 U/L (ref 45–117)
ALT SERPL-CCNC: 28 U/L (ref 12–78)
ANION GAP BLD CALC-SCNC: 10 MMOL/L (ref 5–15)
AST SERPL W P-5'-P-CCNC: 17 U/L (ref 15–37)
BASOPHILS # BLD AUTO: 0 K/UL (ref 0–0.1)
BASOPHILS # BLD: 0 % (ref 0–1)
BILIRUB SERPL-MCNC: 0.3 MG/DL (ref 0.2–1)
BUN SERPL-MCNC: 113 MG/DL (ref 6–20)
BUN/CREAT SERPL: 43 (ref 12–20)
CALCIUM SERPL-MCNC: 8.6 MG/DL (ref 8.5–10.1)
CHLORIDE SERPL-SCNC: 107 MMOL/L (ref 97–108)
CO2 SERPL-SCNC: 27 MMOL/L (ref 21–32)
CREAT SERPL-MCNC: 2.64 MG/DL (ref 0.55–1.02)
EOSINOPHIL # BLD: 0 K/UL (ref 0–0.4)
EOSINOPHIL NFR BLD: 0 % (ref 0–7)
ERYTHROCYTE [DISTWIDTH] IN BLOOD BY AUTOMATED COUNT: 14.4 % (ref 11.5–14.5)
GLOBULIN SER CALC-MCNC: 3.8 G/DL (ref 2–4)
GLUCOSE BLD STRIP.AUTO-MCNC: 193 MG/DL (ref 65–100)
GLUCOSE BLD STRIP.AUTO-MCNC: 229 MG/DL (ref 65–100)
GLUCOSE BLD STRIP.AUTO-MCNC: 271 MG/DL (ref 65–100)
GLUCOSE BLD STRIP.AUTO-MCNC: 273 MG/DL (ref 65–100)
GLUCOSE SERPL-MCNC: 329 MG/DL (ref 65–100)
HCT VFR BLD AUTO: 29.4 % (ref 35–47)
HGB BLD-MCNC: 9 G/DL (ref 11.5–16)
LYMPHOCYTES # BLD AUTO: 4 % (ref 12–49)
LYMPHOCYTES # BLD: 0.4 K/UL (ref 0.8–3.5)
MCH RBC QN AUTO: 28.1 PG (ref 26–34)
MCHC RBC AUTO-ENTMCNC: 30.6 G/DL (ref 30–36.5)
MCV RBC AUTO: 91.9 FL (ref 80–99)
MONOCYTES # BLD: 0.4 K/UL (ref 0–1)
MONOCYTES NFR BLD AUTO: 4 % (ref 5–13)
NEUTS SEG # BLD: 9.9 K/UL (ref 1.8–8)
NEUTS SEG NFR BLD AUTO: 92 % (ref 32–75)
PLATELET # BLD AUTO: 172 K/UL (ref 150–400)
POTASSIUM SERPL-SCNC: 4.2 MMOL/L (ref 3.5–5.1)
PROT SERPL-MCNC: 6.1 G/DL (ref 6.4–8.2)
RBC # BLD AUTO: 3.2 M/UL (ref 3.8–5.2)
RBC MORPH BLD: ABNORMAL
SERVICE CMNT-IMP: ABNORMAL
SODIUM SERPL-SCNC: 144 MMOL/L (ref 136–145)
WBC # BLD AUTO: 10.7 K/UL (ref 3.6–11)

## 2017-06-08 PROCEDURE — 74011250636 HC RX REV CODE- 250/636: Performed by: FAMILY MEDICINE

## 2017-06-08 PROCEDURE — 77030011256 HC DRSG MEPILEX <16IN NO BORD MOLN -A

## 2017-06-08 PROCEDURE — 97535 SELF CARE MNGMENT TRAINING: CPT | Performed by: OCCUPATIONAL THERAPIST

## 2017-06-08 PROCEDURE — 74011636637 HC RX REV CODE- 636/637: Performed by: FAMILY MEDICINE

## 2017-06-08 PROCEDURE — 77010033678 HC OXYGEN DAILY

## 2017-06-08 PROCEDURE — 94640 AIRWAY INHALATION TREATMENT: CPT

## 2017-06-08 PROCEDURE — 85025 COMPLETE CBC W/AUTO DIFF WBC: CPT | Performed by: FAMILY MEDICINE

## 2017-06-08 PROCEDURE — 74011000258 HC RX REV CODE- 258: Performed by: FAMILY MEDICINE

## 2017-06-08 PROCEDURE — 80053 COMPREHEN METABOLIC PANEL: CPT | Performed by: FAMILY MEDICINE

## 2017-06-08 PROCEDURE — 36415 COLL VENOUS BLD VENIPUNCTURE: CPT | Performed by: FAMILY MEDICINE

## 2017-06-08 PROCEDURE — 74011250637 HC RX REV CODE- 250/637: Performed by: FAMILY MEDICINE

## 2017-06-08 PROCEDURE — 74011250636 HC RX REV CODE- 250/636: Performed by: INTERNAL MEDICINE

## 2017-06-08 PROCEDURE — 97530 THERAPEUTIC ACTIVITIES: CPT

## 2017-06-08 PROCEDURE — 97530 THERAPEUTIC ACTIVITIES: CPT | Performed by: OCCUPATIONAL THERAPIST

## 2017-06-08 PROCEDURE — 74011000250 HC RX REV CODE- 250: Performed by: INTERNAL MEDICINE

## 2017-06-08 PROCEDURE — 65660000000 HC RM CCU STEPDOWN

## 2017-06-08 PROCEDURE — 82962 GLUCOSE BLOOD TEST: CPT

## 2017-06-08 RX ORDER — PREDNISONE 5 MG/1
10 TABLET ORAL
Status: DISCONTINUED | OUTPATIENT
Start: 2017-06-08 | End: 2017-06-10 | Stop reason: HOSPADM

## 2017-06-08 RX ORDER — INSULIN GLARGINE 100 [IU]/ML
25 INJECTION, SOLUTION SUBCUTANEOUS
Status: DISCONTINUED | OUTPATIENT
Start: 2017-06-08 | End: 2017-06-09

## 2017-06-08 RX ADMIN — INSULIN LISPRO 8 UNITS: 100 INJECTION, SOLUTION INTRAVENOUS; SUBCUTANEOUS at 11:40

## 2017-06-08 RX ADMIN — NYSTATIN: 100000 POWDER TOPICAL at 09:01

## 2017-06-08 RX ADMIN — HEPARIN SODIUM 5000 UNITS: 5000 INJECTION, SOLUTION INTRAVENOUS; SUBCUTANEOUS at 09:01

## 2017-06-08 RX ADMIN — INSULIN GLARGINE 25 UNITS: 100 INJECTION, SOLUTION SUBCUTANEOUS at 21:43

## 2017-06-08 RX ADMIN — IPRATROPIUM BROMIDE AND ALBUTEROL SULFATE 3 ML: .5; 3 SOLUTION RESPIRATORY (INHALATION) at 13:17

## 2017-06-08 RX ADMIN — NYSTATIN: 100000 POWDER TOPICAL at 17:21

## 2017-06-08 RX ADMIN — Medication 10 ML: at 21:44

## 2017-06-08 RX ADMIN — CARVEDILOL 3.12 MG: 3.12 TABLET, FILM COATED ORAL at 09:01

## 2017-06-08 RX ADMIN — FLUTICASONE FUROATE AND VILANTEROL TRIFENATATE 1 PUFF: 100; 25 POWDER RESPIRATORY (INHALATION) at 09:02

## 2017-06-08 RX ADMIN — IPRATROPIUM BROMIDE AND ALBUTEROL SULFATE 3 ML: .5; 3 SOLUTION RESPIRATORY (INHALATION) at 07:59

## 2017-06-08 RX ADMIN — AMLODIPINE BESYLATE 5 MG: 5 TABLET ORAL at 09:01

## 2017-06-08 RX ADMIN — CARVEDILOL 3.12 MG: 3.12 TABLET, FILM COATED ORAL at 17:21

## 2017-06-08 RX ADMIN — INSULIN LISPRO 2 UNITS: 100 INJECTION, SOLUTION INTRAVENOUS; SUBCUTANEOUS at 22:06

## 2017-06-08 RX ADMIN — CEFTRIAXONE 1 G: 1 INJECTION, POWDER, FOR SOLUTION INTRAMUSCULAR; INTRAVENOUS at 11:40

## 2017-06-08 RX ADMIN — LOSARTAN POTASSIUM 25 MG: 25 TABLET, FILM COATED ORAL at 09:01

## 2017-06-08 RX ADMIN — INSULIN LISPRO 4 UNITS: 100 INJECTION, SOLUTION INTRAVENOUS; SUBCUTANEOUS at 17:20

## 2017-06-08 RX ADMIN — Medication 10 ML: at 15:33

## 2017-06-08 RX ADMIN — PANTOPRAZOLE SODIUM 40 MG: 40 TABLET, DELAYED RELEASE ORAL at 09:01

## 2017-06-08 RX ADMIN — IPRATROPIUM BROMIDE AND ALBUTEROL SULFATE 3 ML: .5; 3 SOLUTION RESPIRATORY (INHALATION) at 20:39

## 2017-06-08 RX ADMIN — HEPARIN SODIUM 5000 UNITS: 5000 INJECTION, SOLUTION INTRAVENOUS; SUBCUTANEOUS at 22:06

## 2017-06-08 RX ADMIN — IPRATROPIUM BROMIDE AND ALBUTEROL SULFATE 3 ML: .5; 3 SOLUTION RESPIRATORY (INHALATION) at 02:06

## 2017-06-08 RX ADMIN — INSULIN LISPRO 8 UNITS: 100 INJECTION, SOLUTION INTRAVENOUS; SUBCUTANEOUS at 09:00

## 2017-06-08 RX ADMIN — Medication 10 ML: at 06:00

## 2017-06-08 RX ADMIN — HEPARIN SODIUM 5000 UNITS: 5000 INJECTION, SOLUTION INTRAVENOUS; SUBCUTANEOUS at 15:33

## 2017-06-08 RX ADMIN — PREDNISONE 10 MG: 5 TABLET ORAL at 09:01

## 2017-06-08 NOTE — DIABETES MGMT
[facility]    Elevated Blood Glucose Note     Recommendations/ Comments: Chart reviewed for elevated blood glucose ( > 180 mg/dL x 2 in the past 24 hours) . Stella Bello is a 80 y.o. female with a past medical history significant for DM2 per Dr. Radha Montez MD's H&P dated 2017. Fasting glucose today: 271 mg/dL (per am POCT glucose). Required 36 units of correction in the last 24 hours. Noted Lantus increased from 20 units to 25 units - 1st dose tonight. Pt is additionally receiving po prednisolone 10 mg daily. If appropriate, please consider the followin. Intensifying correction scale by changing correction from normal sensitivity to insulin resistant (obese, steroids)  2. Hemoglobin A1C with next lab draw - last result was obtained > 3 months ago      Recent Glucose Results:   Lab Results   Component Value Date/Time     (H) 2017 12:42 AM    GLUCPOC 273 (H) 2017 11:19 AM    GLUCPOC 271 (H) 2017 07:18 AM    GLUCPOC 348 (H) 2017 08:49 PM        Hospital (inpatient) medications:  1. Correction Scale: Lispro (Humalog) Normal Sensitivity scale to cover for glucose > 139 mg/dL before meals and for glucose >199 at bedtime      2. Lantus 25 units at bedtime     Prior to admission medications: per past medical records  1. Insulin regular - 6 units before meals   2. Toujeo - 26 units daily      Lab Results   Component Value Date/Time    Hemoglobin A1c 7.2 2017 11:17 AM    Hemoglobin A1c 5.9 2016 04:19 AM     Estimated Creatinine Clearance: 24.9 mL/min (based on Cr of 2.64). Active Orders   Diet    DIET DIABETIC CONSISTENT CARB Regular        Thank you. Mamta Saha. Lianet Roy, BSN, MPH  [facility] [address] [place] [address]  [phone]    -For most hospitalized persons with hyperglycemia in the intensive care unit (ICU), a glucose range of 140 to 180 mg/dL is recommended, provided this target can be safely achieved. *  - For general medicine and surgery patients in non-ICU settings, a premeal glucose target <140 mg/dL and a random blood glucose <180 mg/dL are recommended. *    LIVIA Pacheco, Aminah Burr., Amanda Boyd, ... & Stefani Guy ([id number]). [employer] ENDOCRIN[employer] AND [employer] COLLEGE OF ENDOCRIN[employer]-CLINICAL PRACTICE GUIDELINES FOR DEVELOPING A DIABETES MELLITUS COMPREHENSIVE CARE PLAN-2015-EXECUTIVE SUMMARY: Complete guidelines are available at https://www. aace. com/publications/guidelines. Endocrine Practice, 21(4), [id number].

## 2017-06-08 NOTE — ROUTINE PROCESS
Bedside shift change report given to Francisca Jorge (oncoming nurse) by Vincent Corrigan (offgoing nurse). Report included the following information SBAR, Kardex, Intake/Output, MAR, Accordion and Recent Results.

## 2017-06-08 NOTE — PROGRESS NOTES
Occupational Therapy Goals  Initiated 6/5/2017  1. Patient will perform lower body dressing with maximum assistance within 7 day(s) - discontinue 6/8/2017.  2. Patient will perform upper body bathing with minimal assistance/contact guard assist within 7 day(s). 3. Patient will perform upper body dressing with minimal assistance/contact guard assist within 7 day(s). 4. Patient will perform toilet transfers with moderate assistance to heavy duty drop arm BSC within 7 day(s). 5. Patient will participate in upper extremity therapeutic exercise/activities with supervision/set-up for 10 minutes within 7 day(s). Occupational Therapy TREATMENT  Patient: Alondra Cronin (89 y.o. female)  Date: 6/8/2017  Diagnosis: Cellulitis of right lower extremity Cellulitis of right lower extremity       Precautions: Fall, Skin    ASSESSMENT:  Pt seen for an additional treatment session this week to trial a sit to stand mechanical lift for safe mobility as pt's  requirement for pt to be able to come home and he care for her is that she can toilet on a BSC and get in/out of the car. Pt's  present during session and was educated on functions of this type of lift related to ADLs and safety at home with mobility. He verbalized good understanding. At end of session pt was able to pull to stand with mod Ax 2 and flexed posture for approx 15 seconds x 2 trials. Honest discussion had with pt and her  about current physical assistance required to perform ADLs and BSC transfers, pt's motivation for increased independence, pt's plan on achieving 's requirement for him caring for her at home, home safety and fall prevention. Pt repeatedly stated she only wanted to go home, but could not verbalize a plan on how she was going to assist with mobility, standing and BSC transfers. When asked she would state \"I don't know. \"  At this time pt cannot safely perform [facility] transfers without assist of 2 people or use of a sit to stand mechanical lift. A basic akash lift will not allow for toileting on BSC while pt in the lift. Recommend SNF for safety at discharge. If pt and  decide for pt to return home then a sit to stand mechanical lift will be require for safe toileting on BSC. Progression toward goals:  []       Improving appropriately and progressing toward goals  [x]       Improving slowly and progressing toward goals  []       Not making progress toward goals and plan of care will be adjusted     PLAN:  Patient continues to benefit from skilled intervention to address the above impairments. Continue treatment per established plan of care. Discharge Recommendations:  [facility] Wass[facility]  Further Equipment Recommendations for Discharge:  Hospital bed, drop arm heavy duty BSC, sit to stand mechanical lift     SUBJECTIVE:   Patient stated I don't know how I am going to help with this stuff at home.     OBJECTIVE DATA SUMMARY:   Cognitive/Behavioral Status:  Neurologic State: Alert  Orientation Level: Oriented X4  Cognition: Decreased attention/concentration; Follows commands (requires repetition of commands and questions to respond)  Perception: Cues to maintain midline in sitting;Cues to maintain midline in standing; Tactile;Verbal;Visual  Perseveration: No perseveration noted  Safety/Judgement: Awareness of environment;Decreased insight into deficits; Decreased awareness of need for safety; Fall prevention    Functional Mobility and Transfers for ADLs:  Bed Mobility:  Rolling: Maximum assistance; Additional time;Assist x1 (max A to initiate then mod A to achieve sidelying Simultaneous filing. User may not have seen previous data.)  Supine to Sit: Maximum assistance; Additional time;Assist x1 (A mainly for upper body Simultaneous filing. User may not have seen previous data.)  Sit to Supine: Maximum assistance; Additional time;Assist x2 (A for upper body and lower body Simultaneous filing.  User may not have seen previous data.)  Scooting: Maximum assistance; Additional time;Assist x1    Transfers: Educated PTA and pt's  on use of sit to stand lift. Using this mechanical lift pt was able to stand within the lift x 2-3 minutes x 3 trials to increase WB through BLEs and upright standing for [facility] transfers. Sit to Stand: Moderate assistance;Assist x2 (using pull to stand from mechanical lift Simultaneous filing. User may not have seen previous data.)       Balance:  Sitting: Impaired  Sitting - Static: Fair (occasional) (Simultaneous filing. User may not have seen previous data.)  Sitting - Dynamic: Fair (occasional) (Simultaneous filing. User may not have seen previous data.)  Standing: Impaired; With support;Pull to stand  Standing - Static: Poor;Constant support  Standing - Dynamic : Poor    ADL Intervention:  Honest discussion had with pt and her  about current physical assistance required to perform ADLs and BSC transfers, pt's motivation for increased independence, pt's plan on achieving 's requirement for him caring for her at home, home safety and fall prevention. Pt repeatedly stated she only wanted to go home, but could not verbalize a plan on how she was going to assist with mobility, standing and BSC transfers. When asked she would state \"I don't know. \"     Lower Body Dressing Assistance  Socks: Total assistance (dependent) (pt  performs at home)  Cues: Don;Physical assistance    Toileting - pt was incontinent of bowel upon arrival  Toileting Assistance: Total assistance(dependent) (bed level rolling side to side)  Bladder Hygiene: Total assistance (dependent) (due to decreased mobility and body habitus)  Bowel Hygiene:  Total assistance (dependent) (due to decreased mobility and body habitus)  Cues: Tactile cues provided;Verbal cues provided;Visual cues provided  Adaptive Equipment:  (bed rail)    Cognitive Retraining  Safety/Judgement: Awareness of environment;Decreased insight into deficits; Decreased awareness of need for safety; Fall prevention    Pain:  Pain Scale 1: Numeric (0 - 10)  Pain Intensity 1: 0              Activity Tolerance:   Poor  Please refer to the flowsheet for vital signs taken during this treatment.   After treatment:   [] Patient left in no apparent distress sitting up in chair  [x] Patient left in no apparent distress in bed  [x] Call bell left within reach  [x] Nursing notified  [x] Caregiver present - pt's   [] Bed alarm activated    COMMUNICATION/COLLABORATION:   The patients plan of care was discussed with: Physical Therapy Assistant and Registered Nurse    Jose Luis Alejo OT  Time Calculation: 40 mins

## 2017-06-08 NOTE — PROGRESS NOTES
Problem: Falls - Risk of  Goal: *Absence of falls  Outcome: Progressing Towards Goal  Call bell within reach  Bed rails upx3  Frequent rounds      Goal: *Knowledge of fall prevention  Outcome: Progressing Towards Goal  Call bell within reach  Bed rails upx3  Frequent rounds

## 2017-06-08 NOTE — PROGRESS NOTES
Daily Progress Note: 6/8/2017  Uziel Alvarez MD    Assessment/Plan:   Cellulitis of right lower extremity (6/2/2017)/Sepsis (POA) due to Group G beta strep bacteremia, UTI, and RLE cellulitis; requiring fluid resuscitation and IV Vanc & IV Unasyn.: trigger seems to be persistent scratching due to uremia. Lactate is normal.    --- Blood cultures positive for strep- urine and blood sens to Rocephin---Started IV Unasyn and Vancomycin at admission. stopped Vanc 6/4 - switched to Rocephin 6/6.     Asthma with acute exacerbation (6/2/2017):   ---started Duoneb  --- IV solumedrol - weaned to po  ---Mucinex prn   ---Albuterol as needed     CKD (chronic kidney disease) stage 4, GFR 15-29 ml/min ([facility] (8/14/2016): has had intermittent hemodialysis before.   ---Consulted nephrology  ---Bumex as needed     HTN (hypertension), benign (8/14/2016): Due to severe illness. ---Initially held BP meds  ---Restarted Cozaar and Coreg; restarted Bumex  ---Restarted Amlodipine     DM type 2, uncontrolled, with renal complications ([facility] (8/90/6575): last A1c 7.2.   ---Monitor blood glucose. Appetite low.   ---Lantus and adjust as needed. ---SSI per protocol     Iron deficiency anemia (8/14/2016)/ Anemia due to CKD POA:   ---monitor Hgb closely.      Generalized anxiety disorder (11/3/2016)/ Anxiety and depression: lethargic at this time.   --- Hold all sedating medications     Hyperlipidemia: hold Lipitor     RAI on CPAP: resume CPAP at night      Physical debility POA: she is totally dependent on .   ---Consulted PT, OT.   ---Needs SNF    UTI  ---Culture gram neg rods - switched to Rocephin      Code Status: Full       Problem List:  Problem List as of 6/8/2017  Date Reviewed: 6/2/2017          Codes Class Noted - Resolved    * (Principal)Cellulitis of right lower extremity ICD-10-CM: L03.115  ICD-9-CM: 682.6  6/2/2017 - Present        Asthma with acute exacerbation ICD-10-CM: J45.901  ICD-9-CM: 493.92  6/2/2017 - Present        Asthma ICD-10-CM: J45.909  ICD-9-CM: 493.90  Unknown - Present        Hyperlipidemia ICD-10-CM: E78.5  ICD-9-CM: 272.4  Unknown - Present        RAI on CPAP ICD-10-CM: G47.33, Z99.89  ICD-9-CM: 327.23, V46.8  Unknown - Present        Anxiety and depression ICD-10-CM: F41.9, F32.9  ICD-9-CM: 300.00, 311  Unknown - Present        Hypoglycemia ICD-10-CM: E16.2  ICD-9-CM: 251.2  2/11/2017 - Present        Chronic bilateral low back pain without sciatica ICD-10-CM: M54.5, G89.29  ICD-9-CM: 724.2, 338.29  11/3/2016 - Present        Generalized anxiety disorder ICD-10-CM: F41.1  ICD-9-CM: 300.02  11/3/2016 - Present        Acute renal failure (ARF) ([facility] ICD-10-CM: N17.9  ICD-9-CM: 584.9  8/14/2016 - Present        CKD (chronic kidney disease) stage 4, GFR 15-29 ml/min (HCC) (Chronic) ICD-10-CM: N18.4  ICD-9-CM: 585.4  8/14/2016 - Present        HTN (hypertension), benign (Chronic) ICD-10-CM: I10  ICD-9-CM: 401.1  8/14/2016 - Present        DM type 2, uncontrolled, with renal complications (HCC) (Chronic) ICD-10-CM: E11.29, E11.65  ICD-9-CM: 250.42  8/14/2016 - Present        Iron deficiency anemia (Chronic) ICD-10-CM: D50.9  ICD-9-CM: 280.9  8/14/2016 - Present        RESOLVED: CKD stage 4 due to type 2 diabetes mellitus ([facility] ICD-10-CM: E11.22, N18.4  ICD-9-CM: 250.40, 585.4  Unknown - 2/11/2017        RESOLVED: DM type 2 causing renal disease ([facility] ICD-10-CM: E11.29  ICD-9-CM: 250.40  Unknown - 2/11/2017        RESOLVED: Hyperkalemia ICD-10-CM: E87.5  ICD-9-CM: 276.7  11/3/2016 - 2/11/2017        RESOLVED: Elevated serum creatinine ICD-10-CM: R79.89  ICD-9-CM: 790.99  11/3/2016 - 2/11/2017        RESOLVED: Left leg cellulitis ICD-10-CM: L03.116  ICD-9-CM: 682.6  8/14/2016 - 2/11/2017        RESOLVED: Sepsis ([facility] ICD-10-CM: A41.9  ICD-9-CM: 038.9, 995.91  8/14/2016 - 2/11/2017        RESOLVED: RAI (obstructive sleep apnea) (Chronic) ICD-10-CM: R12.99  ICD-9-CM: 327.23  8/14/2016 - 2/11/2017              HPI:   Ms. Juan Dhillon is a 80 y.o. female who is being admitted for cellulitis of right lower extremity + asthma exacerbation. Ms. Juan Dhillon presented to our Emergency Department today complaining of progressive weakness and a right lower extremity redness as well as fever and chills. She has a hx of CKD and has a persistent generalized body itching including the right lower extremity. She is bedridden and her  is her primary caregiver. She is too weak to give any meaningful hx and I have discussed with her  for collaborative hx. She was found to have extensive cellulitis right lower extremity with asthma exacerbation. She will be admitted to hospital for further management. (Dr Anders Jones)    6/3: Feeling lousy. Had temp of 101 last night. She is having more pain. Still weak. Poor appetite. Breathing is a little better. 6/4: Feeling a little better. Did not sleep with CPAP last night. Creatinine still greater than 3. Her BC are positive for strep and UC with gram neg rods. 6/5:  She reports she continues to improve. Blood culture pos for strep with best sensitivity Pen G and Rocephin but will cont Unasyn for now until sensitivity back from Urine culture. Wean solumedrol from 40 mg to 20 mg today and to po steroids tomorrow. Creat at 3.03.     6/6:  More alert and reports less pain. Urine and Blood can be treated with Rocephin and switched to Rocephin IV. Creat fairly stable at 3.05 today. Will wean steroids to po today. Will likely need SCF.     6/7: She reports she is gradually improving. Creat down to 2.89. Cellulitis nearly resolved. Continuing IV Rocephin for now. Will wean po steroids to 10 mg BID from 20 mg. Will likely need SCF.    6/8:  Continues to grad improve. PT/OT recommend SNF for rehab but pt refusing and only wants to go home. Case management will discuss with . Glucoses up with increased appetite and steroids. Increase Lantus to 25 U. Decreased Pred to 10mg/day. Continuing IV Rocephin for now.        Review of Systems:   A comprehensive review of systems was negative except for that written in the HPI. Objective:   Physical Exam:     Visit Vitals    /69    Pulse 80    Temp 97.6 °F (36.4 °C)    Resp 20    Ht 5' 1\" (1.549 m)    Wt 127.5 kg (281 lb 1.6 oz)    SpO2 98%    BMI 53.11 kg/m2    O2 Flow Rate (L/min): 4 l/min O2 Device: Nasal cannula    Temp (24hrs), Av.8 °F (36.6 °C), Min:97.3 °F (36.3 °C), Max:98.3 °F (36.8 °C)         190 -  0700  In: 240 [P.O.:240]  Out: 2275 [Urine:2275]    General:  Alert, cooperative, appears weak and fatigued,  Morbidly obese. Head:  Normocephalic, without obvious abnormality, atraumatic. Eyes:  Conjunctivae/corneas clear. Nose: Nares normal. Septum midline. Mucosa normal. No drainage or sinus tenderness. Throat: Lips, mucosa, and tongue normal. Teeth and gums normal.   Neck: Supple, symmetrical, trachea midline, no adenopathy, thyroid: no enlargement/tenderness/nodules, no carotid bruit and no JVD. Lungs:   Clear to auscultation bilaterally. Breath sounds are diminished throughout   Heart:  Regular rate and rhythm, S1, S2 normal, no murmur, click, rub or gallop. Abdomen:   Soft, non-tender. Bowel sounds normal. No masses,  No organomegaly. Scattered scabbed areas on her abdomen. Extremities: RLE with resolving erythema and warmth from the mid shin to her ankle, no cyanosis, 1+ edema. No calf tenderness or cords. Skin:  multiple excoriations are present on her abd and upper ext - improved   Neurologic: CNII-XII intact. Alert and oriented X 3. Fine motor of hands and fingers normal.   equal.  No cogwheeling or rigidity. Gait not tested at this time. Sensation grossly normal to touch.   Gross motor of extremities normal.       Data Review:       Recent Days:  Recent Labs      17   0042  17   0045  17   0044   WBC  10.7  9.4  9.6   HGB 9. 0*  8.7*  8.8*   HCT  29.4*  28.7*  28.0*   PLT  172  167  185     Recent Labs      06/08/17   0042  06/07/17   0045  06/06/17   0044   NA  144  141  141   K  4.2  4.1  3.9   CL  107  105  106   CO2  27  27  25   GLU  329*  262*  284*   BUN  113*  121*  115*   CREA  2.64*  2.89*  3.05*   CA  8.6  8.7  8.6   MG   --    --   2.3   ALB  2.3*  2.4*  2.3*   TBILI  0.3  0.4  0.3   SGOT  17  21  21   ALT  28  21  21       24 Hour Results:  Recent Results (from the past 24 hour(s))   GLUCOSE, POC    Collection Time: 06/07/17 11:49 AM   Result Value Ref Range    Glucose (POC) 286 (H) 65 - 100 mg/dL    Performed by Summer Beebe (PCT)    GLUCOSE, POC    Collection Time: 06/07/17  4:16 PM   Result Value Ref Range    Glucose (POC) 284 (H) 65 - 100 mg/dL    Performed by Summer Beebe (PCT)    GLUCOSE, POC    Collection Time: 06/07/17  8:49 PM   Result Value Ref Range    Glucose (POC) 348 (H) 65 - 100 mg/dL    Performed by Hay Pabon    CBC WITH AUTOMATED DIFF    Collection Time: 06/08/17 12:42 AM   Result Value Ref Range    WBC 10.7 3.6 - 11.0 K/uL    RBC 3.20 (L) 3.80 - 5.20 M/uL    HGB 9.0 (L) 11.5 - 16.0 g/dL    HCT 29.4 (L) 35.0 - 47.0 %    MCV 91.9 80.0 - 99.0 FL    MCH 28.1 26.0 - 34.0 PG    MCHC 30.6 30.0 - 36.5 g/dL    RDW 14.4 11.5 - 14.5 %    PLATELET 520 901 - 278 K/uL    NEUTROPHILS 92 (H) 32 - 75 %    LYMPHOCYTES 4 (L) 12 - 49 %    MONOCYTES 4 (L) 5 - 13 %    EOSINOPHILS 0 0 - 7 %    BASOPHILS 0 0 - 1 %    ABS. NEUTROPHILS 9.9 (H) 1.8 - 8.0 K/UL    ABS. LYMPHOCYTES 0.4 (L) 0.8 - 3.5 K/UL    ABS. MONOCYTES 0.4 0.0 - 1.0 K/UL    ABS. EOSINOPHILS 0.0 0.0 - 0.4 K/UL    ABS.  BASOPHILS 0.0 0.0 - 0.1 K/UL    RBC COMMENTS ANISOCYTOSIS  1+       METABOLIC PANEL, COMPREHENSIVE    Collection Time: 06/08/17 12:42 AM   Result Value Ref Range    Sodium 144 136 - 145 mmol/L    Potassium 4.2 3.5 - 5.1 mmol/L    Chloride 107 97 - 108 mmol/L    CO2 27 21 - 32 mmol/L    Anion gap 10 5 - 15 mmol/L    Glucose 329 (H) 65 - 100 mg/dL     (H) 6 - 20 MG/DL    Creatinine 2.64 (H) 0.55 - 1.02 MG/DL    BUN/Creatinine ratio 43 (H) 12 - 20      GFR est AA 22 (L) >60 ml/min/1.73m2    GFR est non-AA 18 (L) >60 ml/min/1.73m2    Calcium 8.6 8.5 - 10.1 MG/DL    Bilirubin, total 0.3 0.2 - 1.0 MG/DL    ALT (SGPT) 28 12 - 78 U/L    AST (SGOT) 17 15 - 37 U/L    Alk.  phosphatase 76 45 - 117 U/L    Protein, total 6.1 (L) 6.4 - 8.2 g/dL    Albumin 2.3 (L) 3.5 - 5.0 g/dL    Globulin 3.8 2.0 - 4.0 g/dL    A-G Ratio 0.6 (L) 1.1 - 2.2     GLUCOSE, POC    Collection Time: 06/08/17  7:18 AM   Result Value Ref Range    Glucose (POC) 271 (H) 65 - 100 mg/dL    Performed by Sol Wilson (PCT)        Medications reviewed  Current Facility-Administered Medications   Medication Dose Route Frequency    predniSONE (DELTASONE) tablet 10 mg  10 mg Oral BID WITH MEALS    epoetin casey (EPOGEN;PROCRIT) injection 10,000 Units  10,000 Units SubCUTAneous Q7D    insulin lispro (HUMALOG) injection   SubCUTAneous AC&HS    cefTRIAXone (ROCEPHIN) 1 g in 0.9% sodium chloride (MBP/ADV) 50 mL  1 g IntraVENous Q24H    nystatin (MYCOSTATIN) 100,000 unit/gram powder   Topical BID    amLODIPine (NORVASC) tablet 5 mg  5 mg Oral DAILY    sodium chloride (NS) flush 5-10 mL  5-10 mL IntraVENous Q8H    sodium chloride (NS) flush 5-10 mL  5-10 mL IntraVENous PRN    acetaminophen (TYLENOL) tablet 650 mg  650 mg Oral Q4H PRN    naloxone (NARCAN) injection 0.4 mg  0.4 mg IntraVENous PRN    prochlorperazine (COMPAZINE) injection 5 mg  5 mg IntraVENous Q8H PRN    glucose chewable tablet 16 g  4 Tab Oral PRN    dextrose (D50W) injection syrg 12.5-25 g  12.5-25 g IntraVENous PRN    glucagon (GLUCAGEN) injection 1 mg  1 mg IntraMUSCular PRN    acetaminophen (TYLENOL) tablet 1,000 mg  1,000 mg Oral BID PRN    insulin glargine (LANTUS) injection 20 Units  20 Units SubCUTAneous QHS    losartan (COZAAR) tablet 25 mg  25 mg Oral DAILY    carvedilol (COREG) tablet 3.125 mg  3.125 mg Oral BID WITH MEALS    fluticasone-vilanterol (BREO ELLIPTA) 100mcg-25mcg/puff  1 Puff Inhalation DAILY    pantoprazole (PROTONIX) tablet 40 mg  40 mg Oral DAILY    sodium chloride (NS) flush 5-10 mL  5-10 mL IntraVENous PRN    heparin (porcine) injection 5,000 Units  5,000 Units SubCUTAneous Q8H    albuterol-ipratropium (DUO-NEB) 2.5 MG-0.5 MG/3 ML  3 mL Nebulization Q6H RT     Care Plan discussed with: Patient and Nurse  Total time spent with patient and review of records: 30 minutes.   Susie Soliz MD

## 2017-06-08 NOTE — PROGRESS NOTES
1030: Patient  asking for ramirez to be discontinued. Notified Dr. Juliet Curry. OK to D/C ramirez. Will continue to monitor. 1045: Ramirez removed. 1330: Patient incontinent of stool and urine. Incontinence care performed. Bedside shift change report given to Tamica SANDY (oncoming nurse) by Tyshawn Gonsales RN (offgoing nurse). Report included the following information SBAR, Kardex, Intake/Output, MAR, Recent Results and Cardiac Rhythm NSR BBB.

## 2017-06-08 NOTE — PROGRESS NOTES
NUTRITION   RD Screen      Recommend: 1. Check current HgbA1c (last A1c: 7.2% 2/2017  2. Check Phosphorus level    Diet: Diabetic Consistent Carb  Body mass index is 53.11 kg/(m^2). Skin: no pressure ulcer, cellulitis of right lower extremity  PO Intake:   Patient Vitals for the past 100 hrs:   % Diet Eaten   06/06/17 1722 100 %   06/05/17 1902 70 %       Estimated Daily Nutrition Requirements:  Kcals:   1436 ( msje x 1.2 - 500 for weight loss with BMI: 53 indicating extreme obesity)               Protein:    88 gms ( 20% kcal intake)              Fluid:   1ml/kcal    Pt screened for nutrition risk d/t LOS. Pt is tolerating po diet very well w/ adequate po intake and meeting estimated nutrition needs. Admitted with weakness, SOB, right lower extremity swelling, currently with cellulitis R lower extremity/sepsis due to Group G beta strep bacteremia, UTI. Hx DM, CKD. Pt with prior admission to hospital with education from Registered Dietitian on current diet.  in room who does all the cooking, at this time no questions from pt or  regarding th diet. Pt reports good appetite and is eating well. Usually eats well at home. Encourage ADA diet compliance to assist with weight and BG control. Nutrition Diagnosis: overweight related to excess kcal intake as evidenced by BMI: 53  Nutrition Intervention: Encourage ADA diet compliance  Goal: PO intake to remain 75% or more at meals with BG control prior to discharge  Monitoring and Evaluation: Pt with prior diet education, no questions at this time. Monitor po intake during hospital stay,  DTC following pt.       Education & Discharge Needs:   [] Nutrition related discharge needs addressed:     [] Supplements (on d/c instruction &/or coupons provided)  [] Education    [x]No nutrition related discharge needs at this time     Cultural, [employer] and ethnic food preferences identified    [x]None   [] Yes     Stefany Castillo RD

## 2017-06-08 NOTE — PROGRESS NOTES
Problem: Mobility Impaired (Adult and Pediatric)  Goal: *Acute Goals and Plan of Care (Insert Text)  Physical Therapy Goals  Initiated 6/3/2017  1. Patient will roll side to side in bed using bed rails with maximal assistance x 1 within 7 day(s). 2. Patient will move supine to sitting with maximum assistance x 2 within 7 days. 3. Patient will tolerate 5 minutes of sitting edge of bed with support under BLEs and SBA x 2 within 7 days. 4. After achieving goal #3 patient will attempt stance with EZ stand support and minimal assist x 2 within 7 days. 5. Within 7 days, patient will demonstrate during single treatment understanding of and tolerance to perform 10 reps of following BLE exercises: bilateral ankle pumps, bilateral heel slides within available ROM, gluteal sets and quad sets. PHYSICAL THERAPY TREATMENT  Patient: Colten May (32 y.o. female)  Date: 6/8/2017  Diagnosis: Cellulitis of right lower extremity Cellulitis of right lower extremity       Precautions: Fall, Skin      ASSESSMENT:  Pt received in bed,  at bedside, agreeable to participate with PT. Pt incontinent of stool  Mod A x2 for rolling bbed mobility with total A for hygiene. Supine > sitting EOB with mod A x2 , primaritly to manage upper body and max encouragement. Using sit-to-stand mechanical lift. (MARY 3000) pt Tolerated two bouts of supported standing x 1 min each. With prolonged rest break between bouts. Pt then attempted pull to stand, with mod A x2 tolerate 2 attempts ~ 15 sec each. Pt becomes anxious with any attempts at standing and requires verbal cues to calm and for slow PLB. Curry Richards discussion with patient regarding discharge plan, although pt resisistant to SNF at this time, she is unable to verbalize how she will mobilize, transfer, etc. Upon return to home with husbands assistance.   Pt spouse's requirement is to transfer to [facility], pt currently unable to perform this task without mechanical lift or 2 person assist. Also spoke with RN regarding lift team to stand patient using mechanical sit to stand lift as patient's change in position. Recommend SNF for safety at discharge. If pt and  decide for pt to return home then a sit to stand mechanical lift will be require for safe toileting on BSC, akash lift will not allow for toileting, as well as hospital bed. Progression toward goals:  [ ]    Improving appropriately and progressing toward goals  [X]    Improving slowly and progressing toward goals  [ ]    Not making progress toward goals and plan of care will be adjusted       PLAN:  Patient continues to benefit from skilled intervention to address the above impairments. Continue treatment per established plan of care. Discharge Recommendations:  Ayan Wasserman  Further Equipment Recommendations for Discharge:  Mechanical sit to stand lift for toileting if return to home        SUBJECTIVE:   Patient stated Radha Riding don't know.       OBJECTIVE DATA SUMMARY:   Critical Behavior:  Neurologic State: Alert  Orientation Level: Oriented X4  Cognition: Decreased attention/concentration, Follows commands (requires repetition of commands and questions to respond)  Safety/Judgement: Awareness of environment, Decreased insight into deficits, Decreased awareness of need for safety, Fall prevention  Functional Mobility Training:  Bed Mobility:  Rolling: Maximum assistance; Additional time;Assist x1 (max A to initiate then mod A to achieve sidelying Simultaneous filing. User may not have seen previous data.)  Supine to Sit: Maximum assistance; Additional time;Assist x1 (A mainly for upper body Simultaneous filing. User may not have seen previous data.)  Sit to Supine: Maximum assistance; Additional time;Assist x2 (A for upper body and lower body Simultaneous filing. User may not have seen previous data.)  Scooting: Maximum assistance; Additional time;Assist x1        Transfers:  Sit to Stand:  Moderate assistance;Assist x2 (using pull to stand from mechanical lift Simultaneous filing. User may not have seen previous data.)           Bed to Chair: Total assistance (using mechanical lift)                    Balance:  Sitting: Impaired  Sitting - Static: Fair (occasional) (Simultaneous filing. User may not have seen previous data.)  Sitting - Dynamic: Fair (occasional) (Simultaneous filing. User may not have seen previous data.)  Standing: Impaired; With support;Pull to stand  Standing - Static: Poor;Constant support  Standing - Dynamic : Poor  Ambulation/Gait Training:                                                                   Stairs:                       Neuro Re-Education:     Therapeutic Exercises:      Pain:  Pain Scale 1: Numeric (0 - 10)  Pain Intensity 1: 0              Activity Tolerance:   Fair  Please refer to the flowsheet for vital signs taken during this treatment.   After treatment:   [ ]    Patient left in no apparent distress sitting up in chair  [X]    Patient left in no apparent distress in bed  [X]    Call bell left within reach  [X]    Nursing notified  [X]    Caregiver present  [ ]    Bed alarm activated      COMMUNICATION/COLLABORATION:   The patients plan of care was discussed with: Occupational Therapist and Registered Nurse     Saida Crekari   Time Calculation: 40 mins

## 2017-06-08 NOTE — PROGRESS NOTES
[address]  YOB: 1945          Assessment & Plan:   1. Stable stage 4-5 chronic kidney disease. · Cr  And BUN better BUN was worse due to steroids  · Non oliguric  · Off loops 6 717,as BUN up   · No HD needed  · Left avf  2. Edema. · Rt > left: due to Cellulitis  · Loops: hold for now  3. Cellulitis. · Antibiotics  4. Obesity  5. HTN  · Amlodipine  6.  Anemia  · Iron panel ok  · Add epo       Subjective:   CC:arf  HPI: Patient seen   ALFONSO is better, making urine, edema++  SOB + better  ROS:as above otherwise neg  Current Facility-Administered Medications   Medication Dose Route Frequency    insulin glargine (LANTUS) injection 25 Units  25 Units SubCUTAneous QHS    predniSONE (DELTASONE) tablet 10 mg  10 mg Oral DAILY WITH BREAKFAST    epoetin casey (EPOGEN;PROCRIT) injection 10,000 Units  10,000 Units SubCUTAneous Q7D    insulin lispro (HUMALOG) injection   SubCUTAneous AC&HS    cefTRIAXone (ROCEPHIN) 1 g in 0.9% sodium chloride (MBP/ADV) 50 mL  1 g IntraVENous Q24H    nystatin (MYCOSTATIN) 100,000 unit/gram powder   Topical BID    amLODIPine (NORVASC) tablet 5 mg  5 mg Oral DAILY    sodium chloride (NS) flush 5-10 mL  5-10 mL IntraVENous Q8H    sodium chloride (NS) flush 5-10 mL  5-10 mL IntraVENous PRN    acetaminophen (TYLENOL) tablet 650 mg  650 mg Oral Q4H PRN    naloxone (NARCAN) injection 0.4 mg  0.4 mg IntraVENous PRN    prochlorperazine (COMPAZINE) injection 5 mg  5 mg IntraVENous Q8H PRN    glucose chewable tablet 16 g  4 Tab Oral PRN    dextrose (D50W) injection syrg 12.5-25 g  12.5-25 g IntraVENous PRN    glucagon (GLUCAGEN) injection 1 mg  1 mg IntraMUSCular PRN    acetaminophen (TYLENOL) tablet 1,000 mg  1,000 mg Oral BID PRN    losartan (COZAAR) tablet 25 mg  25 mg Oral DAILY    carvedilol (COREG) tablet 3.125 mg  3.125 mg Oral BID WITH MEALS    fluticasone-vilanterol (BREO ELLIPTA) 100mcg-25mcg/puff  1 Puff Inhalation DAILY    pantoprazole (PROTONIX) tablet 40 mg  40 mg Oral DAILY    sodium chloride (NS) flush 5-10 mL  5-10 mL IntraVENous PRN    heparin (porcine) injection 5,000 Units  5,000 Units SubCUTAneous Q8H    albuterol-ipratropium (DUO-NEB) 2.5 MG-0.5 MG/3 ML  3 mL Nebulization Q6H RT          Objective:     Vitals:  Blood pressure 156/69, pulse 80, temperature 97.6 °F (36.4 °C), resp. rate 20, height 5' 1\" (1.549 m), weight 127.5 kg (281 lb 1.6 oz), SpO2 96 %. Temp (24hrs), Av.8 °F (36.6 °C), Min:97.3 °F (36.3 °C), Max:98.3 °F (36.8 °C)      Intake and Output:      1901 -  0700  In: 240 [P.O.:240]  Out: 1934 [Urine:2275]    Physical Exam:                Patient is intubated:  no    Physical Examination:   GENERAL ASSESSMENT: NAD  HEENT:Nontraumatic   CHEST: CTA  HEART: S1S2  ABDOMEN: Soft,NT,  :Hanks: y  EXTREMITY: EDEMA 2 rt  NEURO:Grossly non focal          ECG/rhythm:    Data Review      No results for input(s): TNIPOC in the last 72 hours. No lab exists for component: ITNL   No results for input(s): CPK, CKMB, TROIQ in the last 72 hours. Recent Labs      17   0042  17   0045  17   0044   NA  144  141  141   K  4.2  4.1  3.9   CL  107  105  106   CO2  27  27  25   BUN  113*  121*  115*   CREA  2.64*  2.89*  3.05*   GLU  329*  262*  284*   MG   --    --   2.3   CA  8.6  8.7  8.6   ALB  2.3*  2.4*  2.3*   WBC  10.7  9.4  9.6   HGB  9.0*  8.7*  8.8*   HCT  29.4*  28.7*  28.0*   PLT  172  167  185      No results for input(s): INR, PTP, APTT in the last 72 hours.     No lab exists for component: INREXT, INREXT  Needs: urine analysis, urine sodium, protein and creatinine  Lab Results   Component Value Date/Time    Sodium urine, random 50 2016 12:55 PM    Creatinine, urine 53.84 2016 12:55 PM         Discussed with:  Family, Colleague, Nursing    : Melina Schaffer MD  2017        [facility]:  [email or web address]  Gregorio Mariee office:  [address] [place] [address], [address] [place] [address],[address]  [place], [address] [facility]  Phone: [phone]  Fax :     [phone]    [place] office:  [address] [facility]  [place]  Phone - [phone]  Fax - [phone]

## 2017-06-09 LAB
ALBUMIN SERPL BCP-MCNC: 2.3 G/DL (ref 3.5–5)
ALBUMIN/GLOB SERPL: 0.7 {RATIO} (ref 1.1–2.2)
ALP SERPL-CCNC: 70 U/L (ref 45–117)
ALT SERPL-CCNC: 25 U/L (ref 12–78)
ANION GAP BLD CALC-SCNC: 9 MMOL/L (ref 5–15)
AST SERPL W P-5'-P-CCNC: 20 U/L (ref 15–37)
BASOPHILS # BLD AUTO: 0 K/UL (ref 0–0.1)
BASOPHILS # BLD: 0 % (ref 0–1)
BILIRUB SERPL-MCNC: 0.3 MG/DL (ref 0.2–1)
BUN SERPL-MCNC: 109 MG/DL (ref 6–20)
BUN/CREAT SERPL: 46 (ref 12–20)
CALCIUM SERPL-MCNC: 8.4 MG/DL (ref 8.5–10.1)
CHLORIDE SERPL-SCNC: 106 MMOL/L (ref 97–108)
CO2 SERPL-SCNC: 27 MMOL/L (ref 21–32)
CREAT SERPL-MCNC: 2.39 MG/DL (ref 0.55–1.02)
EOSINOPHIL # BLD: 0.5 K/UL (ref 0–0.4)
EOSINOPHIL NFR BLD: 4 % (ref 0–7)
ERYTHROCYTE [DISTWIDTH] IN BLOOD BY AUTOMATED COUNT: 14.4 % (ref 11.5–14.5)
GLOBULIN SER CALC-MCNC: 3.5 G/DL (ref 2–4)
GLUCOSE BLD STRIP.AUTO-MCNC: 124 MG/DL (ref 65–100)
GLUCOSE BLD STRIP.AUTO-MCNC: 200 MG/DL (ref 65–100)
GLUCOSE BLD STRIP.AUTO-MCNC: 254 MG/DL (ref 65–100)
GLUCOSE BLD STRIP.AUTO-MCNC: 79 MG/DL (ref 65–100)
GLUCOSE BLD STRIP.AUTO-MCNC: 82 MG/DL (ref 65–100)
GLUCOSE SERPL-MCNC: 188 MG/DL (ref 65–100)
HCT VFR BLD AUTO: 27.8 % (ref 35–47)
HGB BLD-MCNC: 8.8 G/DL (ref 11.5–16)
LYMPHOCYTES # BLD AUTO: 7 % (ref 12–49)
LYMPHOCYTES # BLD: 0.8 K/UL (ref 0.8–3.5)
MCH RBC QN AUTO: 28.5 PG (ref 26–34)
MCHC RBC AUTO-ENTMCNC: 31.7 G/DL (ref 30–36.5)
MCV RBC AUTO: 90 FL (ref 80–99)
METAMYELOCYTES NFR BLD MANUAL: 2 %
MONOCYTES # BLD: 0.4 K/UL (ref 0–1)
MONOCYTES NFR BLD AUTO: 3 % (ref 5–13)
MYELOCYTES NFR BLD MANUAL: 1 %
NEUTS SEG # BLD: 9.9 K/UL (ref 1.8–8)
NEUTS SEG NFR BLD AUTO: 83 % (ref 32–75)
PLATELET # BLD AUTO: 191 K/UL (ref 150–400)
POTASSIUM SERPL-SCNC: 3.7 MMOL/L (ref 3.5–5.1)
PROT SERPL-MCNC: 5.8 G/DL (ref 6.4–8.2)
RBC # BLD AUTO: 3.09 M/UL (ref 3.8–5.2)
RBC MORPH BLD: ABNORMAL
SERVICE CMNT-IMP: ABNORMAL
SERVICE CMNT-IMP: NORMAL
SERVICE CMNT-IMP: NORMAL
SODIUM SERPL-SCNC: 142 MMOL/L (ref 136–145)
WBC # BLD AUTO: 11.9 K/UL (ref 3.6–11)

## 2017-06-09 PROCEDURE — 77030011256 HC DRSG MEPILEX <16IN NO BORD MOLN -A

## 2017-06-09 PROCEDURE — 82962 GLUCOSE BLOOD TEST: CPT

## 2017-06-09 PROCEDURE — 74011636637 HC RX REV CODE- 636/637: Performed by: FAMILY MEDICINE

## 2017-06-09 PROCEDURE — 80053 COMPREHEN METABOLIC PANEL: CPT | Performed by: FAMILY MEDICINE

## 2017-06-09 PROCEDURE — 85025 COMPLETE CBC W/AUTO DIFF WBC: CPT | Performed by: FAMILY MEDICINE

## 2017-06-09 PROCEDURE — 74011000250 HC RX REV CODE- 250: Performed by: INTERNAL MEDICINE

## 2017-06-09 PROCEDURE — 94640 AIRWAY INHALATION TREATMENT: CPT

## 2017-06-09 PROCEDURE — 74011250637 HC RX REV CODE- 250/637: Performed by: FAMILY MEDICINE

## 2017-06-09 PROCEDURE — 74011250636 HC RX REV CODE- 250/636: Performed by: FAMILY MEDICINE

## 2017-06-09 PROCEDURE — 74011000258 HC RX REV CODE- 258: Performed by: FAMILY MEDICINE

## 2017-06-09 PROCEDURE — 74011250636 HC RX REV CODE- 250/636: Performed by: INTERNAL MEDICINE

## 2017-06-09 PROCEDURE — 65660000000 HC RM CCU STEPDOWN

## 2017-06-09 PROCEDURE — 77010033678 HC OXYGEN DAILY

## 2017-06-09 PROCEDURE — 36415 COLL VENOUS BLD VENIPUNCTURE: CPT | Performed by: FAMILY MEDICINE

## 2017-06-09 RX ORDER — INSULIN GLARGINE 100 [IU]/ML
23 INJECTION, SOLUTION SUBCUTANEOUS
Status: DISCONTINUED | OUTPATIENT
Start: 2017-06-09 | End: 2017-06-10 | Stop reason: HOSPADM

## 2017-06-09 RX ORDER — NYSTATIN 100000 [USP'U]/ML
500000 SUSPENSION ORAL 4 TIMES DAILY
Status: DISCONTINUED | OUTPATIENT
Start: 2017-06-09 | End: 2017-06-10 | Stop reason: HOSPADM

## 2017-06-09 RX ADMIN — Medication 10 ML: at 14:00

## 2017-06-09 RX ADMIN — NYSTATIN 500000 UNITS: 100000 SUSPENSION ORAL at 12:16

## 2017-06-09 RX ADMIN — NYSTATIN: 100000 POWDER TOPICAL at 17:30

## 2017-06-09 RX ADMIN — AMLODIPINE BESYLATE 5 MG: 5 TABLET ORAL at 08:31

## 2017-06-09 RX ADMIN — CARVEDILOL 3.12 MG: 3.12 TABLET, FILM COATED ORAL at 08:31

## 2017-06-09 RX ADMIN — IPRATROPIUM BROMIDE AND ALBUTEROL SULFATE 3 ML: .5; 3 SOLUTION RESPIRATORY (INHALATION) at 07:45

## 2017-06-09 RX ADMIN — INSULIN LISPRO 3 UNITS: 100 INJECTION, SOLUTION INTRAVENOUS; SUBCUTANEOUS at 21:56

## 2017-06-09 RX ADMIN — FLUTICASONE FUROATE AND VILANTEROL TRIFENATATE 1 PUFF: 100; 25 POWDER RESPIRATORY (INHALATION) at 08:31

## 2017-06-09 RX ADMIN — IPRATROPIUM BROMIDE AND ALBUTEROL SULFATE 3 ML: .5; 3 SOLUTION RESPIRATORY (INHALATION) at 20:27

## 2017-06-09 RX ADMIN — INSULIN GLARGINE 23 UNITS: 100 INJECTION, SOLUTION SUBCUTANEOUS at 21:56

## 2017-06-09 RX ADMIN — NYSTATIN 500000 UNITS: 100000 SUSPENSION ORAL at 22:01

## 2017-06-09 RX ADMIN — LOSARTAN POTASSIUM 25 MG: 25 TABLET, FILM COATED ORAL at 08:31

## 2017-06-09 RX ADMIN — IPRATROPIUM BROMIDE AND ALBUTEROL SULFATE 3 ML: .5; 3 SOLUTION RESPIRATORY (INHALATION) at 14:53

## 2017-06-09 RX ADMIN — NYSTATIN 500000 UNITS: 100000 SUSPENSION ORAL at 17:30

## 2017-06-09 RX ADMIN — HEPARIN SODIUM 5000 UNITS: 5000 INJECTION, SOLUTION INTRAVENOUS; SUBCUTANEOUS at 22:02

## 2017-06-09 RX ADMIN — PANTOPRAZOLE SODIUM 40 MG: 40 TABLET, DELAYED RELEASE ORAL at 08:31

## 2017-06-09 RX ADMIN — INSULIN LISPRO 6 UNITS: 100 INJECTION, SOLUTION INTRAVENOUS; SUBCUTANEOUS at 16:07

## 2017-06-09 RX ADMIN — Medication 10 ML: at 21:57

## 2017-06-09 RX ADMIN — NYSTATIN: 100000 POWDER TOPICAL at 08:37

## 2017-06-09 RX ADMIN — Medication 10 ML: at 05:39

## 2017-06-09 RX ADMIN — HEPARIN SODIUM 5000 UNITS: 5000 INJECTION, SOLUTION INTRAVENOUS; SUBCUTANEOUS at 15:10

## 2017-06-09 RX ADMIN — HEPARIN SODIUM 5000 UNITS: 5000 INJECTION, SOLUTION INTRAVENOUS; SUBCUTANEOUS at 08:31

## 2017-06-09 RX ADMIN — CARVEDILOL 3.12 MG: 3.12 TABLET, FILM COATED ORAL at 16:03

## 2017-06-09 RX ADMIN — CEFTRIAXONE 1 G: 1 INJECTION, POWDER, FOR SOLUTION INTRAMUSCULAR; INTRAVENOUS at 11:15

## 2017-06-09 RX ADMIN — PREDNISONE 10 MG: 5 TABLET ORAL at 08:31

## 2017-06-09 NOTE — PROGRESS NOTES
0900: Patient family at bedside requesting more information on rehab options. Patient agreeable to rehab.    1030: Patient with white patch on her tongue. Notified Dr. Kailey Pollard. Orders received for nystatin swish and swallow 4 times a day. Also notified of low blood sugar of 79 this am. Lantus order changed to 23 units at bedtime. OK to transfer patient to remote telemetry. 1400: Incontinence care performed. Patient with small soft BM. Zinc cream applied. 1645: Patient with difficulty swallowing due to patches in mouth. Requesting soft food. Diet changed to mechanical soft. Will continue to monitor. 1745: Patient incontinent of urine. Incontinence care performed. Zinc cream applied to excoriation on bottom. TRANSFER - OUT REPORT:    Verbal report given to Diana Choudhury RN(name) on Anola Mas  being transferred to 5th floor(unit) for routine progression of care       Report consisted of patients Situation, Background, Assessment and   Recommendations(SBAR). Information from the following report(s) SBAR, Kardex, Intake/Output, MAR, Recent Results and Cardiac Rhythm NSR BBB was reviewed with the receiving nurse. Lines:   Peripheral IV 06/02/17 Right Wrist (Active)   Site Assessment Clean, dry, & intact 6/9/2017  3:08 PM   Phlebitis Assessment 0 6/9/2017  3:08 PM   Infiltration Assessment 0 6/9/2017  3:08 PM   Dressing Status Clean, dry, & intact 6/9/2017  3:08 PM   Dressing Type Transparent 6/9/2017  3:08 PM   Hub Color/Line Status Blue;Flushed;Capped 6/9/2017  3:08 PM   Action Taken Open ports on tubing capped 6/9/2017  5:39 AM   Alcohol Cap Used Yes 6/9/2017  3:08 PM        Opportunity for questions and clarification was provided.       Patient transported with:   Monitor  Patient-specific medications from Pharmacy  Registered Nurse

## 2017-06-09 NOTE — PROGRESS NOTES
2035 Pt was cleaned up from incontinence, purewick placed to collect urine . Turn team came and took pt out of bed.     2216 Pt watching TV.    0545 Pt rested well during night. Pt was washed up by RN and [employer]. Protective cream applied. Linen changed. Pt repositioned by RN and [place] [facility]. Bedside and Verbal shift change report given to Fany Lauren RN (oncoming nurse) by Letitia Tolbert RN (offgoing nurse). Report included the following information SBAR, Kardex, MAR and Cardiac Rhythm sinus rhythm BBB.

## 2017-06-09 NOTE — PROGRESS NOTES
6/9/2017   CM ADDENDUM:  Pt has been accepted by the [facility]. Bed is available on Saturday June 9.   RN, please call report to facility at [phone] and ask for [address]. Pt will be going into room 134A. Fax AVS, discharge summary, and current MAR to fax [phone]. Ambulance transport will be arranged with a.m pickup desired - 10 a.m has been requested. Nestor      6/9/2017   CARE MANAGEMENT NOTE:  (cross coverage)  EMR reviewed for clinical updates. CM met with pt and her  to discuss SNF as an option. Pt has been resistant but she now realizes that short term rehab would be beneficial.  Clinicals were faxed to [facility] per pt/spouse choice and await decision re: acceptance and bed availability.   Nestor

## 2017-06-09 NOTE — PROGRESS NOTES
[address] LISA Terry  YOB: 1945          Assessment & Plan:   1. Stable stage 4-5 chronic kidney disease. · Cr  And BUN better BUN was worse due to steroids  · Non oliguric  · Off loops 6 717,as BUN up : better now  · No HD needed  · Left avf  2. Edema. · Rt > left: due to Cellulitis  · Loops: hold for now  3. Cellulitis. · Antibiotics  4. Obesity  5. HTN  · Amlodipine  6.  Anemia  · Iron panel ok  · epo       Subjective:   CC:arf  HPI: Patient seen   ALFONSO is better, making urine, edema+ SOB + better  ROS:as above otherwise neg  Current Facility-Administered Medications   Medication Dose Route Frequency    insulin glargine (LANTUS) injection 25 Units  25 Units SubCUTAneous QHS    predniSONE (DELTASONE) tablet 10 mg  10 mg Oral DAILY WITH BREAKFAST    epoetin casey (EPOGEN;PROCRIT) injection 10,000 Units  10,000 Units SubCUTAneous Q7D    insulin lispro (HUMALOG) injection   SubCUTAneous AC&HS    cefTRIAXone (ROCEPHIN) 1 g in 0.9% sodium chloride (MBP/ADV) 50 mL  1 g IntraVENous Q24H    nystatin (MYCOSTATIN) 100,000 unit/gram powder   Topical BID    amLODIPine (NORVASC) tablet 5 mg  5 mg Oral DAILY    sodium chloride (NS) flush 5-10 mL  5-10 mL IntraVENous Q8H    sodium chloride (NS) flush 5-10 mL  5-10 mL IntraVENous PRN    acetaminophen (TYLENOL) tablet 650 mg  650 mg Oral Q4H PRN    naloxone (NARCAN) injection 0.4 mg  0.4 mg IntraVENous PRN    prochlorperazine (COMPAZINE) injection 5 mg  5 mg IntraVENous Q8H PRN    glucose chewable tablet 16 g  4 Tab Oral PRN    dextrose (D50W) injection syrg 12.5-25 g  12.5-25 g IntraVENous PRN    glucagon (GLUCAGEN) injection 1 mg  1 mg IntraMUSCular PRN    acetaminophen (TYLENOL) tablet 1,000 mg  1,000 mg Oral BID PRN    losartan (COZAAR) tablet 25 mg  25 mg Oral DAILY    carvedilol (COREG) tablet 3.125 mg  3.125 mg Oral BID WITH MEALS    fluticasone-vilanterol (BREO ELLIPTA) 100mcg-25mcg/puff  1 Puff Inhalation DAILY    pantoprazole (PROTONIX) tablet 40 mg  40 mg Oral DAILY    sodium chloride (NS) flush 5-10 mL  5-10 mL IntraVENous PRN    heparin (porcine) injection 5,000 Units  5,000 Units SubCUTAneous Q8H    albuterol-ipratropium (DUO-NEB) 2.5 MG-0.5 MG/3 ML  3 mL Nebulization Q6H RT          Objective:     Vitals:  Blood pressure 113/60, pulse 82, temperature 98.3 °F (36.8 °C), resp. rate 20, height 5' 1\" (1.549 m), weight 127.1 kg (280 lb 1.6 oz), SpO2 100 %. Temp (24hrs), Av.6 °F (37 °C), Min:98.3 °F (36.8 °C), Max:98.8 °F (37.1 °C)      Intake and Output:      1901 -  0700  In: 680 [P.O.:680]  Out: 1350 [Urine:1250]    Physical Exam:                Patient is intubated:  no    Physical Examination:   GENERAL ASSESSMENT: NAD  HEENT:Nontraumatic   CHEST: CTA  HEART: S1S2  ABDOMEN: Soft,NT,  EXTREMITY: EDEMA 2 rt, trace left  NEURO:Grossly non focal          ECG/rhythm:    Data Review      No results for input(s): TNIPOC in the last 72 hours. No lab exists for component: ITNL   No results for input(s): CPK, CKMB, TROIQ in the last 72 hours. Recent Labs      17   0013  17   0042  17   0045   NA  142  144  141   K  3.7  4.2  4.1   CL  106  107  105   CO2  27  27  27   BUN  109*  113*  121*   CREA  2.39*  2.64*  2.89*   GLU  188*  329*  262*   CA  8.4*  8.6  8.7   ALB  2.3*  2.3*  2.4*   WBC  11.9*  10.7  9.4   HGB  8.8*  9.0*  8.7*   HCT  27.8*  29.4*  28.7*   PLT  191  172  167      No results for input(s): INR, PTP, APTT in the last 72 hours.     No lab exists for component: INREXT, INREXT  Needs: urine analysis, urine sodium, protein and creatinine  Lab Results   Component Value Date/Time    Sodium urine, random 50 2016 12:55 PM    Creatinine, urine 53.84 2016 12:55 PM         Discussed with:  Family, Colleague, Nursing    : Sherman Lorenzo MD  2017        [facility]:  [email or web address]  Yamile George office:  [address], [address] [place] [address],[address]  [place], [address] [facility]  Phone: [phone]  Fax :     [phone]    [facility] office:  [address] [facility], [address]  Phone - [phone]  Fax - [phone]

## 2017-06-09 NOTE — PROGRESS NOTES
Daily Progress Note: 6/9/2017  Chloe Shields MD    Assessment/Plan:   Cellulitis of right lower extremity (6/2/2017)/Sepsis (POA) due to Group G beta strep bacteremia, UTI, and RLE cellulitis; requiring fluid resuscitation and IV Vanc & IV Unasyn.: trigger seems to be persistent scratching due to uremia. Lactate is normal.    --- Blood cultures positive for strep- urine and blood sens to Rocephin---Started IV Unasyn and Vancomycin at admission. stopped Vanc 6/4 - switched to Rocephin 6/6.     Asthma with acute exacerbation (6/2/2017):   ---started Duoneb  --- IV solumedrol - weaned to po  ---Mucinex prn   ---Albuterol as needed     CKD (chronic kidney disease) stage 4, GFR 15-29 ml/min ([facility] (8/14/2016): has had intermittent hemodialysis before.   ---Consulted nephrology  ---Bumex as needed     HTN (hypertension), benign (8/14/2016): Due to severe illness. ---Initially held BP meds  ---Restarted Cozaar and Coreg; restarted Bumex  ---Restarted Amlodipine     DM type 2, uncontrolled, with renal complications ([facility] (4/75/6533): last A1c 7.2.   ---Monitor blood glucose. Appetite low.   ---Lantus and adjust as needed. ---SSI per protocol     Iron deficiency anemia (8/14/2016)/ Anemia due to CKD POA:   ---monitor Hgb closely.      Generalized anxiety disorder (11/3/2016)/ Anxiety and depression: lethargic at this time.   --- Hold all sedating medications     Hyperlipidemia: hold Lipitor     RAI on CPAP: resume CPAP at night      Physical debility POA: she is totally dependent on .   ---Consulted PT, OT.   ---Needs SNF    UTI  ---Culture gram neg rods - switched to Rocephin      Code Status: Full       Problem List:  Problem List as of 6/9/2017  Date Reviewed: 6/2/2017          Codes Class Noted - Resolved    * (Principal)Cellulitis of right lower extremity ICD-10-CM: L03.115  ICD-9-CM: 682.6  6/2/2017 - Present        Asthma with acute exacerbation ICD-10-CM: J45.901  ICD-9-CM: 493.92  6/2/2017 - Present        Asthma ICD-10-CM: J45.909  ICD-9-CM: 493.90  Unknown - Present        Hyperlipidemia ICD-10-CM: E78.5  ICD-9-CM: 272.4  Unknown - Present        RAI on CPAP ICD-10-CM: G47.33, Z99.89  ICD-9-CM: 327.23, V46.8  Unknown - Present        Anxiety and depression ICD-10-CM: F41.9, F32.9  ICD-9-CM: 300.00, 311  Unknown - Present        Hypoglycemia ICD-10-CM: E16.2  ICD-9-CM: 251.2  2/11/2017 - Present        Chronic bilateral low back pain without sciatica ICD-10-CM: M54.5, G89.29  ICD-9-CM: 724.2, 338.29  11/3/2016 - Present        Generalized anxiety disorder ICD-10-CM: F41.1  ICD-9-CM: 300.02  11/3/2016 - Present        Acute renal failure (ARF) ([facility] ICD-10-CM: N17.9  ICD-9-CM: 584.9  8/14/2016 - Present        CKD (chronic kidney disease) stage 4, GFR 15-29 ml/min (HCC) (Chronic) ICD-10-CM: N18.4  ICD-9-CM: 585.4  8/14/2016 - Present        HTN (hypertension), benign (Chronic) ICD-10-CM: I10  ICD-9-CM: 401.1  8/14/2016 - Present        DM type 2, uncontrolled, with renal complications (HCC) (Chronic) ICD-10-CM: E11.29, E11.65  ICD-9-CM: 250.42  8/14/2016 - Present        Iron deficiency anemia (Chronic) ICD-10-CM: D50.9  ICD-9-CM: 280.9  8/14/2016 - Present        RESOLVED: CKD stage 4 due to type 2 diabetes mellitus ([facility] ICD-10-CM: E11.22, N18.4  ICD-9-CM: 250.40, 585.4  Unknown - 2/11/2017        RESOLVED: DM type 2 causing renal disease ([facility] ICD-10-CM: E11.29  ICD-9-CM: 250.40  Unknown - 2/11/2017        RESOLVED: Hyperkalemia ICD-10-CM: E87.5  ICD-9-CM: 276.7  11/3/2016 - 2/11/2017        RESOLVED: Elevated serum creatinine ICD-10-CM: R79.89  ICD-9-CM: 790.99  11/3/2016 - 2/11/2017        RESOLVED: Left leg cellulitis ICD-10-CM: L03.116  ICD-9-CM: 682.6  8/14/2016 - 2/11/2017        RESOLVED: Sepsis ([facility] ICD-10-CM: A41.9  ICD-9-CM: 038.9, 995.91  8/14/2016 - 2/11/2017        RESOLVED: RAI (obstructive sleep apnea) (Chronic) ICD-10-CM: G63.20  ICD-9-CM: 327.23  8/14/2016 - 2/11/2017              HPI:   Ms. Dirk Tolbert is a 80 y.o. female who is being admitted for cellulitis of right lower extremity + asthma exacerbation. Ms. Dirk Tolbert presented to our Emergency Department today complaining of progressive weakness and a right lower extremity redness as well as fever and chills. She has a hx of CKD and has a persistent generalized body itching including the right lower extremity. She is bedridden and her  is her primary caregiver. She is too weak to give any meaningful hx and I have discussed with her  for collaborative hx. She was found to have extensive cellulitis right lower extremity with asthma exacerbation. She will be admitted to hospital for further management. (Dr Francisco Davis)    6/3: Feeling lousy. Had temp of 101 last night. She is having more pain. Still weak. Poor appetite. Breathing is a little better. 6/4: Feeling a little better. Did not sleep with CPAP last night. Creatinine still greater than 3. Her BC are positive for strep and UC with gram neg rods. 6/5:  She reports she continues to improve. Blood culture pos for strep with best sensitivity Pen G and Rocephin but will cont Unasyn for now until sensitivity back from Urine culture. Wean solumedrol from 40 mg to 20 mg today and to po steroids tomorrow. Creat at 3.03.     6/6:  More alert and reports less pain. Urine and Blood can be treated with Rocephin and switched to Rocephin IV. Creat fairly stable at 3.05 today. Will wean steroids to po today. Will likely need SCF.     6/7: She reports she is gradually improving. Creat down to 2.89. Cellulitis nearly resolved. Continuing IV Rocephin for now. Will wean po steroids to 10 mg BID from 20 mg. Will likely need SCF.    6/8:  Continues to grad improve. PT/OT recommend SNF for rehab but pt refusing and only wants to go home. Case management will discuss with . Glucoses up with increased appetite and steroids. Increase Lantus to 25 U. Decreased Pred to 10mg/day. Continuing IV Rocephin for now. : She is improving but still has not been out of bed.  accepts that she needs a SNF as she has not been able to ambulate. Leg has improved. Blood sugars have improved with insulin adjustment. Still on IV antibiotics but can change to po. Will ask case management to look for SNF.        Review of Systems:   A comprehensive review of systems was negative except for that written in the HPI. Objective:   Physical Exam:     Visit Vitals    /69 (BP 1 Location: Right arm, BP Patient Position: At rest)    Pulse 78    Temp 98.5 °F (36.9 °C)    Resp 21    Ht 5' 1\" (1.549 m)    Wt 280 lb 1.6 oz (127.1 kg)    SpO2 96%    BMI 52.92 kg/m2    O2 Flow Rate (L/min): 4 l/min O2 Device: CPAP mask    Temp (24hrs), Av.7 °F (37.1 °C), Min:98.5 °F (36.9 °C), Max:98.8 °F (37.1 °C)         1901 -  0700  In: 680 [P.O.:680]  Out: 1350 [Urine:1250]    General:  Alert, cooperative, appears weak and fatigued,  Morbidly obese. Head:  Normocephalic, without obvious abnormality, atraumatic. Eyes:  Conjunctivae/corneas clear. Nose: Nares normal. Septum midline. Mucosa normal. No drainage or sinus tenderness. Throat: Lips, mucosa, and tongue normal. Teeth and gums normal.   Neck: Supple, symmetrical, trachea midline, no adenopathy, thyroid: no enlargement/tenderness/nodules, no carotid bruit and no JVD. Lungs:   Clear to auscultation bilaterally. Breath sounds are diminished throughout   Heart:  Regular rate and rhythm, S1, S2 normal, no murmur, click, rub or gallop. Abdomen:   Soft, non-tender. Bowel sounds normal. No masses,  No organomegaly. Scattered scabbed areas on her abdomen. Extremities: RLE with resolving erythema and warmth from the mid shin to her ankle, no cyanosis, 1+ edema. No calf tenderness or cords. Skin:  multiple excoriations are present on her abd and upper ext - improved   Neurologic: CNII-XII intact. Alert and oriented X 3. Fine motor of hands and fingers normal.   equal.  No cogwheeling or rigidity. Gait not tested at this time. Sensation grossly normal to touch. Gross motor of extremities normal.       Data Review:       Recent Days:  Recent Labs      06/09/17   0013  06/08/17   0042  06/07/17   0045   WBC  11.9*  10.7  9.4   HGB  8.8*  9.0*  8.7*   HCT  27.8*  29.4*  28.7*   PLT  191  172  167     Recent Labs      06/09/17   0013  06/08/17   0042  06/07/17   0045   NA  142  144  141   K  3.7  4.2  4.1   CL  106  107  105   CO2  27  27  27   GLU  188*  329*  262*   BUN  109*  113*  121*   CREA  2.39*  2.64*  2.89*   CA  8.4*  8.6  8.7   ALB  2.3*  2.3*  2.4*   TBILI  0.3  0.3  0.4   SGOT  20  17  21   ALT  25  28  21       24 Hour Results:  Recent Results (from the past 24 hour(s))   GLUCOSE, POC    Collection Time: 06/08/17 11:19 AM   Result Value Ref Range    Glucose (POC) 273 (H) 65 - 100 mg/dL    Performed by Ruth Cuenca (PCT)    GLUCOSE, POC    Collection Time: 06/08/17  3:58 PM   Result Value Ref Range    Glucose (POC) 193 (H) 65 - 100 mg/dL    Performed by Ruth Cuenca (PCT)    GLUCOSE, POC    Collection Time: 06/08/17  9:01 PM   Result Value Ref Range    Glucose (POC) 229 (H) 65 - 100 mg/dL    Performed by Mukesh Daniel (PCT)    CBC WITH AUTOMATED DIFF    Collection Time: 06/09/17 12:13 AM   Result Value Ref Range    WBC 11.9 (H) 3.6 - 11.0 K/uL    RBC 3.09 (L) 3.80 - 5.20 M/uL    HGB 8.8 (L) 11.5 - 16.0 g/dL    HCT 27.8 (L) 35.0 - 47.0 %    MCV 90.0 80.0 - 99.0 FL    MCH 28.5 26.0 - 34.0 PG    MCHC 31.7 30.0 - 36.5 g/dL    RDW 14.4 11.5 - 14.5 %    PLATELET 869 041 - 009 K/uL    NEUTROPHILS 83 (H) 32 - 75 %    LYMPHOCYTES 7 (L) 12 - 49 %    MONOCYTES 3 (L) 5 - 13 %    EOSINOPHILS 4 0 - 7 %    BASOPHILS 0 0 - 1 %    METAMYELOCYTES 2 (H) 0 %    MYELOCYTES 1 (H) 0 %    ABS. NEUTROPHILS 9.9 (H) 1.8 - 8.0 K/UL    ABS. LYMPHOCYTES 0.8 0.8 - 3.5 K/UL    ABS.  MONOCYTES 0.4 0.0 - 1.0 K/UL ABS. EOSINOPHILS 0.5 (H) 0.0 - 0.4 K/UL    ABS. BASOPHILS 0.0 0.0 - 0.1 K/UL    RBC COMMENTS NORMOCYTIC, NORMOCHROMIC     METABOLIC PANEL, COMPREHENSIVE    Collection Time: 06/09/17 12:13 AM   Result Value Ref Range    Sodium 142 136 - 145 mmol/L    Potassium 3.7 3.5 - 5.1 mmol/L    Chloride 106 97 - 108 mmol/L    CO2 27 21 - 32 mmol/L    Anion gap 9 5 - 15 mmol/L    Glucose 188 (H) 65 - 100 mg/dL     (H) 6 - 20 MG/DL    Creatinine 2.39 (H) 0.55 - 1.02 MG/DL    BUN/Creatinine ratio 46 (H) 12 - 20      GFR est AA 24 (L) >60 ml/min/1.73m2    GFR est non-AA 20 (L) >60 ml/min/1.73m2    Calcium 8.4 (L) 8.5 - 10.1 MG/DL    Bilirubin, total 0.3 0.2 - 1.0 MG/DL    ALT (SGPT) 25 12 - 78 U/L    AST (SGOT) 20 15 - 37 U/L    Alk.  phosphatase 70 45 - 117 U/L    Protein, total 5.8 (L) 6.4 - 8.2 g/dL    Albumin 2.3 (L) 3.5 - 5.0 g/dL    Globulin 3.5 2.0 - 4.0 g/dL    A-G Ratio 0.7 (L) 1.1 - 2.2     GLUCOSE, POC    Collection Time: 06/09/17  6:53 AM   Result Value Ref Range    Glucose (POC) 79 65 - 100 mg/dL    Performed by Bruna De Jesus (PCT)    GLUCOSE, POC    Collection Time: 06/09/17  6:54 AM   Result Value Ref Range    Glucose (POC) 82 65 - 100 mg/dL    Performed by Bruna De Jesus (PCT)        Medications reviewed  Current Facility-Administered Medications   Medication Dose Route Frequency    insulin glargine (LANTUS) injection 25 Units  25 Units SubCUTAneous QHS    predniSONE (DELTASONE) tablet 10 mg  10 mg Oral DAILY WITH BREAKFAST    epoetin casey (EPOGEN;PROCRIT) injection 10,000 Units  10,000 Units SubCUTAneous Q7D    insulin lispro (HUMALOG) injection   SubCUTAneous AC&HS    cefTRIAXone (ROCEPHIN) 1 g in 0.9% sodium chloride (MBP/ADV) 50 mL  1 g IntraVENous Q24H    nystatin (MYCOSTATIN) 100,000 unit/gram powder   Topical BID    amLODIPine (NORVASC) tablet 5 mg  5 mg Oral DAILY    sodium chloride (NS) flush 5-10 mL  5-10 mL IntraVENous Q8H    sodium chloride (NS) flush 5-10 mL  5-10 mL IntraVENous PRN  acetaminophen (TYLENOL) tablet 650 mg  650 mg Oral Q4H PRN    naloxone (NARCAN) injection 0.4 mg  0.4 mg IntraVENous PRN    prochlorperazine (COMPAZINE) injection 5 mg  5 mg IntraVENous Q8H PRN    glucose chewable tablet 16 g  4 Tab Oral PRN    dextrose (D50W) injection syrg 12.5-25 g  12.5-25 g IntraVENous PRN    glucagon (GLUCAGEN) injection 1 mg  1 mg IntraMUSCular PRN    acetaminophen (TYLENOL) tablet 1,000 mg  1,000 mg Oral BID PRN    losartan (COZAAR) tablet 25 mg  25 mg Oral DAILY    carvedilol (COREG) tablet 3.125 mg  3.125 mg Oral BID WITH MEALS    fluticasone-vilanterol (BREO ELLIPTA) 100mcg-25mcg/puff  1 Puff Inhalation DAILY    pantoprazole (PROTONIX) tablet 40 mg  40 mg Oral DAILY    sodium chloride (NS) flush 5-10 mL  5-10 mL IntraVENous PRN    heparin (porcine) injection 5,000 Units  5,000 Units SubCUTAneous Q8H    albuterol-ipratropium (DUO-NEB) 2.5 MG-0.5 MG/3 ML  3 mL Nebulization Q6H RT     Care Plan discussed with: Patient and Spouse    Total time spent with patient and review of records: 30 minutes.   Libra Erickson MD

## 2017-06-10 VITALS
HEART RATE: 76 BPM | TEMPERATURE: 97.6 F | OXYGEN SATURATION: 97 % | SYSTOLIC BLOOD PRESSURE: 157 MMHG | RESPIRATION RATE: 20 BRPM | DIASTOLIC BLOOD PRESSURE: 70 MMHG | BODY MASS INDEX: 52.82 KG/M2 | WEIGHT: 279.76 LBS | HEIGHT: 61 IN

## 2017-06-10 LAB
ALBUMIN SERPL BCP-MCNC: 2.2 G/DL (ref 3.5–5)
ALBUMIN/GLOB SERPL: 0.6 {RATIO} (ref 1.1–2.2)
ALP SERPL-CCNC: 73 U/L (ref 45–117)
ALT SERPL-CCNC: 26 U/L (ref 12–78)
ANION GAP BLD CALC-SCNC: 11 MMOL/L (ref 5–15)
AST SERPL W P-5'-P-CCNC: 25 U/L (ref 15–37)
BASOPHILS # BLD AUTO: 0 K/UL (ref 0–0.1)
BASOPHILS # BLD: 0 % (ref 0–1)
BILIRUB SERPL-MCNC: 0.4 MG/DL (ref 0.2–1)
BUN SERPL-MCNC: 101 MG/DL (ref 6–20)
BUN/CREAT SERPL: 47 (ref 12–20)
CALCIUM SERPL-MCNC: 8.3 MG/DL (ref 8.5–10.1)
CHLORIDE SERPL-SCNC: 104 MMOL/L (ref 97–108)
CO2 SERPL-SCNC: 25 MMOL/L (ref 21–32)
CREAT SERPL-MCNC: 2.14 MG/DL (ref 0.55–1.02)
EOSINOPHIL # BLD: 0.2 K/UL (ref 0–0.4)
EOSINOPHIL NFR BLD: 2 % (ref 0–7)
ERYTHROCYTE [DISTWIDTH] IN BLOOD BY AUTOMATED COUNT: 14.7 % (ref 11.5–14.5)
GLOBULIN SER CALC-MCNC: 3.7 G/DL (ref 2–4)
GLUCOSE BLD STRIP.AUTO-MCNC: 245 MG/DL (ref 65–100)
GLUCOSE SERPL-MCNC: 205 MG/DL (ref 65–100)
HCT VFR BLD AUTO: 27.8 % (ref 35–47)
HGB BLD-MCNC: 8.9 G/DL (ref 11.5–16)
LYMPHOCYTES # BLD AUTO: 8 % (ref 12–49)
LYMPHOCYTES # BLD: 0.9 K/UL (ref 0.8–3.5)
MCH RBC QN AUTO: 28.7 PG (ref 26–34)
MCHC RBC AUTO-ENTMCNC: 32 G/DL (ref 30–36.5)
MCV RBC AUTO: 89.7 FL (ref 80–99)
METAMYELOCYTES NFR BLD MANUAL: 1 %
MONOCYTES # BLD: 0.3 K/UL (ref 0–1)
MONOCYTES NFR BLD AUTO: 3 % (ref 5–13)
MYELOCYTES NFR BLD MANUAL: 1 %
NEUTS SEG # BLD: 9.2 K/UL (ref 1.8–8)
NEUTS SEG NFR BLD AUTO: 85 % (ref 32–75)
NRBC # BLD: 0.13 K/UL (ref 0–0.01)
NRBC BLD-RTO: 1.2 PER 100 WBC
PLATELET # BLD AUTO: 216 K/UL (ref 150–400)
POTASSIUM SERPL-SCNC: 4 MMOL/L (ref 3.5–5.1)
PROT SERPL-MCNC: 5.9 G/DL (ref 6.4–8.2)
RBC # BLD AUTO: 3.1 M/UL (ref 3.8–5.2)
RBC MORPH BLD: ABNORMAL
SERVICE CMNT-IMP: ABNORMAL
SODIUM SERPL-SCNC: 140 MMOL/L (ref 136–145)
WBC # BLD AUTO: 10.8 K/UL (ref 3.6–11)
WBC NRBC COR # BLD: ABNORMAL 10*3/UL

## 2017-06-10 PROCEDURE — 74011250637 HC RX REV CODE- 250/637: Performed by: INTERNAL MEDICINE

## 2017-06-10 PROCEDURE — 74011250637 HC RX REV CODE- 250/637: Performed by: FAMILY MEDICINE

## 2017-06-10 PROCEDURE — 74011250636 HC RX REV CODE- 250/636: Performed by: INTERNAL MEDICINE

## 2017-06-10 PROCEDURE — 94640 AIRWAY INHALATION TREATMENT: CPT

## 2017-06-10 PROCEDURE — 87040 BLOOD CULTURE FOR BACTERIA: CPT | Performed by: FAMILY MEDICINE

## 2017-06-10 PROCEDURE — 77030011256 HC DRSG MEPILEX <16IN NO BORD MOLN -A

## 2017-06-10 PROCEDURE — 80053 COMPREHEN METABOLIC PANEL: CPT | Performed by: FAMILY MEDICINE

## 2017-06-10 PROCEDURE — 74011636637 HC RX REV CODE- 636/637: Performed by: FAMILY MEDICINE

## 2017-06-10 PROCEDURE — 36415 COLL VENOUS BLD VENIPUNCTURE: CPT | Performed by: FAMILY MEDICINE

## 2017-06-10 PROCEDURE — 74011000250 HC RX REV CODE- 250: Performed by: INTERNAL MEDICINE

## 2017-06-10 PROCEDURE — 82962 GLUCOSE BLOOD TEST: CPT

## 2017-06-10 PROCEDURE — 85025 COMPLETE CBC W/AUTO DIFF WBC: CPT | Performed by: FAMILY MEDICINE

## 2017-06-10 RX ORDER — AMLODIPINE BESYLATE 5 MG/1
5 TABLET ORAL 2 TIMES DAILY
Qty: 60 TAB | Refills: 0 | Status: SHIPPED | OUTPATIENT
Start: 2017-06-10 | End: 2017-11-02

## 2017-06-10 RX ORDER — AMLODIPINE BESYLATE 5 MG/1
5 TABLET ORAL 2 TIMES DAILY
Status: DISCONTINUED | OUTPATIENT
Start: 2017-06-10 | End: 2017-06-10 | Stop reason: HOSPADM

## 2017-06-10 RX ORDER — NYSTATIN 100000 [USP'U]/G
POWDER TOPICAL 2 TIMES DAILY
Qty: 1 BOTTLE | Refills: 0 | Status: SHIPPED
Start: 2017-06-10 | End: 2017-11-02

## 2017-06-10 RX ORDER — NYSTATIN 100000 [USP'U]/ML
500000 SUSPENSION ORAL 4 TIMES DAILY
Qty: 10 ML | Refills: 0 | Status: SHIPPED
Start: 2017-06-10 | End: 2017-11-02

## 2017-06-10 RX ORDER — CEPHALEXIN 250 MG/1
250 CAPSULE ORAL 3 TIMES DAILY
Qty: 21 CAP | Refills: 0 | Status: SHIPPED
Start: 2017-06-10 | End: 2017-06-17

## 2017-06-10 RX ORDER — PREDNISONE 10 MG/1
10 TABLET ORAL
Qty: 5 TAB | Refills: 0 | Status: SHIPPED
Start: 2017-06-10 | End: 2017-06-15

## 2017-06-10 RX ADMIN — NYSTATIN 500000 UNITS: 100000 SUSPENSION ORAL at 09:48

## 2017-06-10 RX ADMIN — PREDNISONE 10 MG: 5 TABLET ORAL at 09:48

## 2017-06-10 RX ADMIN — LOSARTAN POTASSIUM 25 MG: 25 TABLET, FILM COATED ORAL at 07:11

## 2017-06-10 RX ADMIN — INSULIN LISPRO 6 UNITS: 100 INJECTION, SOLUTION INTRAVENOUS; SUBCUTANEOUS at 07:30

## 2017-06-10 RX ADMIN — HEPARIN SODIUM 5000 UNITS: 5000 INJECTION, SOLUTION INTRAVENOUS; SUBCUTANEOUS at 09:48

## 2017-06-10 RX ADMIN — AMLODIPINE BESYLATE 5 MG: 5 TABLET ORAL at 07:11

## 2017-06-10 RX ADMIN — ACETAMINOPHEN 650 MG: 325 TABLET ORAL at 09:48

## 2017-06-10 RX ADMIN — CARVEDILOL 3.12 MG: 3.12 TABLET, FILM COATED ORAL at 07:11

## 2017-06-10 RX ADMIN — IPRATROPIUM BROMIDE AND ALBUTEROL SULFATE 3 ML: .5; 3 SOLUTION RESPIRATORY (INHALATION) at 08:24

## 2017-06-10 RX ADMIN — NYSTATIN: 100000 POWDER TOPICAL at 09:00

## 2017-06-10 RX ADMIN — FLUTICASONE FUROATE AND VILANTEROL TRIFENATATE 1 PUFF: 100; 25 POWDER RESPIRATORY (INHALATION) at 09:00

## 2017-06-10 RX ADMIN — Medication 10 ML: at 06:16

## 2017-06-10 RX ADMIN — IPRATROPIUM BROMIDE AND ALBUTEROL SULFATE 3 ML: .5; 3 SOLUTION RESPIRATORY (INHALATION) at 01:12

## 2017-06-10 RX ADMIN — PANTOPRAZOLE SODIUM 40 MG: 40 TABLET, DELAYED RELEASE ORAL at 09:48

## 2017-06-10 NOTE — DISCHARGE SUMMARY
Physician Discharge Summary     Patient ID:    Red Boyd  [id number]  80 y.o.  1945  Valentino Pick, MD    Admit date: 6/2/2017    Discharge date and time: 6/10/2017    Admission Diagnoses: Cellulitis of right lower extremity    Chronic Diagnoses:    Problem List as of 6/10/2017  Date Reviewed: 6/2/2017          Codes Class Noted - Resolved    * (Principal)Cellulitis of right lower extremity ICD-10-CM: L03.115  ICD-9-CM: 682.6  6/2/2017 - Present        Asthma with acute exacerbation ICD-10-CM: J45.901  ICD-9-CM: 493.92  6/2/2017 - Present        Asthma ICD-10-CM: J45.909  ICD-9-CM: 493.90  Unknown - Present        Hyperlipidemia ICD-10-CM: E78.5  ICD-9-CM: 272.4  Unknown - Present        RAI on CPAP ICD-10-CM: G47.33, Z99.89  ICD-9-CM: 327.23, V46.8  Unknown - Present        Anxiety and depression ICD-10-CM: F41.9, F32.9  ICD-9-CM: 300.00, 311  Unknown - Present        Hypoglycemia ICD-10-CM: E16.2  ICD-9-CM: 251.2  2/11/2017 - Present        Chronic bilateral low back pain without sciatica ICD-10-CM: M54.5, G89.29  ICD-9-CM: 724.2, 338.29  11/3/2016 - Present        Generalized anxiety disorder ICD-10-CM: F41.1  ICD-9-CM: 300.02  11/3/2016 - Present        Acute renal failure (ARF) (HCC) ICD-10-CM: N17.9  ICD-9-CM: 584.9  8/14/2016 - Present        CKD (chronic kidney disease) stage 4, GFR 15-29 ml/min (HCC) (Chronic) ICD-10-CM: N18.4  ICD-9-CM: 585.4  8/14/2016 - Present        HTN (hypertension), benign (Chronic) ICD-10-CM: I10  ICD-9-CM: 401.1  8/14/2016 - Present        DM type 2, uncontrolled, with renal complications (HCC) (Chronic) ICD-10-CM: E11.29, E11.65  ICD-9-CM: 250.42  8/14/2016 - Present        Iron deficiency anemia (Chronic) ICD-10-CM: D50.9  ICD-9-CM: 280.9  8/14/2016 - Present        RESOLVED: CKD stage 4 due to type 2 diabetes mellitus ([facility] ICD-10-CM: E11.22, N18.4  ICD-9-CM: 250.40, 585.4  Unknown - 2/11/2017        RESOLVED: DM type 2 causing renal disease ([facility] ICD-10-CM: E11.29  ICD-9-CM: 250.40  Unknown - 2/11/2017        RESOLVED: Hyperkalemia ICD-10-CM: E87.5  ICD-9-CM: 276.7  11/3/2016 - 2/11/2017        RESOLVED: Elevated serum creatinine ICD-10-CM: R79.89  ICD-9-CM: 790.99  11/3/2016 - 2/11/2017        RESOLVED: Left leg cellulitis ICD-10-CM: L03.116  ICD-9-CM: 682.6  8/14/2016 - 2/11/2017        RESOLVED: Sepsis ([facility] ICD-10-CM: A41.9  ICD-9-CM: 038.9, 995.91  8/14/2016 - 2/11/2017        RESOLVED: RAI (obstructive sleep apnea) (Chronic) ICD-10-CM: L82.68  ICD-9-CM: 327.23  8/14/2016 - 2/11/2017              Discharge Medications:   Current Discharge Medication List      START taking these medications    Details   nystatin (MYCOSTATIN) powder Apply  to affected area two (2) times a day. APPLY TO inner groins. Lower skin folds and perineal area. Qty: 1 Bottle, Refills: 0      nystatin (MYCOSTATIN) 100,000 unit/mL suspension Take 5 mL by mouth four (4) times daily. swish and spit  Qty: 10 mL, Refills: 0      predniSONE (DELTASONE) 10 mg tablet Take 1 Tab by mouth daily (with breakfast) for 5 doses. Qty: 5 Tab, Refills: 0      cephALEXin (KEFLEX) 250 mg capsule Take 1 Cap by mouth three (3) times daily for 7 days. Qty: 21 Cap, Refills: 0         CONTINUE these medications which have CHANGED    Details   amLODIPine (NORVASC) 5 mg tablet Take 1 Tab by mouth two (2) times a day. Qty: 60 Tab, Refills: 0         CONTINUE these medications which have NOT CHANGED    Details   promethazine (PHENERGAN) 25 mg tablet Take 25 mg by mouth every six (6) hours as needed for Nausea. citalopram (CELEXA) 40 mg tablet Take 40 mg by mouth daily. doxazosin (CARDURA) 2 mg tablet Take 2 mg by mouth daily. losartan (COZAAR) 25 mg tablet Take 25 mg by mouth daily. insulin glargine (TOUJEO SOLOSTAR) 300 unit/mL (1.5 mL) inpn 26 Units by SubCUTAneous route daily. multivitamin (ONE A DAY) tablet Take 1 Tab by mouth daily.       bumetanide (BUMEX) 2 mg tablet Take 1 Tab by mouth two (2) times a day. Qty: 100 Tab, Refills: 0      acetaminophen (TYLENOL) 500 mg tablet Take 1,000 mg by mouth two (2) times daily as needed for Pain. carvedilol (COREG) 3.125 mg tablet Take 3.125 mg by mouth two (2) times daily (with meals). fluticasone-vilanterol (BREO ELLIPTA) 100-25 mcg/dose inhaler Take 1 Puff by inhalation daily. Qty: 1 Inhaler, Refills: 0      calcium-cholecalciferol, D3, (CALTRATE 600+D) tablet Take 1 Tab by mouth two (2) times a day. fenofibrate nanocrystallized (TRICOR) 145 mg tablet Take 145 mg by mouth daily. loratadine (CLARITIN) 10 mg tablet Take 10 mg by mouth daily. Omeprazole delayed release (PRILOSEC D/R) 20 mg tablet Take 20 mg by mouth daily. calcium polycarbophil (FIBER LAXATIVE, CA POLYCARBO,) 625 mg tablet Take 625 mg by mouth daily. atorvastatin (LIPITOR) 40 mg tablet Take 40 mg by mouth nightly. insulin regular (HUMULIN R) 100 unit/mL injection 6 Units by SubCUTAneous route Before breakfast and dinner. ergocalciferol (ERGOCALCIFEROL) 50,000 unit capsule Take 50,000 Units by mouth every month. aspirin 81 mg chewable tablet Take 81 mg by mouth daily. senna-docusate (SENNA-C) 8.6-50 mg per tablet Take 2 Tabs by mouth every evening. albuterol (PROVENTIL HFA, VENTOLIN HFA) 90 mcg/actuation inhaler Take 2 Puffs by inhalation every four (4) hours as needed for Wheezing. STOP taking these medications       nystatin-triamcinolone (MYCOLOG) 100,000-0.1 unit/gram-% ointment Comments:   Reason for Stopping:         nystatin (MYCOSTATIN) 500,000 unit tab Comments:   Reason for Stopping:         ALPRAZolam (XANAX) 0.25 mg tablet Comments:   Reason for Stopping:         triamcinolone acetonide (KENALOG) 0.1 % ointment Comments:   Reason for Stopping:         mupirocin (BACTROBAN) 2 % ointment Comments:   Reason for Stopping: Follow up Care:    1.  Manisha Wan MD in 2 weeks    Diet: Diabetic Diet    Disposition:  SNF. Advanced Directive:    Discharge Exam:  [See today's progress note.]    CONSULTATIONS: Nephrology    Significant Diagnostic Studies:   Recent Labs      06/10/17   0450  06/09/17   0013   WBC  10.8  11.9*   HGB  8.9*  8.8*   HCT  27.8*  27.8*   PLT  216  191     Recent Labs      06/10/17   0450  06/09/17   0013  06/08/17   0042   NA  140  142  144   K  4.0  3.7  4.2   CL  104  106  107   CO2  25  27  27   BUN  101*  109*  113*   CREA  2.14*  2.39*  2.64*   GLU  205*  188*  329*   CA  8.3*  8.4*  8.6     Recent Labs      06/10/17   0450  06/09/17   0013  06/08/17   0042   SGOT  25  20  17   AP  73  70  76   TP  5.9*  5.8*  6.1*   ALB  2.2*  2.3*  2.3*   GLOB  3.7  3.5  3.8     No results for input(s): INR, PTP, APTT in the last 72 hours. No lab exists for component: INREXT   No results for input(s): FE, TIBC, PSAT, FERR in the last 72 hours. No results for input(s): PH, PCO2, PO2 in the last 72 hours. No results for input(s): CPK, CKMB in the last 72 hours. No lab exists for component: TROPONINI  No components found for: GLPOC  No results found for: TSH, TSH2, TSH3, TSHP, TSHELE, TT3, T3U, T3UP, FRT3, FT3, FT4, FT4P, T4, T4P, FT4T, TT7, TSHEXT          HOSPITAL COURSE & TREATMENT RENDERED:   1. Patient was admitted with LE edema/cellulitis and asthma exacerbation. She was treated with IV abx and steroids and neb treatments. Abx were weaned once cultures returned, she did have strep bacteremia as well. She improved with these interventions and by day of discharge was on room air without SOB or tachypnea. Legs were improved with continued edema but decreased erythema and warmth. She was seen in consult by renal due to acute on ckd. Cr improving daily by day of discharge. Chronic anemia was treated with epo injections. She will be discharged to SNF for continued rehab with close OP f/u.       Signed:  Jazzmine Sweeney MD  6/10/2017  9:20 AM

## 2017-06-10 NOTE — DISCHARGE INSTRUCTIONS
Cellulitis: Care Instructions  Your Care Instructions    Cellulitis is a skin infection. It often occurs after a break in the skin from a scrape, cut, bite, or puncture, or after a rash. The doctor has checked you carefully, but problems can develop later. If you notice any problems or new symptoms, get medical treatment right away. Follow-up care is a key part of your treatment and safety. Be sure to make and go to all appointments, and call your doctor if you are having problems. It's also a good idea to know your test results and keep a list of the medicines you take. How can you care for yourself at home? · Take your antibiotics as directed. Do not stop taking them just because you feel better. You need to take the full course of antibiotics. · Prop up the infected area on pillows to reduce pain and swelling. Try to keep the area above the level of your heart as often as you can. · If your doctor told you how to care for your wound, follow your doctor's instructions. If you did not get instructions, follow this general advice:  ¨ Wash the wound with clean water 2 times a day. Don't use hydrogen peroxide or alcohol, which can slow healing. ¨ You may cover the wound with a thin layer of petroleum jelly, such as Vaseline, and a nonstick bandage. ¨ Apply more petroleum jelly and replace the bandage as needed. · Be safe with medicines. Take pain medicines exactly as directed. ¨ If the doctor gave you a prescription medicine for pain, take it as prescribed. ¨ If you are not taking a prescription pain medicine, ask your doctor if you can take an over-the-counter medicine. To prevent cellulitis in the future  · Try to prevent cuts, scrapes, or other injuries to your skin. Cellulitis most often occurs where there is a break in the skin. · If you get a scrape, cut, mild burn, or bite, wash the wound with clean water as soon as you can to help avoid infection.  Don't use hydrogen peroxide or alcohol, which can slow healing. · If you have swelling in your legs (edema), support stockings and good skin care may help prevent leg sores and cellulitis. · Take care of your feet, especially if you have diabetes or other conditions that increase the risk of infection. Wear shoes and socks. Do not go barefoot. If you have athlete's foot or other skin problems on your feet, talk to your doctor about how to treat them. When should you call for help? Call your doctor now or seek immediate medical care if:  · You have signs that your infection is getting worse, such as:  ¨ Increased pain, swelling, warmth, or redness. ¨ Red streaks leading from the area. ¨ Pus draining from the area. ¨ A fever. · You get a rash. Watch closely for changes in your health, and be sure to contact your doctor if:  · You are not getting better after 1 day (24 hours). · You do not get better as expected. Where can you learn more? Go to http://trishJinnianastacia.info/. Colby Liang in the search box to learn more about \"Cellulitis: Care Instructions. \"  Current as of: 2016  Content Version: 11.2  © 7685-6780 [employer]. Care instructions adapted under license by [employer] (which disclaims liability or warranty for this information). If you have questions about a medical condition or this instruction, always ask your healthcare professional. [place] [address] any warranty or liability for your use of this information.     Patient Discharge Instructions    Abhi Trevino / [id number] : 1945    Admitted 2017 Discharged: 6/10/2017 9:19 AM     ACUTE DIAGNOSES:  Cellulitis of right lower extremity    CHRONIC MEDICAL DIAGNOSES:  Problem List as of 6/10/2017  Date Reviewed: 2017          Codes Class Noted - Resolved    * (Principal)Cellulitis of right lower extremity ICD-10-CM: L03.115  ICD-9-CM: 682.6  2017 - Present        Asthma with acute exacerbation ICD-10-CM: J45.901  ICD-9-CM: 493.92  6/2/2017 - Present        Asthma ICD-10-CM: J45.909  ICD-9-CM: 493.90  Unknown - Present        Hyperlipidemia ICD-10-CM: E78.5  ICD-9-CM: 272.4  Unknown - Present        RAI on CPAP ICD-10-CM: G47.33, Z99.89  ICD-9-CM: 327.23, V46.8  Unknown - Present        Anxiety and depression ICD-10-CM: F41.9, F32.9  ICD-9-CM: 300.00, 311  Unknown - Present        Hypoglycemia ICD-10-CM: E16.2  ICD-9-CM: 251.2  2/11/2017 - Present        Chronic bilateral low back pain without sciatica ICD-10-CM: M54.5, G89.29  ICD-9-CM: 724.2, 338.29  11/3/2016 - Present        Generalized anxiety disorder ICD-10-CM: F41.1  ICD-9-CM: 300.02  11/3/2016 - Present        Acute renal failure (ARF) ([facility] ICD-10-CM: N17.9  ICD-9-CM: 584.9  8/14/2016 - Present        CKD (chronic kidney disease) stage 4, GFR 15-29 ml/min (HCC) (Chronic) ICD-10-CM: N18.4  ICD-9-CM: 585.4  8/14/2016 - Present        HTN (hypertension), benign (Chronic) ICD-10-CM: I10  ICD-9-CM: 401.1  8/14/2016 - Present        DM type 2, uncontrolled, with renal complications (HCC) (Chronic) ICD-10-CM: E11.29, E11.65  ICD-9-CM: 250.42  8/14/2016 - Present        Iron deficiency anemia (Chronic) ICD-10-CM: D50.9  ICD-9-CM: 280.9  8/14/2016 - Present        RESOLVED: CKD stage 4 due to type 2 diabetes mellitus ([facility] ICD-10-CM: E11.22, N18.4  ICD-9-CM: 250.40, 585.4  Unknown - 2/11/2017        RESOLVED: DM type 2 causing renal disease ([facility] ICD-10-CM: E11.29  ICD-9-CM: 250.40  Unknown - 2/11/2017        RESOLVED: Hyperkalemia ICD-10-CM: E87.5  ICD-9-CM: 276.7  11/3/2016 - 2/11/2017        RESOLVED: Elevated serum creatinine ICD-10-CM: R79.89  ICD-9-CM: 790.99  11/3/2016 - 2/11/2017        RESOLVED: Left leg cellulitis ICD-10-CM: L03.116  ICD-9-CM: 682.6  8/14/2016 - 2/11/2017        RESOLVED: Sepsis ([facility] ICD-10-CM: A41.9  ICD-9-CM: 038.9, 995.91  8/14/2016 - 2/11/2017        RESOLVED: RAI (obstructive sleep apnea) (Chronic) ICD-10-CM: G47.33  ICD-9-CM: 327.23  8/14/2016 - 2/11/2017              DISCHARGE MEDICATIONS:       · It is important that you take the medication exactly as they are prescribed. · Keep your medication in the bottles provided by the pharmacist and keep a list of the medication names, dosages, and times to be taken in your wallet. · Do not take other medications without consulting your doctor. DIET:  Diabetic Diet    ACTIVITY: Activity as tolerated    ADDITIONAL INFORMATION: If you experience any of the following symptoms then please call your primary care physician or return to the emergency room if you cannot get hold of your doctor: Fever, chills, nausea, vomiting, diarrhea, change in mentation, falling, bleeding, shortness of breath. FOLLOW UP CARE:  Dr. Liliya Foley MD  you are to call and set up an appointment to see them in 2 weeks. Information obtained by :  I understand that if any problems occur once I am at home I am to contact my physician. I understand and acknowledge receipt of the instructions indicated above.                                                                                                                                            Physician's or R.N.'s Signature                                                                  Date/Time                                                                                                                                              Patient or Representative Signature                                                          Date/Time

## 2017-06-10 NOTE — PROGRESS NOTES
Daily Progress & Discharge Note: 6/10/2017  Chloe Shields MD    Assessment/Plan:   Cellulitis of right lower extremity (6/2/2017)/Sepsis (POA) due to Group G beta strep bacteremia, UTI, and RLE cellulitis; requiring fluid resuscitation and IV Vanc & IV Unasyn.: trigger seems to be persistent scratching due to uremia. Lactate is normal.    --- Blood cultures positive for strep- urine and blood sens to Rocephin---Started IV Unasyn and Vancomycin at admission. stopped Vanc 6/4 - switched to Rocephin 6/6. Will discharge on keflex. --- repeat blood cultures today to ensure clearance     Asthma with acute exacerbation:    ---prednisone at 10mg daily, will wean off shortly  -- continue nebs, breo     CKD (chronic kidney disease) stage 4): has had intermittent hemodialysis before. ---nephrology has seen and Cr is improving     HTN:  ---Back on home meds and BP still high. Will increase norvasc to BID and monitor BP     DM type 2, uncontrolled, with renal complications ([facility] (2/43/9600): last A1c 7.2.   ---BS up and down, most recently high  --adjust insulin as needed     Iron deficiency anemia (8/14/2016)/ Anemia due to CKD POA:   ---fairly stable in 8s     Generalized anxiety disorder/ Anxiety and depression:   --- Hold all sedating medications     Hyperlipidemia: re-start Lipitor     RAI on CPAP: resume CPAP at night      Physical debility POA: she is totally dependent on .   ---To SNF today    UTI  ---Ecoli.   Has been treated with Rocephin.     Code Status: Full       Problem List:  Problem List as of 6/10/2017  Date Reviewed: 6/2/2017          Codes Class Noted - Resolved    * (Principal)Cellulitis of right lower extremity ICD-10-CM: L03.115  ICD-9-CM: 682.6  6/2/2017 - Present        Asthma with acute exacerbation ICD-10-CM: J45.901  ICD-9-CM: 493.92  6/2/2017 - Present        Asthma ICD-10-CM: J45.909  ICD-9-CM: 493.90  Unknown - Present        Hyperlipidemia ICD-10-CM: E78.5  ICD-9-CM: 272.4  Unknown - Present        RAI on CPAP ICD-10-CM: G47.33, Z99.89  ICD-9-CM: 327.23, V46.8  Unknown - Present        Anxiety and depression ICD-10-CM: F41.9, F32.9  ICD-9-CM: 300.00, 311  Unknown - Present        Hypoglycemia ICD-10-CM: E16.2  ICD-9-CM: 251.2  2/11/2017 - Present        Chronic bilateral low back pain without sciatica ICD-10-CM: M54.5, G89.29  ICD-9-CM: 724.2, 338.29  11/3/2016 - Present        Generalized anxiety disorder ICD-10-CM: F41.1  ICD-9-CM: 300.02  11/3/2016 - Present        Acute renal failure (ARF) ([email or web address] ICD-10-CM: N17.9  ICD-9-CM: 584.9  8/14/2016 - Present        CKD (chronic kidney disease) stage 4, GFR 15-29 ml/min (HCC) (Chronic) ICD-10-CM: N18.4  ICD-9-CM: 585.4  8/14/2016 - Present        HTN (hypertension), benign (Chronic) ICD-10-CM: I10  ICD-9-CM: 401.1  8/14/2016 - Present        DM type 2, uncontrolled, with renal complications (HCC) (Chronic) ICD-10-CM: E11.29, E11.65  ICD-9-CM: 250.42  8/14/2016 - Present        Iron deficiency anemia (Chronic) ICD-10-CM: D50.9  ICD-9-CM: 280.9  8/14/2016 - Present        RESOLVED: CKD stage 4 due to type 2 diabetes mellitus ([facility] [[email or web address] ICD-10-CM: E11.22, N18.4  ICD-9-CM: 250.40, 585.4  Unknown - 2/11/2017        RESOLVED: DM type 2 causing renal disease ([facility] [[email or web address] ICD-10-CM: E11.29  ICD-9-CM: 250.40  Unknown - 2/11/2017        RESOLVED: Hyperkalemia ICD-10-CM: E87.5  ICD-9-CM: 276.7  11/3/2016 - 2/11/2017        RESOLVED: Elevated serum creatinine ICD-10-CM: R79.89  ICD-9-CM: 790.99  11/3/2016 - 2/11/2017        RESOLVED: Left leg cellulitis ICD-10-CM: L03.116  ICD-9-CM: 682.6  8/14/2016 - 2/11/2017        RESOLVED: Sepsis ([address] ICD-10-CM: A41.9  ICD-9-CM: 038.9, 995.91  8/14/2016 - 2/11/2017        RESOLVED: RAI (obstructive sleep apnea) (Chronic) ICD-10-CM: R63.28  ICD-9-CM: 327.23  8/14/2016 - 2/11/2017              HPI:   Ms. Chidi Martinez is a 80 y.o. female who is being admitted for cellulitis of right lower extremity + asthma exacerbation. Ms. Severiano Deans presented to our Emergency Department today complaining of progressive weakness and a right lower extremity redness as well as fever and chills. She has a hx of CKD and has a persistent generalized body itching including the right lower extremity. She is bedridden and her  is her primary caregiver. She is too weak to give any meaningful hx and I have discussed with her  for collaborative hx. She was found to have extensive cellulitis right lower extremity with asthma exacerbation. She will be admitted to hospital for further management. (Dr Bernard Gamboa)    6/3: Feeling lousy. Had temp of 101 last night. She is having more pain. Still weak. Poor appetite. Breathing is a little better. 6/4: Feeling a little better. Did not sleep with CPAP last night. Creatinine still greater than 3. Her BC are positive for strep and UC with gram neg rods. 6/5:  She reports she continues to improve. Blood culture pos for strep with best sensitivity Pen G and Rocephin but will cont Unasyn for now until sensitivity back from Urine culture. Wean solumedrol from 40 mg to 20 mg today and to po steroids tomorrow. Creat at 3.03.     6/6:  More alert and reports less pain. Urine and Blood can be treated with Rocephin and switched to Rocephin IV. Creat fairly stable at 3.05 today. Will wean steroids to po today. Will likely need SCF.     6/7: She reports she is gradually improving. Creat down to 2.89. Cellulitis nearly resolved. Continuing IV Rocephin for now. Will wean po steroids to 10 mg BID from 20 mg. Will likely need SCF.    6/8:  Continues to grad improve. PT/OT recommend SNF for rehab but pt refusing and only wants to go home. Case management will discuss with . Glucoses up with increased appetite and steroids. Increase Lantus to 25 U. Decreased Pred to 10mg/day. Continuing IV Rocephin for now. 6/9: She is improving but still has not been out of bed.   accepts that she needs a SNF as she has not been able to ambulate. Leg has improved. Blood sugars have improved with insulin adjustment. Still on IV antibiotics but can change to po. Will ask case management to look for SNF.     6/10: No acute events overnight. She still has cough but non-productive. Denies CP, SOB, dizziness. Legs only painful now if touched. She had difficulty getting up yesterday with PT and is now agreeable to SNF. She feels ready to go and get started on rehab so she can get home.       Review of Systems:   A comprehensive review of systems was negative except for that written in the HPI. Objective:   Physical Exam:     Visit Vitals    /70 (BP 1 Location: Right arm, BP Patient Position: At rest)    Pulse 78    Temp 97.6 °F (36.4 °C)    Resp 20    Ht 5' 1\" (1.549 m)    Wt 126.9 kg (279 lb 12.2 oz)    SpO2 93%    BMI 52.86 kg/m2    O2 Flow Rate (L/min): 4 l/min O2 Device: CPAP mask    Temp (24hrs), Av.2 °F (36.8 °C), Min:97.6 °F (36.4 °C), Max:98.7 °F (37.1 °C)         0701 -  1900  In: 440 [P.O.:440]  Out: 1200 [Urine:1100]    General:  Alert, cooperative, no resp distress, Morbidly obese. Head:  Normocephalic, without obvious abnormality, atraumatic. Eyes:  Conjunctivae/corneas clear. Nose: Nares normal. Septum midline. Mucosa normal. No drainage or sinus tenderness. Throat: Lips, mucosa, and tongue normal. Teeth and gums normal.   Neck: Supple, symmetrical, trachea midline, no adenopathy   Lungs:   Clear to auscultation bilaterally. No w/r/r   Heart:  Regular rate and rhythm, S1, S2 normal, no murmur, click, rub or gallop. Abdomen:   Soft, non-tender. Bowel sounds normal.   Extremities: RLE with resolving erythema and warmth from the mid shin to her ankle, no cyanosis, 1+ edema. No calf tenderness or cords. Skin:  multiple excoriations are present on her abd and upper ext    Neurologic: CNII-XII intact. Alert and oriented X 3.   Fine motor of hands and fingers normal.   equal.  No cogwheeling or rigidity. Gait not tested at this time. Sensation grossly normal to touch. Gross motor of extremities normal.       Data Review:       Recent Days:  Recent Labs      06/10/17   0450  06/09/17   0013  06/08/17   0042   WBC  10.8  11.9*  10.7   HGB  8.9*  8.8*  9.0*   HCT  27.8*  27.8*  29.4*   PLT  216  191  172     Recent Labs      06/10/17   0450  06/09/17   0013  06/08/17   0042   NA  140  142  144   K  4.0  3.7  4.2   CL  104  106  107   CO2  25  27  27   GLU  205*  188*  329*   BUN  101*  109*  113*   CREA  2.14*  2.39*  2.64*   CA  8.3*  8.4*  8.6   ALB  2.2*  2.3*  2.3*   TBILI  0.4  0.3  0.3   SGOT  25  20  17   ALT  26  25  28       24 Hour Results:  Recent Results (from the past 24 hour(s))   GLUCOSE, POC    Collection Time: 06/09/17 11:02 AM   Result Value Ref Range    Glucose (POC) 124 (H) 65 - 100 mg/dL    Performed by Ale Infante (PCT)    GLUCOSE, POC    Collection Time: 06/09/17  4:03 PM   Result Value Ref Range    Glucose (POC) 200 (H) 65 - 100 mg/dL    Performed by Bill Conner    GLUCOSE, POC    Collection Time: 06/09/17  9:41 PM   Result Value Ref Range    Glucose (POC) 254 (H) 65 - 100 mg/dL    Performed by Mills Cooks (PCT)    CBC WITH AUTOMATED DIFF    Collection Time: 06/10/17  4:50 AM   Result Value Ref Range    WBC 10.8 3.6 - 11.0 K/uL    RBC 3.10 (L) 3.80 - 5.20 M/uL    HGB 8.9 (L) 11.5 - 16.0 g/dL    HCT 27.8 (L) 35.0 - 47.0 %    MCV 89.7 80.0 - 99.0 FL    MCH 28.7 26.0 - 34.0 PG    MCHC 32.0 30.0 - 36.5 g/dL    RDW 14.7 (H) 11.5 - 14.5 %    PLATELET 268 521 - 436 K/uL    NEUTROPHILS 85 (H) 32 - 75 %    LYMPHOCYTES 8 (L) 12 - 49 %    MONOCYTES 3 (L) 5 - 13 %    EOSINOPHILS 2 0 - 7 %    BASOPHILS 0 0 - 1 %    METAMYELOCYTES 1 (H) 0 %    MYELOCYTES 1 (H) 0 %    ABS. NEUTROPHILS 9.2 (H) 1.8 - 8.0 K/UL    ABS. LYMPHOCYTES 0.9 0.8 - 3.5 K/UL    ABS. MONOCYTES 0.3 0.0 - 1.0 K/UL    ABS. EOSINOPHILS 0.2 0.0 - 0.4 K/UL    ABS.  BASOPHILS 0.0 0.0 - 0.1 K/UL    RBC COMMENTS NORMOCYTIC, NORMOCHROMIC     METABOLIC PANEL, COMPREHENSIVE    Collection Time: 06/10/17  4:50 AM   Result Value Ref Range    Sodium 140 136 - 145 mmol/L    Potassium 4.0 3.5 - 5.1 mmol/L    Chloride 104 97 - 108 mmol/L    CO2 25 21 - 32 mmol/L    Anion gap 11 5 - 15 mmol/L    Glucose 205 (H) 65 - 100 mg/dL     (H) 6 - 20 MG/DL    Creatinine 2.14 (H) 0.55 - 1.02 MG/DL    BUN/Creatinine ratio 47 (H) 12 - 20      GFR est AA 28 (L) >60 ml/min/1.73m2    GFR est non-AA 23 (L) >60 ml/min/1.73m2    Calcium 8.3 (L) 8.5 - 10.1 MG/DL    Bilirubin, total 0.4 0.2 - 1.0 MG/DL    ALT (SGPT) 26 12 - 78 U/L    AST (SGOT) 25 15 - 37 U/L    Alk.  phosphatase 73 45 - 117 U/L    Protein, total 5.9 (L) 6.4 - 8.2 g/dL    Albumin 2.2 (L) 3.5 - 5.0 g/dL    Globulin 3.7 2.0 - 4.0 g/dL    A-G Ratio 0.6 (L) 1.1 - 2.2     NUCLEATED RBC    Collection Time: 06/10/17  4:50 AM   Result Value Ref Range    NRBC 1.2 (H) 0  WBC    ABSOLUTE NRBC 0.13 (H) 0.00 - 0.01 K/uL    WBC CORRECTED FOR NR ADJUSTED FOR NUCLEATED RBC'S         Medications reviewed  Current Facility-Administered Medications   Medication Dose Route Frequency    nystatin (MYCOSTATIN) 100,000 unit/mL oral suspension 500,000 Units  500,000 Units Oral QID    insulin glargine (LANTUS) injection 23 Units  23 Units SubCUTAneous QHS    predniSONE (DELTASONE) tablet 10 mg  10 mg Oral DAILY WITH BREAKFAST    epoetin casey (EPOGEN;PROCRIT) injection 10,000 Units  10,000 Units SubCUTAneous Q7D    insulin lispro (HUMALOG) injection   SubCUTAneous AC&HS    cefTRIAXone (ROCEPHIN) 1 g in 0.9% sodium chloride (MBP/ADV) 50 mL  1 g IntraVENous Q24H    nystatin (MYCOSTATIN) 100,000 unit/gram powder   Topical BID    amLODIPine (NORVASC) tablet 5 mg  5 mg Oral DAILY    sodium chloride (NS) flush 5-10 mL  5-10 mL IntraVENous Q8H    sodium chloride (NS) flush 5-10 mL  5-10 mL IntraVENous PRN    acetaminophen (TYLENOL) tablet 650 mg  650 mg Oral Q4H PRN    naloxone (NARCAN) injection 0.4 mg  0.4 mg IntraVENous PRN    prochlorperazine (COMPAZINE) injection 5 mg  5 mg IntraVENous Q8H PRN    glucose chewable tablet 16 g  4 Tab Oral PRN    dextrose (D50W) injection syrg 12.5-25 g  12.5-25 g IntraVENous PRN    glucagon (GLUCAGEN) injection 1 mg  1 mg IntraMUSCular PRN    acetaminophen (TYLENOL) tablet 1,000 mg  1,000 mg Oral BID PRN    losartan (COZAAR) tablet 25 mg  25 mg Oral DAILY    carvedilol (COREG) tablet 3.125 mg  3.125 mg Oral BID WITH MEALS    fluticasone-vilanterol (BREO ELLIPTA) 100mcg-25mcg/puff  1 Puff Inhalation DAILY    pantoprazole (PROTONIX) tablet 40 mg  40 mg Oral DAILY    sodium chloride (NS) flush 5-10 mL  5-10 mL IntraVENous PRN    heparin (porcine) injection 5,000 Units  5,000 Units SubCUTAneous Q8H    albuterol-ipratropium (DUO-NEB) 2.5 MG-0.5 MG/3 ML  3 mL Nebulization Q6H RT     Care Plan discussed with: Patient and Spouse and RN      Alex Gonzales MD

## 2017-06-10 NOTE — PROGRESS NOTES
2100: patient has skin tears all over her body from scratching. There were a few covered in foam and I applied dressings to her R upper arm and L arm. Mulitple tears in those places were open and bleeding.

## 2017-06-10 NOTE — PROGRESS NOTES
Bedside and Verbal shift change report given to Jesi Langley RN (oncoming nurse) by Chandan Ann RN (offgoing nurse). Report included the following information SBAR, Kardex, Procedure Summary, Intake/Output and MAR. TRANSFER - OUT REPORT:    Verbal report given to [facility], LPN(name) on Golden Jacobo  being transferred to [facility](unit) for routine progression of care       Report consisted of patients Situation, Background, Assessment and   Recommendations(SBAR). Information from the following report(s) SBAR, Kardex, Procedure Summary, Intake/Output and Accordion was reviewed with the receiving nurse. Lines:   Peripheral IV 06/02/17 Right Wrist (Active)   Site Assessment Clean, dry, & intact 6/10/2017 12:00 AM   Phlebitis Assessment 0 6/10/2017 12:00 AM   Infiltration Assessment 0 6/10/2017 12:00 AM   Dressing Status Clean, dry, & intact 6/10/2017 12:00 AM   Dressing Type Tape;Transparent 6/10/2017 12:00 AM   Hub Color/Line Status Blue;Flushed 6/10/2017 12:00 AM   Action Taken Open ports on tubing capped 6/10/2017 12:00 AM   Alcohol Cap Used Yes 6/10/2017 12:00 AM        Opportunity for questions and clarification was provided. Patient transported with:   Tech          Zinc applied to sacrum and excoriated areas, foams to open scratches. IV removed  I have reviewed discharge instructions with the patient and spouse. The patient and spouse verbalized understanding. \

## 2017-06-15 LAB
BACTERIA SPEC CULT: NORMAL
SERVICE CMNT-IMP: NORMAL

## 2017-11-02 ENCOUNTER — APPOINTMENT (OUTPATIENT)
Dept: CT IMAGING | Age: 72
DRG: 602 | End: 2017-11-02
Attending: PHYSICIAN ASSISTANT
Payer: MEDICARE

## 2017-11-02 ENCOUNTER — HOSPITAL ENCOUNTER (INPATIENT)
Age: 72
LOS: 8 days | Discharge: HOME | DRG: 602 | End: 2017-11-10
Attending: EMERGENCY MEDICINE | Admitting: INTERNAL MEDICINE
Payer: MEDICARE

## 2017-11-02 ENCOUNTER — APPOINTMENT (OUTPATIENT)
Dept: GENERAL RADIOLOGY | Age: 72
DRG: 602 | End: 2017-11-02
Attending: EMERGENCY MEDICINE
Payer: MEDICARE

## 2017-11-02 DIAGNOSIS — R09.02 HYPOXIA: ICD-10-CM

## 2017-11-02 DIAGNOSIS — J90 PLEURAL EFFUSION: Primary | ICD-10-CM

## 2017-11-02 DIAGNOSIS — L03.114 CELLULITIS OF LEFT UPPER EXTREMITY: ICD-10-CM

## 2017-11-02 PROBLEM — J45.901 ASTHMA WITH ACUTE EXACERBATION: Status: RESOLVED | Noted: 2017-06-02 | Resolved: 2017-11-02

## 2017-11-02 PROBLEM — T82.7XXA ARTERIOVENOUS FISTULA INFECTION (HCC): Status: ACTIVE | Noted: 2017-11-02

## 2017-11-02 PROBLEM — L03.115 CELLULITIS OF RIGHT LOWER EXTREMITY: Status: RESOLVED | Noted: 2017-06-02 | Resolved: 2017-11-02

## 2017-11-02 PROBLEM — D72.829 LEUKOCYTOSIS: Status: ACTIVE | Noted: 2017-11-02

## 2017-11-02 PROBLEM — E16.2 HYPOGLYCEMIA: Status: RESOLVED | Noted: 2017-02-11 | Resolved: 2017-11-02

## 2017-11-02 LAB
ALBUMIN SERPL-MCNC: 2.7 G/DL (ref 3.5–5)
ALBUMIN/GLOB SERPL: 0.7 {RATIO} (ref 1.1–2.2)
ALP SERPL-CCNC: 61 U/L (ref 45–117)
ALT SERPL-CCNC: 18 U/L (ref 12–78)
ANION GAP SERPL CALC-SCNC: 6 MMOL/L (ref 5–15)
AST SERPL-CCNC: 25 U/L (ref 15–37)
BASOPHILS # BLD: 0 K/UL (ref 0–0.1)
BASOPHILS NFR BLD: 0 % (ref 0–1)
BILIRUB SERPL-MCNC: 0.7 MG/DL (ref 0.2–1)
BUN SERPL-MCNC: 54 MG/DL (ref 6–20)
BUN/CREAT SERPL: 22 (ref 12–20)
CALCIUM SERPL-MCNC: 9.2 MG/DL (ref 8.5–10.1)
CHLORIDE SERPL-SCNC: 101 MMOL/L (ref 97–108)
CO2 SERPL-SCNC: 32 MMOL/L (ref 21–32)
CREAT SERPL-MCNC: 2.49 MG/DL (ref 0.55–1.02)
DIFFERENTIAL METHOD BLD: ABNORMAL
EOSINOPHIL # BLD: 0 K/UL (ref 0–0.4)
EOSINOPHIL NFR BLD: 0 % (ref 0–7)
ERYTHROCYTE [DISTWIDTH] IN BLOOD BY AUTOMATED COUNT: 15.2 % (ref 11.5–14.5)
GLOBULIN SER CALC-MCNC: 3.9 G/DL (ref 2–4)
GLUCOSE SERPL-MCNC: 171 MG/DL (ref 65–100)
HCT VFR BLD AUTO: 38.2 % (ref 35–47)
HGB BLD-MCNC: 11.8 G/DL (ref 11.5–16)
LACTATE SERPL-SCNC: 1.2 MMOL/L (ref 0.4–2)
LIPASE SERPL-CCNC: 189 U/L (ref 73–393)
LYMPHOCYTES # BLD: 0.5 K/UL (ref 0.8–3.5)
LYMPHOCYTES NFR BLD: 4 % (ref 12–49)
MCH RBC QN AUTO: 29 PG (ref 26–34)
MCHC RBC AUTO-ENTMCNC: 30.9 G/DL (ref 30–36.5)
MCV RBC AUTO: 93.9 FL (ref 80–99)
MONOCYTES # BLD: 0.3 K/UL (ref 0–1)
MONOCYTES NFR BLD: 2 % (ref 5–13)
NEUTS SEG # BLD: 11.9 K/UL (ref 1.8–8)
NEUTS SEG NFR BLD: 94 % (ref 32–75)
PLATELET # BLD AUTO: 227 K/UL (ref 150–400)
POTASSIUM SERPL-SCNC: 3.6 MMOL/L (ref 3.5–5.1)
PROT SERPL-MCNC: 6.6 G/DL (ref 6.4–8.2)
RBC # BLD AUTO: 4.07 M/UL (ref 3.8–5.2)
RBC MORPH BLD: ABNORMAL
SODIUM SERPL-SCNC: 139 MMOL/L (ref 136–145)
TROPONIN I SERPL-MCNC: <0.04 NG/ML
TROPONIN I SERPL-MCNC: <0.04 NG/ML
WBC # BLD AUTO: 12.7 K/UL (ref 3.6–11)

## 2017-11-02 PROCEDURE — 71010 XR CHEST PORT: CPT

## 2017-11-02 PROCEDURE — 83036 HEMOGLOBIN GLYCOSYLATED A1C: CPT | Performed by: INTERNAL MEDICINE

## 2017-11-02 PROCEDURE — 71250 CT THORAX DX C-: CPT

## 2017-11-02 PROCEDURE — 65270000029 HC RM PRIVATE

## 2017-11-02 PROCEDURE — 93971 EXTREMITY STUDY: CPT

## 2017-11-02 PROCEDURE — 96374 THER/PROPH/DIAG INJ IV PUSH: CPT

## 2017-11-02 PROCEDURE — 80053 COMPREHEN METABOLIC PANEL: CPT | Performed by: EMERGENCY MEDICINE

## 2017-11-02 PROCEDURE — 87040 BLOOD CULTURE FOR BACTERIA: CPT | Performed by: EMERGENCY MEDICINE

## 2017-11-02 PROCEDURE — 83690 ASSAY OF LIPASE: CPT | Performed by: PHYSICIAN ASSISTANT

## 2017-11-02 PROCEDURE — 85025 COMPLETE CBC W/AUTO DIFF WBC: CPT | Performed by: EMERGENCY MEDICINE

## 2017-11-02 PROCEDURE — 83605 ASSAY OF LACTIC ACID: CPT | Performed by: EMERGENCY MEDICINE

## 2017-11-02 PROCEDURE — 84484 ASSAY OF TROPONIN QUANT: CPT | Performed by: PHYSICIAN ASSISTANT

## 2017-11-02 PROCEDURE — 99285 EMERGENCY DEPT VISIT HI MDM: CPT

## 2017-11-02 PROCEDURE — 74011250636 HC RX REV CODE- 250/636: Performed by: PHYSICIAN ASSISTANT

## 2017-11-02 PROCEDURE — 74011000258 HC RX REV CODE- 258: Performed by: PHYSICIAN ASSISTANT

## 2017-11-02 PROCEDURE — 36415 COLL VENOUS BLD VENIPUNCTURE: CPT | Performed by: PHYSICIAN ASSISTANT

## 2017-11-02 RX ORDER — BUDESONIDE 0.5 MG/2ML
500 INHALANT ORAL
Status: DISCONTINUED | OUTPATIENT
Start: 2017-11-03 | End: 2017-11-10 | Stop reason: HOSPADM

## 2017-11-02 RX ORDER — LORATADINE 10 MG/1
10 TABLET ORAL DAILY
Status: DISCONTINUED | OUTPATIENT
Start: 2017-11-03 | End: 2017-11-05

## 2017-11-02 RX ORDER — BUMETANIDE 1 MG/1
2 TABLET ORAL 2 TIMES DAILY
Status: DISCONTINUED | OUTPATIENT
Start: 2017-11-03 | End: 2017-11-05

## 2017-11-02 RX ORDER — FLUTICASONE FUROATE AND VILANTEROL 200; 25 UG/1; UG/1
1 POWDER RESPIRATORY (INHALATION) DAILY
COMMUNITY

## 2017-11-02 RX ORDER — DIPHENHYDRAMINE HCL 25 MG
25 CAPSULE ORAL
Status: DISCONTINUED | OUTPATIENT
Start: 2017-11-02 | End: 2017-11-10 | Stop reason: HOSPADM

## 2017-11-02 RX ORDER — SODIUM CHLORIDE 0.9 % (FLUSH) 0.9 %
5-10 SYRINGE (ML) INJECTION AS NEEDED
Status: DISCONTINUED | OUTPATIENT
Start: 2017-11-02 | End: 2017-11-10 | Stop reason: HOSPADM

## 2017-11-02 RX ORDER — DOXAZOSIN 1 MG/1
2 TABLET ORAL DAILY
Status: DISCONTINUED | OUTPATIENT
Start: 2017-11-03 | End: 2017-11-10 | Stop reason: HOSPADM

## 2017-11-02 RX ORDER — NALOXONE HYDROCHLORIDE 0.4 MG/ML
0.4 INJECTION, SOLUTION INTRAMUSCULAR; INTRAVENOUS; SUBCUTANEOUS AS NEEDED
Status: DISCONTINUED | OUTPATIENT
Start: 2017-11-02 | End: 2017-11-10 | Stop reason: HOSPADM

## 2017-11-02 RX ORDER — MAGNESIUM SULFATE 100 %
4 CRYSTALS MISCELLANEOUS AS NEEDED
Status: DISCONTINUED | OUTPATIENT
Start: 2017-11-02 | End: 2017-11-10 | Stop reason: HOSPADM

## 2017-11-02 RX ORDER — CARVEDILOL 3.12 MG/1
3.12 TABLET ORAL 2 TIMES DAILY WITH MEALS
Status: DISCONTINUED | OUTPATIENT
Start: 2017-11-03 | End: 2017-11-10 | Stop reason: HOSPADM

## 2017-11-02 RX ORDER — ONDANSETRON 2 MG/ML
4 INJECTION INTRAMUSCULAR; INTRAVENOUS
Status: DISCONTINUED | OUTPATIENT
Start: 2017-11-02 | End: 2017-11-10 | Stop reason: HOSPADM

## 2017-11-02 RX ORDER — ARFORMOTEROL TARTRATE 15 UG/2ML
15 SOLUTION RESPIRATORY (INHALATION)
Status: DISCONTINUED | OUTPATIENT
Start: 2017-11-03 | End: 2017-11-10 | Stop reason: HOSPADM

## 2017-11-02 RX ORDER — INSULIN GLARGINE 100 [IU]/ML
17 INJECTION, SOLUTION SUBCUTANEOUS
Status: DISCONTINUED | OUTPATIENT
Start: 2017-11-03 | End: 2017-11-05

## 2017-11-02 RX ORDER — INSULIN LISPRO 100 [IU]/ML
INJECTION, SOLUTION INTRAVENOUS; SUBCUTANEOUS
Status: DISCONTINUED | OUTPATIENT
Start: 2017-11-03 | End: 2017-11-10 | Stop reason: HOSPADM

## 2017-11-02 RX ORDER — VANCOMYCIN/0.9 % SOD CHLORIDE 1.5G/250ML
1500 PLASTIC BAG, INJECTION (ML) INTRAVENOUS
Status: COMPLETED | OUTPATIENT
Start: 2017-11-02 | End: 2017-11-06

## 2017-11-02 RX ORDER — DEXTROSE 50 % IN WATER (D50W) INTRAVENOUS SYRINGE
12.5-25 AS NEEDED
Status: DISCONTINUED | OUTPATIENT
Start: 2017-11-02 | End: 2017-11-10 | Stop reason: HOSPADM

## 2017-11-02 RX ORDER — LOSARTAN POTASSIUM 50 MG/1
25 TABLET ORAL DAILY
Status: DISCONTINUED | OUTPATIENT
Start: 2017-11-03 | End: 2017-11-05

## 2017-11-02 RX ORDER — ONDANSETRON 2 MG/ML
4 INJECTION INTRAMUSCULAR; INTRAVENOUS
Status: COMPLETED | OUTPATIENT
Start: 2017-11-02 | End: 2017-11-02

## 2017-11-02 RX ORDER — ATORVASTATIN CALCIUM 20 MG/1
20 TABLET, FILM COATED ORAL
COMMUNITY

## 2017-11-02 RX ORDER — ATORVASTATIN CALCIUM 20 MG/1
40 TABLET, FILM COATED ORAL
Status: DISCONTINUED | OUTPATIENT
Start: 2017-11-03 | End: 2017-11-02

## 2017-11-02 RX ORDER — ATORVASTATIN CALCIUM 20 MG/1
20 TABLET, FILM COATED ORAL
Status: DISCONTINUED | OUTPATIENT
Start: 2017-11-03 | End: 2017-11-10 | Stop reason: HOSPADM

## 2017-11-02 RX ORDER — SODIUM CHLORIDE 0.9 % (FLUSH) 0.9 %
5-10 SYRINGE (ML) INJECTION EVERY 8 HOURS
Status: DISCONTINUED | OUTPATIENT
Start: 2017-11-03 | End: 2017-11-10 | Stop reason: HOSPADM

## 2017-11-02 RX ORDER — AMLODIPINE BESYLATE 2.5 MG/1
2.5 TABLET ORAL DAILY
COMMUNITY

## 2017-11-02 RX ORDER — PANTOPRAZOLE SODIUM 40 MG/1
40 TABLET, DELAYED RELEASE ORAL DAILY
Status: DISCONTINUED | OUTPATIENT
Start: 2017-11-03 | End: 2017-11-07

## 2017-11-02 RX ORDER — ALPRAZOLAM 0.25 MG/1
0.25 TABLET ORAL 2 TIMES DAILY
COMMUNITY

## 2017-11-02 RX ORDER — ACETAMINOPHEN 325 MG/1
650 TABLET ORAL
Status: DISCONTINUED | OUTPATIENT
Start: 2017-11-02 | End: 2017-11-10 | Stop reason: HOSPADM

## 2017-11-02 RX ORDER — ONDANSETRON 2 MG/ML
4 INJECTION INTRAMUSCULAR; INTRAVENOUS
Status: DISPENSED | OUTPATIENT
Start: 2017-11-02 | End: 2017-11-03

## 2017-11-02 RX ORDER — CITALOPRAM 20 MG/1
40 TABLET, FILM COATED ORAL DAILY
Status: DISCONTINUED | OUTPATIENT
Start: 2017-11-03 | End: 2017-11-10 | Stop reason: HOSPADM

## 2017-11-02 RX ORDER — AMLODIPINE BESYLATE 2.5 MG/1
2.5 TABLET ORAL DAILY
Status: DISCONTINUED | OUTPATIENT
Start: 2017-11-03 | End: 2017-11-02

## 2017-11-02 RX ORDER — HEPARIN SODIUM 5000 [USP'U]/ML
5000 INJECTION, SOLUTION INTRAVENOUS; SUBCUTANEOUS EVERY 8 HOURS
Status: DISCONTINUED | OUTPATIENT
Start: 2017-11-03 | End: 2017-11-10 | Stop reason: HOSPADM

## 2017-11-02 RX ORDER — FENOFIBRATE 145 MG/1
145 TABLET, COATED ORAL DAILY
Status: DISCONTINUED | OUTPATIENT
Start: 2017-11-03 | End: 2017-11-10 | Stop reason: HOSPADM

## 2017-11-02 RX ADMIN — VANCOMYCIN HYDROCHLORIDE 1500 MG: 10 INJECTION, POWDER, LYOPHILIZED, FOR SOLUTION INTRAVENOUS at 23:43

## 2017-11-02 RX ADMIN — ONDANSETRON 4 MG: 2 INJECTION INTRAMUSCULAR; INTRAVENOUS at 19:10

## 2017-11-02 RX ADMIN — PIPERACILLIN SODIUM AND TAZOBACTAM SODIUM 3.38 G: 3; .375 INJECTION, POWDER, LYOPHILIZED, FOR SOLUTION INTRAVENOUS at 22:38

## 2017-11-02 NOTE — ED TRIAGE NOTES
\"I'm throwing up. \" Vomiting began this morning. Patient left upper arm is grossly edematous with erythema and is hot to touch. She reports low grade fever at home. She is an end stage renal patient with a fistula in left arm which has not yet been used for dialysis. She has a diffuse pruritic rash to bilateral upper extremities.

## 2017-11-02 NOTE — ED PROVIDER NOTES
HPI Comments: 71 y.o.  female with past medical history significant for DM type II, hyperlipidemia, HTN, asthma, CKD stage IV, and s/p cholecystectomy and mastectomy who presents ambulatory from home via private vehicle, accompanied by , with chief complaint of vomiting. Pt complains of constant vomiting since acute onset ~14:30 today (4 hours PTA) with associated nausea, epigastric abdominal pain, and low grade fever. Pt reports pruritic rash to the LUE progressively worsening over the past 2 days. Pt reports rash is similar in appearance to prior cellulitis of the lower extremities resulting in hospital admission. Pt notes that rash surrounds area where fistula was recently placed, pt not yet been dialyzed. Additionally, pt reports CP and SOB. Pt is a very poor historian. There are no other acute medical concerns at this time. Social hx: -Tobacco use -EtOH use -Illicit drug use  PCP: Ria Brar MD    Note written by Oscar. Brock Torres, as dictated by Eduar Velasquez PA-C 6:28 PM      The history is provided by the patient. No  was used. Past Medical History:   Diagnosis Date    Anxiety and depression     Asthma     CKD stage 4 due to type 2 diabetes mellitus ([facility] [address]     DM type 2 causing renal disease (HCC)     Hyperlipidemia     Hypertension     RAI on CPAP        Past Surgical History:   Procedure Laterality Date    BREAST SURGERY PROCEDURE UNLISTED      mastectomy, bilat    HX CHOLECYSTECTOMY      HX GYN      hysterectomy    HX HEENT      tonsilectomy         Family History:   Problem Relation Age of Onset    Hypertension Other     Diabetes Other        Social History     Social History    Marital status:      Spouse name: N/A    Number of children: N/A    Years of education: N/A     Occupational History    Not on file.      Social History Main Topics    Smoking status: Never Smoker    Smokeless tobacco: Not on file    Alcohol use No    Drug use: No    Sexual activity: Not on file     Other Topics Concern    Not on file     Social History Narrative         ALLERGIES: Latex, natural rubber; Darvon [propoxyphene]; Peanut; Pineapple; Sulfa (sulfonamide antibiotics); and Tape [adhesive]    Review of Systems   Constitutional: Positive for fever. Negative for chills and diaphoresis. HENT: Negative for congestion, postnasal drip, rhinorrhea and sore throat. Eyes: Negative for photophobia, discharge, redness and visual disturbance. Respiratory: Positive for shortness of breath. Negative for cough, chest tightness and wheezing. Cardiovascular: Positive for chest pain. Negative for palpitations and leg swelling. Gastrointestinal: Positive for abdominal pain (epigastric), nausea and vomiting. Negative for abdominal distention, blood in stool, constipation and diarrhea. Genitourinary: Negative for difficulty urinating, dysuria, frequency, hematuria and urgency. Musculoskeletal: Negative for arthralgias, back pain, joint swelling and myalgias. Skin: Positive for rash (LUE). Negative for color change. Neurological: Negative for dizziness, speech difficulty, weakness, light-headedness, numbness and headaches. Psychiatric/Behavioral: Negative for confusion. The patient is not nervous/anxious. All other systems reviewed and are negative. Vitals:    11/02/17 1809   BP: 141/63   Pulse: 87   Resp: 18   Temp: 99.4 °F (37.4 °C)   SpO2: 90%   Weight: 105.7 kg (233 lb)   Height: 5' 1\" (1.549 m)            Physical Exam   Constitutional: She is oriented to person, place, and time. She appears well-developed and well-nourished. HENT:   Head: Normocephalic and atraumatic. Eyes: Conjunctivae are normal. Pupils are equal, round, and reactive to light. Right eye exhibits no discharge. Left eye exhibits no discharge. Neck: Normal range of motion. Neck supple. No thyromegaly present.    Cardiovascular: Normal rate, regular rhythm and normal heart sounds. Exam reveals no gallop and no friction rub. No murmur heard. Pulmonary/Chest: Effort normal and breath sounds normal. No respiratory distress. She has no wheezes. Abdominal: Soft. Bowel sounds are normal. She exhibits no distension. There is no tenderness. There is no rebound and no guarding. Musculoskeletal: Normal range of motion. Neurological: She is alert and oriented to person, place, and time. Skin: Skin is warm. Please see caption below for description of LUE. Redness warmth and swelling surrounding dialysis fistula site. Pt is NVI. Psychiatric: She has a normal mood and affect. MDM  Number of Diagnoses or Management Options  Cellulitis of left upper extremity:   Hypoxia:   Pleural effusion:   Diagnosis management comments: Pt is hypoxic with pleural effusions. Also diffuse cellulitis of LUE. Started abx and spoke to hospitalist who will admit to their service. Reviewed treatment plan with attending and they agree.   Elisabeth Nowak PA-C     ED Course       Procedures

## 2017-11-03 LAB
ANION GAP SERPL CALC-SCNC: 9 MMOL/L (ref 5–15)
BUN SERPL-MCNC: 58 MG/DL (ref 6–20)
BUN/CREAT SERPL: 22 (ref 12–20)
CALCIUM SERPL-MCNC: 8.8 MG/DL (ref 8.5–10.1)
CHLORIDE SERPL-SCNC: 105 MMOL/L (ref 97–108)
CO2 SERPL-SCNC: 30 MMOL/L (ref 21–32)
CREAT SERPL-MCNC: 2.63 MG/DL (ref 0.55–1.02)
ERYTHROCYTE [DISTWIDTH] IN BLOOD BY AUTOMATED COUNT: 15.2 % (ref 11.5–14.5)
EST. AVERAGE GLUCOSE BLD GHB EST-MCNC: 117 MG/DL
GLUCOSE BLD STRIP.AUTO-MCNC: 119 MG/DL (ref 65–100)
GLUCOSE BLD STRIP.AUTO-MCNC: 119 MG/DL (ref 65–100)
GLUCOSE BLD STRIP.AUTO-MCNC: 147 MG/DL (ref 65–100)
GLUCOSE BLD STRIP.AUTO-MCNC: 152 MG/DL (ref 65–100)
GLUCOSE BLD STRIP.AUTO-MCNC: 160 MG/DL (ref 65–100)
GLUCOSE SERPL-MCNC: 128 MG/DL (ref 65–100)
HBA1C MFR BLD: 5.7 % (ref 4.2–6.3)
HCT VFR BLD AUTO: 35.4 % (ref 35–47)
HGB BLD-MCNC: 10.6 G/DL (ref 11.5–16)
MAGNESIUM SERPL-MCNC: 1.8 MG/DL (ref 1.6–2.4)
MCH RBC QN AUTO: 28.3 PG (ref 26–34)
MCHC RBC AUTO-ENTMCNC: 29.9 G/DL (ref 30–36.5)
MCV RBC AUTO: 94.4 FL (ref 80–99)
PLATELET # BLD AUTO: 190 K/UL (ref 150–400)
POTASSIUM SERPL-SCNC: 4 MMOL/L (ref 3.5–5.1)
RBC # BLD AUTO: 3.75 M/UL (ref 3.8–5.2)
SERVICE CMNT-IMP: ABNORMAL
SODIUM SERPL-SCNC: 144 MMOL/L (ref 136–145)
WBC # BLD AUTO: 10.2 K/UL (ref 3.6–11)

## 2017-11-03 PROCEDURE — 74011250636 HC RX REV CODE- 250/636: Performed by: INTERNAL MEDICINE

## 2017-11-03 PROCEDURE — 74011000258 HC RX REV CODE- 258: Performed by: INTERNAL MEDICINE

## 2017-11-03 PROCEDURE — 85027 COMPLETE CBC AUTOMATED: CPT | Performed by: INTERNAL MEDICINE

## 2017-11-03 PROCEDURE — 83735 ASSAY OF MAGNESIUM: CPT | Performed by: INTERNAL MEDICINE

## 2017-11-03 PROCEDURE — 36415 COLL VENOUS BLD VENIPUNCTURE: CPT | Performed by: INTERNAL MEDICINE

## 2017-11-03 PROCEDURE — 74011000250 HC RX REV CODE- 250: Performed by: INTERNAL MEDICINE

## 2017-11-03 PROCEDURE — 74011250637 HC RX REV CODE- 250/637: Performed by: INTERNAL MEDICINE

## 2017-11-03 PROCEDURE — 74011636637 HC RX REV CODE- 636/637: Performed by: INTERNAL MEDICINE

## 2017-11-03 PROCEDURE — 82962 GLUCOSE BLOOD TEST: CPT

## 2017-11-03 PROCEDURE — 94640 AIRWAY INHALATION TREATMENT: CPT

## 2017-11-03 PROCEDURE — 77010033678 HC OXYGEN DAILY

## 2017-11-03 PROCEDURE — 80048 BASIC METABOLIC PNL TOTAL CA: CPT | Performed by: INTERNAL MEDICINE

## 2017-11-03 PROCEDURE — 65270000029 HC RM PRIVATE

## 2017-11-03 RX ADMIN — Medication 10 ML: at 00:46

## 2017-11-03 RX ADMIN — PANTOPRAZOLE SODIUM 40 MG: 40 TABLET, DELAYED RELEASE ORAL at 05:48

## 2017-11-03 RX ADMIN — ATORVASTATIN CALCIUM 20 MG: 20 TABLET, FILM COATED ORAL at 21:35

## 2017-11-03 RX ADMIN — ACETAMINOPHEN 650 MG: 325 TABLET ORAL at 05:47

## 2017-11-03 RX ADMIN — HEPARIN SODIUM 5000 UNITS: 5000 INJECTION, SOLUTION INTRAVENOUS; SUBCUTANEOUS at 21:31

## 2017-11-03 RX ADMIN — CARVEDILOL 3.12 MG: 3.12 TABLET, FILM COATED ORAL at 18:21

## 2017-11-03 RX ADMIN — LOSARTAN POTASSIUM 25 MG: 50 TABLET ORAL at 09:45

## 2017-11-03 RX ADMIN — INSULIN GLARGINE 17 UNITS: 100 INJECTION, SOLUTION SUBCUTANEOUS at 21:33

## 2017-11-03 RX ADMIN — BUMETANIDE 2 MG: 1 TABLET ORAL at 09:44

## 2017-11-03 RX ADMIN — HEPARIN SODIUM 5000 UNITS: 5000 INJECTION, SOLUTION INTRAVENOUS; SUBCUTANEOUS at 13:39

## 2017-11-03 RX ADMIN — ARFORMOTEROL TARTRATE 15 MCG: 15 SOLUTION RESPIRATORY (INHALATION) at 07:24

## 2017-11-03 RX ADMIN — ACETAMINOPHEN 650 MG: 325 TABLET ORAL at 20:05

## 2017-11-03 RX ADMIN — DOXAZOSIN 2 MG: 1 TABLET ORAL at 09:44

## 2017-11-03 RX ADMIN — BUDESONIDE 500 MCG: 0.5 INHALANT RESPIRATORY (INHALATION) at 20:28

## 2017-11-03 RX ADMIN — PIPERACILLIN SODIUM AND TAZOBACTAM SODIUM 3.38 G: 3; .375 INJECTION, POWDER, LYOPHILIZED, FOR SOLUTION INTRAVENOUS at 13:38

## 2017-11-03 RX ADMIN — BUMETANIDE 2 MG: 1 TABLET ORAL at 18:21

## 2017-11-03 RX ADMIN — CARVEDILOL 3.12 MG: 3.12 TABLET, FILM COATED ORAL at 09:45

## 2017-11-03 RX ADMIN — CITALOPRAM HYDROBROMIDE 40 MG: 20 TABLET ORAL at 09:44

## 2017-11-03 RX ADMIN — PIPERACILLIN SODIUM AND TAZOBACTAM SODIUM 3.38 G: 3; .375 INJECTION, POWDER, LYOPHILIZED, FOR SOLUTION INTRAVENOUS at 05:53

## 2017-11-03 RX ADMIN — LORATADINE 10 MG: 10 TABLET ORAL at 09:45

## 2017-11-03 RX ADMIN — Medication 10 ML: at 21:35

## 2017-11-03 RX ADMIN — ATORVASTATIN CALCIUM 20 MG: 20 TABLET, FILM COATED ORAL at 00:45

## 2017-11-03 RX ADMIN — HEPARIN SODIUM 5000 UNITS: 5000 INJECTION, SOLUTION INTRAVENOUS; SUBCUTANEOUS at 05:47

## 2017-11-03 RX ADMIN — PIPERACILLIN SODIUM AND TAZOBACTAM SODIUM 3.38 G: 3; .375 INJECTION, POWDER, LYOPHILIZED, FOR SOLUTION INTRAVENOUS at 21:34

## 2017-11-03 RX ADMIN — Medication 10 ML: at 05:47

## 2017-11-03 RX ADMIN — INSULIN GLARGINE 17 UNITS: 100 INJECTION, SOLUTION SUBCUTANEOUS at 00:45

## 2017-11-03 RX ADMIN — Medication 5 ML: at 14:00

## 2017-11-03 RX ADMIN — ARFORMOTEROL TARTRATE 15 MCG: 15 SOLUTION RESPIRATORY (INHALATION) at 20:28

## 2017-11-03 RX ADMIN — FENOFIBRATE 145 MG: 145 TABLET ORAL at 09:44

## 2017-11-03 RX ADMIN — BUDESONIDE 500 MCG: 0.5 INHALANT RESPIRATORY (INHALATION) at 07:24

## 2017-11-03 NOTE — PROGRESS NOTES
Nutrition Assessment:    RECOMMENDATIONS/INTERVENTION(S):   Continue current Cardiac/CCD/Renal/1200mL fluid diet  Monitor PO intakes, wt, BG, renal labs  Monitor need for ONS. ASSESSMENT:   11/3: 71 yr old female admitted for fistula infection. PMHx: CKD4- not currently on HD. HLD, HTN, DM. Pt on Cardiac/CCD/Renal (70/70/70) 1200 mL fluid restriction. Per chart pt has lost 46 lbs (16%) since previous admission in June this year (5 months). Family states they weigh her weekly and 233 lbs was most recent wt. No standing wt taken during admission yet. Pt on CPAP during visit. Pt ate peaches, 1 bite sausage, 2 bites Burkinan toast and coffee this morning. Pt states she was trying to lose wt by limiting portion sizes. Pt was n/v for 2 days PTA. Nausea resolved, no more vomiting. , 160 mg/dL. BUN 58, Cr 2.63. A1c 5.7 in June, likely incorrect due to anemia. Edema- trace BLE, 2+ LUE. SUBJECTIVE/OBJECTIVE:   Diet Order: Cardiac, Consistent carb, Renal  % Eaten:  No data found. Pertinent Medications: [x] Reviewed    Chemistries:  Lab Results   Component Value Date/Time    Sodium 144 11/03/2017 05:12 AM    Potassium 4.0 11/03/2017 05:12 AM    Chloride 105 11/03/2017 05:12 AM    CO2 30 11/03/2017 05:12 AM    Anion gap 9 11/03/2017 05:12 AM    Glucose 128 11/03/2017 05:12 AM    BUN 58 11/03/2017 05:12 AM    Creatinine 2.63 11/03/2017 05:12 AM    BUN/Creatinine ratio 22 11/03/2017 05:12 AM    GFR est AA 22 11/03/2017 05:12 AM    GFR est non-AA 18 11/03/2017 05:12 AM    Calcium 8.8 11/03/2017 05:12 AM    AST (SGOT) 25 11/02/2017 06:58 PM    Alk.  phosphatase 61 11/02/2017 06:58 PM    Protein, total 6.6 11/02/2017 06:58 PM    Albumin 2.7 11/02/2017 06:58 PM    Globulin 3.9 11/02/2017 06:58 PM    A-G Ratio 0.7 11/02/2017 06:58 PM    ALT (SGPT) 18 11/02/2017 06:58 PM      Anthropometrics: Height: 5' 1\" (154.9 cm) Weight: 105.7 kg (233 lb)    IBW (%IBW):   ( ) UBW (%UBW):   (  %)    BMI: Body mass index is 44.02 kg/(m^2). This BMI is indicative of:     [] Underweight    [] Normal    [] Overweight    []  Obesity    [x]  Extreme Obesity (BMI>40)    Estimated Nutrition Needs (Based on): 1713 Kcals/day (BMR(1510x1.3)-250) , 106 g (-127g/kg(1.0-1.2g/kg)) Protein  Carbohydrate: At Least 130 g/day  Fluids: 1200 mL/day    Last BM: 11/1   []Active     []Hyperactive  []Hypoactive       [] Absent   BS  Skin:    [] Intact   [] Incision  [] Breakdown   [] DTI   [] Tears/Excoriation/Abrasion  []Edema [] Other: Wt Readings from Last 30 Encounters:   11/02/17 105.7 kg (233 lb)   06/10/17 126.9 kg (279 lb 12.2 oz)   02/11/17 126.6 kg (279 lb)   11/11/16 126.6 kg (279 lb 3.2 oz)   08/15/16 125.6 kg (277 lb)      NUTRITION DIAGNOSES:   Problem:  Increased nutrient needs     Etiology: related to increased protein needs     Signs/Symptoms: as evidenced by increased needs for HD.        NUTRITION INTERVENTIONS:  Meals/Snacks: General/healthful diet                  GOAL:   Pt will consume >50% of meals and ONS within 3-5 days    Cultural, Zoroastrianism, or Ethnic Dietary Needs: None     LEARNING NEEDS (Diet, Food/Nutrient-Drug Interaction):    [] None Identified   [] Identified and Education Provided/Documented   [] Identified and Pt declined/was not appropriate      [x] Interdisciplinary Care Plan Reviewed/Documented    [x] Discharge Needs: TBD   [] No Nutrition Related Discharge Needs    NUTRITION RISK:   Pt Is At Nutrition Risk  [x]     No Nutrition Risk Identified  []       PT SEEN FOR:    []  MD Consult: []Calorie Count      []Diabetic Diet Education        [x]Diet Education     []Electrolyte Management     []General Nutrition Management and Supplements     []Management of Tube Feeding     []TPN Recommendations    []  RN Referral:  []MST score >=2     []Enteral/Parenteral Nutrition PTA     []Pregnant: Gestational DM or Multigestation                 [] Pressure Ulcer      []  Low BMI      []  Length of Stay       [] Dysphagia Diet     [] Ventilator      [] Follow-Up        Previous Recommendations:   [] Implemented          [] Not Implemented          [x] Not Applicable    Previous Goal:   [] Met              [] Progressing Towards Goal              [] Not Progressing Towards Goal   [x] Not Applicable              Toni German, [address] [place] [address]   Pager [phone]

## 2017-11-03 NOTE — ROUTINE PROCESS
TRANSFER - OUT REPORT:    Verbal report given to Evelia Schwab, RN on Khoa Hughes  being transferred to [facility] for routine progression of care       Report consisted of patients Situation, Background, Assessment and   Recommendations(SBAR). Information from the following report(s) SBAR, Kardex, ED Summary, MAR, Recent Results and Cardiac Rhythm NSR with BBB was reviewed with the receiving nurse. Lines:   Peripheral IV 06/02/17 Right Wrist (Active)       Peripheral IV 11/02/17 Right Hand (Active)   Site Assessment Clean, dry, & intact 11/2/2017  6:56 PM   Phlebitis Assessment 0 11/2/2017  6:56 PM   Infiltration Assessment 0 11/2/2017  6:56 PM   Dressing Status Clean, dry, & intact 11/2/2017  6:56 PM   Dressing Type Transparent 11/2/2017  6:56 PM   Hub Color/Line Status Blue 11/2/2017  6:56 PM   Action Taken Blood drawn 11/2/2017  6:56 PM        Opportunity for questions and clarification was provided.       Patient transported with:   [employer]

## 2017-11-03 NOTE — PROGRESS NOTES
[facility]: MED RECONCILIATION    Comments/Recommendations:   ·  manages patients medications at home  · No medications taken today due to vomiting  ·  reports last A1c was 5.5. At that time, Toujeo reduced from 26 unit to 24 units and regular insulin was discontinued    Medications added:     · Xanax 0.25 mg BID    Medications removed:    · Regular insulin   · Nystatin    Medications adjusted:    · Norvasc dose reduced from 5 mg BID to 2.5 mg daily  · Atorvastatin reduced from 40 mg to 20 mg HS    Information obtained from:     Allergies: Latex, natural rubber; Darvon [propoxyphene]; Peanut; Pineapple; Sulfa (sulfonamide antibiotics); and Tape [adhesive]    Prior to Admission Medications:     Medication Documentation Review Audit       Reviewed by [facility] (Pharmacist) on 11/02/17 at 2246         Medication Sig Documenting Provider Last Dose Status Taking?      acetaminophen (TYLENOL) 500 mg tablet Take 1,000 mg by mouth two (2) times daily as needed for Pain. Historical Provider  Active Yes    albuterol (PROVENTIL HFA, VENTOLIN HFA) 90 mcg/actuation inhaler Take 2 Puffs by inhalation every four (4) hours as needed for Wheezing. Ruben Romero MD  Active Yes    ALPRAZolam (XANAX) 0.25 mg tablet Take 0.25 mg by mouth two (2) times a day. Historical Provider 11/1/2017 Unknown time Active Yes    amLODIPine (NORVASC) 2.5 mg tablet Take 2.5 mg by mouth daily. Historical Provider 11/1/2017 Unknown time Active Yes    aspirin 81 mg chewable tablet Take 81 mg by mouth nightly. Ruben Romero MD 11/1/2017 Unknown time Active Yes    atorvastatin (LIPITOR) 20 mg tablet Take 20 mg by mouth nightly. Historical Provider 11/1/2017 Unknown time Active Yes    bumetanide (BUMEX) 2 mg tablet Take 1 Tab by mouth two (2) times a day. Jessica Burnett MD  Active No    calcium polycarbophil (FIBER LAXATIVE, CA POLYCARBO,) 625 mg tablet Take 625 mg by mouth daily.  Historical Provider 11/1/2017 Unknown time Active No    calcium-cholecalciferol, D3, (CALTRATE 600+D) tablet Take 1 Tab by mouth two (2) times a day. Historical Provider 11/1/2017 Unknown time Active No    carvedilol (COREG) 3.125 mg tablet Take 3.125 mg by mouth two (2) times daily (with meals). Historical Provider 11/1/2017 Unknown time Active Yes    citalopram (CELEXA) 40 mg tablet Take 40 mg by mouth daily. Historical Provider 11/1/2017 Unknown time Active Yes    doxazosin (CARDURA) 2 mg tablet Take 2 mg by mouth daily. Historical Provider 11/1/2017 Unknown time Active Yes    ergocalciferol (ERGOCALCIFEROL) 50,000 unit capsule Take 50,000 Units by mouth every month. First of the month Ruben Romero MD 11/1/2017 Unknown time Active Yes    fenofibrate nanocrystallized (TRICOR) 145 mg tablet Take 145 mg by mouth daily. Historical Provider 11/1/2017 Unknown time Active Yes    fluticasone-vilanterol (BREO ELLIPTA) 200-25 mcg/dose inhaler Take 1 Puff by inhalation daily. Historical Provider 11/1/2017 Unknown time Active Yes    insulin glargine (TOUJEO SOLOSTAR) 300 unit/mL (1.5 mL) inpn 24 Units by SubCUTAneous route daily. Historical Provider 11/1/2017 Unknown time Active Yes    loratadine (CLARITIN) 10 mg tablet Take 10 mg by mouth daily. Historical Provider 11/1/2017 Unknown time Active Yes    losartan (COZAAR) 25 mg tablet Take 25 mg by mouth daily. Historical Provider 11/1/2017 Unknown time Active Yes    multivitamin (ONE A DAY) tablet Take 1 Tab by mouth daily. Historical Provider 11/1/2017 Unknown time Active Yes    Omeprazole delayed release (PRILOSEC D/R) 20 mg tablet Take 20 mg by mouth daily. Historical Provider 11/1/2017 Unknown time Active Yes    senna-docusate (SENNA-C) 8.6-50 mg per tablet Take 2 Tabs by mouth daily. Ruben Romero MD 11/1/2017 Unknown time Active Yes                  Thank you,    Melina Calle.  Cheryle DuttonBaptist Health Louisville

## 2017-11-03 NOTE — H&P
[facility]  [address]  [phone]    Hospitalist Admission Note      NAME:  Gianni Severino   :   1945   MRN:  [id number]     PCP:  Marisol John MD     Date/Time:  2017 10:34 PM          Subjective:     CHIEF COMPLAINT: arm infection     HISTORY OF PRESENT ILLNESS:     Ms. Prosper Das is a 71 y.o.  female who presented to the Emergency Department complaining of arm infection. She provides limited hx, I think she has dementia. Her  is present and provide tangential statements about her hx. She reports several days of worsening redness in her left arm near site of HD fistula. Fistula needed \"balloon\" about a month ago due to arm edema. It has not yet been used. She has a hx of skin infection, but never MRSA she thinks. We will admit her for management. Past Medical History:   Diagnosis Date    Anxiety and depression     Asthma     CKD stage 4 due to type 2 diabetes mellitus ([facility] [address]     DM type 2 causing renal disease (HCC)     Hyperlipidemia     Hypertension     RAI on CPAP         Past Surgical History:   Procedure Laterality Date    BREAST SURGERY PROCEDURE UNLISTED      mastectomy, bilat    HX CHOLECYSTECTOMY      HX GYN      hysterectomy    HX HEENT      tonsilectomy       Social History   Substance Use Topics    Smoking status: Never Smoker    Smokeless tobacco: Not on file    Alcohol use No        Family History   Problem Relation Age of Onset    Hypertension Other     Diabetes Other         Allergies   Allergen Reactions    Latex, Natural Rubber Rash     Per pt, had a reaction to latex gloves when an RN administered lotion     Darvon [Propoxyphene] Itching    Peanut Cough    Pineapple Itching    Sulfa (Sulfonamide Antibiotics) Swelling    Tape [Adhesive] Itching        Prior to Admission medications    Medication Sig Start Date End Date Taking?  Authorizing Provider amLODIPine (NORVASC) 2.5 mg tablet Take 2.5 mg by mouth daily. Yes Historical Provider   fluticasone-vilanterol (BREO ELLIPTA) 200-25 mcg/dose inhaler Take 1 Puff by inhalation daily. Yes Historical Provider   ALPRAZolam (XANAX) 0.25 mg tablet Take 0.25 mg by mouth two (2) times a day. Yes Historical Provider   atorvastatin (LIPITOR) 20 mg tablet Take 20 mg by mouth nightly. Yes Historical Provider   citalopram (CELEXA) 40 mg tablet Take 40 mg by mouth daily. Yes Historical Provider   doxazosin (CARDURA) 2 mg tablet Take 2 mg by mouth daily. Yes Historical Provider   losartan (COZAAR) 25 mg tablet Take 25 mg by mouth daily. Yes Historical Provider   insulin glargine (TOUJEO SOLOSTAR) 300 unit/mL (1.5 mL) inpn 24 Units by SubCUTAneous route daily. Yes Historical Provider   multivitamin (ONE A DAY) tablet Take 1 Tab by mouth daily. Yes Historical Provider   bumetanide (BUMEX) 2 mg tablet Take 1 Tab by mouth two (2) times a day. 11/17/16  Yes Micheal Munguia MD   acetaminophen (TYLENOL) 500 mg tablet Take 1,000 mg by mouth two (2) times daily as needed for Pain. Yes Historical Provider   carvedilol (COREG) 3.125 mg tablet Take 3.125 mg by mouth two (2) times daily (with meals). Yes Historical Provider   calcium-cholecalciferol, D3, (CALTRATE 600+D) tablet Take 1 Tab by mouth two (2) times a day. Yes Historical Provider   fenofibrate nanocrystallized (TRICOR) 145 mg tablet Take 145 mg by mouth daily. Yes Historical Provider   loratadine (CLARITIN) 10 mg tablet Take 10 mg by mouth daily. Yes Historical Provider   Omeprazole delayed release (PRILOSEC D/R) 20 mg tablet Take 20 mg by mouth daily. Yes Historical Provider   calcium polycarbophil (FIBER LAXATIVE, CA POLYCARBO,) 625 mg tablet Take 625 mg by mouth daily. Yes Historical Provider   ergocalciferol (ERGOCALCIFEROL) 50,000 unit capsule Take 50,000 Units by mouth every month.  First of the month   Yes Ruben Romero MD   aspirin 81 mg chewable tablet Take 81 mg by mouth nightly. Yes Ruben Romero MD   senna-docusate (SENNA-C) 8.6-50 mg per tablet Take 2 Tabs by mouth daily. Yes Ruben Romero MD   albuterol (PROVENTIL HFA, VENTOLIN HFA) 90 mcg/actuation inhaler Take 2 Puffs by inhalation every four (4) hours as needed for Wheezing.    Yes Ruben Romero MD       Review of Systems:  (bold if positive, if negative)    Gen:  fatigueEyes:  ENT:  CVS:  Pulm:  GI:  nausea, emesis,  :    MS:  arthritisSkin:  erythemawoundPsych:   depression, anxiety,Endo:    Hem:  Renal:    Neuro:  weakness      Objective:      VITALS:    Vital signs reviewed; most recent are:    Visit Vitals    /59    Pulse 82    Temp 99.4 °F (37.4 °C)    Resp 20    Ht 5' 1\" (1.549 m)    Wt 105.7 kg (233 lb)    SpO2 97%    BMI 44.02 kg/m2     SpO2 Readings from Last 6 Encounters:   11/02/17 97%   06/10/17 97%   02/14/17 96%   11/17/16 97%   08/23/16 96%   01/02/14 95%    O2 Flow Rate (L/min): 2 l/min   No intake or output data in the 24 hours ending 11/02/17 9036     Exam:     Physical Exam:    Gen:  Morbidly obese, in no acute distress  HEENT:  Pink conjunctivae, PERRL, hearing intact to voice, moist mucous membranes  Neck:  Supple, without masses, thyroid non-tender  Resp:  No accessory muscle use, clear breath sounds without wheezes rales or rhonchi  Card:  No murmurs, normal S1, S2 without thrills, bruits, 2+ peripheral edema  Abd:  Soft, non-tender, non-distended, normoactive bowel sounds are present, no mass  Lymph:  No cervical or inguinal adenopathy  Musc:  No cyanosis or clubbing  Skin:  SUBHASH erythema on proximal region, excoriations, thrill, skin turgor is good  Neuro:  Cranial nerves are grossly intact, no focal motor weakness, follows commands vaguely  Psych:  Poor insight, oriented to person, plac, slow simple answers     Labs:    Recent Labs      11/02/17   1858   WBC  12.7*   HGB  11.8   HCT  38.2   PLT  227     Recent Labs      11/02/17   1858   NA  139   K 3.6   CL  101   CO2  32   GLU  171*   BUN  54*   CREA  2.49*   CA  9.2   ALB  2.7*   TBILI  0.7   SGOT  25   ALT  18     Lab Results   Component Value Date/Time    Glucose (POC) 245 06/10/2017 09:26 AM    Glucose (POC) 254 06/09/2017 09:41 PM     No results for input(s): PH, PCO2, PO2, HCO3, FIO2 in the last 72 hours. No results for input(s): INR in the last 72 hours. No lab exists for component: [facility]  All Micro Results     Procedure Component Value Units Date/Time    CULTURE, BLOOD, PAIRED [[id number]] Collected:  11/02/17 1858    Order Status:  Completed Specimen:  Blood Updated:  11/02/17 1945          I have reviewed previous records       Assessment and Plan:      Arteriovenous fistula infection / Leukocytosis - POA, not sepsis. No Hx of MRSA. Start Zosyn and vanco.  Blood cx. Vascular consult to inspect site. Hold ASA tonight in case surgery is needed. Hypoxia / Pleural effusion / RAI on CPAP - POA, likely due to fluid overload. Lacking dialysis, continue bumex and fluid restriction. Continue home CPAP. CKD (chronic kidney disease) stage 4 - Consult nephrology. Continue diuretics PO. DM type 2, uncontrolled, with renal complications - Diabetic/Renal diet and counseling. SSI per protocol. Continue home Glargine. A1c useless in setting of anemia. HTN (hypertension), benign - Stop micro dose of Norvasc, it only makes edema worse. Continue doxazosin, losartan, bumex, coreg. Iron deficiency anemia - Due to ESRD. Epo per renal.    Chronic bilateral low back pain without sciatica - tylenol prn. Anxiety and depression / Generalized anxiety disorder - POA, stable. I suspect dementia and I would recommend outpatient full neuropsych testing. She is normally on xanax, celexa    Asthma - Stable. Continue Breo and prn albuterol.     Hyperlipidemia - Continue atorvastatin and tricor      Telemetry reviewed:   normal sinus rhythm    Risk of deterioration: high      Total time spent with patient: 79 Minutes Signed out to Dr Yariel Aguayo at [facility] discussed with: Patient, Family, Nursing Staff, Consultant/Specialist and >50% of time spent in counseling and coordination of care    Discussed:  Care Plan       ___________________________________________________    Attending Physician: Torie Minor MD

## 2017-11-03 NOTE — PROGRESS NOTES
Bedside shift change report given to Cami Cartwright RN  (oncoming nurse) by Lionel Turner RN  (offgoing nurse). Report included the following information SBAR and MAR.

## 2017-11-03 NOTE — PROGRESS NOTES
[facility] Dosing Services: Antimicrobial Stewardship Progress Note    Consult for antibiotic dosing of Vancomycin, renal adjustment if needed of Zosyn by Dr. Alex Smith  Pharmacist reviewed antibiotic appropriateness for 71year old , female  for indication of:  \"Arteriovenous fistula infection / Leukocytosis - POA, not sepsis. No Hx of MRSA. Start Zosyn and vanco.  Blood cx. Vascular consult to inspect site. \"      Day of Therapy 1    Plan:  Vancomycin therapy:  Start Vancomycin therapy, loading dose of 1.5 grams given in ED x 1  Follow with maintenance dosing based on levels due to CrCl of 23  Will re-dose when random level at or below 15   Last trough level / Plan for level: within next 48 hours  Pharmacy to follow daily and will make changes to dose and/or frequency based on clinical status. Non-Kinetic Antimicrobial Dosing: Zosyn 3.375 grams every 8 hours extended infusion dosing for now. When/if CrCl drops below 20, will adjust to every 12 hour schedule. Serum Creatinine     Lab Results   Component Value Date/Time    Creatinine 2.49 11/02/2017 06:58 PM       Creatinine Clearance Estimated Creatinine Clearance: 23.2 mL/min (based on Cr of 2.49).      WBC   Lab Results   Component Value Date/Time    WBC 12.7 11/02/2017 06:58 PM       H/H   Lab Results   Component Value Date/Time    HGB 11.8 11/02/2017 06:58 PM        Platelets   Lab Results   Component Value Date/Time    PLATELET 833 [birth year]/1938 06:58 PM          Pharmacist: Gissel Mcdonald,[address] information:

## 2017-11-03 NOTE — PROGRESS NOTES
[address]  YOB: 1945          Assessment & Plan:   CKD 4  · Stable  · No need for RRT  · She is to follow up with me in January    Cellulitis  · On ABX  · Likely due to pruritis/excoriation  · Has a steroid cream, Rxd by dermatology, which she says helps her but she has not been using it regularly  · Watch Cr on Vanc    HTN  · Controlled  · Continue ARB    DM  · Insulin    She has chronic resp issues  She is on BID Bumex and uses nasal CPAP  Xray shows no edema but perhaps some pleural effusion  Sats have been ok  Continue Bumex       Subjective:   CC: f/u CKD  HPI: Patient of mine with CKD 4 and baseline creatinine around 2.5 or so is admitted with cellulitis of the LUE. She has an AVF in the LUE and recently had an intervention. She is receiving antibiotics. She has chronic pruritis of the UEs and has not been using her steroid cream regularly. Her HTN is controlled.    ROS: +n/v, now better, no sob  Current Facility-Administered Medications   Medication Dose Route Frequency    piperacillin-tazobactam (ZOSYN) 3.375 g in 0.9% sodium chloride (MBP/ADV) 100 mL  3.375 g IntraVENous Q8H    Vancomycin: Rx to dose on levels   Other Rx Dosing/Monitoring    [START ON 11/4/2017] Vancomycin Random Level with AM labs 11/4   Other ONCE    bumetanide (BUMEX) tablet 2 mg  2 mg Oral BID    carvedilol (COREG) tablet 3.125 mg  3.125 mg Oral BID WITH MEALS    citalopram (CELEXA) tablet 40 mg  40 mg Oral DAILY    doxazosin (CARDURA) tablet 2 mg  2 mg Oral DAILY    insulin glargine (LANTUS) injection 17 Units  17 Units SubCUTAneous QHS    pantoprazole (PROTONIX) tablet 40 mg  40 mg Oral DAILY    losartan (COZAAR) tablet 25 mg  25 mg Oral DAILY    loratadine (CLARITIN) tablet 10 mg  10 mg Oral DAILY    sodium chloride (NS) flush 5-10 mL  5-10 mL IntraVENous Q8H    sodium chloride (NS) flush 5-10 mL  5-10 mL IntraVENous PRN    naloxone (NARCAN) injection 0.4 mg  0.4 mg IntraVENous PRN    glucose chewable tablet 16 g  4 Tab Oral PRN    dextrose (D50W) injection syrg 12.5-25 g  12.5-25 g IntraVENous PRN    glucagon (GLUCAGEN) injection 1 mg  1 mg IntraMUSCular PRN    acetaminophen (TYLENOL) tablet 650 mg  650 mg Oral Q4H PRN    diphenhydrAMINE (BENADRYL) capsule 25 mg  25 mg Oral Q4H PRN    ondansetron (ZOFRAN) injection 4 mg  4 mg IntraVENous Q4H PRN    heparin (porcine) injection 5,000 Units  5,000 Units SubCUTAneous Q8H    insulin lispro (HUMALOG) injection   SubCUTAneous AC&HS    fenofibrate nanocrystallized (TRICOR) tablet 145 mg  145 mg Oral DAILY    atorvastatin (LIPITOR) tablet 20 mg  20 mg Oral QHS    arformoterol (BROVANA) neb solution 15 mcg  15 mcg Nebulization BID RT    And    budesonide (PULMICORT) 500 mcg/2 ml nebulizer suspension  500 mcg Nebulization BID RT          Objective:     Vitals:  Blood pressure 113/61, pulse 82, temperature 98.7 °F (37.1 °C), resp. rate 20, height 5' 1\" (1.549 m), weight 105.7 kg (233 lb), SpO2 96 %. Temp (24hrs), Av.1 °F (37.3 °C), Min:98.7 °F (37.1 °C), Max:99.4 °F (37.4 °C)      Intake and Output:          Physical Exam:               GENERAL ASSESSMENT: NAD  CHEST: CTA  HEART: S1S2  ABDOMEN: Soft,NT  EXTREMITY: trace EDEMA, LUE with excoriations and diffuse erythema  NEURO:Grossly non focal          ECG/rhythm:    Data Review      No results for input(s): TNIPOC in the last 72 hours. No lab exists for component: ITNL   Recent Labs      17   185   TROIQ  <0.04  <0.04     Recent Labs      17   0512  17   1858   NA  144  139   K  4.0  3.6   CL  105  101   CO2  30  32   BUN  58*  54*   CREA  2.63*  2.49*   GLU  128*  171*   MG  1.8   --    CA  8.8  9.2   ALB   --   2.7*   WBC  10.2  12.7*   HGB  10.6*  11.8   HCT  35.4  38.2   PLT  190  227      No results for input(s): INR, PTP, APTT in the last 72 hours.     No lab exists for component: INREXT  Needs: urine analysis, urine sodium, protein and creatinine  Lab Results   Component Value Date/Time    Sodium urine, random 50 11/03/2016 12:55 PM    Creatinine, urine 53.84 11/03/2016 12:55 PM           : Kavitha Jarquin MD  11/3/2017        [facility]:  [email or web address]  Deonte Soha office:  [address], [address] [place] [address],[address]  [place], [address] [place] [address]  Phone: [phone]  Fax :     [phone]    [facility] office:  [address] [facility], [address]  Phone - [phone]  Fax - [phone]

## 2017-11-03 NOTE — PROCEDURES
[address]  *** FINAL REPORT ***    Name: Sherin Barnhart  MRN: [id number]    Outpatient  : 29 Sep 1945  HIS Order #: [id number]  [address] [facility] Visit #: [id number]  Date: 2017    TYPE OF TEST: Peripheral Venous Testing    REASON FOR TEST  Limb swelling    Left Arm:-  Deep venous thrombosis:           No  Superficial venous thrombosis:    No      INTERPRETATION/FINDINGS  PROCEDURE: LEFT UPPER EXTREMITY VENOUS DUPLEX: Evaluation of upper  extremity veins with ultrasound (B-mode imaging, pulsed Doppler, color   Doppler). Includes the internal jugular, subclavian, axillary,  brachial, radial, ulnar, basilic, and cephalic veins. FINDINGS: Technically difficult exam due to patient edema. Gray scale  imaging suboptimal. The left basilic vein was not able to be fully  evaluated due to pain tolerance, however showed good color fill and  augmentation. Gray scale and color flow duplex images of the  visualized veins of the left upper extremity and bilateral subclavian  veins demonstrate normal compressibility, absence of filing defects,  reflux or phlebitic changes of the internal jugular, bilateral  subclavian, axillary, upper arm and forearm veins of the left arm. CONCLUSION: No deep vein thrombosis or thrombophlebitis in the veins  visualized. No evidence of thrombus in contralateral right subclavian  vein. Fistula is widely patent. ADDITIONAL COMMENTS    I have personally reviewed the data relevant to the interpretation of  this  study. TECHNOLOGIST: Anisha Johnson. Kavon  Signed: 2017 08:39 PM    PHYSICIAN: Gunjan Schulz.  Buck Boast, MD  Signed: 2017 08:43 AM

## 2017-11-03 NOTE — PROGRESS NOTES
Daily Progress Note: 11/3/2017  Cassondra Heimlich, MD    Assessment/Plan:   Arteriovenous fistula infection /cellulitis/ Leukocytosis - POA, not sepsis. No Hx of MRSA. Zosyn and vanc for now. Blood cx Neg so far. Vascular consulted to inspect site. Hold ASA for now - until vascular has seen and no plans for surg.     Hypoxia / Pleural effusion / RAI on CPAP - POA, likely due to fluid overload. Lacking dialysis, continue bumex and fluid restriction. Continue home CPAP.     CKD (chronic kidney disease) stage 4 - Consulted nephrology. Continue diuretics PO.     DM type 2, uncontrolled, with renal complications - Diabetic/Renal diet and counseling. SSI per protocol. Continue home Glargine. A1c useless in setting of anemia.     HTN (hypertension), benign - Norvasc held for now in case it is making edema worse. Continue doxazosin, losartan, bumex, coreg.     Iron deficiency anemia - Due to ESRD. Epo per renal.     Chronic bilateral low back pain without sciatica - tylenol prn.     Anxiety and depression / Generalized anxiety disorder - POA, stable. I suspect dementia and I would recommend outpatient full neuropsych testing. She is normally on xanax, celexa     Asthma - Stable. Continue Breo and prn albuterol.     Hyperlipidemia - Continue atorvastatin     Morbid obesity - unchanged. Counseled again. Problem List:  Problem List as of 11/3/2017  Date Reviewed: 6/2/2017          Codes Class Noted - Resolved    Arteriovenous fistula infection ([facility] ICD-10-CM: T82. 7XXA  ICD-9-CM: 996.62  11/2/2017 - Present        Leukocytosis ICD-10-CM: D72.829  ICD-9-CM: 288.60  11/2/2017 - Present        Hypoxia ICD-10-CM: R09.02  ICD-9-CM: 799.02  11/2/2017 - Present        Pleural effusion ICD-10-CM: J90  ICD-9-CM: 511.9  11/2/2017 - Present        Asthma ICD-10-CM: J45.909  ICD-9-CM: 493.90  Unknown - Present        Hyperlipidemia ICD-10-CM: E78.5  ICD-9-CM: 272.4  Unknown - Present RAI on CPAP ICD-10-CM: G47.33, Z99.89  ICD-9-CM: 327.23, V46.8  Unknown - Present        Anxiety and depression ICD-10-CM: F41.8  ICD-9-CM: 300.00, 311  Unknown - Present        Chronic bilateral low back pain without sciatica ICD-10-CM: M54.5, G89.29  ICD-9-CM: 724.2, 338.29  11/3/2016 - Present        Generalized anxiety disorder ICD-10-CM: F41.1  ICD-9-CM: 300.02  11/3/2016 - Present        CKD (chronic kidney disease) stage 4, GFR 15-29 ml/min (HCC) (Chronic) ICD-10-CM: N18.4  ICD-9-CM: 585.4  8/14/2016 - Present        HTN (hypertension), benign (Chronic) ICD-10-CM: I10  ICD-9-CM: 401.1  8/14/2016 - Present        DM type 2, uncontrolled, with renal complications (HCC) (Chronic) ICD-10-CM: E11.29, E11.65  ICD-9-CM: 250.42  8/14/2016 - Present        Iron deficiency anemia (Chronic) ICD-10-CM: D50.9  ICD-9-CM: 280.9  8/14/2016 - Present        RESOLVED: Hypertension ICD-10-CM: I10  ICD-9-CM: 401.9  Unknown - 11/2/2017        RESOLVED: DM type 2 causing renal disease ([facility] ICD-10-CM: E11.29  ICD-9-CM: 250.40  Unknown - 11/2/2017        RESOLVED: CKD stage 4 due to type 2 diabetes mellitus ([facility] ICD-10-CM: E11.22, N18.4  ICD-9-CM: 250.40, 585.4  Unknown - 11/2/2017        RESOLVED: Cellulitis of right lower extremity ICD-10-CM: L03.115  ICD-9-CM: 682.6  6/2/2017 - 11/2/2017        RESOLVED: Asthma with acute exacerbation ICD-10-CM: J45.901  ICD-9-CM: 493.92  6/2/2017 - 11/2/2017        RESOLVED: CKD stage 4 due to type 2 diabetes mellitus ([facility] ICD-10-CM: E11.22, N18.4  ICD-9-CM: 250.40, 585.4  Unknown - 2/11/2017        RESOLVED: DM type 2 causing renal disease ([facility] ICD-10-CM: E11.29  ICD-9-CM: 250.40  Unknown - 2/11/2017        RESOLVED: Hypoglycemia ICD-10-CM: E16.2  ICD-9-CM: 251.2  2/11/2017 - 11/2/2017        RESOLVED: Hyperkalemia ICD-10-CM: E87.5  ICD-9-CM: 276.7  11/3/2016 - 2/11/2017        RESOLVED: Elevated serum creatinine ICD-10-CM: R79.89  ICD-9-CM: 790.99  11/3/2016 - 2/11/2017        RESOLVED: Left leg cellulitis ICD-10-CM: L03.116  ICD-9-CM: 682.6  2016 - 2017        RESOLVED: Sepsis ([facility] ICD-10-CM: A41.9  ICD-9-CM: 038.9, 995.91  2016 - 2017        RESOLVED: Acute renal failure (ARF) ([facility] ICD-10-CM: N17.9  ICD-9-CM: 584.9  2016 - 2017        RESOLVED: RAI (obstructive sleep apnea) (Chronic) ICD-10-CM: G63.31  ICD-9-CM: 327.23  2016 - 2017              Subjective:     71 y.o.  female who presented to the Emergency Department complaining of arm infection. She provides limited hx, I think she has dementia. Her  is present and provide tangential statements about her hx. She reports several days of worsening redness in her left arm near site of HD fistula. Fistula needed \"balloon\" about a month ago due to arm edema. It has not yet been used. She has a hx of skin infection, but never MRSA she thinks. We will admit her for management. 11/3:  She reports she is feeling better today. Wearing CPAP. She reports her left arm continues to hurt with movement but is fairly comfortable at rest.  Doppler reports fistula is patent. She has multiple shallow abrasions and scabs on both forearms and abdomin where she reports she has \"itchy spots\" that she picks at and scratches - may be source of infection. Nephrology and Vascular consulted.          Review of Systems:   A comprehensive review of systems was negative except for that written in the HPI. Objective:   Physical Exam:     Visit Vitals    /66 (BP 1 Location: Right arm, BP Patient Position: At rest)    Pulse 84    Temp 99 °F (37.2 °C)    Resp 22    Ht 5' 1\" (1.549 m)    Wt 105.7 kg (233 lb)    SpO2 91%    BMI 44.02 kg/m2    O2 Flow Rate (L/min): 2 l/min O2 Device: Nasal cannula    Temp (24hrs), Av.2 °F (37.3 °C), Min:99 °F (37.2 °C), Max:99.4 °F (37.4 °C)             General:  Alert, cooperative, no distress, appears stated age; morbidly obese.    Head:  Normocephalic, without obvious abnormality, atraumatic. Eyes:  Conjunctivae/corneas clear. PERRL, EOMs intact. Nose: Nares normal. Septum midline. Mucosa normal. No drainage or sinus tenderness. Throat: Lips, mucosa, and tongue moist..   Neck: Supple, symmetrical, trachea midline, no adenopathy, thyroid: no enlargement/tenderness/nodules, no carotid bruit and no JVD. Back:   Symmetric, no curvature. ROM normal. No CVA tenderness. Lungs:   Clear to auscultation bilaterally. Chest wall:  No tenderness or deformity. Heart:  Regular rate and rhythm, S1, S2 normal, no murmur, click, rub or gallop. Abdomen:   Soft, non-tender. Bowel sounds normal. No masses,  No organomegaly. Extremities: no cyanosis. 2+ LE edema. No calf tenderness or cords. Pulses: 2+ and symmetric all extremities. Skin:  turgor normal. Left arm with erythema and slightly warm from volar region to deltoid area. Thrill present at shunt site. multiple shallow abrasions/excoriations and scabs on both forearms and abdomin   Neurologic: CNII-XII intact. Alert and oriented X 3. Fine motor of hands and fingers normal.   equal.  No cogwheeling or rigidity. Gait not tested at this time. Sensation grossly normal to touch. Gross motor of extremities normal.  Psych:  Poor insight, oriented to person, place, slow simple answers     Data Review:     Doppler LUE 11/2/17:  CONCLUSION: No deep vein thrombosis or thrombophlebitis in the veins  visualized. No evidence of thrombus in contralateral right subclavian  vein.  Fistula is widely patent    Recent Days:  Recent Labs      11/03/17 0512  11/02/17   1858   WBC  10.2  12.7*   HGB  10.6*  11.8   HCT  35.4  38.2   PLT  190  227     Recent Labs      11/03/17   0512  11/02/17   1858   NA  144  139   K  4.0  3.6   CL  105  101   CO2  30  32   GLU  128*  171*   BUN  58*  54*   CREA  2.63*  2.49*   CA  8.8  9.2   MG  1.8   --    ALB   --   2.7*   TBILI   --   0.7   SGOT   --   25   ALT   --   18     No results for input(s): PH, PCO2, PO2, HCO3, FIO2 in the last 72 hours. 24 Hour Results:  Recent Results (from the past 24 hour(s))   CBC WITH AUTOMATED DIFF    Collection Time: 11/02/17  6:58 PM   Result Value Ref Range    WBC 12.7 (H) 3.6 - 11.0 K/uL    RBC 4.07 3.80 - 5.20 M/uL    HGB 11.8 11.5 - 16.0 g/dL    HCT 38.2 35.0 - 47.0 %    MCV 93.9 80.0 - 99.0 FL    MCH 29.0 26.0 - 34.0 PG    MCHC 30.9 30.0 - 36.5 g/dL    RDW 15.2 (H) 11.5 - 14.5 %    PLATELET 570 237 - 677 K/uL    NEUTROPHILS 94 (H) 32 - 75 %    LYMPHOCYTES 4 (L) 12 - 49 %    MONOCYTES 2 (L) 5 - 13 %    EOSINOPHILS 0 0 - 7 %    BASOPHILS 0 0 - 1 %    ABS. NEUTROPHILS 11.9 (H) 1.8 - 8.0 K/UL    ABS. LYMPHOCYTES 0.5 (L) 0.8 - 3.5 K/UL    ABS. MONOCYTES 0.3 0.0 - 1.0 K/UL    ABS. EOSINOPHILS 0.0 0.0 - 0.4 K/UL    ABS. BASOPHILS 0.0 0.0 - 0.1 K/UL    DF SMEAR SCANNED      RBC COMMENTS NORMOCYTIC, NORMOCHROMIC     METABOLIC PANEL, COMPREHENSIVE    Collection Time: 11/02/17  6:58 PM   Result Value Ref Range    Sodium 139 136 - 145 mmol/L    Potassium 3.6 3.5 - 5.1 mmol/L    Chloride 101 97 - 108 mmol/L    CO2 32 21 - 32 mmol/L    Anion gap 6 5 - 15 mmol/L    Glucose 171 (H) 65 - 100 mg/dL    BUN 54 (H) 6 - 20 MG/DL    Creatinine 2.49 (H) 0.55 - 1.02 MG/DL    BUN/Creatinine ratio 22 (H) 12 - 20      GFR est AA 23 (L) >60 ml/min/1.73m2    GFR est non-AA 19 (L) >60 ml/min/1.73m2    Calcium 9.2 8.5 - 10.1 MG/DL    Bilirubin, total 0.7 0.2 - 1.0 MG/DL    ALT (SGPT) 18 12 - 78 U/L    AST (SGOT) 25 15 - 37 U/L    Alk.  phosphatase 61 45 - 117 U/L    Protein, total 6.6 6.4 - 8.2 g/dL    Albumin 2.7 (L) 3.5 - 5.0 g/dL    Globulin 3.9 2.0 - 4.0 g/dL    A-G Ratio 0.7 (L) 1.1 - 2.2     LACTIC ACID    Collection Time: 11/02/17  6:58 PM   Result Value Ref Range    Lactic acid 1.2 0.4 - 2.0 MMOL/L   TROPONIN I    Collection Time: 11/02/17  6:58 PM   Result Value Ref Range    Troponin-I, Qt. <0.04 <0.05 ng/mL   LIPASE    Collection Time: 11/02/17  6:58 PM   Result Value Ref Range    Lipase 189 73 - 393 U/L   TROPONIN I    Collection Time: 11/02/17 10:06 PM   Result Value Ref Range    Troponin-I, Qt. <0.04 <0.05 ng/mL   GLUCOSE, POC    Collection Time: 11/02/17 11:59 PM   Result Value Ref Range    Glucose (POC) 160 (H) 65 - 100 mg/dL    Performed by Chele Hitchcock    CBC W/O DIFF    Collection Time: 11/03/17  5:12 AM   Result Value Ref Range    WBC 10.2 3.6 - 11.0 K/uL    RBC 3.75 (L) 3.80 - 5.20 M/uL    HGB 10.6 (L) 11.5 - 16.0 g/dL    HCT 35.4 35.0 - 47.0 %    MCV 94.4 80.0 - 99.0 FL    MCH 28.3 26.0 - 34.0 PG    MCHC 29.9 (L) 30.0 - 36.5 g/dL    RDW 15.2 (H) 11.5 - 14.5 %    PLATELET 012 932 - 885 K/uL   MAGNESIUM    Collection Time: 11/03/17  5:12 AM   Result Value Ref Range    Magnesium 1.8 1.6 - 2.4 mg/dL   METABOLIC PANEL, BASIC    Collection Time: 11/03/17  5:12 AM   Result Value Ref Range    Sodium 144 136 - 145 mmol/L    Potassium 4.0 3.5 - 5.1 mmol/L    Chloride 105 97 - 108 mmol/L    CO2 30 21 - 32 mmol/L    Anion gap 9 5 - 15 mmol/L    Glucose 128 (H) 65 - 100 mg/dL    BUN 58 (H) 6 - 20 MG/DL    Creatinine 2.63 (H) 0.55 - 1.02 MG/DL    BUN/Creatinine ratio 22 (H) 12 - 20      GFR est AA 22 (L) >60 ml/min/1.73m2    GFR est non-AA 18 (L) >60 ml/min/1.73m2    Calcium 8.8 8.5 - 10.1 MG/DL       Medications reviewed  Current Facility-Administered Medications   Medication Dose Route Frequency    piperacillin-tazobactam (ZOSYN) 3.375 g in 0.9% sodium chloride (MBP/ADV) 100 mL  3.375 g IntraVENous Q8H    Vancomycin: Rx to dose on levels   Other Rx Dosing/Monitoring    ondansetron (ZOFRAN) injection 4 mg  4 mg IntraVENous NOW    bumetanide (BUMEX) tablet 2 mg  2 mg Oral BID    carvedilol (COREG) tablet 3.125 mg  3.125 mg Oral BID WITH MEALS    citalopram (CELEXA) tablet 40 mg  40 mg Oral DAILY    doxazosin (CARDURA) tablet 2 mg  2 mg Oral DAILY    insulin glargine (LANTUS) injection 17 Units  17 Units SubCUTAneous QHS    pantoprazole (PROTONIX) tablet 40 mg  40 mg Oral DAILY    losartan (COZAAR) tablet 25 mg  25 mg Oral DAILY    loratadine (CLARITIN) tablet 10 mg  10 mg Oral DAILY    sodium chloride (NS) flush 5-10 mL  5-10 mL IntraVENous Q8H    sodium chloride (NS) flush 5-10 mL  5-10 mL IntraVENous PRN    naloxone (NARCAN) injection 0.4 mg  0.4 mg IntraVENous PRN    glucose chewable tablet 16 g  4 Tab Oral PRN    dextrose (D50W) injection syrg 12.5-25 g  12.5-25 g IntraVENous PRN    glucagon (GLUCAGEN) injection 1 mg  1 mg IntraMUSCular PRN    acetaminophen (TYLENOL) tablet 650 mg  650 mg Oral Q4H PRN    diphenhydrAMINE (BENADRYL) capsule 25 mg  25 mg Oral Q4H PRN    ondansetron (ZOFRAN) injection 4 mg  4 mg IntraVENous Q4H PRN    heparin (porcine) injection 5,000 Units  5,000 Units SubCUTAneous Q8H    insulin lispro (HUMALOG) injection   SubCUTAneous AC&HS    fenofibrate nanocrystallized (TRICOR) tablet 145 mg  145 mg Oral DAILY    atorvastatin (LIPITOR) tablet 20 mg  20 mg Oral QHS    arformoterol (BROVANA) neb solution 15 mcg  15 mcg Nebulization BID RT    And    budesonide (PULMICORT) 500 mcg/2 ml nebulizer suspension  500 mcg Nebulization BID RT       Care Plan discussed with: Patient and Nurse    Total time spent with patient: 30 minutes.     Brian Portillo MD

## 2017-11-03 NOTE — PROGRESS NOTES
11/3/2017  10:03 AM  CM met with pt for assessment. Demographics and PCP were confirmed. Pt is a retired 71year old,  female who lives in a private residence with her  (outside ramp, and no interior steps). Pt reports having limited mobility, and requires the use of a RW, rollator, and BSC. Pt reports she typically stays in a recliner throughout the entire day, and sleeps in the recliner as well. Pt's  assists pt in transitioning to the [facility]. Pt reports she is unable to sleep in a bed due to being unable to breathe. Pt uses a CPAP. Pt reports completing ADLs is a struggle. Pt reports having received HH in the past, but is unaware of the provider. Pt has had several SNF placements. The most recent placement being at the [address] [facility]. Pt reported an unwillingness to return to SNF. CM will continue to monitor for treatment recommendations. Georgina Cheng MA    Care Management Interventions  PCP Verified by CM:  Yes Tayla De Santiago  Palliative Care Criteria Met (RRAT>21 & CHF Dx)?: No  MyChart Signup: No  Discharge Durable Medical Equipment: No  Physical Therapy Consult: No  Occupational Therapy Consult: No  Speech Therapy Consult: No  Current Support Network: Lives with Spouse

## 2017-11-03 NOTE — PROGRESS NOTES
Primary Nurse Mireya Baker RN and Lisa Hensley, RN performed a dual skin assessment on this patient Impairment noted- see wound doc flow sheet  Luis Felipe score is 18

## 2017-11-03 NOTE — PROGRESS NOTES
Bedside shift change report given to Evangelist Hadley (oncoming nurse) by Didi Santacruz (offgoing nurse). Report included the following information SBAR, Kardex, Procedure Summary, Intake/Output and MAR.

## 2017-11-03 NOTE — PROGRESS NOTES
Patient seen and examined  I think the fistula is fine  She has some sort of skin problem which is likely responsible for the erythema  Not sure the role of antibiotics here  Recommend follow up with her surgeon Dr Salcedo Like on discharge.

## 2017-11-03 NOTE — DIABETES MGMT
DTC Consult Note    Recommendations/ Comments: Met with pt and for consult. Per pt her PCP recommended she adjust her Toujeo. Reviewed hypoglycemia treatment. Pt verbalized understanding. Provided education material and outpatient DTC contact info for patient. We also discussed sick day guidelines and A1C target. Consult received for:  [x]             Assessment of home management                []      Medication Recommendations                []             Meter/monitoring     []             Insulin instruction     []             New diagnosis     []             Outpatient education     []             Insulin pump patient     []             Insulin infusion     []             DKA/HHS    Chart reviewed and initial evaluation complete on Michael Parish. Patient is a 71 y.o. female with known history of Type 2 Diabetes on Toujeo 24 units daily. BG monitoring at home 5-6 times per day. Patient reports BG levels at home \"all over the place\". Assessed and instructed patient on the following:   ·  interpretation of lab results, blood sugar goals, hypoglycemia prevention and treatment, nutrition, referred to Diabetes Educator and use of insulin pen    Encouraged the following:   · increased exercise, dietary modifications: eliminating juice consumption with meals    Provided patient with the following: [x]             DM Self-Care Guide education materials               [x]             Insulin education materials               []             CHO counting education materials               [x]             Outpatient DTC contact number               []             Glucometer                 Discussed with patient and/or family need for follow up appointment for diabetes management after discharge.       A1c:   Lab Results   Component Value Date/Time    Hemoglobin A1c 5.7 11/02/2017 05:12 AM       Recent Glucose Results:   Lab Results   Component Value Date/Time     (H) 11/03/2017 05:12 AM  (H) 11/02/2017 06:58 PM    GLUCPOC 119 (H) 11/03/2017 11:57 AM    GLUCPOC 152 (H) 11/03/2017 09:24 AM    GLUCPOC 160 (H) 11/02/2017 11:59 PM        Lab Results   Component Value Date/Time    Creatinine 2.63 11/03/2017 05:12 AM       Active Orders   Diet    DIET CARDIAC Regular; Consistent Carb 1800kcal; FR 1200ML; 70-70-70 (House)        PO intake: No data found. Current hospital DM medication:   -Humalog high sensitivity scale   -Lantus 17 units at bedtime    Will continue to follow as needed. Thank you. Anitra Pickens.  Yoselin Horvath, MPH, RN, BSN, [address]   [phone] (office)  [phone] (pager)

## 2017-11-04 LAB
CREAT SERPL-MCNC: 3.31 MG/DL (ref 0.55–1.02)
GLUCOSE BLD STRIP.AUTO-MCNC: 118 MG/DL (ref 65–100)
GLUCOSE BLD STRIP.AUTO-MCNC: 119 MG/DL (ref 65–100)
GLUCOSE BLD STRIP.AUTO-MCNC: 134 MG/DL (ref 65–100)
GLUCOSE BLD STRIP.AUTO-MCNC: 75 MG/DL (ref 65–100)
GLUCOSE BLD STRIP.AUTO-MCNC: 77 MG/DL (ref 65–100)
GLUCOSE BLD STRIP.AUTO-MCNC: 98 MG/DL (ref 65–100)
SERVICE CMNT-IMP: ABNORMAL
SERVICE CMNT-IMP: NORMAL
VANCOMYCIN SERPL-MCNC: 14.4 UG/ML

## 2017-11-04 PROCEDURE — 74011250636 HC RX REV CODE- 250/636: Performed by: FAMILY MEDICINE

## 2017-11-04 PROCEDURE — 87147 CULTURE TYPE IMMUNOLOGIC: CPT | Performed by: FAMILY MEDICINE

## 2017-11-04 PROCEDURE — 74011000250 HC RX REV CODE- 250: Performed by: INTERNAL MEDICINE

## 2017-11-04 PROCEDURE — 82962 GLUCOSE BLOOD TEST: CPT

## 2017-11-04 PROCEDURE — 74011250636 HC RX REV CODE- 250/636: Performed by: INTERNAL MEDICINE

## 2017-11-04 PROCEDURE — 77010033678 HC OXYGEN DAILY

## 2017-11-04 PROCEDURE — 36415 COLL VENOUS BLD VENIPUNCTURE: CPT | Performed by: INTERNAL MEDICINE

## 2017-11-04 PROCEDURE — 74011636637 HC RX REV CODE- 636/637: Performed by: INTERNAL MEDICINE

## 2017-11-04 PROCEDURE — 80202 ASSAY OF VANCOMYCIN: CPT | Performed by: INTERNAL MEDICINE

## 2017-11-04 PROCEDURE — 74011000258 HC RX REV CODE- 258: Performed by: FAMILY MEDICINE

## 2017-11-04 PROCEDURE — 65270000029 HC RM PRIVATE

## 2017-11-04 PROCEDURE — 74011000258 HC RX REV CODE- 258: Performed by: INTERNAL MEDICINE

## 2017-11-04 PROCEDURE — 87205 SMEAR GRAM STAIN: CPT | Performed by: FAMILY MEDICINE

## 2017-11-04 PROCEDURE — 74011250637 HC RX REV CODE- 250/637: Performed by: INTERNAL MEDICINE

## 2017-11-04 PROCEDURE — 94640 AIRWAY INHALATION TREATMENT: CPT

## 2017-11-04 PROCEDURE — 82565 ASSAY OF CREATININE: CPT | Performed by: INTERNAL MEDICINE

## 2017-11-04 RX ORDER — DIPHENOXYLATE HYDROCHLORIDE AND ATROPINE SULFATE 2.5; .025 MG/1; MG/1
2 TABLET ORAL
Status: DISCONTINUED | OUTPATIENT
Start: 2017-11-04 | End: 2017-11-07

## 2017-11-04 RX ADMIN — ACETAMINOPHEN 650 MG: 325 TABLET ORAL at 01:14

## 2017-11-04 RX ADMIN — ARFORMOTEROL TARTRATE 15 MCG: 15 SOLUTION RESPIRATORY (INHALATION) at 19:45

## 2017-11-04 RX ADMIN — BUDESONIDE 500 MCG: 0.5 INHALANT RESPIRATORY (INHALATION) at 19:45

## 2017-11-04 RX ADMIN — ATORVASTATIN CALCIUM 20 MG: 20 TABLET, FILM COATED ORAL at 22:23

## 2017-11-04 RX ADMIN — Medication 10 ML: at 14:00

## 2017-11-04 RX ADMIN — CARVEDILOL 3.12 MG: 3.12 TABLET, FILM COATED ORAL at 18:07

## 2017-11-04 RX ADMIN — HEPARIN SODIUM 5000 UNITS: 5000 INJECTION, SOLUTION INTRAVENOUS; SUBCUTANEOUS at 16:25

## 2017-11-04 RX ADMIN — FENOFIBRATE 145 MG: 145 TABLET ORAL at 09:05

## 2017-11-04 RX ADMIN — BUDESONIDE 500 MCG: 0.5 INHALANT RESPIRATORY (INHALATION) at 08:38

## 2017-11-04 RX ADMIN — DOXAZOSIN 2 MG: 1 TABLET ORAL at 09:05

## 2017-11-04 RX ADMIN — BUMETANIDE 2 MG: 1 TABLET ORAL at 18:08

## 2017-11-04 RX ADMIN — INSULIN GLARGINE 17 UNITS: 100 INJECTION, SOLUTION SUBCUTANEOUS at 22:23

## 2017-11-04 RX ADMIN — VANCOMYCIN HYDROCHLORIDE 1000 MG: 1 INJECTION, POWDER, LYOPHILIZED, FOR SOLUTION INTRAVENOUS at 10:28

## 2017-11-04 RX ADMIN — ACETAMINOPHEN 650 MG: 325 TABLET ORAL at 09:05

## 2017-11-04 RX ADMIN — ACETAMINOPHEN 650 MG: 325 TABLET ORAL at 06:20

## 2017-11-04 RX ADMIN — HEPARIN SODIUM 5000 UNITS: 5000 INJECTION, SOLUTION INTRAVENOUS; SUBCUTANEOUS at 06:20

## 2017-11-04 RX ADMIN — CITALOPRAM HYDROBROMIDE 40 MG: 20 TABLET ORAL at 09:05

## 2017-11-04 RX ADMIN — HEPARIN SODIUM 5000 UNITS: 5000 INJECTION, SOLUTION INTRAVENOUS; SUBCUTANEOUS at 22:22

## 2017-11-04 RX ADMIN — LORATADINE 10 MG: 10 TABLET ORAL at 09:06

## 2017-11-04 RX ADMIN — BUMETANIDE 2 MG: 1 TABLET ORAL at 09:05

## 2017-11-04 RX ADMIN — PIPERACILLIN SODIUM AND TAZOBACTAM SODIUM 3.38 G: 3; .375 INJECTION, POWDER, LYOPHILIZED, FOR SOLUTION INTRAVENOUS at 18:07

## 2017-11-04 RX ADMIN — Medication 10 ML: at 22:23

## 2017-11-04 RX ADMIN — LOSARTAN POTASSIUM 25 MG: 50 TABLET ORAL at 09:05

## 2017-11-04 RX ADMIN — PANTOPRAZOLE SODIUM 40 MG: 40 TABLET, DELAYED RELEASE ORAL at 06:32

## 2017-11-04 RX ADMIN — Medication 10 ML: at 06:20

## 2017-11-04 RX ADMIN — ARFORMOTEROL TARTRATE 15 MCG: 15 SOLUTION RESPIRATORY (INHALATION) at 08:38

## 2017-11-04 RX ADMIN — CARVEDILOL 3.12 MG: 3.12 TABLET, FILM COATED ORAL at 08:00

## 2017-11-04 RX ADMIN — PIPERACILLIN SODIUM AND TAZOBACTAM SODIUM 3.38 G: 3; .375 INJECTION, POWDER, LYOPHILIZED, FOR SOLUTION INTRAVENOUS at 06:20

## 2017-11-04 NOTE — PROGRESS NOTES
[facility] Dosing Services: Antimicrobial Stewardship Daily Doc    Consult for antibiotic dosing of vancomycin/zosyn by Dr. Chasity Quiñones  Indication: Hillary Gill on arm   Day of Therapy 3    Ht Readings from Last 1 Encounters:   11/02/17 154.9 cm (61\")        Wt Readings from Last 1 Encounters:   11/02/17 105.7 kg (233 lb)      Vancomycin therapy:  Current maintenance dose: Dosing by levels  Dose calculated to approximate a therapeutic trough of 10-15 mcg/mL. Most recent level: 14.4 mcg/ml @ 11/4 @ 452 ~ 30 hours since 1500mg dose on 11/2. Plan for level / Adjustment in Therapy: Will give 1000mg X 1 today (10mg/kg), conservative dose due to increase in SCr. Monitoring Scr closely. Dose administration notes:   Doses given appropriately as scheduled    Non-Kinetic Antimicrobial Dosing Regimen:   Current Regimen:  Zosyn 3.375g IV q 8 hours   Recommendation: Adjust regimen to 3.375g IV q 12 hours   Dose administration notes:   Doses given appropriately as scheduled    Other Antimicrobial   (not dosed by pharmacist) none   Cultures 11/2 Blood, paired: NGTD X 2D  11/4 Wound (arm): in process   Serum Creatinine Lab Results   Component Value Date/Time    Creatinine 3.31 11/04/2017 04:52 AM         Creatinine Clearance Estimated Creatinine Clearance: 17.5 mL/min (based on Cr of 3.31).      Temp Temp: 97.5 °F (36.4 °C)       WBC Lab Results   Component Value Date/Time    WBC 10.2 11/03/2017 05:12 AM        H/H Lab Results   Component Value Date/Time    HGB 10.6 11/03/2017 05:12 AM        Platelets    Lab Results   Component Value Date/Time    PLATELET 120 40/17/0707 05:12 AM          Thank Deedee Singer,   Pharmacist Yoshi Bingham Contact information: [phone]

## 2017-11-04 NOTE — PROGRESS NOTES
Bedside and Verbal shift change report given to VIKA Hubbard (oncoming nurse) by Albertina Degroot (offgoing nurse). Report included the following information SBAR and Kardex.

## 2017-11-04 NOTE — PROGRESS NOTES
Daily Progress Note: 11/4/2017  Latrice Sims MD    Assessment/Plan:   cellulitis/ Leukocytosis - POA, not sepsis. Excoriated skin lesions seem to be the source. AV fistula has been ruled out. No Hx of MRSA. --Zosyn and vanc for now. --Blood cx Neg so far. --Vascular has ruled out clot and infection  --nurse to culture pustule of arm today     Hypoxia / Pleural effusion / RAI on CPAP - POA, likely due to fluid overload. --continue bumex and fluid restriction. -- Continue home CPAP.     CKD (chronic kidney disease) stage 4 - Cr a bit worse today  --Renal following. --Continue diuretics PO.     DM type 2, uncontrolled, with renal complications - Diabetic/Renal diet and counseling. SSI per protocol. Continue home Glargine. A1c useless in setting of anemia.     HTN (hypertension), benign - stable  --Norvasc held for now in case it is making edema worse. --Continue doxazosin, losartan, bumex, coreg.     Iron deficiency anemia - Due to ESRD. Epo per renal.     Chronic bilateral low back pain without sciatica - tylenol prn.     Anxiety and depression / Generalized anxiety disorder - POA, stable. I suspect dementia and I would recommend outpatient full neuropsych testing. She is normally on xanax, celexa     Asthma - Stable. Continue Breo and prn albuterol.     Hyperlipidemia - Continue atorvastatin     Morbid obesity - unchanged. Counseled again. Problem List:  Problem List as of 11/4/2017  Date Reviewed: 6/2/2017          Codes Class Noted - Resolved    Arteriovenous fistula infection ([facility] ICD-10-CM: T82. 7XXA  ICD-9-CM: 996.62  11/2/2017 - Present        Leukocytosis ICD-10-CM: D72.829  ICD-9-CM: 288.60  11/2/2017 - Present        Hypoxia ICD-10-CM: R09.02  ICD-9-CM: 799.02  11/2/2017 - Present        Pleural effusion ICD-10-CM: J90  ICD-9-CM: 511.9  11/2/2017 - Present        Asthma ICD-10-CM: J45.909  ICD-9-CM: 493.90  Unknown - Present Hyperlipidemia ICD-10-CM: E78.5  ICD-9-CM: 272.4  Unknown - Present        RAI on CPAP ICD-10-CM: G47.33, Z99.89  ICD-9-CM: 327.23, V46.8  Unknown - Present        Anxiety and depression ICD-10-CM: F41.8  ICD-9-CM: 300.00, 311  Unknown - Present        Chronic bilateral low back pain without sciatica ICD-10-CM: M54.5, G89.29  ICD-9-CM: 724.2, 338.29  11/3/2016 - Present        Generalized anxiety disorder ICD-10-CM: F41.1  ICD-9-CM: 300.02  11/3/2016 - Present        CKD (chronic kidney disease) stage 4, GFR 15-29 ml/min (HCC) (Chronic) ICD-10-CM: N18.4  ICD-9-CM: 585.4  8/14/2016 - Present        HTN (hypertension), benign (Chronic) ICD-10-CM: I10  ICD-9-CM: 401.1  8/14/2016 - Present        DM type 2, uncontrolled, with renal complications (HCC) (Chronic) ICD-10-CM: E11.29, E11.65  ICD-9-CM: 250.42  8/14/2016 - Present        Iron deficiency anemia (Chronic) ICD-10-CM: D50.9  ICD-9-CM: 280.9  8/14/2016 - Present        RESOLVED: Hypertension ICD-10-CM: I10  ICD-9-CM: 401.9  Unknown - 11/2/2017        RESOLVED: DM type 2 causing renal disease ([facility] ICD-10-CM: E11.29  ICD-9-CM: 250.40  Unknown - 11/2/2017        RESOLVED: CKD stage 4 due to type 2 diabetes mellitus ([facility] ICD-10-CM: E11.22, N18.4  ICD-9-CM: 250.40, 585.4  Unknown - 11/2/2017        RESOLVED: Cellulitis of right lower extremity ICD-10-CM: L03.115  ICD-9-CM: 682.6  6/2/2017 - 11/2/2017        RESOLVED: Asthma with acute exacerbation ICD-10-CM: J45.901  ICD-9-CM: 493.92  6/2/2017 - 11/2/2017        RESOLVED: CKD stage 4 due to type 2 diabetes mellitus ([facility] ICD-10-CM: E11.22, N18.4  ICD-9-CM: 250.40, 585.4  Unknown - 2/11/2017        RESOLVED: DM type 2 causing renal disease ([facility] ICD-10-CM: E11.29  ICD-9-CM: 250.40  Unknown - 2/11/2017        RESOLVED: Hypoglycemia ICD-10-CM: E16.2  ICD-9-CM: 251.2  2/11/2017 - 11/2/2017        RESOLVED: Hyperkalemia ICD-10-CM: E87.5  ICD-9-CM: 276.7  11/3/2016 - 2/11/2017        RESOLVED: Elevated serum creatinine ICD-10-CM: R79.89  ICD-9-CM: 790.99  11/3/2016 - 2/11/2017        RESOLVED: Left leg cellulitis ICD-10-CM: L03.116  ICD-9-CM: 682.6  8/14/2016 - 2/11/2017        RESOLVED: Sepsis ([facility] ICD-10-CM: A41.9  ICD-9-CM: 038.9, 995.91  8/14/2016 - 2/11/2017        RESOLVED: Acute renal failure (ARF) ([facility] ICD-10-CM: N17.9  ICD-9-CM: 584.9  8/14/2016 - 11/2/2017        RESOLVED: RAI (obstructive sleep apnea) (Chronic) ICD-10-CM: V74.16  ICD-9-CM: 327.23  8/14/2016 - 2/11/2017              Subjective:     71 y.o.  female who presented to the Emergency Department complaining of arm infection. She provides limited hx, I think she has dementia. Her  is present and provide tangential statements about her hx. She reports several days of worsening redness in her left arm near site of HD fistula. Fistula needed \"balloon\" about a month ago due to arm edema. It has not yet been used. She has a hx of skin infection, but never MRSA she thinks. We will admit her for management. 11/3:  She reports she is feeling better today. Wearing CPAP. She reports her left arm continues to hurt with movement but is fairly comfortable at rest.  Doppler reports fistula is patent. She has multiple shallow abrasions and scabs on both forearms and abdomen where she reports she has \"itchy spots\" that she picks at and scratches - may be source of infection. Nephrology and Vascular consulted. 11/4: Pt notes she wants to go home. Afebrile overnight. Skin presumed source, but no positive cultures yet. Cr worse this AM.  Tolerating PO. I spoke with pt's . He thinks she needs another day or two. Willing to take her home when released. Review of Systems:   A comprehensive review of systems was negative except for that written in the HPI.     Objective:   Physical Exam:     Visit Vitals    /47 (BP 1 Location: Right arm, BP Patient Position: At rest)    Pulse 80    Temp 97.5 °F (36.4 °C)    Resp 19    Ht 5' 1\" (1.549 m)    Wt 105.7 kg (233 lb)    SpO2 94%    BMI 44.02 kg/m2    O2 Flow Rate (L/min): 2 l/min O2 Device: CPAP mask    Temp (24hrs), Av.9 °F (36.6 °C), Min:97.5 °F (36.4 °C), Max:98.5 °F (36.9 °C)         190 -  0700  In: 5 [P.O.:590; I.V.:200]  Out: 1 [Urine:1]    General:  Alert, cooperative, no distress, appears stated age; morbidly obese/debilitated. Head:  Normocephalic, without obvious abnormality, atraumatic. Eyes:  Conjunctivae/corneas clear. PERRL, EOMs intact. Nose: Nares normal. Septum midline. Throat: Lips, mucosa, and tongue moist.   Neck: Supple, symmetrical, trachea midline, no adenopathy, thyroid: no enlargement/tenderness/nodules, no carotid bruit and no JVD. Back:   Symmetric, no curvature. ROM normal. No CVA tenderness. Lungs:   Clear to auscultation bilaterally. Chest wall:  No tenderness or deformity. Heart:  Regular rate and rhythm, S1, S2 normal, no murmur, click, rub or gallop. Abdomen:   Soft, non-tender. Bowel sounds normal. No masses,  No organomegaly. Extremities: no cyanosis. 2+ LE edema. No calf tenderness or cords. Pulses: 2+ and symmetric all extremities. Skin:  turgor normal. Left arm with erythema and slightly warm from volar region to deltoid area. Thrill present at shunt site. multiple shallow abrasions/excoriations and scabs on both forearms and abdomen, one area on L forearm has a pustule   Neurologic: CNII-XII intact. Alert and oriented X 3. Fine motor of hands and fingers normal.   equal. Gait not tested at this time. Sensation grossly normal to touch. Gross motor of extremities normal.  Psych:  Poor insight, oriented to person, place, slow simple answers     Data Review:     Doppler LUE 17:  CONCLUSION: No deep vein thrombosis or thrombophlebitis in the veins  visualized. No evidence of thrombus in contralateral right subclavian  vein.  Fistula is widely patent    Recent Days:  Recent Labs      17 6486  11/02/17   1858   WBC  10.2  12.7*   HGB  10.6*  11.8   HCT  35.4  38.2   PLT  190  227     Recent Labs      11/04/17   0452  11/03/17   0512  11/02/17   1858   NA   --   144  139   K   --   4.0  3.6   CL   --   105  101   CO2   --   30  32   GLU   --   128*  171*   BUN   --   58*  54*   CREA  3.31*  2.63*  2.49*   CA   --   8.8  9.2   MG   --   1.8   --    ALB   --    --   2.7*   TBILI   --    --   0.7   SGOT   --    --   25   ALT   --    --   18     No results for input(s): PH, PCO2, PO2, HCO3, FIO2 in the last 72 hours.     24 Hour Results:  Recent Results (from the past 24 hour(s))   GLUCOSE, POC    Collection Time: 11/03/17  9:24 AM   Result Value Ref Range    Glucose (POC) 152 (H) 65 - 100 mg/dL    Performed by Trudy Sepulveda    GLUCOSE, POC    Collection Time: 11/03/17 11:57 AM   Result Value Ref Range    Glucose (POC) 119 (H) 65 - 100 mg/dL    Performed by Chad Scruggs ([facility])    GLUCOSE, POC    Collection Time: 11/03/17  5:08 PM   Result Value Ref Range    Glucose (POC) 119 (H) 65 - 100 mg/dL    Performed by Ander Donald ([facility])    GLUCOSE, POC    Collection Time: 11/03/17  9:22 PM   Result Value Ref Range    Glucose (POC) 147 (H) 65 - 100 mg/dL    Performed by Lizbet Barcenas, [facility]    Collection Time: 11/04/17  4:52 AM   Result Value Ref Range    Vancomycin, random 14.4 UG/ML   CREATININE    Collection Time: 11/04/17  4:52 AM   Result Value Ref Range    Creatinine 3.31 (H) 0.55 - 1.02 MG/DL    GFR est AA 17 (L) >60 ml/min/1.73m2    GFR est non-AA 14 (L) >60 ml/min/1.73m2   GLUCOSE, POC    Collection Time: 11/04/17  7:04 AM   Result Value Ref Range    Glucose (POC) 75 65 - 100 mg/dL    Performed by Shannan Zhang ([facility])    GLUCOSE, POC    Collection Time: 11/04/17  7:44 AM   Result Value Ref Range    Glucose (POC) 77 65 - 100 mg/dL    Performed by Libzet Jason        Medications reviewed  Current Facility-Administered Medications   Medication Dose Route Frequency    piperacillin-tazobactam (ZOSYN) 3.375 g in 0.9% sodium chloride (MBP/ADV) 100 mL  3.375 g IntraVENous Q8H    Vancomycin: Rx to dose on levels   Other Rx Dosing/Monitoring    Vancomycin Random Level with AM labs 11/4   Other ONCE    bumetanide (BUMEX) tablet 2 mg  2 mg Oral BID    carvedilol (COREG) tablet 3.125 mg  3.125 mg Oral BID WITH MEALS    citalopram (CELEXA) tablet 40 mg  40 mg Oral DAILY    doxazosin (CARDURA) tablet 2 mg  2 mg Oral DAILY    insulin glargine (LANTUS) injection 17 Units  17 Units SubCUTAneous QHS    pantoprazole (PROTONIX) tablet 40 mg  40 mg Oral DAILY    losartan (COZAAR) tablet 25 mg  25 mg Oral DAILY    loratadine (CLARITIN) tablet 10 mg  10 mg Oral DAILY    sodium chloride (NS) flush 5-10 mL  5-10 mL IntraVENous Q8H    sodium chloride (NS) flush 5-10 mL  5-10 mL IntraVENous PRN    naloxone (NARCAN) injection 0.4 mg  0.4 mg IntraVENous PRN    glucose chewable tablet 16 g  4 Tab Oral PRN    dextrose (D50W) injection syrg 12.5-25 g  12.5-25 g IntraVENous PRN    glucagon (GLUCAGEN) injection 1 mg  1 mg IntraMUSCular PRN    acetaminophen (TYLENOL) tablet 650 mg  650 mg Oral Q4H PRN    diphenhydrAMINE (BENADRYL) capsule 25 mg  25 mg Oral Q4H PRN    ondansetron (ZOFRAN) injection 4 mg  4 mg IntraVENous Q4H PRN    heparin (porcine) injection 5,000 Units  5,000 Units SubCUTAneous Q8H    insulin lispro (HUMALOG) injection   SubCUTAneous AC&HS    fenofibrate nanocrystallized (TRICOR) tablet 145 mg  145 mg Oral DAILY    atorvastatin (LIPITOR) tablet 20 mg  20 mg Oral QHS    arformoterol (BROVANA) neb solution 15 mcg  15 mcg Nebulization BID RT    And    budesonide (PULMICORT) 500 mcg/2 ml nebulizer suspension  500 mcg Nebulization BID RT       Care Plan discussed with: Patient and Nurse    Total time spent with patient: 30 minutes.     Max Gordon MD

## 2017-11-05 LAB
ANION GAP SERPL CALC-SCNC: 7 MMOL/L (ref 5–15)
BASOPHILS # BLD: 0 K/UL (ref 0–0.1)
BASOPHILS NFR BLD: 0 % (ref 0–1)
BUN SERPL-MCNC: 71 MG/DL (ref 6–20)
BUN/CREAT SERPL: 17 (ref 12–20)
CALCIUM SERPL-MCNC: 8.6 MG/DL (ref 8.5–10.1)
CHLORIDE SERPL-SCNC: 102 MMOL/L (ref 97–108)
CO2 SERPL-SCNC: 29 MMOL/L (ref 21–32)
CREAT SERPL-MCNC: 4.12 MG/DL (ref 0.55–1.02)
EOSINOPHIL # BLD: 0.4 K/UL (ref 0–0.4)
EOSINOPHIL NFR BLD: 6 % (ref 0–7)
ERYTHROCYTE [DISTWIDTH] IN BLOOD BY AUTOMATED COUNT: 14.9 % (ref 11.5–14.5)
GLUCOSE BLD STRIP.AUTO-MCNC: 106 MG/DL (ref 65–100)
GLUCOSE BLD STRIP.AUTO-MCNC: 111 MG/DL (ref 65–100)
GLUCOSE BLD STRIP.AUTO-MCNC: 114 MG/DL (ref 65–100)
GLUCOSE BLD STRIP.AUTO-MCNC: 63 MG/DL (ref 65–100)
GLUCOSE BLD STRIP.AUTO-MCNC: 97 MG/DL (ref 65–100)
GLUCOSE SERPL-MCNC: 71 MG/DL (ref 65–100)
HCT VFR BLD AUTO: 34 % (ref 35–47)
HGB BLD-MCNC: 10.7 G/DL (ref 11.5–16)
LYMPHOCYTES # BLD: 0.8 K/UL (ref 0.8–3.5)
LYMPHOCYTES NFR BLD: 13 % (ref 12–49)
MCH RBC QN AUTO: 28.8 PG (ref 26–34)
MCHC RBC AUTO-ENTMCNC: 31.5 G/DL (ref 30–36.5)
MCV RBC AUTO: 91.4 FL (ref 80–99)
MONOCYTES # BLD: 0.4 K/UL (ref 0–1)
MONOCYTES NFR BLD: 7 % (ref 5–13)
NEUTS SEG # BLD: 4.5 K/UL (ref 1.8–8)
NEUTS SEG NFR BLD: 74 % (ref 32–75)
PLATELET # BLD AUTO: 200 K/UL (ref 150–400)
POTASSIUM SERPL-SCNC: 3.8 MMOL/L (ref 3.5–5.1)
RBC # BLD AUTO: 3.72 M/UL (ref 3.8–5.2)
SERVICE CMNT-IMP: ABNORMAL
SERVICE CMNT-IMP: NORMAL
SODIUM SERPL-SCNC: 138 MMOL/L (ref 136–145)
WBC # BLD AUTO: 6 K/UL (ref 3.6–11)

## 2017-11-05 PROCEDURE — 80048 BASIC METABOLIC PNL TOTAL CA: CPT | Performed by: FAMILY MEDICINE

## 2017-11-05 PROCEDURE — 94640 AIRWAY INHALATION TREATMENT: CPT

## 2017-11-05 PROCEDURE — 77010033678 HC OXYGEN DAILY

## 2017-11-05 PROCEDURE — 85025 COMPLETE CBC W/AUTO DIFF WBC: CPT | Performed by: FAMILY MEDICINE

## 2017-11-05 PROCEDURE — 36415 COLL VENOUS BLD VENIPUNCTURE: CPT | Performed by: FAMILY MEDICINE

## 2017-11-05 PROCEDURE — 74011250636 HC RX REV CODE- 250/636: Performed by: INTERNAL MEDICINE

## 2017-11-05 PROCEDURE — 74011636637 HC RX REV CODE- 636/637: Performed by: FAMILY MEDICINE

## 2017-11-05 PROCEDURE — 74011250637 HC RX REV CODE- 250/637: Performed by: FAMILY MEDICINE

## 2017-11-05 PROCEDURE — 74011000250 HC RX REV CODE- 250: Performed by: INTERNAL MEDICINE

## 2017-11-05 PROCEDURE — 77030012856

## 2017-11-05 PROCEDURE — 74011250637 HC RX REV CODE- 250/637: Performed by: INTERNAL MEDICINE

## 2017-11-05 PROCEDURE — 82962 GLUCOSE BLOOD TEST: CPT

## 2017-11-05 PROCEDURE — 65270000029 HC RM PRIVATE

## 2017-11-05 PROCEDURE — 74011250636 HC RX REV CODE- 250/636: Performed by: FAMILY MEDICINE

## 2017-11-05 PROCEDURE — 74011000258 HC RX REV CODE- 258: Performed by: FAMILY MEDICINE

## 2017-11-05 RX ORDER — INSULIN GLARGINE 100 [IU]/ML
10 INJECTION, SOLUTION SUBCUTANEOUS
Status: DISCONTINUED | OUTPATIENT
Start: 2017-11-05 | End: 2017-11-10 | Stop reason: HOSPADM

## 2017-11-05 RX ORDER — LORATADINE 10 MG/1
10 TABLET ORAL
Status: DISCONTINUED | OUTPATIENT
Start: 2017-11-07 | End: 2017-11-10 | Stop reason: HOSPADM

## 2017-11-05 RX ORDER — L. ACIDOPHILUS/L.BULGARICUS 100MM CELL
1 GRANULES IN PACKET (EA) ORAL 2 TIMES DAILY
Status: DISCONTINUED | OUTPATIENT
Start: 2017-11-05 | End: 2017-11-10 | Stop reason: HOSPADM

## 2017-11-05 RX ADMIN — ATORVASTATIN CALCIUM 20 MG: 20 TABLET, FILM COATED ORAL at 22:14

## 2017-11-05 RX ADMIN — LACTOBACILLUS ACIDOPHILUS / LACTOBACILLUS BULGARICUS 1 PACKET: 100 MILLION CFU STRENGTH GRANULES at 08:22

## 2017-11-05 RX ADMIN — LACTOBACILLUS ACIDOPHILUS / LACTOBACILLUS BULGARICUS 1 PACKET: 100 MILLION CFU STRENGTH GRANULES at 19:00

## 2017-11-05 RX ADMIN — LORATADINE 10 MG: 10 TABLET ORAL at 08:21

## 2017-11-05 RX ADMIN — CARVEDILOL 3.12 MG: 3.12 TABLET, FILM COATED ORAL at 16:04

## 2017-11-05 RX ADMIN — ARFORMOTEROL TARTRATE 15 MCG: 15 SOLUTION RESPIRATORY (INHALATION) at 19:41

## 2017-11-05 RX ADMIN — CARVEDILOL 3.12 MG: 3.12 TABLET, FILM COATED ORAL at 08:22

## 2017-11-05 RX ADMIN — PIPERACILLIN SODIUM AND TAZOBACTAM SODIUM 3.38 G: 3; .375 INJECTION, POWDER, LYOPHILIZED, FOR SOLUTION INTRAVENOUS at 18:27

## 2017-11-05 RX ADMIN — FENOFIBRATE 145 MG: 145 TABLET ORAL at 08:22

## 2017-11-05 RX ADMIN — BUDESONIDE 500 MCG: 0.5 INHALANT RESPIRATORY (INHALATION) at 07:39

## 2017-11-05 RX ADMIN — CITALOPRAM HYDROBROMIDE 40 MG: 20 TABLET ORAL at 08:22

## 2017-11-05 RX ADMIN — BUDESONIDE 500 MCG: 0.5 INHALANT RESPIRATORY (INHALATION) at 19:41

## 2017-11-05 RX ADMIN — PIPERACILLIN SODIUM AND TAZOBACTAM SODIUM 3.38 G: 3; .375 INJECTION, POWDER, LYOPHILIZED, FOR SOLUTION INTRAVENOUS at 06:23

## 2017-11-05 RX ADMIN — INSULIN GLARGINE 10 UNITS: 100 INJECTION, SOLUTION SUBCUTANEOUS at 22:14

## 2017-11-05 RX ADMIN — HEPARIN SODIUM 5000 UNITS: 5000 INJECTION, SOLUTION INTRAVENOUS; SUBCUTANEOUS at 06:23

## 2017-11-05 RX ADMIN — DOXAZOSIN 2 MG: 1 TABLET ORAL at 08:21

## 2017-11-05 RX ADMIN — HEPARIN SODIUM 5000 UNITS: 5000 INJECTION, SOLUTION INTRAVENOUS; SUBCUTANEOUS at 22:14

## 2017-11-05 RX ADMIN — HEPARIN SODIUM 5000 UNITS: 5000 INJECTION, SOLUTION INTRAVENOUS; SUBCUTANEOUS at 16:04

## 2017-11-05 RX ADMIN — ARFORMOTEROL TARTRATE 15 MCG: 15 SOLUTION RESPIRATORY (INHALATION) at 07:39

## 2017-11-05 RX ADMIN — PANTOPRAZOLE SODIUM 40 MG: 40 TABLET, DELAYED RELEASE ORAL at 06:23

## 2017-11-05 RX ADMIN — Medication 10 ML: at 06:41

## 2017-11-05 RX ADMIN — Medication 10 ML: at 22:15

## 2017-11-05 NOTE — PROGRESS NOTES
Daily Progress Note: 11/5/2017  Alicia Johnson MD    Assessment/Plan:   cellulitis/ Leukocytosis - POA, not sepsis. Excoriated skin lesions seem to be the source. AV fistula has been ruled out. No Hx of MRSA. --Zosyn and vanc for now. --Blood cx Neg so far. --Vascular has ruled out clot and infection  --nurse to culture pustule of arm today     Hypoxia / Pleural effusion / RAI on CPAP - POA, likely due to fluid overload. --continue bumex and fluid restriction. -- Continue home CPAP.     CKD (chronic kidney disease) stage 4 - Cr worse  --Renal following. May need dialysis if no improvement  --diuretics and ARB held     DM type 2, uncontrolled, with renal complications - Diabetic/Renal diet and counseling.    --SSI per protocol.    --decrease glargine while ALFONSO   -- A1c useless in setting of anemia.     HTN (hypertension), benign - stable  --Norvasc held for now in case it is making edema worse. --Continue doxazosin, losartan, bumex, coreg.     Iron deficiency anemia - Due to ESRD. Epo per renal.     Chronic bilateral low back pain without sciatica - tylenol prn.     Anxiety and depression / Generalized anxiety disorder - POA, stable. I suspect dementia and I would recommend outpatient full neuropsych testing. She is normally on xanax, celexa     Asthma - Stable. Continue Breo and prn albuterol.     Hyperlipidemia - Continue atorvastatin     Diarrhea: stool C diff sent  -probiotics  -contact precautions    Morbid obesity - unchanged. Counseled again. Problem List:  Problem List as of 11/5/2017  Date Reviewed: 6/2/2017          Codes Class Noted - Resolved    Arteriovenous fistula infection ([facility] [address] ICD-10-CM: T82. 7XXA  ICD-9-CM: 996.62  11/2/2017 - Present        Leukocytosis ICD-10-CM: D72.829  ICD-9-CM: 288.60  11/2/2017 - Present        Hypoxia ICD-10-CM: R09.02  ICD-9-CM: 799.02  11/2/2017 - Present        Pleural effusion ICD-10-CM: J90  ICD-9-CM: 511.9 11/2/2017 - Present        Asthma ICD-10-CM: J45.909  ICD-9-CM: 493.90  Unknown - Present        Hyperlipidemia ICD-10-CM: E78.5  ICD-9-CM: 272.4  Unknown - Present        RAI on CPAP ICD-10-CM: G47.33, Z99.89  ICD-9-CM: 327.23, V46.8  Unknown - Present        Anxiety and depression ICD-10-CM: F41.8  ICD-9-CM: 300.00, 311  Unknown - Present        Chronic bilateral low back pain without sciatica ICD-10-CM: M54.5, G89.29  ICD-9-CM: 724.2, 338.29  11/3/2016 - Present        Generalized anxiety disorder ICD-10-CM: F41.1  ICD-9-CM: 300.02  11/3/2016 - Present        CKD (chronic kidney disease) stage 4, GFR 15-29 ml/min (HCC) (Chronic) ICD-10-CM: N18.4  ICD-9-CM: 585.4  8/14/2016 - Present        HTN (hypertension), benign (Chronic) ICD-10-CM: I10  ICD-9-CM: 401.1  8/14/2016 - Present        DM type 2, uncontrolled, with renal complications (HCC) (Chronic) ICD-10-CM: E11.29, E11.65  ICD-9-CM: 250.42  8/14/2016 - Present        Iron deficiency anemia (Chronic) ICD-10-CM: D50.9  ICD-9-CM: 280.9  8/14/2016 - Present        RESOLVED: Hypertension ICD-10-CM: I10  ICD-9-CM: 401.9  Unknown - 11/2/2017        RESOLVED: DM type 2 causing renal disease ([facility] ICD-10-CM: E11.29  ICD-9-CM: 250.40  Unknown - 11/2/2017        RESOLVED: CKD stage 4 due to type 2 diabetes mellitus ([facility] ICD-10-CM: E11.22, N18.4  ICD-9-CM: 250.40, 585.4  Unknown - 11/2/2017        RESOLVED: Cellulitis of right lower extremity ICD-10-CM: L03.115  ICD-9-CM: 682.6  6/2/2017 - 11/2/2017        RESOLVED: Asthma with acute exacerbation ICD-10-CM: J45.901  ICD-9-CM: 493.92  6/2/2017 - 11/2/2017        RESOLVED: CKD stage 4 due to type 2 diabetes mellitus ([facility] ICD-10-CM: E11.22, N18.4  ICD-9-CM: 250.40, 585.4  Unknown - 2/11/2017        RESOLVED: DM type 2 causing renal disease ([facility] ICD-10-CM: E11.29  ICD-9-CM: 250.40  Unknown - 2/11/2017        RESOLVED: Hypoglycemia ICD-10-CM: E16.2  ICD-9-CM: 251.2  2/11/2017 - 11/2/2017        RESOLVED: Hyperkalemia ICD-10-CM: E87.5  ICD-9-CM: 276.7  11/3/2016 - 2/11/2017        RESOLVED: Elevated serum creatinine ICD-10-CM: R79.89  ICD-9-CM: 790.99  11/3/2016 - 2/11/2017        RESOLVED: Left leg cellulitis ICD-10-CM: L03.116  ICD-9-CM: 682.6  8/14/2016 - 2/11/2017        RESOLVED: Sepsis ([address] ICD-10-CM: A41.9  ICD-9-CM: 038.9, 995.91  8/14/2016 - 2/11/2017        RESOLVED: Acute renal failure (ARF) (HCC) ICD-10-CM: N17.9  ICD-9-CM: 584.9  8/14/2016 - 11/2/2017        RESOLVED: RAI (obstructive sleep apnea) (Chronic) ICD-10-CM: L47.95  ICD-9-CM: 327.23  8/14/2016 - 2/11/2017              Subjective:     71 y.o.  female who presented to the Emergency Department complaining of arm infection. She provides limited hx, I think she has dementia. Her  is present and provide tangential statements about her hx. She reports several days of worsening redness in her left arm near site of HD fistula. Fistula needed \"balloon\" about a month ago due to arm edema. It has not yet been used. She has a hx of skin infection, but never MRSA she thinks. We will admit her for management. 11/3:  She reports she is feeling better today. Wearing CPAP. She reports her left arm continues to hurt with movement but is fairly comfortable at rest.  Doppler reports fistula is patent. She has multiple shallow abrasions and scabs on both forearms and abdomen where she reports she has \"itchy spots\" that she picks at and scratches - may be source of infection. Nephrology and Vascular consulted. 11/4: Pt notes she wants to go home. Afebrile overnight. Skin presumed source, but no positive cultures yet. Cr worse this AM.  Tolerating PO. I spoke with pt's . He thinks she needs another day or two. Willing to take her home when released. 11/5: Cr is worse this AM after another dose of vanc. This has been stopped as wound culture showed WBCs but no organisms yet. Pt is still afebrile. Still having diarrhea.   C diff has been sent. Review of Systems:   A comprehensive review of systems was negative except for that written in the HPI. Objective:   Physical Exam:     Visit Vitals    /60 (BP 1 Location: Right arm, BP Patient Position: At rest)    Pulse 75    Temp 96.8 °F (36 °C)    Resp 16    Ht 5' 1\" (1.549 m)    Wt 105.7 kg (233 lb)    SpO2 93%    BMI 44.02 kg/m2    O2 Flow Rate (L/min): 2 l/min O2 Device: CPAP mask    Temp (24hrs), Av.5 °F (36.4 °C), Min:96.8 °F (36 °C), Max:98 °F (36.7 °C)         1901 -  0700  In: 18 [P.O.:570]  Out: 100 [Urine:100]    General:  Alert, cooperative, no distress, appears stated age; morbidly obese/debilitated. Head:  Normocephalic, without obvious abnormality, atraumatic. Eyes:  Conjunctivae/corneas clear. PERRL, EOMs intact. Nose: Nares normal. Septum midline. Throat: Lips, mucosa, and tongue moist.   Neck: Supple, symmetrical, trachea midline, no adenopathy, thyroid: no enlargement/tenderness/nodules, no carotid bruit and no JVD. Back:   Symmetric, no curvature. ROM normal. No CVA tenderness. Lungs:   Clear to auscultation bilaterally. Chest wall:  No tenderness or deformity. Heart:  Regular rate and rhythm, S1, S2 normal, no murmur, click, rub or gallop. Abdomen:   Soft, minimally tender. Bowel sounds normal. No masses,  No organomegaly. Extremities: no cyanosis. 2+ LE edema. No calf tenderness or cords. Pulses: 2+ and symmetric all extremities. Skin:  turgor normal. Left arm with erythema and slightly warm from volar region to deltoid area. Thrill present at shunt site. multiple shallow abrasions/excoriations and scabs on both forearms and abdomen   Neurologic: CNII-XII intact. Alert and oriented X 3. Fine motor of hands and fingers normal.   equal. Gait not tested at this time. Sensation grossly normal to touch.   Gross motor of extremities normal.  Psych:  Poor insight, oriented to person, place, slow simple answers Data Review:     Doppler LUE 11/2/17:  CONCLUSION: No deep vein thrombosis or thrombophlebitis in the veins  visualized. No evidence of thrombus in contralateral right subclavian  vein. Fistula is widely patent    Recent Days:  Recent Labs      11/05/17   0353  11/03/17   0512 11/02/17   1858   WBC  6.0  10.2  12.7*   HGB  10.7*  10.6*  11.8   HCT  34.0*  35.4  38.2   PLT  200  190  227     Recent Labs      11/05/17   0353  11/04/17   0452  11/03/17   0512 11/02/17   1858   NA  138   --   144  139   K  3.8   --   4.0  3.6   CL  102   --   105  101   CO2  29   --   30  32   GLU  71   --   128*  171*   BUN  71*   --   58*  54*   CREA  4.12*  3.31*  2.63*  2.49*   CA  8.6   --   8.8  9.2   MG   --    --   1.8   --    ALB   --    --    --   2.7*   TBILI   --    --    --   0.7   SGOT   --    --    --   25   ALT   --    --    --   18     No results for input(s): PH, PCO2, PO2, HCO3, FIO2 in the last 72 hours.     24 Hour Results:  Recent Results (from the past 24 hour(s))   GLUCOSE, POC    Collection Time: 11/04/17  7:44 AM   Result Value Ref Range    Glucose (POC) 77 65 - 100 mg/dL    Performed by Lizbet Romero    GLUCOSE, POC    Collection Time: 11/04/17  9:08 AM   Result Value Ref Range    Glucose (POC) 119 (H) 65 - 100 mg/dL    Performed by Mima Mayberry (PCT)    CULTURE, WOUND W GRAM STAIN    Collection Time: 11/04/17  9:10 AM   Result Value Ref Range    Special Requests: NO SPECIAL REQUESTS      GRAM STAIN FEW WBCS SEEN      GRAM STAIN NO ORGANISMS SEEN      Culture result: PENDING    GLUCOSE, POC    Collection Time: 11/04/17 11:20 AM   Result Value Ref Range    Glucose (POC) 98 65 - 100 mg/dL    Performed by Mima Mayberry (PCT)    GLUCOSE, POC    Collection Time: 11/04/17  4:47 PM   Result Value Ref Range    Glucose (POC) 118 (H) 65 - 100 mg/dL    Performed by Mima Mayberry (PCT)    GLUCOSE, POC    Collection Time: 11/04/17  9:08 PM   Result Value Ref Range    Glucose (POC) 134 (H) 65 - 100 mg/dL    Performed by Gisela Logan (PCT)    CBC WITH AUTOMATED DIFF    Collection Time: 11/05/17  3:53 AM   Result Value Ref Range    WBC 6.0 3.6 - 11.0 K/uL    RBC 3.72 (L) 3.80 - 5.20 M/uL    HGB 10.7 (L) 11.5 - 16.0 g/dL    HCT 34.0 (L) 35.0 - 47.0 %    MCV 91.4 80.0 - 99.0 FL    MCH 28.8 26.0 - 34.0 PG    MCHC 31.5 30.0 - 36.5 g/dL    RDW 14.9 (H) 11.5 - 14.5 %    PLATELET 230 693 - 387 K/uL    NEUTROPHILS 74 32 - 75 %    LYMPHOCYTES 13 12 - 49 %    MONOCYTES 7 5 - 13 %    EOSINOPHILS 6 0 - 7 %    BASOPHILS 0 0 - 1 %    ABS. NEUTROPHILS 4.5 1.8 - 8.0 K/UL    ABS. LYMPHOCYTES 0.8 0.8 - 3.5 K/UL    ABS. MONOCYTES 0.4 0.0 - 1.0 K/UL    ABS. EOSINOPHILS 0.4 0.0 - 0.4 K/UL    ABS.  BASOPHILS 0.0 0.0 - 0.1 K/UL   METABOLIC PANEL, BASIC    Collection Time: 11/05/17  3:53 AM   Result Value Ref Range    Sodium 138 136 - 145 mmol/L    Potassium 3.8 3.5 - 5.1 mmol/L    Chloride 102 97 - 108 mmol/L    CO2 29 21 - 32 mmol/L    Anion gap 7 5 - 15 mmol/L    Glucose 71 65 - 100 mg/dL    BUN 71 (H) 6 - 20 MG/DL    Creatinine 4.12 (H) 0.55 - 1.02 MG/DL    BUN/Creatinine ratio 17 12 - 20      GFR est AA 13 (L) >60 ml/min/1.73m2    GFR est non-AA 11 (L) >60 ml/min/1.73m2    Calcium 8.6 8.5 - 10.1 MG/DL   GLUCOSE, POC    Collection Time: 11/05/17  6:58 AM   Result Value Ref Range    Glucose (POC) 63 (L) 65 - 100 mg/dL    Performed by Val Savage (PCT)        Medications reviewed  Current Facility-Administered Medications   Medication Dose Route Frequency    piperacillin-tazobactam (ZOSYN) 3.375 g in 0.9% sodium chloride (MBP/ADV) 100 mL  3.375 g IntraVENous Q12H    diphenoxylate-atropine (LOMOTIL) tablet 2 Tab  2 Tab Oral QID PRN    carvedilol (COREG) tablet 3.125 mg  3.125 mg Oral BID WITH MEALS    citalopram (CELEXA) tablet 40 mg  40 mg Oral DAILY    doxazosin (CARDURA) tablet 2 mg  2 mg Oral DAILY    insulin glargine (LANTUS) injection 17 Units  17 Units SubCUTAneous QHS    pantoprazole (PROTONIX) tablet 40 mg  40 mg Oral DAILY    loratadine (CLARITIN) tablet 10 mg  10 mg Oral DAILY    sodium chloride (NS) flush 5-10 mL  5-10 mL IntraVENous Q8H    sodium chloride (NS) flush 5-10 mL  5-10 mL IntraVENous PRN    naloxone (NARCAN) injection 0.4 mg  0.4 mg IntraVENous PRN    glucose chewable tablet 16 g  4 Tab Oral PRN    dextrose (D50W) injection syrg 12.5-25 g  12.5-25 g IntraVENous PRN    glucagon (GLUCAGEN) injection 1 mg  1 mg IntraMUSCular PRN    acetaminophen (TYLENOL) tablet 650 mg  650 mg Oral Q4H PRN    diphenhydrAMINE (BENADRYL) capsule 25 mg  25 mg Oral Q4H PRN    ondansetron (ZOFRAN) injection 4 mg  4 mg IntraVENous Q4H PRN    heparin (porcine) injection 5,000 Units  5,000 Units SubCUTAneous Q8H    insulin lispro (HUMALOG) injection   SubCUTAneous AC&HS    fenofibrate nanocrystallized (TRICOR) tablet 145 mg  145 mg Oral DAILY    atorvastatin (LIPITOR) tablet 20 mg  20 mg Oral QHS    arformoterol (BROVANA) neb solution 15 mcg  15 mcg Nebulization BID RT    And    budesonide (PULMICORT) 500 mcg/2 ml nebulizer suspension  500 mcg Nebulization BID RT       Care Plan discussed with: Patient/Renal and Nurse    Total time spent with patient: 30 minutes.     Yeimy Blackman MD

## 2017-11-05 NOTE — ROUTINE PROCESS
Bedside shift change report given to Cat Bell RN (oncoming nurse) by Sonia Gerber RN (offgoing nurse). Report included the following information SBAR and Kardex.

## 2017-11-05 NOTE — PROGRESS NOTES
[facility] Dosing Services: 11/5/17    Pharmacist Renal Dosing Progress Note for loratadine   Physician Dr. Camacho Prim    The following medication: loratadine was automatically dose-adjusted per [facility] P&T Committee Protocol, with respect to renal function. Pt Weight:   Wt Readings from Last 1 Encounters:   11/02/17 105.7 kg (233 lb)         Previous Regimen 10mg daily   Serum Creatinine Lab Results   Component Value Date/Time    Creatinine 4.12 11/05/2017 03:53 AM       Creatinine Clearance Estimated Creatinine Clearance: 14 mL/min (based on Cr of 4.12). BUN Lab Results   Component Value Date/Time    BUN 71 11/05/2017 03:53 AM       Dosage changed to:  10mg every 48 hours due to CrCl <30mL/min     Additional notes:      Pharmacy to continue to monitor patient daily. Will make dosage adjustments based upon changing renal function. Signed Yoshi Bingham.  Contact information:  [phone]

## 2017-11-05 NOTE — PROGRESS NOTES
[address]  YOB: 1945          Assessment & Plan:   CKD 4 with ARF  · Stable on admission  · Creat rising the past 2 days  · Suspect ATN due to vanc. IVVD, AIN also considerations. · Stop Vanc. Hold ARB and diuretic  · Check UA and urine eos  · D/w pt, may need RRT if ARF progresses    Cellulitis  · On ABX  · Likely due to pruritis/excoriation  · Has improved    HTN  · Controlled  · Hold ARB    DM  · Insulin       Subjective:   CC: f/u CKD  HPI: Creat up over 4. Has been on vanc and zosyn for cellulitis, which is improving. HTN is controlled. She remains on BID Bumex.   ROS: +diarrhea, no n/v  Current Facility-Administered Medications   Medication Dose Route Frequency    insulin glargine (LANTUS) injection 10 Units  10 Units SubCUTAneous QHS    piperacillin-tazobactam (ZOSYN) 3.375 g in 0.9% sodium chloride (MBP/ADV) 100 mL  3.375 g IntraVENous Q12H    diphenoxylate-atropine (LOMOTIL) tablet 2 Tab  2 Tab Oral QID PRN    carvedilol (COREG) tablet 3.125 mg  3.125 mg Oral BID WITH MEALS    citalopram (CELEXA) tablet 40 mg  40 mg Oral DAILY    doxazosin (CARDURA) tablet 2 mg  2 mg Oral DAILY    pantoprazole (PROTONIX) tablet 40 mg  40 mg Oral DAILY    loratadine (CLARITIN) tablet 10 mg  10 mg Oral DAILY    sodium chloride (NS) flush 5-10 mL  5-10 mL IntraVENous Q8H    sodium chloride (NS) flush 5-10 mL  5-10 mL IntraVENous PRN    naloxone (NARCAN) injection 0.4 mg  0.4 mg IntraVENous PRN    glucose chewable tablet 16 g  4 Tab Oral PRN    dextrose (D50W) injection syrg 12.5-25 g  12.5-25 g IntraVENous PRN    glucagon (GLUCAGEN) injection 1 mg  1 mg IntraMUSCular PRN    acetaminophen (TYLENOL) tablet 650 mg  650 mg Oral Q4H PRN    diphenhydrAMINE (BENADRYL) capsule 25 mg  25 mg Oral Q4H PRN    ondansetron (ZOFRAN) injection 4 mg  4 mg IntraVENous Q4H PRN    heparin (porcine) injection 5,000 Units  5,000 Units SubCUTAneous Q8H    insulin lispro (HUMALOG) injection   SubCUTAneous AC&HS    fenofibrate nanocrystallized (TRICOR) tablet 145 mg  145 mg Oral DAILY    atorvastatin (LIPITOR) tablet 20 mg  20 mg Oral QHS    arformoterol (BROVANA) neb solution 15 mcg  15 mcg Nebulization BID RT    And    budesonide (PULMICORT) 500 mcg/2 ml nebulizer suspension  500 mcg Nebulization BID RT          Objective:     Vitals:  Blood pressure 128/60, pulse 75, temperature 96.8 °F (36 °C), resp. rate 16, height 5' 1\" (1.549 m), weight 105.7 kg (233 lb), SpO2 93 %. Temp (24hrs), Av.5 °F (36.4 °C), Min:96.8 °F (36 °C), Max:98 °F (36.7 °C)      Intake and Output:      1901 -  0700  In: 18 [P.O.:570]  Out: 100 [Urine:100]    Physical Exam:               GENERAL ASSESSMENT: NAD  CHEST: CTA  HEART: S1S2  ABDOMEN: Soft,NT  EXTREMITY: trace EDEMA, LUE with excoriations and diffuse erythema; LUE AVF +t/b  NEURO:Grossly non focal          ECG/rhythm:    Data Review      No results for input(s): TNIPOC in the last 72 hours. No lab exists for component: ITNL   Recent Labs      17   TROIQ  <0.04  <0.04     Recent Labs      17   0353  17   0452  17   0512  17   1858   NA  138   --   144  139   K  3.8   --   4.0  3.6   CL  102   --   105  101   CO2  29   --   30  32   BUN  71*   --   58*  54*   CREA  4.12*  3.31*  2.63*  2.49*   GLU  71   --   128*  171*   MG   --    --   1.8   --    CA  8.6   --   8.8  9.2   ALB   --    --    --   2.7*   WBC  6.0   --   10.2  12.7*   HGB  10.7*   --   10.6*  11.8   HCT  34.0*   --   35.4  38.2   PLT  200   --   190  227      No results for input(s): INR, PTP, APTT in the last 72 hours.     No lab exists for component: INREXT, INREXT  Needs: urine analysis, urine sodium, protein and creatinine  Lab Results   Component Value Date/Time    Sodium urine, random 50 2016 12:55 PM    Creatinine, urine 53.84 2016 12:55 PM           : Barbara Miller Daryle Hayward, MD  11/5/2017        [facility]:  [email or web address]  Deonte Hoyos office:  [address] [place] [address], [address] [place] [address],[address]  [place], [address] [place] [address]  Phone: [phone]  Fax :     [phone]    [facility] office:  [address] [facility], [address]  Phone - [phone]  Fax - [phone]

## 2017-11-06 LAB
ANION GAP SERPL CALC-SCNC: 10 MMOL/L (ref 5–15)
BACTERIA SPEC CULT: ABNORMAL
BASOPHILS # BLD: 0 K/UL (ref 0–0.1)
BASOPHILS NFR BLD: 1 % (ref 0–1)
BUN SERPL-MCNC: 82 MG/DL (ref 6–20)
BUN/CREAT SERPL: 16 (ref 12–20)
C DIFF TOX GENS STL QL NAA+PROBE: POSITIVE
CALCIUM SERPL-MCNC: 8.8 MG/DL (ref 8.5–10.1)
CHLORIDE SERPL-SCNC: 100 MMOL/L (ref 97–108)
CO2 SERPL-SCNC: 27 MMOL/L (ref 21–32)
CREAT SERPL-MCNC: 5.02 MG/DL (ref 0.55–1.02)
EOSINOPHIL # BLD: 0.3 K/UL (ref 0–0.4)
EOSINOPHIL NFR BLD: 6 % (ref 0–7)
ERYTHROCYTE [DISTWIDTH] IN BLOOD BY AUTOMATED COUNT: 14.9 % (ref 11.5–14.5)
GLUCOSE BLD STRIP.AUTO-MCNC: 102 MG/DL (ref 65–100)
GLUCOSE BLD STRIP.AUTO-MCNC: 144 MG/DL (ref 65–100)
GLUCOSE BLD STRIP.AUTO-MCNC: 149 MG/DL (ref 65–100)
GLUCOSE BLD STRIP.AUTO-MCNC: 69 MG/DL (ref 65–100)
GLUCOSE BLD STRIP.AUTO-MCNC: 97 MG/DL (ref 65–100)
GLUCOSE SERPL-MCNC: 80 MG/DL (ref 65–100)
GRAM STN SPEC: ABNORMAL
GRAM STN SPEC: ABNORMAL
HCT VFR BLD AUTO: 35.5 % (ref 35–47)
HGB BLD-MCNC: 10.9 G/DL (ref 11.5–16)
LYMPHOCYTES # BLD: 0.8 K/UL (ref 0.8–3.5)
LYMPHOCYTES NFR BLD: 14 % (ref 12–49)
MCH RBC QN AUTO: 28.3 PG (ref 26–34)
MCHC RBC AUTO-ENTMCNC: 30.7 G/DL (ref 30–36.5)
MCV RBC AUTO: 92.2 FL (ref 80–99)
MONOCYTES # BLD: 0.4 K/UL (ref 0–1)
MONOCYTES NFR BLD: 6 % (ref 5–13)
NEUTS SEG # BLD: 4.3 K/UL (ref 1.8–8)
NEUTS SEG NFR BLD: 73 % (ref 32–75)
PLATELET # BLD AUTO: 218 K/UL (ref 150–400)
POTASSIUM SERPL-SCNC: 3.9 MMOL/L (ref 3.5–5.1)
RBC # BLD AUTO: 3.85 M/UL (ref 3.8–5.2)
SERVICE CMNT-IMP: ABNORMAL
SERVICE CMNT-IMP: NORMAL
SERVICE CMNT-IMP: NORMAL
SODIUM SERPL-SCNC: 137 MMOL/L (ref 136–145)
WBC # BLD AUTO: 5.9 K/UL (ref 3.6–11)

## 2017-11-06 PROCEDURE — 74011250636 HC RX REV CODE- 250/636: Performed by: FAMILY MEDICINE

## 2017-11-06 PROCEDURE — 77030038269 HC DRN EXT URIN PURWCK BARD -A

## 2017-11-06 PROCEDURE — 74011250637 HC RX REV CODE- 250/637: Performed by: FAMILY MEDICINE

## 2017-11-06 PROCEDURE — 85025 COMPLETE CBC W/AUTO DIFF WBC: CPT | Performed by: FAMILY MEDICINE

## 2017-11-06 PROCEDURE — 82962 GLUCOSE BLOOD TEST: CPT

## 2017-11-06 PROCEDURE — 74011636637 HC RX REV CODE- 636/637: Performed by: FAMILY MEDICINE

## 2017-11-06 PROCEDURE — 80048 BASIC METABOLIC PNL TOTAL CA: CPT | Performed by: FAMILY MEDICINE

## 2017-11-06 PROCEDURE — 74011250636 HC RX REV CODE- 250/636: Performed by: INTERNAL MEDICINE

## 2017-11-06 PROCEDURE — 94640 AIRWAY INHALATION TREATMENT: CPT

## 2017-11-06 PROCEDURE — 74011000258 HC RX REV CODE- 258: Performed by: FAMILY MEDICINE

## 2017-11-06 PROCEDURE — 36415 COLL VENOUS BLD VENIPUNCTURE: CPT | Performed by: FAMILY MEDICINE

## 2017-11-06 PROCEDURE — 74011000250 HC RX REV CODE- 250: Performed by: INTERNAL MEDICINE

## 2017-11-06 PROCEDURE — 87493 C DIFF AMPLIFIED PROBE: CPT | Performed by: FAMILY MEDICINE

## 2017-11-06 PROCEDURE — 65270000029 HC RM PRIVATE

## 2017-11-06 PROCEDURE — 77030011256 HC DRSG MEPILEX <16IN NO BORD MOLN -A

## 2017-11-06 PROCEDURE — 74011250637 HC RX REV CODE- 250/637: Performed by: INTERNAL MEDICINE

## 2017-11-06 RX ORDER — METRONIDAZOLE 250 MG/1
500 TABLET ORAL
Status: DISCONTINUED | OUTPATIENT
Start: 2017-11-06 | End: 2017-11-10 | Stop reason: HOSPADM

## 2017-11-06 RX ORDER — NYSTATIN 100000 [USP'U]/G
POWDER TOPICAL 2 TIMES DAILY
Status: DISCONTINUED | OUTPATIENT
Start: 2017-11-06 | End: 2017-11-10 | Stop reason: HOSPADM

## 2017-11-06 RX ORDER — MUPIROCIN 20 MG/G
OINTMENT TOPICAL 2 TIMES DAILY
Status: DISCONTINUED | OUTPATIENT
Start: 2017-11-06 | End: 2017-11-10 | Stop reason: HOSPADM

## 2017-11-06 RX ADMIN — FENOFIBRATE 145 MG: 145 TABLET ORAL at 08:56

## 2017-11-06 RX ADMIN — LACTOBACILLUS ACIDOPHILUS / LACTOBACILLUS BULGARICUS 1 PACKET: 100 MILLION CFU STRENGTH GRANULES at 17:31

## 2017-11-06 RX ADMIN — BUDESONIDE 500 MCG: 0.5 INHALANT RESPIRATORY (INHALATION) at 07:21

## 2017-11-06 RX ADMIN — MUPIROCIN: 20 OINTMENT TOPICAL at 08:57

## 2017-11-06 RX ADMIN — NYSTATIN: 100000 POWDER TOPICAL at 17:14

## 2017-11-06 RX ADMIN — MUPIROCIN: 20 OINTMENT TOPICAL at 17:13

## 2017-11-06 RX ADMIN — LACTOBACILLUS ACIDOPHILUS / LACTOBACILLUS BULGARICUS 1 PACKET: 100 MILLION CFU STRENGTH GRANULES at 08:56

## 2017-11-06 RX ADMIN — METRONIDAZOLE 500 MG: 250 TABLET ORAL at 21:00

## 2017-11-06 RX ADMIN — CEFTRIAXONE 2 G: 2 INJECTION, POWDER, FOR SOLUTION INTRAMUSCULAR; INTRAVENOUS at 14:33

## 2017-11-06 RX ADMIN — CARVEDILOL 3.12 MG: 3.12 TABLET, FILM COATED ORAL at 08:56

## 2017-11-06 RX ADMIN — HEPARIN SODIUM 5000 UNITS: 5000 INJECTION, SOLUTION INTRAVENOUS; SUBCUTANEOUS at 21:35

## 2017-11-06 RX ADMIN — CARVEDILOL 3.12 MG: 3.12 TABLET, FILM COATED ORAL at 16:44

## 2017-11-06 RX ADMIN — Medication 10 ML: at 21:45

## 2017-11-06 RX ADMIN — INSULIN GLARGINE 10 UNITS: 100 INJECTION, SOLUTION SUBCUTANEOUS at 23:08

## 2017-11-06 RX ADMIN — Medication 10 ML: at 06:21

## 2017-11-06 RX ADMIN — ARFORMOTEROL TARTRATE 15 MCG: 15 SOLUTION RESPIRATORY (INHALATION) at 19:08

## 2017-11-06 RX ADMIN — HEPARIN SODIUM 5000 UNITS: 5000 INJECTION, SOLUTION INTRAVENOUS; SUBCUTANEOUS at 13:04

## 2017-11-06 RX ADMIN — DOXAZOSIN 2 MG: 1 TABLET ORAL at 08:56

## 2017-11-06 RX ADMIN — ATORVASTATIN CALCIUM 20 MG: 20 TABLET, FILM COATED ORAL at 21:45

## 2017-11-06 RX ADMIN — ARFORMOTEROL TARTRATE 15 MCG: 15 SOLUTION RESPIRATORY (INHALATION) at 07:21

## 2017-11-06 RX ADMIN — BUDESONIDE 500 MCG: 0.5 INHALANT RESPIRATORY (INHALATION) at 19:08

## 2017-11-06 RX ADMIN — PANTOPRAZOLE SODIUM 40 MG: 40 TABLET, DELAYED RELEASE ORAL at 06:15

## 2017-11-06 RX ADMIN — HEPARIN SODIUM 5000 UNITS: 5000 INJECTION, SOLUTION INTRAVENOUS; SUBCUTANEOUS at 06:15

## 2017-11-06 RX ADMIN — PIPERACILLIN SODIUM AND TAZOBACTAM SODIUM 3.38 G: 3; .375 INJECTION, POWDER, LYOPHILIZED, FOR SOLUTION INTRAVENOUS at 06:16

## 2017-11-06 RX ADMIN — CITALOPRAM HYDROBROMIDE 40 MG: 20 TABLET ORAL at 08:56

## 2017-11-06 RX ADMIN — DIPHENHYDRAMINE HYDROCHLORIDE 25 MG: 25 CAPSULE ORAL at 14:40

## 2017-11-06 NOTE — DIABETES MGMT
Progress Note     Chart reviewed for low blood glucose ( < 80 mg/dL x 1 in the past 24 hours) . Recommendations/ Comments: Noted Lantus 10 units resumed last night 11/5/2017    Fasting glucose today: 80 mg/dL (per am lab). Required 0 units of correction in the last 24 hours. Inpatient medications for glucose management:  1. Correction Scale: Lispro (Humalog) High Sensitivity scale (thin, ESRD) to cover for glucose > 199 mg/dL  before meals and for glucose >199 at bedtime      2. Lantus 10 units at bedtime     POC Glucose last 24hrs:   Lab Results   Component Value Date/Time    GLU 80 11/06/2017 04:35 AM    GLUCPOC 102 (H) 11/06/2017 11:15 AM    GLUCPOC 97 11/06/2017 08:03 AM    GLUCPOC 69 11/06/2017 07:26 AM        Estimated Creatinine Clearance: 11.5 mL/min (based on Cr of 5.02). Diet order:   Active Orders   Diet    DIET CARDIAC Regular; Consistent Carb 1800kcal; FR 1200ML; 70-70-70 (House)      PO intake: Patient Vitals for the past 72 hrs:   % Diet Eaten   11/04/17 1859 50 %   11/04/17 0941 40 %       History of Diabetes:   Kenya Romero is a 71 y.o. female with a past medical history significant for DM per Dr. Kesha Larry MD's H&P dated 11/02/2017. Prior to admission medications for diabetes: per patient and past medical records  -Toujeo 24 units daily     A1C:   Lab Results   Component Value Date/Time    Hemoglobin A1c 5.7 11/02/2017 05:12 AM    Hemoglobin A1c 7.2 02/11/2017 11:17 AM       Reference range*:  Increased risk for diabetes: 5.7 - 6.4%  Diabetes: >6.4%  Glycemic control for adults with diabetes: <7.0 %    *CHARLOTTE SAMSON (2014). Diagnosis and classification of diabetes mellitus. Diabetes care, 37, S81. Thank you. Bridgett Yanez MPH, RN, BSN, [address]   [phone]    -For most hospitalized persons with hyperglycemia in the intensive care unit (ICU), a glucose range of 140 to 180 mg/dL is recommended, provided this target can be safely achieved. *  - For general medicine and surgery patients in non-ICU settings, a premeal glucose target <140 mg/dL and a random blood glucose <180 mg/dL are recommended. *    LIVIA Gudino, Queenie Rod., Bart Gonzalez, ... & Marjean Sacks ([id number]). AMERICAN ASSOCIATION OF CLINICAL ENDOCRINOLOGISTS AND AMERICAN COLLEGE OF ENDOCRINOLOGY-CLINICAL PRACTICE GUIDELINES FOR DEVELOPING A DIABETES MELLITUS COMPREHENSIVE CARE PLAN-2015-EXECUTIVE SUMMARY: Complete guidelines are available at [email or web address] aace. com/publications/guidelines. Endocrine Practice, 21(4), [id number].

## 2017-11-06 NOTE — PROGRESS NOTES
Daily Progress Note: 11/6/2017  Ayesha Mac MD    Assessment/Plan:   cellulitis/ Leukocytosis - POA, not sepsis. Excoriated skin lesions seem to be the source. AV fistula has been ruled out. No Hx of MRSA. --Zosyn and vanc for now. --Blood cx Neg so far. --Vascular has ruled out clot and infection  --Culture of wound; arm pustule: group B strep, beta hemolytic     Hypoxia / Pleural effusion / RAI on CPAP - POA, likely due to fluid overload. --continue bumex and fluid restriction. -- Continue home CPAP.     CKD (chronic kidney disease) stage 4 - Cr worse  --Renal following. May need dialysis if no improvement  --diuretics and ARB held     DM type 2, uncontrolled, with renal complications - Diabetic/Renal diet and counseling.    --SSI per protocol.    --decrease glargine while ALFONSO   -- A1c useless in setting of anemia.     HTN (hypertension), benign - stable  --Norvasc held for now in case it is making edema worse. --Continue doxazosin, losartan, bumex, coreg.     Iron deficiency anemia - Due to ESRD. Epo per renal.     Chronic bilateral low back pain without sciatica - tylenol prn.     Anxiety and depression / Generalized anxiety disorder - POA, stable. I suspect dementia and I would recommend outpatient full neuropsych testing. She is normally on xanax, celexa     Asthma - Stable. Continue Breo and prn albuterol.     Hyperlipidemia - Continue atorvastatin     Diarrhea: stool C diff sent  -probiotics  -contact precautions    Morbid obesity - unchanged. Counseled again. Problem List:  Problem List as of 11/6/2017  Date Reviewed: 6/2/2017          Codes Class Noted - Resolved    [facility] fistula infection ([facility] ICD-10-CM: T82. 7XXA  ICD-9-CM: 996.62  11/2/2017 - Present        Leukocytosis ICD-10-CM: D72.829  ICD-9-CM: 288.60  11/2/2017 - Present        Hypoxia ICD-10-CM: R09.02  ICD-9-CM: 799.02  11/2/2017 - Present        Pleural effusion ICD-10-CM: J90  ICD-9-CM: 511.9  11/2/2017 - Present        Asthma ICD-10-CM: J45.909  ICD-9-CM: 493.90  Unknown - Present        Hyperlipidemia ICD-10-CM: E78.5  ICD-9-CM: 272.4  Unknown - Present        RAI on CPAP ICD-10-CM: G47.33, Z99.89  ICD-9-CM: 327.23, V46.8  Unknown - Present        Anxiety and depression ICD-10-CM: F41.8  ICD-9-CM: 300.00, 311  Unknown - Present        Chronic bilateral low back pain without sciatica ICD-10-CM: M54.5, G89.29  ICD-9-CM: 724.2, 338.29  11/3/2016 - Present        Generalized anxiety disorder ICD-10-CM: F41.1  ICD-9-CM: 300.02  11/3/2016 - Present        CKD (chronic kidney disease) stage 4, GFR 15-29 ml/min (HCC) (Chronic) ICD-10-CM: N18.4  ICD-9-CM: 585.4  8/14/2016 - Present        HTN (hypertension), benign (Chronic) ICD-10-CM: I10  ICD-9-CM: 401.1  8/14/2016 - Present        DM type 2, uncontrolled, with renal complications (HCC) (Chronic) ICD-10-CM: E11.29, E11.65  ICD-9-CM: 250.42  8/14/2016 - Present        Iron deficiency anemia (Chronic) ICD-10-CM: D50.9  ICD-9-CM: 280.9  8/14/2016 - Present        RESOLVED: Hypertension ICD-10-CM: I10  ICD-9-CM: 401.9  Unknown - 11/2/2017        RESOLVED: DM type 2 causing renal disease ([facility] ICD-10-CM: E11.29  ICD-9-CM: 250.40  Unknown - 11/2/2017        RESOLVED: CKD stage 4 due to type 2 diabetes mellitus ([facility] ICD-10-CM: E11.22, N18.4  ICD-9-CM: 250.40, 585.4  Unknown - 11/2/2017        RESOLVED: Cellulitis of right lower extremity ICD-10-CM: L03.115  ICD-9-CM: 682.6  6/2/2017 - 11/2/2017        RESOLVED: Asthma with acute exacerbation ICD-10-CM: J45.901  ICD-9-CM: 493.92  6/2/2017 - 11/2/2017        RESOLVED: CKD stage 4 due to type 2 diabetes mellitus ([facility] ICD-10-CM: E11.22, N18.4  ICD-9-CM: 250.40, 585.4  Unknown - 2/11/2017        RESOLVED: DM type 2 causing renal disease ([facility] ICD-10-CM: E11.29  ICD-9-CM: 250.40  Unknown - 2/11/2017        RESOLVED: Hypoglycemia ICD-10-CM: E16.2  ICD-9-CM: 251.2  2/11/2017 - 11/2/2017 RESOLVED: Hyperkalemia ICD-10-CM: E87.5  ICD-9-CM: 276.7  11/3/2016 - 2/11/2017        RESOLVED: Elevated serum creatinine ICD-10-CM: R79.89  ICD-9-CM: 790.99  11/3/2016 - 2/11/2017        RESOLVED: Left leg cellulitis ICD-10-CM: L03.116  ICD-9-CM: 682.6  8/14/2016 - 2/11/2017        RESOLVED: Sepsis ([facility] ICD-10-CM: A41.9  ICD-9-CM: 038.9, 995.91  8/14/2016 - 2/11/2017        RESOLVED: Acute renal failure (ARF) ([facility] ICD-10-CM: N17.9  ICD-9-CM: 584.9  8/14/2016 - 11/2/2017        RESOLVED: RAI (obstructive sleep apnea) (Chronic) ICD-10-CM: F34.03  ICD-9-CM: 327.23  8/14/2016 - 2/11/2017              Subjective:     71 y.o.  female who presented to the Emergency Department complaining of arm infection. She provides limited hx, I think she has dementia. Her  is present and provide tangential statements about her hx. She reports several days of worsening redness in her left arm near site of HD fistula. Fistula needed \"balloon\" about a month ago due to arm edema. It has not yet been used. She has a hx of skin infection, but never MRSA she thinks. We will admit her for management. 11/3:  She reports she is feeling better today. Wearing CPAP. She reports her left arm continues to hurt with movement but is fairly comfortable at rest.  Doppler reports fistula is patent. She has multiple shallow abrasions and scabs on both forearms and abdomen where she reports she has \"itchy spots\" that she picks at and scratches - may be source of infection. Nephrology and Vascular consulted. 11/4: Pt notes she wants to go home. Afebrile overnight. Skin presumed source, but no positive cultures yet. Cr worse this AM.  Tolerating PO. I spoke with pt's . He thinks she needs another day or two. Willing to take her home when released. 11/5: Cr is worse this AM after another dose of vanc. This has been stopped as wound culture showed WBCs but no organisms yet. Pt is still afebrile. Still having diarrhea. C diff has been sent. :  She reports she feels better. Vanc stopped. Cr cont to worsen - dialysis decision per Renal.  Nurses report stools semi-solid so C diff test not sent. Wound culture with Group G, B Hemolytic strep - Zosyn should cover - bactroban added to all sites of excoriations. Review of Systems:   A comprehensive review of systems was negative except for that written in the HPI. Allergies   Allergen Reactions    Latex, Natural Rubber Rash     Per pt, had a reaction to latex gloves when an RN administered lotion     Darvon [Propoxyphene] Itching    Peanut Cough    Pineapple Itching    Sulfa (Sulfonamide Antibiotics) Swelling    Tape [Adhesive] Itching     Objective:   Physical Exam:     Visit Vitals    /62 (BP 1 Location: Right arm, BP Patient Position: At rest)    Pulse 79    Temp 98.2 °F (36.8 °C)    Resp 18    Ht 5' 1\" (1.549 m)    Wt 105.7 kg (233 lb)    SpO2 94%    BMI 44.02 kg/m2    O2 Flow Rate (L/min): 2 l/min O2 Device: CPAP mask    Temp (24hrs), Av.9 °F (36.6 °C), Min:97.3 °F (36.3 °C), Max:98.8 °F (37.1 °C)         07 -  1900  In: 220 [P.O.:220]  Out: 100 [Urine:100]    General:  Alert, cooperative, no distress, appears stated age; morbidly obese/debilitated. Head:  Normocephalic, without obvious abnormality, atraumatic. Eyes:  Conjunctivae/corneas clear. PERRL, EOMs intact. Nose: Nares normal. Septum midline. Throat: Lips, mucosa, and tongue moist.   Neck: Supple, symmetrical, trachea midline, no adenopathy, thyroid: no enlargement/tenderness/nodules, no carotid bruit and no JVD. Back:   Symmetric, no curvature. ROM normal. No CVA tenderness. Lungs:   Clear to auscultation bilaterally. Chest wall:  No tenderness or deformity. Heart:  Regular rate and rhythm, S1, S2 normal, no murmur, click, rub or gallop. Abdomen:   Soft, minimally tender. Bowel sounds normal. No masses,  No organomegaly. Extremities: no cyanosis. 2+ LE edema. No calf tenderness or cords. Pulses: 2+ and symmetric all extremities. Skin:  turgor normal. Left arm with nearly resolved erythema and no longer warm from volar region to deltoid area. Thrill present at shunt site. multiple shallow abrasions/excoriations and scabs on both forearms and abdomen look better   Neurologic: CNII-XII intact. Alert and oriented X 3. Fine motor of hands and fingers normal.   equal. Gait not tested at this time. Sensation grossly normal to touch. Gross motor of extremities normal.  Psych:  Poor insight, oriented to person, place, slow simple answers     Data Review:     Doppler LUE 11/2/17:  CONCLUSION: No deep vein thrombosis or thrombophlebitis in the veins  visualized. No evidence of thrombus in contralateral right subclavian  vein. Fistula is widely patent    Recent Days:  Recent Labs      11/06/17 0435 11/05/17 0353   WBC  5.9  6.0   HGB  10.9*  10.7*   HCT  35.5  34.0*   PLT  218  200     Recent Labs      11/06/17 0435 11/05/17 0353  11/04/17   0452   NA  137  138   --    K  3.9  3.8   --    CL  100  102   --    CO2  27  29   --    GLU  80  71   --    BUN  82*  71*   --    CREA  5.02*  4.12*  3.31*   CA  8.8  8.6   --      No results for input(s): PH, PCO2, PO2, HCO3, FIO2 in the last 72 hours.     24 Hour Results:  Recent Results (from the past 24 hour(s))   GLUCOSE, POC    Collection Time: 11/05/17  6:58 AM   Result Value Ref Range    Glucose (POC) 63 (L) 65 - 100 mg/dL    Performed by Jose Irene (PCT)    GLUCOSE, POC    Collection Time: 11/05/17  7:41 AM   Result Value Ref Range    Glucose (POC) 97 65 - 100 mg/dL    Performed by Marvia Gottron    GLUCOSE, POC    Collection Time: 11/05/17 12:13 PM   Result Value Ref Range    Glucose (POC) 114 (H) 65 - 100 mg/dL    Performed by Shawna Murrieta ([facility])    GLUCOSE, POC    Collection Time: 11/05/17  4:42 PM   Result Value Ref Range    Glucose (POC) 106 (H) 65 - 100 mg/dL    Performed by Marci Walker (PCT)    GLUCOSE, POC    Collection Time: 11/05/17  8:53 PM   Result Value Ref Range    Glucose (POC) 111 (H) 65 - 100 mg/dL    Performed by Dorothea Keller (PCT)    CBC WITH AUTOMATED DIFF    Collection Time: 11/06/17  4:35 AM   Result Value Ref Range    WBC 5.9 3.6 - 11.0 K/uL    RBC 3.85 3.80 - 5.20 M/uL    HGB 10.9 (L) 11.5 - 16.0 g/dL    HCT 35.5 35.0 - 47.0 %    MCV 92.2 80.0 - 99.0 FL    MCH 28.3 26.0 - 34.0 PG    MCHC 30.7 30.0 - 36.5 g/dL    RDW 14.9 (H) 11.5 - 14.5 %    PLATELET 243 372 - 540 K/uL    NEUTROPHILS 73 32 - 75 %    LYMPHOCYTES 14 12 - 49 %    MONOCYTES 6 5 - 13 %    EOSINOPHILS 6 0 - 7 %    BASOPHILS 1 0 - 1 %    ABS. NEUTROPHILS 4.3 1.8 - 8.0 K/UL    ABS. LYMPHOCYTES 0.8 0.8 - 3.5 K/UL    ABS. MONOCYTES 0.4 0.0 - 1.0 K/UL    ABS. EOSINOPHILS 0.3 0.0 - 0.4 K/UL    ABS.  BASOPHILS 0.0 0.0 - 0.1 K/UL   METABOLIC PANEL, BASIC    Collection Time: 11/06/17  4:35 AM   Result Value Ref Range    Sodium 137 136 - 145 mmol/L    Potassium 3.9 3.5 - 5.1 mmol/L    Chloride 100 97 - 108 mmol/L    CO2 27 21 - 32 mmol/L    Anion gap 10 5 - 15 mmol/L    Glucose 80 65 - 100 mg/dL    BUN 82 (H) 6 - 20 MG/DL    Creatinine 5.02 (H) 0.55 - 1.02 MG/DL    BUN/Creatinine ratio 16 12 - 20      GFR est AA 10 (L) >60 ml/min/1.73m2    GFR est non-AA 8 (L) >60 ml/min/1.73m2    Calcium 8.8 8.5 - 10.1 MG/DL       Medications reviewed  Current Facility-Administered Medications   Medication Dose Route Frequency    insulin glargine (LANTUS) injection 10 Units  10 Units SubCUTAneous QHS    lactobacillus-acidophilus (LACTINEX) 1 Packet  1 Packet Oral BID    [START ON 11/7/2017] loratadine (CLARITIN) tablet 10 mg  10 mg Oral Q48H    piperacillin-tazobactam (ZOSYN) 3.375 g in 0.9% sodium chloride (MBP/ADV) 100 mL  3.375 g IntraVENous Q12H    diphenoxylate-atropine (LOMOTIL) tablet 2 Tab  2 Tab Oral QID PRN    carvedilol (COREG) tablet 3.125 mg  3.125 mg Oral BID WITH MEALS    citalopram (CELEXA) tablet 40 mg  40 mg Oral DAILY    doxazosin (CARDURA) tablet 2 mg  2 mg Oral DAILY    pantoprazole (PROTONIX) tablet 40 mg  40 mg Oral DAILY    sodium chloride (NS) flush 5-10 mL  5-10 mL IntraVENous Q8H    sodium chloride (NS) flush 5-10 mL  5-10 mL IntraVENous PRN    naloxone (NARCAN) injection 0.4 mg  0.4 mg IntraVENous PRN    glucose chewable tablet 16 g  4 Tab Oral PRN    dextrose (D50W) injection syrg 12.5-25 g  12.5-25 g IntraVENous PRN    glucagon (GLUCAGEN) injection 1 mg  1 mg IntraMUSCular PRN    acetaminophen (TYLENOL) tablet 650 mg  650 mg Oral Q4H PRN    diphenhydrAMINE (BENADRYL) capsule 25 mg  25 mg Oral Q4H PRN    ondansetron (ZOFRAN) injection 4 mg  4 mg IntraVENous Q4H PRN    heparin (porcine) injection 5,000 Units  5,000 Units SubCUTAneous Q8H    insulin lispro (HUMALOG) injection   SubCUTAneous AC&HS    fenofibrate nanocrystallized (TRICOR) tablet 145 mg  145 mg Oral DAILY    atorvastatin (LIPITOR) tablet 20 mg  20 mg Oral QHS    arformoterol (BROVANA) neb solution 15 mcg  15 mcg Nebulization BID RT    And    budesonide (PULMICORT) 500 mcg/2 ml nebulizer suspension  500 mcg Nebulization BID RT       Care Plan discussed with: Patient/Renal and Nurse    Total time spent with patient: 30 minutes.     Mitchel Grijalva MD

## 2017-11-06 NOTE — WOUND CARE
Wound care consult:  Initial visit for skin assessment, alert, no distress. Spouse at bedside. Familiar to wound care team from previous admissions. Assessment  All skin folds and bony prominences assessed, turned with staff assistance. Left lower arm  Skin is thin, fragile with scattered bruises noted. Chronic \"itching and scratching\" from ESRD. Scattered scabs and linear scratches on arms, chest, abdomen and pannus. Linear excoriation and light yeast like rash in the lateral lower pannus. Partial thickness skin loss on upper and lower abdomen. Sacral and bilateral buttocks intact with a chronic darker discoloration from history of incontinence related to immobility. Heels and elbows intact. Treatment  All open wounds on left lower arm and abdomen cleansed, antibiotic, foam dressings applied. Incontinent care given, moisture barrier applied. Repositioned in bed   Heels floated    Recommendations/Plan  Turn, reposition every 2 hours as tolerated, float heels. Incontinent care with comfort shields. Apply Zinc to all open areas, moisture barrier as needed. Nystatin to skin folds. Call placed to Dr Jan Lucero, orders obtained, staff nurse aware. Will follow, reconsult as needed.     Lillian wSann

## 2017-11-06 NOTE — ROUTINE PROCESS
Bedside shift change report given to Kade Hernandez (oncoming nurse) by Brent Ferrell RN (offgoing nurse). Report included the following information SBAR and Kardex.

## 2017-11-06 NOTE — PROGRESS NOTES
Bedside and Verbal shift change report given to Mony Rosales (oncoming nurse) by Josie Johnson RN (offgoing nurse). Report included the following information SBAR, Kardex, Intake/Output, MAR, Accordion and Recent Results.

## 2017-11-06 NOTE — PROGRESS NOTES
[address]  YOB: 1945          Assessment & Plan:   CKD 4 with ARF  · Stable on admission  · Now rising during hospitalization  · Suspect ATN due to vanc. IVVD, AIN also considerations. · Vanc D/C. Loop and ARB on hold    Cellulitis  · On ceftriaxone  · Likely due to pruritis/excoriation  · Has improved    HTN  · Controlled  · Hold ARB    DM  · Insulin       Subjective:   CC: f/u CKD  HPI: Creatinine still rising. Making urine. ABX changed to rocephin. Cellulitis resolving.  HTN reasonably controlled with ARB on hold  ROS: No n/v/d/sob  Current Facility-Administered Medications   Medication Dose Route Frequency    mupirocin (BACTROBAN) 2 % ointment   Topical BID    cefTRIAXone (ROCEPHIN) 2 g in 0.9% sodium chloride (MBP/ADV) 50 mL  2 g IntraVENous Q24H    insulin glargine (LANTUS) injection 10 Units  10 Units SubCUTAneous QHS    lactobacillus-acidophilus (LACTINEX) 1 Packet  1 Packet Oral BID    [START ON 11/7/2017] loratadine (CLARITIN) tablet 10 mg  10 mg Oral Q48H    diphenoxylate-atropine (LOMOTIL) tablet 2 Tab  2 Tab Oral QID PRN    carvedilol (COREG) tablet 3.125 mg  3.125 mg Oral BID WITH MEALS    citalopram (CELEXA) tablet 40 mg  40 mg Oral DAILY    doxazosin (CARDURA) tablet 2 mg  2 mg Oral DAILY    pantoprazole (PROTONIX) tablet 40 mg  40 mg Oral DAILY    sodium chloride (NS) flush 5-10 mL  5-10 mL IntraVENous Q8H    sodium chloride (NS) flush 5-10 mL  5-10 mL IntraVENous PRN    naloxone (NARCAN) injection 0.4 mg  0.4 mg IntraVENous PRN    glucose chewable tablet 16 g  4 Tab Oral PRN    dextrose (D50W) injection syrg 12.5-25 g  12.5-25 g IntraVENous PRN    glucagon (GLUCAGEN) injection 1 mg  1 mg IntraMUSCular PRN    acetaminophen (TYLENOL) tablet 650 mg  650 mg Oral Q4H PRN    diphenhydrAMINE (BENADRYL) capsule 25 mg  25 mg Oral Q4H PRN    ondansetron (ZOFRAN) injection 4 mg  4 mg IntraVENous Q4H PRN    heparin (porcine) injection 5,000 Units  5,000 Units SubCUTAneous Q8H    insulin lispro (HUMALOG) injection   SubCUTAneous AC&HS    fenofibrate nanocrystallized (TRICOR) tablet 145 mg  145 mg Oral DAILY    atorvastatin (LIPITOR) tablet 20 mg  20 mg Oral QHS    arformoterol (BROVANA) neb solution 15 mcg  15 mcg Nebulization BID RT    And    budesonide (PULMICORT) 500 mcg/2 ml nebulizer suspension  500 mcg Nebulization BID RT          Objective:     Vitals:  Blood pressure 140/66, pulse 86, temperature 97.5 °F (36.4 °C), resp. rate 18, height 5' 1\" (1.549 m), weight 105.7 kg (233 lb), SpO2 93 %. Temp (24hrs), Av.1 °F (36.7 °C), Min:97.4 °F (36.3 °C), Max:98.8 °F (37.1 °C)      Intake and Output:          Physical Exam:               GENERAL ASSESSMENT: NAD  CHEST: CTA  HEART: S1S2  ABDOMEN: Soft,NT  EXTREMITY: trace EDEMA, LUE with excoriations and improving erythema; LUE AVF +t/b  NEURO:Grossly non focal          ECG/rhythm:    Data Review      No results for input(s): TNIPOC in the last 72 hours. No lab exists for component: ITNL   No results for input(s): CPK, CKMB, TROIQ in the last 72 hours. Recent Labs      17   0435  17   0353  17   0452   NA  137  138   --    K  3.9  3.8   --    CL  100  102   --    CO2  27  29   --    BUN  82*  71*   --    CREA  5.02*  4.12*  3.31*   GLU  80  71   --    CA  8.8  8.6   --    WBC  5.9  6.0   --    HGB  10.9*  10.7*   --    HCT  35.5  34.0*   --    PLT  218  200   --       No results for input(s): INR, PTP, APTT in the last 72 hours. No lab exists for component: INREXT, INREXT  Needs: urine analysis, urine sodium, protein and creatinine  Lab Results   Component Value Date/Time    Sodium urine, random 50 2016 12:55 PM    Creatinine, urine 53.84 2016 12:55 PM           : Jean Mckeon MD  2017        [facility]:  [email or web address]  [email or web address]    [place] office:  [address], [address] [place] [address] [address]  [place], [address] [facility]  Phone: [phone]  Fax :     [phone]    [facility] office:  [address] [facility], [address]  Phone - [phone]  Fax - [phone]

## 2017-11-07 LAB
ANION GAP SERPL CALC-SCNC: 12 MMOL/L (ref 5–15)
BACTERIA SPEC CULT: NORMAL
BASOPHILS # BLD: 0 K/UL (ref 0–0.1)
BASOPHILS NFR BLD: 0 % (ref 0–1)
BUN SERPL-MCNC: 86 MG/DL (ref 6–20)
BUN/CREAT SERPL: 16 (ref 12–20)
CALCIUM SERPL-MCNC: 8.9 MG/DL (ref 8.5–10.1)
CHLORIDE SERPL-SCNC: 99 MMOL/L (ref 97–108)
CO2 SERPL-SCNC: 25 MMOL/L (ref 21–32)
CREAT SERPL-MCNC: 5.34 MG/DL (ref 0.55–1.02)
EOSINOPHIL # BLD: 0.3 K/UL (ref 0–0.4)
EOSINOPHIL NFR BLD: 5 % (ref 0–7)
ERYTHROCYTE [DISTWIDTH] IN BLOOD BY AUTOMATED COUNT: 14.7 % (ref 11.5–14.5)
GLUCOSE BLD STRIP.AUTO-MCNC: 115 MG/DL (ref 65–100)
GLUCOSE BLD STRIP.AUTO-MCNC: 117 MG/DL (ref 65–100)
GLUCOSE BLD STRIP.AUTO-MCNC: 162 MG/DL (ref 65–100)
GLUCOSE BLD STRIP.AUTO-MCNC: 174 MG/DL (ref 65–100)
GLUCOSE SERPL-MCNC: 124 MG/DL (ref 65–100)
HCT VFR BLD AUTO: 32.5 % (ref 35–47)
HGB BLD-MCNC: 10.7 G/DL (ref 11.5–16)
LYMPHOCYTES # BLD: 0.8 K/UL (ref 0.8–3.5)
LYMPHOCYTES NFR BLD: 11 % (ref 12–49)
MCH RBC QN AUTO: 29.2 PG (ref 26–34)
MCHC RBC AUTO-ENTMCNC: 32.9 G/DL (ref 30–36.5)
MCV RBC AUTO: 88.6 FL (ref 80–99)
MONOCYTES # BLD: 0.4 K/UL (ref 0–1)
MONOCYTES NFR BLD: 6 % (ref 5–13)
NEUTS SEG # BLD: 5.8 K/UL (ref 1.8–8)
NEUTS SEG NFR BLD: 78 % (ref 32–75)
PLATELET # BLD AUTO: 223 K/UL (ref 150–400)
POTASSIUM SERPL-SCNC: 3.9 MMOL/L (ref 3.5–5.1)
RBC # BLD AUTO: 3.67 M/UL (ref 3.8–5.2)
SERVICE CMNT-IMP: ABNORMAL
SERVICE CMNT-IMP: NORMAL
SODIUM SERPL-SCNC: 136 MMOL/L (ref 136–145)
WBC # BLD AUTO: 7.4 K/UL (ref 3.6–11)

## 2017-11-07 PROCEDURE — 74011250637 HC RX REV CODE- 250/637: Performed by: INTERNAL MEDICINE

## 2017-11-07 PROCEDURE — 65270000029 HC RM PRIVATE

## 2017-11-07 PROCEDURE — 74011250636 HC RX REV CODE- 250/636: Performed by: INTERNAL MEDICINE

## 2017-11-07 PROCEDURE — 80048 BASIC METABOLIC PNL TOTAL CA: CPT | Performed by: FAMILY MEDICINE

## 2017-11-07 PROCEDURE — 82962 GLUCOSE BLOOD TEST: CPT

## 2017-11-07 PROCEDURE — 74011000258 HC RX REV CODE- 258: Performed by: FAMILY MEDICINE

## 2017-11-07 PROCEDURE — 85025 COMPLETE CBC W/AUTO DIFF WBC: CPT | Performed by: FAMILY MEDICINE

## 2017-11-07 PROCEDURE — 36415 COLL VENOUS BLD VENIPUNCTURE: CPT | Performed by: FAMILY MEDICINE

## 2017-11-07 PROCEDURE — 74011000250 HC RX REV CODE- 250: Performed by: INTERNAL MEDICINE

## 2017-11-07 PROCEDURE — 74011636637 HC RX REV CODE- 636/637: Performed by: FAMILY MEDICINE

## 2017-11-07 PROCEDURE — 74011250636 HC RX REV CODE- 250/636: Performed by: FAMILY MEDICINE

## 2017-11-07 PROCEDURE — 74011250637 HC RX REV CODE- 250/637: Performed by: FAMILY MEDICINE

## 2017-11-07 PROCEDURE — 94640 AIRWAY INHALATION TREATMENT: CPT

## 2017-11-07 RX ORDER — FAMOTIDINE 20 MG/1
20 TABLET, FILM COATED ORAL DAILY
Status: DISCONTINUED | OUTPATIENT
Start: 2017-11-08 | End: 2017-11-10 | Stop reason: HOSPADM

## 2017-11-07 RX ADMIN — LACTOBACILLUS ACIDOPHILUS / LACTOBACILLUS BULGARICUS 1 PACKET: 100 MILLION CFU STRENGTH GRANULES at 20:00

## 2017-11-07 RX ADMIN — CITALOPRAM HYDROBROMIDE 40 MG: 20 TABLET ORAL at 09:02

## 2017-11-07 RX ADMIN — DOXAZOSIN 2 MG: 1 TABLET ORAL at 09:02

## 2017-11-07 RX ADMIN — ARFORMOTEROL TARTRATE 15 MCG: 15 SOLUTION RESPIRATORY (INHALATION) at 07:33

## 2017-11-07 RX ADMIN — MUPIROCIN: 20 OINTMENT TOPICAL at 09:10

## 2017-11-07 RX ADMIN — NYSTATIN: 100000 POWDER TOPICAL at 17:16

## 2017-11-07 RX ADMIN — Medication 10 ML: at 06:34

## 2017-11-07 RX ADMIN — NYSTATIN: 100000 POWDER TOPICAL at 09:08

## 2017-11-07 RX ADMIN — PANTOPRAZOLE SODIUM 40 MG: 40 TABLET, DELAYED RELEASE ORAL at 06:34

## 2017-11-07 RX ADMIN — BUDESONIDE 500 MCG: 0.5 INHALANT RESPIRATORY (INHALATION) at 19:33

## 2017-11-07 RX ADMIN — CEFTRIAXONE 2 G: 2 INJECTION, POWDER, FOR SOLUTION INTRAMUSCULAR; INTRAVENOUS at 15:22

## 2017-11-07 RX ADMIN — LORATADINE 10 MG: 10 TABLET ORAL at 09:02

## 2017-11-07 RX ADMIN — HEPARIN SODIUM 5000 UNITS: 5000 INJECTION, SOLUTION INTRAVENOUS; SUBCUTANEOUS at 06:34

## 2017-11-07 RX ADMIN — Medication 10 ML: at 14:00

## 2017-11-07 RX ADMIN — CARVEDILOL 3.12 MG: 3.12 TABLET, FILM COATED ORAL at 17:14

## 2017-11-07 RX ADMIN — VANCOMYCIN HYDROCHLORIDE 250 MG: 1 INJECTION, POWDER, LYOPHILIZED, FOR SOLUTION INTRAVENOUS at 10:09

## 2017-11-07 RX ADMIN — LACTOBACILLUS ACIDOPHILUS / LACTOBACILLUS BULGARICUS 1 PACKET: 100 MILLION CFU STRENGTH GRANULES at 09:01

## 2017-11-07 RX ADMIN — INSULIN GLARGINE 10 UNITS: 100 INJECTION, SOLUTION SUBCUTANEOUS at 22:45

## 2017-11-07 RX ADMIN — ARFORMOTEROL TARTRATE 15 MCG: 15 SOLUTION RESPIRATORY (INHALATION) at 19:33

## 2017-11-07 RX ADMIN — Medication 10 ML: at 21:41

## 2017-11-07 RX ADMIN — FENOFIBRATE 145 MG: 145 TABLET ORAL at 09:02

## 2017-11-07 RX ADMIN — METRONIDAZOLE 500 MG: 250 TABLET ORAL at 12:15

## 2017-11-07 RX ADMIN — MUPIROCIN: 20 OINTMENT TOPICAL at 17:15

## 2017-11-07 RX ADMIN — METRONIDAZOLE 500 MG: 250 TABLET ORAL at 17:14

## 2017-11-07 RX ADMIN — HEPARIN SODIUM 5000 UNITS: 5000 INJECTION, SOLUTION INTRAVENOUS; SUBCUTANEOUS at 15:22

## 2017-11-07 RX ADMIN — VANCOMYCIN HYDROCHLORIDE 250 MG: 1 INJECTION, POWDER, LYOPHILIZED, FOR SOLUTION INTRAVENOUS at 21:41

## 2017-11-07 RX ADMIN — CARVEDILOL 3.12 MG: 3.12 TABLET, FILM COATED ORAL at 09:02

## 2017-11-07 RX ADMIN — METRONIDAZOLE 500 MG: 250 TABLET ORAL at 09:02

## 2017-11-07 RX ADMIN — ATORVASTATIN CALCIUM 20 MG: 20 TABLET, FILM COATED ORAL at 21:41

## 2017-11-07 RX ADMIN — BUDESONIDE 500 MCG: 0.5 INHALANT RESPIRATORY (INHALATION) at 07:33

## 2017-11-07 RX ADMIN — HEPARIN SODIUM 5000 UNITS: 5000 INJECTION, SOLUTION INTRAVENOUS; SUBCUTANEOUS at 21:41

## 2017-11-07 RX ADMIN — VANCOMYCIN HYDROCHLORIDE 250 MG: 1 INJECTION, POWDER, LYOPHILIZED, FOR SOLUTION INTRAVENOUS at 15:00

## 2017-11-07 NOTE — PROGRESS NOTES
Daily Progress Note: 11/7/2017  Isidro Parmar MD    Assessment/Plan:   cellulitis/ Leukocytosis - POA, not sepsis. Excoriated skin lesions seem to be the source. AV fistula has been ruled out. No Hx of MRSA. -- Antibiotic switched to Rocephin for now. --Blood cx Neg so far. --Vascular has ruled out clot and infection  --Culture of wound; arm pustule: group B strep, beta hemolytic     Hypoxia / Pleural effusion / RAI on CPAP - POA, likely due to fluid overload. --continue bumex and fluid restriction. -- Continue home CPAP.     CKD (chronic kidney disease) stage 4 -5 - Cr worse  --Renal following. May need dialysis if no improvement  --diuretics and ARB held     DM type 2, uncontrolled, with renal complications - Diabetic/Renal diet and counseling.    --SSI per protocol.    --decrease glargine while ALFONSO   -- A1c useless in setting of anemia.     HTN (hypertension), benign - stable  --Norvasc held for now in case it is making edema worse. --Continue doxazosin, losartan, bumex, coreg.     Iron deficiency anemia - Due to ESRD. Epo per renal.     Chronic bilateral low back pain without sciatica - tylenol prn.     Anxiety and depression / Generalized anxiety disorder - POA, stable. I suspect dementia and I would recommend outpatient full neuropsych testing. She is normally on xanax, celexa     Asthma - Stable. Continue Breo and prn albuterol.     Hyperlipidemia - Continue atorvastatin     Diarrhea/C diff: stool C diff positive 11/6  -Flagyl, consulted GI  -contact precautions    Morbid obesity - unchanged. Counseled. Problem List:  Problem List as of 11/7/2017  Date Reviewed: 6/2/2017          Codes Class Noted - Resolved    Arteriovenous fistula infection ([facility] ICD-10-CM: T82. 7XXA  ICD-9-CM: 996.62  11/2/2017 - Present        Leukocytosis ICD-10-CM: D72.829  ICD-9-CM: 288.60  11/2/2017 - Present        Hypoxia ICD-10-CM: R09.02  ICD-9-CM: 799.02  11/2/2017 - Present        Pleural effusion ICD-10-CM: J90  ICD-9-CM: 511.9  11/2/2017 - Present        Asthma ICD-10-CM: J45.909  ICD-9-CM: 493.90  Unknown - Present        Hyperlipidemia ICD-10-CM: E78.5  ICD-9-CM: 272.4  Unknown - Present        RAI on CPAP ICD-10-CM: G47.33, Z99.89  ICD-9-CM: 327.23, V46.8  Unknown - Present        Anxiety and depression ICD-10-CM: F41.8  ICD-9-CM: 300.00, 311  Unknown - Present        Chronic bilateral low back pain without sciatica ICD-10-CM: M54.5, G89.29  ICD-9-CM: 724.2, 338.29  11/3/2016 - Present        Generalized anxiety disorder ICD-10-CM: F41.1  ICD-9-CM: 300.02  11/3/2016 - Present        CKD (chronic kidney disease) stage 4, GFR 15-29 ml/min (HCC) (Chronic) ICD-10-CM: N18.4  ICD-9-CM: 585.4  8/14/2016 - Present        HTN (hypertension), benign (Chronic) ICD-10-CM: I10  ICD-9-CM: 401.1  8/14/2016 - Present        DM type 2, uncontrolled, with renal complications (HCC) (Chronic) ICD-10-CM: E11.29, E11.65  ICD-9-CM: 250.42  8/14/2016 - Present        Iron deficiency anemia (Chronic) ICD-10-CM: D50.9  ICD-9-CM: 280.9  8/14/2016 - Present        RESOLVED: Hypertension ICD-10-CM: I10  ICD-9-CM: 401.9  Unknown - 11/2/2017        RESOLVED: DM type 2 causing renal disease ([facility] ICD-10-CM: E11.29  ICD-9-CM: 250.40  Unknown - 11/2/2017        RESOLVED: CKD stage 4 due to type 2 diabetes mellitus ([facility] ICD-10-CM: E11.22, N18.4  ICD-9-CM: 250.40, 585.4  Unknown - 11/2/2017        RESOLVED: Cellulitis of right lower extremity ICD-10-CM: L03.115  ICD-9-CM: 682.6  6/2/2017 - 11/2/2017        RESOLVED: Asthma with acute exacerbation ICD-10-CM: J45.901  ICD-9-CM: 493.92  6/2/2017 - 11/2/2017        RESOLVED: CKD stage 4 due to type 2 diabetes mellitus ([facility] ICD-10-CM: E11.22, N18.4  ICD-9-CM: 250.40, 585.4  Unknown - 2/11/2017        RESOLVED: DM type 2 causing renal disease ([facility] ICD-10-CM: E11.29  ICD-9-CM: 250.40  Unknown - 2/11/2017        RESOLVED: Hypoglycemia ICD-10-CM: E16.2  ICD-9-CM: 251.2  2/11/2017 - 11/2/2017        RESOLVED: Hyperkalemia ICD-10-CM: E87.5  ICD-9-CM: 276.7  11/3/2016 - 2/11/2017        RESOLVED: Elevated serum creatinine ICD-10-CM: R79.89  ICD-9-CM: 790.99  11/3/2016 - 2/11/2017        RESOLVED: Left leg cellulitis ICD-10-CM: L03.116  ICD-9-CM: 682.6  8/14/2016 - 2/11/2017        RESOLVED: Sepsis ([address] ICD-10-CM: A41.9  ICD-9-CM: 038.9, 995.91  8/14/2016 - 2/11/2017        RESOLVED: Acute renal failure (ARF) (HCC) ICD-10-CM: N17.9  ICD-9-CM: 584.9  8/14/2016 - 11/2/2017        RESOLVED: RAI (obstructive sleep apnea) (Chronic) ICD-10-CM: G54.33  ICD-9-CM: 327.23  8/14/2016 - 2/11/2017              Subjective:     71 y.o.  female who presented to the Emergency Department complaining of arm infection. She provides limited hx, I think she has dementia. Her  is present and provide tangential statements about her hx. She reports several days of worsening redness in her left arm near site of HD fistula. Fistula needed \"balloon\" about a month ago due to arm edema. It has not yet been used. She has a hx of skin infection, but never MRSA she thinks. We will admit her for management. 11/3:  She reports she is feeling better today. Wearing CPAP. She reports her left arm continues to hurt with movement but is fairly comfortable at rest.  Doppler reports fistula is patent. She has multiple shallow abrasions and scabs on both forearms and abdomen where she reports she has \"itchy spots\" that she picks at and scratches - may be source of infection. Nephrology and Vascular consulted. 11/4: Pt notes she wants to go home. Afebrile overnight. Skin presumed source, but no positive cultures yet. Cr worse this AM.  Tolerating PO. I spoke with pt's . He thinks she needs another day or two. Willing to take her home when released. 11/5: Cr is worse this AM after another dose of vanc.   This has been stopped as wound culture showed WBCs but no organisms yet.  Pt is still afebrile. Still having diarrhea. C diff has been sent. :  She reports she feels better. Vanc stopped. Cr cont to worsen - dialysis decision per Renal.  Nurses report stools semi-solid so C diff test not sent. Wound culture with Group G, B Hemolytic strep - Zosyn should cover - bactroban added to all sites of excoriations. :  Overall, except for diarrhea, pt reports she feels better. Pt had loose stool yesterday and another C diff sent - returned positive. Overnight 4- 5 loose stools. Flagyl added po. Will consult GI also - restart Vanc po? Creat continues to worsen - dialysis decision per renal.     Review of Systems:   A comprehensive review of systems was negative except for that written in the HPI. Allergies   Allergen Reactions    Latex, Natural Rubber Rash     Per pt, had a reaction to latex gloves when an RN administered lotion     Darvon [Propoxyphene] Itching    Peanut Cough    Pineapple Itching    Sulfa (Sulfonamide Antibiotics) Swelling    Tape [Adhesive] Itching     Objective:   Physical Exam:     Visit Vitals    /68 (BP 1 Location: Right arm, BP Patient Position: At rest)    Pulse 78    Temp 97.9 °F (36.6 °C)    Resp 18    Ht 5' 1\" (1.549 m)    Wt 105.7 kg (233 lb)    SpO2 94%    BMI 44.02 kg/m2    O2 Flow Rate (L/min): 2 l/min O2 Device: CPAP mask    Temp (24hrs), Av.8 °F (36.6 °C), Min:97.1 °F (36.2 °C), Max:98.5 °F (36.9 °C)             General:  Alert, cooperative, no distress, appears stated age; morbidly obese/debilitated. Head:  Normocephalic, without obvious abnormality, atraumatic. Eyes:  Conjunctivae/corneas clear. PERRL, EOMs intact. Nose: Nares normal. Septum midline. Throat: Lips, mucosa, and tongue moist.   Neck: Supple, symmetrical, trachea midline, no adenopathy, thyroid: no enlargement/tenderness/nodules, no carotid bruit and no JVD. Lungs:   Clear to auscultation bilaterally.    Chest wall:  No tenderness or deformity. Heart:  Regular rate and rhythm, S1, S2 normal, no murmur, click, rub or gallop. Abdomen:   Soft, minimally tender. Bowel sounds normal. No masses,  No organomegaly. Extremities: no cyanosis. 2+ LE edema. No calf tenderness or cords. Pulses: 2+ and symmetric all extremities. Skin:  turgor normal. Left arm with nearly resolved erythema and no longer warm from volar region to deltoid area. Thrill present at shunt site. multiple shallow abrasions/excoriations and scabs on both forearms and abdomen look better   Neurologic: CNII-XII intact. Alert and oriented X 3. Fine motor of hands and fingers normal.   equal. Gait not tested at this time. Sensation grossly normal to touch. Gross motor of extremities normal.  Psych:  oriented to person, place, slow simple answers     Data Review:     Doppler LUE 11/2/17:  CONCLUSION: No deep vein thrombosis or thrombophlebitis in the veins  visualized. No evidence of thrombus in contralateral right subclavian  vein. Fistula is widely patent    Recent Days:  Recent Labs      11/07/17   0507  11/06/17   0435  11/05/17   0353   WBC  7.4  5.9  6.0   HGB  10.7*  10.9*  10.7*   HCT  32.5*  35.5  34.0*   PLT  223  218  200     Recent Labs      11/07/17   0507  11/06/17   0435  11/05/17   0353   NA  136  137  138   K  3.9  3.9  3.8   CL  99  100  102   CO2  25  27  29   GLU  124*  80  71   BUN  86*  82*  71*   CREA  5.34*  5.02*  4.12*   CA  8.9  8.8  8.6     No results for input(s): PH, PCO2, PO2, HCO3, FIO2 in the last 72 hours.     24 Hour Results:  Recent Results (from the past 24 hour(s))   GLUCOSE, POC    Collection Time: 11/06/17  7:26 AM   Result Value Ref Range    Glucose (POC) 69 65 - 100 mg/dL    Performed by Raina Chao, POC    Collection Time: 11/06/17  8:03 AM   Result Value Ref Range    Glucose (POC) 97 65 - 100 mg/dL    Performed by Alon Medina (PCT)    GLUCOSE, POC    Collection Time: 11/06/17 11:15 AM   Result Value Ref Range    Glucose (POC) 102 (H) 65 - 100 mg/dL    Performed by Chandu Gomes (PCT)    C. DIFFICILE (DNA)    Collection Time: 11/06/17  1:07 PM   Result Value Ref Range    C. difficile (DNA) POSITIVE (A) NEG     GLUCOSE, POC    Collection Time: 11/06/17  5:03 PM   Result Value Ref Range    Glucose (POC) 149 (H) 65 - 100 mg/dL    Performed by Chandu Gomes (PCT)    GLUCOSE, POC    Collection Time: 11/06/17 10:55 PM   Result Value Ref Range    Glucose (POC) 144 (H) 65 - 100 mg/dL    Performed by Padmaja Rowan (PCT)    CBC WITH AUTOMATED DIFF    Collection Time: 11/07/17  5:07 AM   Result Value Ref Range    WBC 7.4 3.6 - 11.0 K/uL    RBC 3.67 (L) 3.80 - 5.20 M/uL    HGB 10.7 (L) 11.5 - 16.0 g/dL    HCT 32.5 (L) 35.0 - 47.0 %    MCV 88.6 80.0 - 99.0 FL    MCH 29.2 26.0 - 34.0 PG    MCHC 32.9 30.0 - 36.5 g/dL    RDW 14.7 (H) 11.5 - 14.5 %    PLATELET 416 150 - 560 K/uL    NEUTROPHILS 78 (H) 32 - 75 %    LYMPHOCYTES 11 (L) 12 - 49 %    MONOCYTES 6 5 - 13 %    EOSINOPHILS 5 0 - 7 %    BASOPHILS 0 0 - 1 %    ABS. NEUTROPHILS 5.8 1.8 - 8.0 K/UL    ABS. LYMPHOCYTES 0.8 0.8 - 3.5 K/UL    ABS. MONOCYTES 0.4 0.0 - 1.0 K/UL    ABS. EOSINOPHILS 0.3 0.0 - 0.4 K/UL    ABS.  BASOPHILS 0.0 0.0 - 0.1 K/UL   METABOLIC PANEL, BASIC    Collection Time: 11/07/17  5:07 AM   Result Value Ref Range    Sodium 136 136 - 145 mmol/L    Potassium 3.9 3.5 - 5.1 mmol/L    Chloride 99 97 - 108 mmol/L    CO2 25 21 - 32 mmol/L    Anion gap 12 5 - 15 mmol/L    Glucose 124 (H) 65 - 100 mg/dL    BUN 86 (H) 6 - 20 MG/DL    Creatinine 5.34 (H) 0.55 - 1.02 MG/DL    BUN/Creatinine ratio 16 12 - 20      GFR est AA 10 (L) >60 ml/min/1.73m2    GFR est non-AA 8 (L) >60 ml/min/1.73m2    Calcium 8.9 8.5 - 10.1 MG/DL       Medications reviewed  Current Facility-Administered Medications   Medication Dose Route Frequency    mupirocin (BACTROBAN) 2 % ointment   Topical BID    cefTRIAXone (ROCEPHIN) 2 g in 0.9% sodium chloride (MBP/ADV) 50 mL  2 g IntraVENous Q24H    nystatin (MYCOSTATIN) 100,000 unit/gram powder   Topical BID    metroNIDAZOLE (FLAGYL) tablet 500 mg  500 mg Oral TID WITH MEALS    insulin glargine (LANTUS) injection 10 Units  10 Units SubCUTAneous QHS    lactobacillus-acidophilus (LACTINEX) 1 Packet  1 Packet Oral BID    loratadine (CLARITIN) tablet 10 mg  10 mg Oral Q48H    diphenoxylate-atropine (LOMOTIL) tablet 2 Tab  2 Tab Oral QID PRN    carvedilol (COREG) tablet 3.125 mg  3.125 mg Oral BID WITH MEALS    citalopram (CELEXA) tablet 40 mg  40 mg Oral DAILY    doxazosin (CARDURA) tablet 2 mg  2 mg Oral DAILY    pantoprazole (PROTONIX) tablet 40 mg  40 mg Oral DAILY    sodium chloride (NS) flush 5-10 mL  5-10 mL IntraVENous Q8H    sodium chloride (NS) flush 5-10 mL  5-10 mL IntraVENous PRN    naloxone (NARCAN) injection 0.4 mg  0.4 mg IntraVENous PRN    glucose chewable tablet 16 g  4 Tab Oral PRN    dextrose (D50W) injection syrg 12.5-25 g  12.5-25 g IntraVENous PRN    glucagon (GLUCAGEN) injection 1 mg  1 mg IntraMUSCular PRN    acetaminophen (TYLENOL) tablet 650 mg  650 mg Oral Q4H PRN    diphenhydrAMINE (BENADRYL) capsule 25 mg  25 mg Oral Q4H PRN    ondansetron (ZOFRAN) injection 4 mg  4 mg IntraVENous Q4H PRN    heparin (porcine) injection 5,000 Units  5,000 Units SubCUTAneous Q8H    insulin lispro (HUMALOG) injection   SubCUTAneous AC&HS    fenofibrate nanocrystallized (TRICOR) tablet 145 mg  145 mg Oral DAILY    atorvastatin (LIPITOR) tablet 20 mg  20 mg Oral QHS    arformoterol (BROVANA) neb solution 15 mcg  15 mcg Nebulization BID RT    And    budesonide (PULMICORT) 500 mcg/2 ml nebulizer suspension  500 mcg Nebulization BID RT       Care Plan discussed with: Patient/Renal and Nurse  Total time spent with patient: 30 minutes.   Rosmery Rowan MD

## 2017-11-07 NOTE — CONSULTS
Gastroenterology Consultation Note      Admit Date: 11/2/2017  Consult Date: 11/7/2017   I greatly appreciate your asking me to see Maria Torres, thank you very much for the opportunity to participate in her care. Narrative Assessment and Plan   · Diarrhea secondary to C diff - diarrhea persists despite PO Flagyl. Add PO vancomycin. Continue daily probiotic especially while on antibiotics for cellulitis treatment. Will continue to follow and monitor patient clinical course. · Acute on chronic renal failure - nephrology following  · Cellulitis - management per family practice    Subjective:     Chief Complaint: diarrhea    History of Present Illness: Maria Torres is a 71 y.o. female with PMH of DM, HTN, CKD who was admitted 11/2/17 for cellulitis. During admission she has been treated with IV antibiotics. Two days ago patient started to have frequent diarrhea. C diff positive on 11/6/17. Started on Flagyl. Patient states that she continued to have persistent diarrhea. Not associated with abdominal pain, nausea, vomiting, or rectal bleeding. No prior history of C diff. Last colonoscopy in 2008 believes she had polyps but not sure. We have been asked to evaluate for C diff treatment.     PCP:  Judith Brooks MD    Past Medical History:   Diagnosis Date    Anxiety and depression     Asthma     CKD stage 4 due to type 2 diabetes mellitus ([facility] [address]     DM type 2 causing renal disease ([address]     Hyperlipidemia     Hypertension     RAI on CPAP         Past Surgical History:   Procedure Laterality Date    BREAST SURGERY PROCEDURE UNLISTED      mastectomy, bilat    HX CHOLECYSTECTOMY      HX GYN      hysterectomy    HX HEENT      tonsilectomy       Social History   Substance Use Topics    Smoking status: Never Smoker    Smokeless tobacco: Not on file    Alcohol use No        Family History   Problem Relation Age of Onset    Hypertension Other     Diabetes Other         Allergies Allergen Reactions    Latex, Natural Rubber Rash     Per pt, had a reaction to latex gloves when an RN administered lotion     Darvon [Propoxyphene] Itching    Peanut Cough    Pineapple Itching    Sulfa (Sulfonamide Antibiotics) Swelling    Tape [Adhesive] Itching            Home Medications:  Prior to Admission Medications   Prescriptions Last Dose Informant Patient Reported? Taking? ALPRAZolam (XANAX) 0.25 mg tablet 11/1/2017 at Unknown time Significant Other Yes Yes   Sig: Take 0.25 mg by mouth two (2) times a day. Omeprazole delayed release (PRILOSEC D/R) 20 mg tablet 11/1/2017 at Unknown time Significant Other Yes Yes   Sig: Take 20 mg by mouth daily. acetaminophen (TYLENOL) 500 mg tablet  Significant Other Yes Yes   Sig: Take 1,000 mg by mouth two (2) times daily as needed for Pain. albuterol (PROVENTIL HFA, VENTOLIN HFA) 90 mcg/actuation inhaler  Significant Other Yes Yes   Sig: Take 2 Puffs by inhalation every four (4) hours as needed for Wheezing. amLODIPine (NORVASC) 2.5 mg tablet 11/1/2017 at Unknown time Significant Other Yes Yes   Sig: Take 2.5 mg by mouth daily. aspirin 81 mg chewable tablet 11/1/2017 at Unknown time Significant Other Yes Yes   Sig: Take 81 mg by mouth nightly. atorvastatin (LIPITOR) 20 mg tablet 11/1/2017 at Unknown time Significant Other Yes Yes   Sig: Take 20 mg by mouth nightly. bumetanide (BUMEX) 2 mg tablet 11/1/2017 at Unknown time Significant Other No Yes   Sig: Take 1 Tab by mouth two (2) times a day. calcium polycarbophil (FIBER LAXATIVE, CA POLYCARBO,) 625 mg tablet 11/1/2017 at Unknown time Significant Other Yes Yes   Sig: Take 625 mg by mouth daily. calcium-cholecalciferol, D3, (CALTRATE 600+D) tablet 11/1/2017 at Unknown time Significant Other Yes Yes   Sig: Take 1 Tab by mouth two (2) times a day.    carvedilol (COREG) 3.125 mg tablet 11/1/2017 at Unknown time Significant Other Yes Yes   Sig: Take 3.125 mg by mouth two (2) times daily (with meals). citalopram (CELEXA) 40 mg tablet 2017 at Unknown time Significant Other Yes Yes   Sig: Take 40 mg by mouth daily. doxazosin (CARDURA) 2 mg tablet 2017 at Unknown time Significant Other Yes Yes   Sig: Take 2 mg by mouth daily. ergocalciferol (ERGOCALCIFEROL) 50,000 unit capsule 2017 at Unknown time Significant Other Yes Yes   Sig: Take 50,000 Units by mouth every month. First of the month   fenofibrate nanocrystallized (TRICOR) 145 mg tablet 2017 at Unknown time Significant Other Yes Yes   Sig: Take 145 mg by mouth daily. fluticasone-vilanterol (BREO ELLIPTA) 200-25 mcg/dose inhaler 2017 at Unknown time Significant Other Yes Yes   Sig: Take 1 Puff by inhalation daily. insulin glargine (TOUJEO SOLOSTAR) 300 unit/mL (1.5 mL) inpn 2017 at Unknown time Significant Other Yes Yes   Si Units by SubCUTAneous route daily. loratadine (CLARITIN) 10 mg tablet 2017 at Unknown time Significant Other Yes Yes   Sig: Take 10 mg by mouth daily. losartan (COZAAR) 25 mg tablet 2017 at Unknown time Significant Other Yes Yes   Sig: Take 25 mg by mouth daily. multivitamin (ONE A DAY) tablet 2017 at Unknown time Significant Other Yes Yes   Sig: Take 1 Tab by mouth daily. senna-docusate (SENNA-C) 8.6-50 mg per tablet 2017 at Unknown time Significant Other Yes Yes   Sig: Take 2 Tabs by mouth daily.       Facility-Administered Medications: None       Hospital Medications:  Current Facility-Administered Medications   Medication Dose Route Frequency    vancomycin 50 mg/mL oral solution (compounded) 250 mg  250 mg Oral Q6H    mupirocin (BACTROBAN) 2 % ointment   Topical BID    cefTRIAXone (ROCEPHIN) 2 g in 0.9% sodium chloride (MBP/ADV) 50 mL  2 g IntraVENous Q24H    nystatin (MYCOSTATIN) 100,000 unit/gram powder   Topical BID    metroNIDAZOLE (FLAGYL) tablet 500 mg  500 mg Oral TID WITH MEALS    insulin glargine (LANTUS) injection 10 Units  10 Units SubCUTAneous QHS    lactobacillus-acidophilus (LACTINEX) 1 Packet  1 Packet Oral BID    loratadine (CLARITIN) tablet 10 mg  10 mg Oral Q48H    diphenoxylate-atropine (LOMOTIL) tablet 2 Tab  2 Tab Oral QID PRN    carvedilol (COREG) tablet 3.125 mg  3.125 mg Oral BID WITH MEALS    citalopram (CELEXA) tablet 40 mg  40 mg Oral DAILY    doxazosin (CARDURA) tablet 2 mg  2 mg Oral DAILY    pantoprazole (PROTONIX) tablet 40 mg  40 mg Oral DAILY    sodium chloride (NS) flush 5-10 mL  5-10 mL IntraVENous Q8H    sodium chloride (NS) flush 5-10 mL  5-10 mL IntraVENous PRN    naloxone (NARCAN) injection 0.4 mg  0.4 mg IntraVENous PRN    glucose chewable tablet 16 g  4 Tab Oral PRN    dextrose (D50W) injection syrg 12.5-25 g  12.5-25 g IntraVENous PRN    glucagon (GLUCAGEN) injection 1 mg  1 mg IntraMUSCular PRN    acetaminophen (TYLENOL) tablet 650 mg  650 mg Oral Q4H PRN    diphenhydrAMINE (BENADRYL) capsule 25 mg  25 mg Oral Q4H PRN    ondansetron (ZOFRAN) injection 4 mg  4 mg IntraVENous Q4H PRN    heparin (porcine) injection 5,000 Units  5,000 Units SubCUTAneous Q8H    insulin lispro (HUMALOG) injection   SubCUTAneous AC&HS    fenofibrate nanocrystallized (TRICOR) tablet 145 mg  145 mg Oral DAILY    atorvastatin (LIPITOR) tablet 20 mg  20 mg Oral QHS    arformoterol (BROVANA) neb solution 15 mcg  15 mcg Nebulization BID RT    And    budesonide (PULMICORT) 500 mcg/2 ml nebulizer suspension  500 mcg Nebulization BID RT       Review of Systems: Admission ROS by Rick Cooper MD from 11/2/2017 were reviewed with the patient and changes (other than per HPI) include: none      Objective:     Physical Exam:  Visit Vitals    /51    Pulse 85    Temp 97 °F (36.1 °C)    Resp 18    Ht 5' 1\" (1.549 m)    Wt 105.7 kg (233 lb)    SpO2 94%    BMI 44.02 kg/m2     SpO2 Readings from Last 6 Encounters:   11/07/17 94%   06/10/17 97%   02/14/17 96%   11/17/16 97%   08/23/16 96%   01/02/14 95%    O2 Flow Rate (L/min): 2 l/min     Intake/Output Summary (Last 24 hours) at 11/07/17 1256  Last data filed at 11/07/17 0932   Gross per 24 hour   Intake              480 ml   Output                0 ml   Net              480 ml      General: no distress, comfortable  Skin:  Multiple excoriations  HEENT: Pupils equal, sclera anicteric, oropharynx with no gross lesions  Cardiovascular: regular rate and rhythm, well perfused, trace edema  Respiratory:  No abnormal audible breath sounds, normal respiratory effort, no throacic deformity  GI:  Bowel sounds present, soft, obese, nontender  Musculoskeletal:  No skeletal deformity nor acute arthritis noted. Neurological:  CN II-XII grossly intact, no focal deficits    Laboratory:    Recent Results (from the past 24 hour(s))   C. DIFFICILE (DNA)    Collection Time: 11/06/17  1:07 PM   Result Value Ref Range    C. difficile (DNA) POSITIVE (A) NEG     GLUCOSE, POC    Collection Time: 11/06/17  5:03 PM   Result Value Ref Range    Glucose (POC) 149 (H) 65 - 100 mg/dL    Performed by Luis F Padilla (PCT)    GLUCOSE, POC    Collection Time: 11/06/17 10:55 PM   Result Value Ref Range    Glucose (POC) 144 (H) 65 - 100 mg/dL    Performed by Larry Garcia (PCT)    CBC WITH AUTOMATED DIFF    Collection Time: 11/07/17  5:07 AM   Result Value Ref Range    WBC 7.4 3.6 - 11.0 K/uL    RBC 3.67 (L) 3.80 - 5.20 M/uL    HGB 10.7 (L) 11.5 - 16.0 g/dL    HCT 32.5 (L) 35.0 - 47.0 %    MCV 88.6 80.0 - 99.0 FL    MCH 29.2 26.0 - 34.0 PG    MCHC 32.9 30.0 - 36.5 g/dL    RDW 14.7 (H) 11.5 - 14.5 %    PLATELET 918 028 - 680 K/uL    NEUTROPHILS 78 (H) 32 - 75 %    LYMPHOCYTES 11 (L) 12 - 49 %    MONOCYTES 6 5 - 13 %    EOSINOPHILS 5 0 - 7 %    BASOPHILS 0 0 - 1 %    ABS. NEUTROPHILS 5.8 1.8 - 8.0 K/UL    ABS. LYMPHOCYTES 0.8 0.8 - 3.5 K/UL    ABS. MONOCYTES 0.4 0.0 - 1.0 K/UL    ABS. EOSINOPHILS 0.3 0.0 - 0.4 K/UL    ABS.  BASOPHILS 0.0 0.0 - 0.1 K/UL   METABOLIC PANEL, BASIC    Collection Time: 11/07/17 5: 07 AM   Result Value Ref Range    Sodium 136 136 - 145 mmol/L    Potassium 3.9 3.5 - 5.1 mmol/L    Chloride 99 97 - 108 mmol/L    CO2 25 21 - 32 mmol/L    Anion gap 12 5 - 15 mmol/L    Glucose 124 (H) 65 - 100 mg/dL    BUN 86 (H) 6 - 20 MG/DL    Creatinine 5.34 (H) 0.55 - 1.02 MG/DL    BUN/Creatinine ratio 16 12 - 20      GFR est AA 10 (L) >60 ml/min/1.73m2    GFR est non-AA 8 (L) >60 ml/min/1.73m2    Calcium 8.9 8.5 - 10.1 MG/DL   GLUCOSE, POC    Collection Time: 11/07/17  6:50 AM   Result Value Ref Range    Glucose (POC) 115 (H) 65 - 100 mg/dL    Performed by Bambi Crenshaw (PCT)    GLUCOSE, POC    Collection Time: 11/07/17 11:27 AM   Result Value Ref Range    Glucose (POC) 117 (H) 65 - 100 mg/dL    Performed by Anibal Raman          Assessment/Plan:     Active Problems:    CKD (chronic kidney disease) stage 4, GFR 15-29 ml/min (HCC) (8/14/2016)      HTN (hypertension), benign (8/14/2016)      DM type 2, uncontrolled, with renal complications ([facility]) (2/13/8645)      Iron deficiency anemia (8/14/2016)      Chronic bilateral low back pain without sciatica (11/3/2016)      Generalized anxiety disorder (11/3/2016)      Asthma ()      Hyperlipidemia ()      RAI on CPAP ()      Anxiety and depression ()      Arteriovenous fistula infection ([facility] [address] (11/2/2017)      Leukocytosis (11/2/2017)      Hypoxia (11/2/2017)      Pleural effusion (11/2/2017)         See above narrative for full detail.     Vamsi Ordonez PA-C  11/07/17  12:56 PM  I have interviewed and examined patient with addendum to note above and formulation care plan    Madie Mendoza M.D.

## 2017-11-07 NOTE — ROUTINE PROCESS
Bedside and Verbal shift change report given to Deonte Hoyos RN (oncoming nurse) by Lionel Kern RN (offgoing nurse). Report included the following information SBAR, Kardex, ED Summary, Intake/Output, MAR and Recent Results.

## 2017-11-07 NOTE — CDMP QUERY
=> ATN secondary to Vancomycin in the setting of rising creatinine   =>Other Explanation of clinical findings  =>Unable to Determine (no explanation of clinical findings)    The medical record reflects the following clinical findings, treatment, and risk factors:  Risk Factors: 72 yo F admitted with cellulitis  of arm   Clinical Indicators: 11/3 cr 2.63  11/5  cr 4.12  11/7  cr 5.34   11/7 renal consult states \" Suspect ATN due to vanc. IVVD, AIN also considerations\"   \" Vanc D/C. Loop and ARB on hold\"   Treatment: Vanc discontinued, Loops and arbs on hold     Please clarify and document your clinical opinion in the progress notes and discharge summary including the definitive and/or presumptive diagnosis, (suspected or probable), related to the above clinical findings. Please include clinical findings supporting your diagnosis.     Thank you for your time   [facility] RN/BSN, [address]  Desk:   [phone]   Other:  [phone]

## 2017-11-07 NOTE — PROGRESS NOTES
[address]  YOB: 1945          Assessment & Plan:   CKD 4 with ARF  · Stable on admission  · Now rising during hospitalization  · Cr higher but dCr/dt lower. Seems to be nearing plateau. · Suspect ATN due to vanc. IVVD, AIN also considerations. · Vanc D/C. Loop and ARB on hold    Cellulitis  · On ceftriaxone  · Likely due to pruritis/excoriation  · Better today    HTN  · Controlled  · Continue to hold ARB    Chronic edema  · Off Loop, seems ok    DM  · Insulin    Cdiff  · On PO vanc  · Not typically associated with renal dysfunction       Subjective:   CC: f/u CKD  HPI: Creat higher but rate of change is lower. HTN is stable. No increase in edema.  Now on PO vanc for Cdiff  ROS: No n/v/d/sob  Current Facility-Administered Medications   Medication Dose Route Frequency    vancomycin 50 mg/mL oral solution (compounded) 250 mg  250 mg Oral Q6H    mupirocin (BACTROBAN) 2 % ointment   Topical BID    cefTRIAXone (ROCEPHIN) 2 g in 0.9% sodium chloride (MBP/ADV) 50 mL  2 g IntraVENous Q24H    nystatin (MYCOSTATIN) 100,000 unit/gram powder   Topical BID    metroNIDAZOLE (FLAGYL) tablet 500 mg  500 mg Oral TID WITH MEALS    insulin glargine (LANTUS) injection 10 Units  10 Units SubCUTAneous QHS    lactobacillus-acidophilus (LACTINEX) 1 Packet  1 Packet Oral BID    loratadine (CLARITIN) tablet 10 mg  10 mg Oral Q48H    diphenoxylate-atropine (LOMOTIL) tablet 2 Tab  2 Tab Oral QID PRN    carvedilol (COREG) tablet 3.125 mg  3.125 mg Oral BID WITH MEALS    citalopram (CELEXA) tablet 40 mg  40 mg Oral DAILY    doxazosin (CARDURA) tablet 2 mg  2 mg Oral DAILY    pantoprazole (PROTONIX) tablet 40 mg  40 mg Oral DAILY    sodium chloride (NS) flush 5-10 mL  5-10 mL IntraVENous Q8H    sodium chloride (NS) flush 5-10 mL  5-10 mL IntraVENous PRN    naloxone (NARCAN) injection 0.4 mg  0.4 mg IntraVENous PRN    glucose chewable tablet 16 g  4 Tab Oral PRN    dextrose (D50W) injection syrg 12.5-25 g  12.5-25 g IntraVENous PRN    glucagon (GLUCAGEN) injection 1 mg  1 mg IntraMUSCular PRN    acetaminophen (TYLENOL) tablet 650 mg  650 mg Oral Q4H PRN    diphenhydrAMINE (BENADRYL) capsule 25 mg  25 mg Oral Q4H PRN    ondansetron (ZOFRAN) injection 4 mg  4 mg IntraVENous Q4H PRN    heparin (porcine) injection 5,000 Units  5,000 Units SubCUTAneous Q8H    insulin lispro (HUMALOG) injection   SubCUTAneous AC&HS    fenofibrate nanocrystallized (TRICOR) tablet 145 mg  145 mg Oral DAILY    atorvastatin (LIPITOR) tablet 20 mg  20 mg Oral QHS    arformoterol (BROVANA) neb solution 15 mcg  15 mcg Nebulization BID RT    And    budesonide (PULMICORT) 500 mcg/2 ml nebulizer suspension  500 mcg Nebulization BID RT          Objective:     Vitals:  Blood pressure 126/55, pulse 85, temperature 98.3 °F (36.8 °C), resp. rate 18, height 5' 1\" (1.549 m), weight 105.7 kg (233 lb), SpO2 94 %. Temp (24hrs), Av.7 °F (36.5 °C), Min:97.1 °F (36.2 °C), Max:98.3 °F (36.8 °C)      Intake and Output:      1901 -  0700  In: 240 [P.O.:240]  Out: -     Physical Exam:               GENERAL ASSESSMENT: NAD  CHEST: CTA  HEART: S1S2  ABDOMEN: Soft,NT  EXTREMITY: trace EDEMA, LUE with excoriations and improving erythema; LUE AVF +t/b  NEURO:Grossly non focal          ECG/rhythm:    Data Review      No results for input(s): TNIPOC in the last 72 hours. No lab exists for component: ITNL   No results for input(s): CPK, CKMB, TROIQ in the last 72 hours. Recent Labs      17   0507  17   0435  17   0353   NA  136  137  138   K  3.9  3.9  3.8   CL  99  100  102   CO2  25  27  29   BUN  86*  82*  71*   CREA  5.34*  5.02*  4.12*   GLU  124*  80  71   CA  8.9  8.8  8.6   WBC  7.4  5.9  6.0   HGB  10.7*  10.9*  10.7*   HCT  32.5*  35.5  34.0*   PLT  223  218  200      No results for input(s): INR, PTP, APTT in the last 72 hours.     No lab exists for component: INREXT, INREXT  Needs: urine analysis, urine sodium, protein and creatinine  Lab Results   Component Value Date/Time    Sodium urine, random 50 11/03/2016 12:55 PM    Creatinine, urine 53.84 11/03/2016 12:55 PM           : Hiwot Lewis MD  11/7/2017        [facility]:  [email or web address]  [place] office:  [address] [place] [address], [address] [place] [address],[address]  [address]  Phone: [phone]  Fax :     [phone]    [place] office:  [address] [facility]  Phone - [phone]  Fax - [phone]

## 2017-11-08 LAB
ANION GAP SERPL CALC-SCNC: 13 MMOL/L (ref 5–15)
BASOPHILS # BLD: 0 K/UL (ref 0–0.1)
BASOPHILS NFR BLD: 0 % (ref 0–1)
BUN SERPL-MCNC: 87 MG/DL (ref 6–20)
BUN/CREAT SERPL: 16 (ref 12–20)
CALCIUM SERPL-MCNC: 9 MG/DL (ref 8.5–10.1)
CHLORIDE SERPL-SCNC: 98 MMOL/L (ref 97–108)
CO2 SERPL-SCNC: 25 MMOL/L (ref 21–32)
CREAT SERPL-MCNC: 5.37 MG/DL (ref 0.55–1.02)
EOSINOPHIL # BLD: 0.3 K/UL (ref 0–0.4)
EOSINOPHIL NFR BLD: 5 % (ref 0–7)
ERYTHROCYTE [DISTWIDTH] IN BLOOD BY AUTOMATED COUNT: 14.9 % (ref 11.5–14.5)
GLUCOSE BLD STRIP.AUTO-MCNC: 120 MG/DL (ref 65–100)
GLUCOSE BLD STRIP.AUTO-MCNC: 146 MG/DL (ref 65–100)
GLUCOSE BLD STRIP.AUTO-MCNC: 151 MG/DL (ref 65–100)
GLUCOSE BLD STRIP.AUTO-MCNC: 179 MG/DL (ref 65–100)
GLUCOSE SERPL-MCNC: 135 MG/DL (ref 65–100)
HCT VFR BLD AUTO: 33.7 % (ref 35–47)
HGB BLD-MCNC: 10.9 G/DL (ref 11.5–16)
LYMPHOCYTES # BLD: 1 K/UL (ref 0.8–3.5)
LYMPHOCYTES NFR BLD: 15 % (ref 12–49)
MCH RBC QN AUTO: 28.8 PG (ref 26–34)
MCHC RBC AUTO-ENTMCNC: 32.3 G/DL (ref 30–36.5)
MCV RBC AUTO: 88.9 FL (ref 80–99)
MONOCYTES # BLD: 0.3 K/UL (ref 0–1)
MONOCYTES NFR BLD: 5 % (ref 5–13)
NEUTS SEG # BLD: 5 K/UL (ref 1.8–8)
NEUTS SEG NFR BLD: 75 % (ref 32–75)
PLATELET # BLD AUTO: 265 K/UL (ref 150–400)
POTASSIUM SERPL-SCNC: 3.7 MMOL/L (ref 3.5–5.1)
RBC # BLD AUTO: 3.79 M/UL (ref 3.8–5.2)
SERVICE CMNT-IMP: ABNORMAL
SODIUM SERPL-SCNC: 136 MMOL/L (ref 136–145)
WBC # BLD AUTO: 6.7 K/UL (ref 3.6–11)

## 2017-11-08 PROCEDURE — 80048 BASIC METABOLIC PNL TOTAL CA: CPT | Performed by: FAMILY MEDICINE

## 2017-11-08 PROCEDURE — 74011250636 HC RX REV CODE- 250/636: Performed by: INTERNAL MEDICINE

## 2017-11-08 PROCEDURE — 74011250637 HC RX REV CODE- 250/637: Performed by: INTERNAL MEDICINE

## 2017-11-08 PROCEDURE — 85025 COMPLETE CBC W/AUTO DIFF WBC: CPT | Performed by: FAMILY MEDICINE

## 2017-11-08 PROCEDURE — 36415 COLL VENOUS BLD VENIPUNCTURE: CPT | Performed by: FAMILY MEDICINE

## 2017-11-08 PROCEDURE — 74011000250 HC RX REV CODE- 250: Performed by: INTERNAL MEDICINE

## 2017-11-08 PROCEDURE — 74011250636 HC RX REV CODE- 250/636: Performed by: FAMILY MEDICINE

## 2017-11-08 PROCEDURE — 74011000258 HC RX REV CODE- 258: Performed by: FAMILY MEDICINE

## 2017-11-08 PROCEDURE — 74011250637 HC RX REV CODE- 250/637: Performed by: FAMILY MEDICINE

## 2017-11-08 PROCEDURE — 74011636637 HC RX REV CODE- 636/637: Performed by: FAMILY MEDICINE

## 2017-11-08 PROCEDURE — 65270000029 HC RM PRIVATE

## 2017-11-08 PROCEDURE — 94640 AIRWAY INHALATION TREATMENT: CPT

## 2017-11-08 PROCEDURE — 77030011256 HC DRSG MEPILEX <16IN NO BORD MOLN -A

## 2017-11-08 PROCEDURE — 82962 GLUCOSE BLOOD TEST: CPT

## 2017-11-08 RX ADMIN — LACTOBACILLUS ACIDOPHILUS / LACTOBACILLUS BULGARICUS 1 PACKET: 100 MILLION CFU STRENGTH GRANULES at 10:06

## 2017-11-08 RX ADMIN — Medication 10 ML: at 06:11

## 2017-11-08 RX ADMIN — DOXAZOSIN 2 MG: 1 TABLET ORAL at 10:06

## 2017-11-08 RX ADMIN — METRONIDAZOLE 500 MG: 250 TABLET ORAL at 10:06

## 2017-11-08 RX ADMIN — VANCOMYCIN HYDROCHLORIDE 250 MG: 1 INJECTION, POWDER, LYOPHILIZED, FOR SOLUTION INTRAVENOUS at 12:39

## 2017-11-08 RX ADMIN — NYSTATIN: 100000 POWDER TOPICAL at 12:39

## 2017-11-08 RX ADMIN — NYSTATIN: 100000 POWDER TOPICAL at 17:47

## 2017-11-08 RX ADMIN — ARFORMOTEROL TARTRATE 15 MCG: 15 SOLUTION RESPIRATORY (INHALATION) at 20:51

## 2017-11-08 RX ADMIN — INSULIN GLARGINE 10 UNITS: 100 INJECTION, SOLUTION SUBCUTANEOUS at 23:44

## 2017-11-08 RX ADMIN — CARVEDILOL 3.12 MG: 3.12 TABLET, FILM COATED ORAL at 10:06

## 2017-11-08 RX ADMIN — CARVEDILOL 3.12 MG: 3.12 TABLET, FILM COATED ORAL at 17:45

## 2017-11-08 RX ADMIN — HEPARIN SODIUM 5000 UNITS: 5000 INJECTION, SOLUTION INTRAVENOUS; SUBCUTANEOUS at 23:45

## 2017-11-08 RX ADMIN — CITALOPRAM HYDROBROMIDE 40 MG: 20 TABLET ORAL at 10:06

## 2017-11-08 RX ADMIN — HEPARIN SODIUM 5000 UNITS: 5000 INJECTION, SOLUTION INTRAVENOUS; SUBCUTANEOUS at 06:11

## 2017-11-08 RX ADMIN — VANCOMYCIN HYDROCHLORIDE 250 MG: 1 INJECTION, POWDER, LYOPHILIZED, FOR SOLUTION INTRAVENOUS at 00:24

## 2017-11-08 RX ADMIN — MUPIROCIN: 20 OINTMENT TOPICAL at 10:07

## 2017-11-08 RX ADMIN — MUPIROCIN: 20 OINTMENT TOPICAL at 17:49

## 2017-11-08 RX ADMIN — HEPARIN SODIUM 5000 UNITS: 5000 INJECTION, SOLUTION INTRAVENOUS; SUBCUTANEOUS at 12:38

## 2017-11-08 RX ADMIN — BUDESONIDE 500 MCG: 0.5 INHALANT RESPIRATORY (INHALATION) at 20:51

## 2017-11-08 RX ADMIN — BUDESONIDE 500 MCG: 0.5 INHALANT RESPIRATORY (INHALATION) at 07:52

## 2017-11-08 RX ADMIN — FAMOTIDINE 20 MG: 20 TABLET, FILM COATED ORAL at 10:06

## 2017-11-08 RX ADMIN — VANCOMYCIN HYDROCHLORIDE 250 MG: 1 INJECTION, POWDER, LYOPHILIZED, FOR SOLUTION INTRAVENOUS at 06:11

## 2017-11-08 RX ADMIN — LACTOBACILLUS ACIDOPHILUS / LACTOBACILLUS BULGARICUS 1 PACKET: 100 MILLION CFU STRENGTH GRANULES at 21:25

## 2017-11-08 RX ADMIN — Medication 10 ML: at 17:46

## 2017-11-08 RX ADMIN — ATORVASTATIN CALCIUM 20 MG: 20 TABLET, FILM COATED ORAL at 23:45

## 2017-11-08 RX ADMIN — METRONIDAZOLE 500 MG: 250 TABLET ORAL at 17:45

## 2017-11-08 RX ADMIN — CEFTRIAXONE 2 G: 2 INJECTION, POWDER, FOR SOLUTION INTRAMUSCULAR; INTRAVENOUS at 12:42

## 2017-11-08 RX ADMIN — Medication 10 ML: at 21:26

## 2017-11-08 RX ADMIN — FENOFIBRATE 145 MG: 145 TABLET ORAL at 10:06

## 2017-11-08 RX ADMIN — VANCOMYCIN HYDROCHLORIDE 250 MG: 1 INJECTION, POWDER, LYOPHILIZED, FOR SOLUTION INTRAVENOUS at 17:45

## 2017-11-08 RX ADMIN — METRONIDAZOLE 500 MG: 250 TABLET ORAL at 12:41

## 2017-11-08 RX ADMIN — ARFORMOTEROL TARTRATE 15 MCG: 15 SOLUTION RESPIRATORY (INHALATION) at 07:52

## 2017-11-08 RX ADMIN — NYSTATIN: 100000 POWDER TOPICAL at 10:07

## 2017-11-08 NOTE — PROGRESS NOTES
Nutrition Assessment:    RECOMMENDATIONS/INTERVENTION(S):   Continue current Cardiac/CCD/Renal/1200mL fluid diet  Monitor PO intakes, wt, BG, renal labs  Monitor need for ONS. ASSESSMENT:   11/8: Follow up. Pt documented as eating ~50% of meals. Pt agrees, states she's eating half of what's on trays usually. Appetite improved from previous visit. Pt doesn't like Ensure/ONS drinks that she has tried at home PTA. Pt continues on Cardiac/CCD/Renal/1200FR diet. Pt ordering from menu but getting tired of limited options. Pt states she will be here a few more day. Pt states diarrhea is subsiding. Edema 2+ LUE, 1+ BLE. Cr increasing 5.37, GFR 8.      11/3: 71 yr old female admitted for fistula infection. PMHx: CKD4- not currently on HD. HLD, HTN, DM. Pt on Cardiac/CCD/Renal (70/70/70) 1200 mL fluid restriction. Per chart pt has lost 46 lbs (16%) since previous admission in June this year (5 months). Family states they weigh her weekly and 233 lbs was most recent wt. No standing wt taken during admission yet. Pt on CPAP during visit. Pt ate peaches, 1 bite sausage, 2 bites Afghan toast and coffee this morning. Pt states she was trying to lose wt by limiting portion sizes. Pt was n/v for 2 days PTA. Nausea resolved, no more vomiting. , 160 mg/dL. BUN 58, Cr 2.63. A1c 5.7 in June, likely incorrect due to anemia. Edema- trace BLE, 2+ LUE.       SUBJECTIVE/OBJECTIVE:   Diet Order: Cardiac, Consistent carb, Renal  % Eaten:    Patient Vitals for the past 72 hrs:   % Diet Eaten   11/07/17 1732 50 %   11/07/17 1401 50 %   11/07/17 0932 50 %     Pertinent Medications: [x] Reviewed    Chemistries:  Lab Results   Component Value Date/Time    Sodium 136 11/08/2017 05:02 AM    Potassium 3.7 11/08/2017 05:02 AM    Chloride 98 11/08/2017 05:02 AM    CO2 25 11/08/2017 05:02 AM    Anion gap 13 11/08/2017 05:02 AM    Glucose 135 11/08/2017 05:02 AM    BUN 87 11/08/2017 05:02 AM    Creatinine 5.37 11/08/2017 05:02 AM BUN/Creatinine ratio 16 11/08/2017 05:02 AM    GFR est AA 10 11/08/2017 05:02 AM    GFR est non-AA 8 11/08/2017 05:02 AM    Calcium 9.0 11/08/2017 05:02 AM    AST (SGOT) 25 11/02/2017 06:58 PM    Alk. phosphatase 61 11/02/2017 06:58 PM    Protein, total 6.6 11/02/2017 06:58 PM    Albumin 2.7 11/02/2017 06:58 PM    Globulin 3.9 11/02/2017 06:58 PM    A-G Ratio 0.7 11/02/2017 06:58 PM    ALT (SGPT) 18 11/02/2017 06:58 PM      Anthropometrics: Height: 5' 1\" (154.9 cm) Weight: 105.7 kg (233 lb)    IBW (%IBW):   ( ) UBW (%UBW):   (  %)    BMI: Body mass index is 44.02 kg/(m^2). This BMI is indicative of:     [] Underweight    [] Normal    [] Overweight    []  Obesity    [x]  Extreme Obesity (BMI>40)    Estimated Nutrition Needs (Based on): 1713 Kcals/day (BMR(1510x1.3)-250) , 106 g (-127g/kg(1.0-1.2g/kg)) Protein  Carbohydrate: At Least 130 g/day  Fluids: 1200 mL/day    Last BM: 11/8   [x]Active     []Hyperactive  []Hypoactive       [] Absent   BS  Skin:    [] Intact   [] Incision  [] Breakdown   [] DTI   [] Tears/Excoriation/Abrasion  []Edema [] Other:    Wt Readings from Last 30 Encounters:   11/02/17 105.7 kg (233 lb)   06/10/17 126.9 kg (279 lb 12.2 oz)   02/11/17 126.6 kg (279 lb)   11/11/16 126.6 kg (279 lb 3.2 oz)   08/15/16 125.6 kg (277 lb)      NUTRITION DIAGNOSES:   Problem:  Altered nutrition-related lab values     Etiology: related to renal dysfunction/ endocrine dysfunction     Signs/Symptoms: as evidenced by Cr 5.37, GFR 8, / , 174,       NUTRITION INTERVENTIONS:  Meals/Snacks: General/healthful diet                  GOAL:   Pt will consume >50% of meals and ONS within 3-5 days    Cultural, Orthodoxy, or Ethnic Dietary Needs: None     LEARNING NEEDS (Diet, Food/Nutrient-Drug Interaction):    [] None Identified   [] Identified and Education Provided/Documented   [] Identified and Pt declined/was not appropriate      [x] Interdisciplinary Care Plan Reviewed/Documented    [x] Discharge Needs: TBD [] No Nutrition Related Discharge Needs    NUTRITION RISK:   Pt Is At Nutrition Risk  [x]     No Nutrition Risk Identified  []       PT SEEN FOR:    []  MD Consult: []Calorie Count      []Diabetic Diet Education        []Diet Education     []Electrolyte Management     []General Nutrition Management and Supplements     []Management of Tube Feeding     []TPN Recommendations    []  RN Referral:  []MST score >=2     []Enteral/Parenteral Nutrition PTA     []Pregnant: Gestational DM or Multigestation                 [] Pressure Ulcer      []  Low BMI      []  Length of Stay       [] Dysphagia Diet     [] Ventilator      [x] Follow-Up        Previous Recommendations:   [x] Implemented          [] Not Implemented          [] Not Applicable    Previous Goal:   [] Met              [x] Progressing Towards Goal              [] Not Progressing Towards Goal   [] Not Applicable              Marcus Goel, [address] [place] [address]   Pager [phone]

## 2017-11-08 NOTE — PROGRESS NOTES
Gastrointestinal Progress Note    11/8/2017    Admit Date: 11/2/2017    Subjective:     New Complaints Today:  Patient states that she is feeling better. Tech at bedside and patient has had 3 BM so far today and now semi-solid. No visible blood. Patient denies abdominal pain, nausea or vomiting.       Current Facility-Administered Medications   Medication Dose Route Frequency    vancomycin 50 mg/mL oral solution (compounded) 250 mg  250 mg Oral Q6H    famotidine (PEPCID) tablet 20 mg  20 mg Oral DAILY    mupirocin (BACTROBAN) 2 % ointment   Topical BID    cefTRIAXone (ROCEPHIN) 2 g in 0.9% sodium chloride (MBP/ADV) 50 mL  2 g IntraVENous Q24H    nystatin (MYCOSTATIN) 100,000 unit/gram powder   Topical BID    metroNIDAZOLE (FLAGYL) tablet 500 mg  500 mg Oral TID WITH MEALS    insulin glargine (LANTUS) injection 10 Units  10 Units SubCUTAneous QHS    lactobacillus-acidophilus (LACTINEX) 1 Packet  1 Packet Oral BID    loratadine (CLARITIN) tablet 10 mg  10 mg Oral Q48H    carvedilol (COREG) tablet 3.125 mg  3.125 mg Oral BID WITH MEALS    citalopram (CELEXA) tablet 40 mg  40 mg Oral DAILY    doxazosin (CARDURA) tablet 2 mg  2 mg Oral DAILY    sodium chloride (NS) flush 5-10 mL  5-10 mL IntraVENous Q8H    sodium chloride (NS) flush 5-10 mL  5-10 mL IntraVENous PRN    naloxone (NARCAN) injection 0.4 mg  0.4 mg IntraVENous PRN    glucose chewable tablet 16 g  4 Tab Oral PRN    dextrose (D50W) injection syrg 12.5-25 g  12.5-25 g IntraVENous PRN    glucagon (GLUCAGEN) injection 1 mg  1 mg IntraMUSCular PRN    acetaminophen (TYLENOL) tablet 650 mg  650 mg Oral Q4H PRN    diphenhydrAMINE (BENADRYL) capsule 25 mg  25 mg Oral Q4H PRN    ondansetron (ZOFRAN) injection 4 mg  4 mg IntraVENous Q4H PRN    heparin (porcine) injection 5,000 Units  5,000 Units SubCUTAneous Q8H    insulin lispro (HUMALOG) injection   SubCUTAneous AC&HS    fenofibrate nanocrystallized (TRICOR) tablet 145 mg  145 mg Oral DAILY    atorvastatin (LIPITOR) tablet 20 mg  20 mg Oral QHS    arformoterol (BROVANA) neb solution 15 mcg  15 mcg Nebulization BID RT    And    budesonide (PULMICORT) 500 mcg/2 ml nebulizer suspension  500 mcg Nebulization BID RT        Objective:     Blood pressure 121/68, pulse 84, temperature 98.2 °F (36.8 °C), resp. rate 18, height 5' 1\" (1.549 m), weight 105.7 kg (233 lb), SpO2 95 %. 11/06 1901 - 11/08 0700  In: 960 [P.O.:960]  Out: -     EXAM:    GENERAL: alert, cooperative, no distress,   HEART: regular rate and rhythm   LUNGS: clear to auscultation bilaterally   ABDOMEN:  Soft, obese, nontender  Anasarca    Data Review    Recent Results (from the past 24 hour(s))   GLUCOSE, POC    Collection Time: 11/07/17  4:15 PM   Result Value Ref Range    Glucose (POC) 162 (H) 65 - 100 mg/dL    Performed by Francisco Nice    GLUCOSE, POC    Collection Time: 11/07/17  9:17 PM   Result Value Ref Range    Glucose (POC) 174 (H) 65 - 100 mg/dL    Performed by Gilberto Mata (PCT)    CBC WITH AUTOMATED DIFF    Collection Time: 11/08/17  5:02 AM   Result Value Ref Range    WBC 6.7 3.6 - 11.0 K/uL    RBC 3.79 (L) 3.80 - 5.20 M/uL    HGB 10.9 (L) 11.5 - 16.0 g/dL    HCT 33.7 (L) 35.0 - 47.0 %    MCV 88.9 80.0 - 99.0 FL    MCH 28.8 26.0 - 34.0 PG    MCHC 32.3 30.0 - 36.5 g/dL    RDW 14.9 (H) 11.5 - 14.5 %    PLATELET 866 980 - 971 K/uL    NEUTROPHILS 75 32 - 75 %    LYMPHOCYTES 15 12 - 49 %    MONOCYTES 5 5 - 13 %    EOSINOPHILS 5 0 - 7 %    BASOPHILS 0 0 - 1 %    ABS. NEUTROPHILS 5.0 1.8 - 8.0 K/UL    ABS. LYMPHOCYTES 1.0 0.8 - 3.5 K/UL    ABS. MONOCYTES 0.3 0.0 - 1.0 K/UL    ABS. EOSINOPHILS 0.3 0.0 - 0.4 K/UL    ABS.  BASOPHILS 0.0 0.0 - 0.1 K/UL   METABOLIC PANEL, BASIC    Collection Time: 11/08/17  5:02 AM   Result Value Ref Range    Sodium 136 136 - 145 mmol/L    Potassium 3.7 3.5 - 5.1 mmol/L    Chloride 98 97 - 108 mmol/L    CO2 25 21 - 32 mmol/L    Anion gap 13 5 - 15 mmol/L    Glucose 135 (H) 65 - 100 mg/dL    BUN 87 (H) 6 - 20 MG/DL    Creatinine 5.37 (H) 0.55 - 1.02 MG/DL    BUN/Creatinine ratio 16 12 - 20      GFR est AA 10 (L) >60 ml/min/1.73m2    GFR est non-AA 8 (L) >60 ml/min/1.73m2    Calcium 9.0 8.5 - 10.1 MG/DL   GLUCOSE, POC    Collection Time: 11/08/17  7:24 AM   Result Value Ref Range    Glucose (POC) 120 (H) 65 - 100 mg/dL    Performed by Westley Azul (PCT)    GLUCOSE, POC    Collection Time: 11/08/17 11:11 AM   Result Value Ref Range    Glucose (POC) 179 (H) 65 - 100 mg/dL    Performed by Noreene Alpers        Assessment:     Active Problems:    CKD (chronic kidney disease) stage 4, GFR 15-29 ml/min ([facility] (8/14/2016)      HTN (hypertension), benign (8/14/2016)      DM type 2, uncontrolled, with renal complications ([address] (8/98/9030)      Iron deficiency anemia (8/14/2016)      Chronic bilateral low back pain without sciatica (11/3/2016)      Generalized anxiety disorder (11/3/2016)      Asthma ()      Hyperlipidemia ()      RAI on CPAP ()      Anxiety and depression ()      Arteriovenous fistula infection ([facility] (11/2/2017)      Leukocytosis (11/2/2017)      Hypoxia (11/2/2017)      Pleural effusion (11/2/2017)    c diff colitis    Plan:     Continue PO Vanc and Flagyl, daily probiotic - stool consistency and frequency improving. When diarrhea controlled okay to discharge from GI standpoint.     Summer Herman PA-C  11/08/17  11:59 AM  I have interviewed and examined patient with addendum to note above and formulation care plan    José Miguel Jacques M.D.

## 2017-11-08 NOTE — PROGRESS NOTES
[address]  YOB: 1945          Assessment & Plan:   CKD 4 with ARF  · Stable on admission  · Felt to have developed ATN due to vanc  · Cr stabilizing. Likely will be lower tomorrow    Cellulitis  · On ceftriaxone  · Likely due to pruritis/excoriation  · Better today    HTN  · Controlled  · Continue to hold ARB    Chronic edema  · Remain off loop one more day  · Volume seems ok clnically    DM  · Insulin    Cdiff  · On PO vanc  · Not typically associated with renal dysfunction       Subjective:   CC: f/u CKD  HPI: Renal function stabilizing. HTN is ok. On PO vanc for Cdiff. On ceftriaxone for cellulitis, which is improving. ROS: Wants to go home.  No sob/n/v  Current Facility-Administered Medications   Medication Dose Route Frequency    vancomycin 50 mg/mL oral solution (compounded) 250 mg  250 mg Oral Q6H    famotidine (PEPCID) tablet 20 mg  20 mg Oral DAILY    mupirocin (BACTROBAN) 2 % ointment   Topical BID    cefTRIAXone (ROCEPHIN) 2 g in 0.9% sodium chloride (MBP/ADV) 50 mL  2 g IntraVENous Q24H    nystatin (MYCOSTATIN) 100,000 unit/gram powder   Topical BID    metroNIDAZOLE (FLAGYL) tablet 500 mg  500 mg Oral TID WITH MEALS    insulin glargine (LANTUS) injection 10 Units  10 Units SubCUTAneous QHS    lactobacillus-acidophilus (LACTINEX) 1 Packet  1 Packet Oral BID    loratadine (CLARITIN) tablet 10 mg  10 mg Oral Q48H    carvedilol (COREG) tablet 3.125 mg  3.125 mg Oral BID WITH MEALS    citalopram (CELEXA) tablet 40 mg  40 mg Oral DAILY    doxazosin (CARDURA) tablet 2 mg  2 mg Oral DAILY    sodium chloride (NS) flush 5-10 mL  5-10 mL IntraVENous Q8H    sodium chloride (NS) flush 5-10 mL  5-10 mL IntraVENous PRN    naloxone (NARCAN) injection 0.4 mg  0.4 mg IntraVENous PRN    glucose chewable tablet 16 g  4 Tab Oral PRN    dextrose (D50W) injection syrg 12.5-25 g  12.5-25 g IntraVENous PRN    glucagon (GLUCAGEN) injection 1 mg  1 mg IntraMUSCular PRN    acetaminophen (TYLENOL) tablet 650 mg  650 mg Oral Q4H PRN    diphenhydrAMINE (BENADRYL) capsule 25 mg  25 mg Oral Q4H PRN    ondansetron (ZOFRAN) injection 4 mg  4 mg IntraVENous Q4H PRN    heparin (porcine) injection 5,000 Units  5,000 Units SubCUTAneous Q8H    insulin lispro (HUMALOG) injection   SubCUTAneous AC&HS    fenofibrate nanocrystallized (TRICOR) tablet 145 mg  145 mg Oral DAILY    atorvastatin (LIPITOR) tablet 20 mg  20 mg Oral QHS    arformoterol (BROVANA) neb solution 15 mcg  15 mcg Nebulization BID RT    And    budesonide (PULMICORT) 500 mcg/2 ml nebulizer suspension  500 mcg Nebulization BID RT          Objective:     Vitals:  Blood pressure 121/68, pulse 84, temperature 98.2 °F (36.8 °C), resp. rate 18, height 5' 1\" (1.549 m), weight 105.7 kg (233 lb), SpO2 95 %. Temp (24hrs), Av.2 °F (36.8 °C), Min:97.8 °F (36.6 °C), Max:98.7 °F (37.1 °C)      Intake and Output:      1901 -  0700  In: 960 [P.O.:960]  Out: -     Physical Exam:               GENERAL ASSESSMENT: NAD  CHEST: CTA  HEART: S1S2  ABDOMEN: Soft,NT  EXTREMITY: trace EDEMA, LUE with less erythema          ECG/rhythm:    Data Review      No results for input(s): TNIPOC in the last 72 hours. No lab exists for component: ITNL   No results for input(s): CPK, CKMB, TROIQ in the last 72 hours. Recent Labs      17   0502  17   0507  17   0435   NA  136  136  137   K  3.7  3.9  3.9   CL  98  99  100   CO2  25  25  27   BUN  87*  86*  82*   CREA  5.37*  5.34*  5.02*   GLU  135*  124*  80   CA  9.0  8.9  8.8   WBC  6.7  7.4  5.9   HGB  10.9*  10.7*  10.9*   HCT  33.7*  32.5*  35.5   PLT  265  223  218      No results for input(s): INR, PTP, APTT in the last 72 hours.     No lab exists for component: INREXT, INREXT  Needs: urine analysis, urine sodium, protein and creatinine  Lab Results   Component Value Date/Time    Sodium urine, random 50 11/03/2016 12:55 PM    Creatinine, urine 53.84 11/03/2016 12:55 PM           : Livan Bernstein MD  11/8/2017        [facility]:  [email or web address]  Bhakti Daubs office:  [address] [address]  [place], [address] [place] [address]  Phone: [phone]  Fax :     [phone]    [place] office:  [address]  Phone - [phone]  Fax - [phone]

## 2017-11-08 NOTE — PROGRESS NOTES
Daily Progress Note: 11/8/2017  Mary Murcia MD    Assessment/Plan:   cellulitis/ Leukocytosis - POA, not sepsis. Excoriated skin lesions seem to be the source. AV fistula has been ruled out. No Hx of MRSA. -- Antibiotic switched to Rocephin for now. --Blood cx Neg so far. --Vascular has ruled out clot and infection  --Culture of wound; arm pustule: group B strep, beta hemolytic     Hypoxia / Pleural effusion / RAI on CPAP - POA, likely due to fluid overload. --continue bumex and fluid restriction when needed. -- Continue home CPAP.     CKD (chronic kidney disease) stage 4 -5 - Cr worse  --Renal following. Likely ATN poss due to Vanc or Strep  --diuretics and ARB held     DM type 2, uncontrolled, with renal complications - Diabetic/Renal diet and counseling.    --SSI per protocol.    --decrease glargine while ALFONSO   -- A1c useless in setting of anemia.     HTN (hypertension), benign - stable  --Norvasc held for now in case it is making edema worse. --Continue doxazosin, coreg - use bumex when needed     Iron deficiency anemia - Due to ESRD. Epo per renal.     Chronic bilateral low back pain without sciatica - tylenol prn.     Anxiety and depression / Generalized anxiety disorder - POA, stable. I suspect dementia and I would recommend outpatient full neuropsych testing. She is normally on xanax, celexa     Asthma - Stable. Continue Breo and prn albuterol.     Hyperlipidemia - Continue atorvastatin     Diarrhea/C diff: stool C diff positive 11/6  -Flagyl, Vanc po - consulted GI  -contact precautions    Morbid obesity - unchanged. Counseled. Problem List:  Problem List as of 11/8/2017  Date Reviewed: 6/2/2017          Codes Class Noted - Resolved    Arteriovenous fistula infection ([facility] ICD-10-CM: T82. 7XXA  ICD-9-CM: 996.62  11/2/2017 - Present        Leukocytosis ICD-10-CM: G83.547  ICD-9-CM: 288.60  11/2/2017 - Present        Hypoxia ICD-10-CM: R09.02  ICD-9-CM: 799.02  11/2/2017 - Present        Pleural effusion ICD-10-CM: J90  ICD-9-CM: 511.9  11/2/2017 - Present        Asthma ICD-10-CM: J45.909  ICD-9-CM: 493.90  Unknown - Present        Hyperlipidemia ICD-10-CM: E78.5  ICD-9-CM: 272.4  Unknown - Present        RAI on CPAP ICD-10-CM: G47.33, Z99.89  ICD-9-CM: 327.23, V46.8  Unknown - Present        Anxiety and depression ICD-10-CM: F41.8  ICD-9-CM: 300.00, 311  Unknown - Present        Chronic bilateral low back pain without sciatica ICD-10-CM: M54.5, G89.29  ICD-9-CM: 724.2, 338.29  11/3/2016 - Present        Generalized anxiety disorder ICD-10-CM: F41.1  ICD-9-CM: 300.02  11/3/2016 - Present        CKD (chronic kidney disease) stage 4, GFR 15-29 ml/min (HCC) (Chronic) ICD-10-CM: N18.4  ICD-9-CM: 585.4  8/14/2016 - Present        HTN (hypertension), benign (Chronic) ICD-10-CM: I10  ICD-9-CM: 401.1  8/14/2016 - Present        DM type 2, uncontrolled, with renal complications (HCC) (Chronic) ICD-10-CM: E11.29, E11.65  ICD-9-CM: 250.42  8/14/2016 - Present        Iron deficiency anemia (Chronic) ICD-10-CM: D50.9  ICD-9-CM: 280.9  8/14/2016 - Present        RESOLVED: Hypertension ICD-10-CM: I10  ICD-9-CM: 401.9  Unknown - 11/2/2017        RESOLVED: DM type 2 causing renal disease ([facility] ICD-10-CM: E11.29  ICD-9-CM: 250.40  Unknown - 11/2/2017        RESOLVED: CKD stage 4 due to type 2 diabetes mellitus ([facility] ICD-10-CM: E11.22, N18.4  ICD-9-CM: 250.40, 585.4  Unknown - 11/2/2017        RESOLVED: Cellulitis of right lower extremity ICD-10-CM: L03.115  ICD-9-CM: 682.6  6/2/2017 - 11/2/2017        RESOLVED: Asthma with acute exacerbation ICD-10-CM: J45.901  ICD-9-CM: 493.92  6/2/2017 - 11/2/2017        RESOLVED: CKD stage 4 due to type 2 diabetes mellitus ([facility] ICD-10-CM: E11.22, N18.4  ICD-9-CM: 250.40, 585.4  Unknown - 2/11/2017        RESOLVED: DM type 2 causing renal disease ([facility] ICD-10-CM: E11.29  ICD-9-CM: 250.40  Unknown - 2/11/2017        RESOLVED: Hypoglycemia ICD-10-CM: E16.2  ICD-9-CM: 251.2  2/11/2017 - 11/2/2017        RESOLVED: Hyperkalemia ICD-10-CM: E87.5  ICD-9-CM: 276.7  11/3/2016 - 2/11/2017        RESOLVED: Elevated serum creatinine ICD-10-CM: R79.89  ICD-9-CM: 790.99  11/3/2016 - 2/11/2017        RESOLVED: Left leg cellulitis ICD-10-CM: L03.116  ICD-9-CM: 682.6  8/14/2016 - 2/11/2017        RESOLVED: Sepsis ([facility] ICD-10-CM: A41.9  ICD-9-CM: 038.9, 995.91  8/14/2016 - 2/11/2017        RESOLVED: Acute renal failure (ARF) ([facility] ICD-10-CM: N17.9  ICD-9-CM: 584.9  8/14/2016 - 11/2/2017        RESOLVED: RAI (obstructive sleep apnea) (Chronic) ICD-10-CM: Y09.51  ICD-9-CM: 327.23  8/14/2016 - 2/11/2017              Subjective:     71 y.o.  female who presented to the Emergency Department complaining of arm infection. She provides limited hx, I think she has dementia. Her  is present and provide tangential statements about her hx. She reports several days of worsening redness in her left arm near site of HD fistula. Fistula needed \"balloon\" about a month ago due to arm edema. It has not yet been used. She has a hx of skin infection, but never MRSA she thinks. We will admit her for management. 11/3:  She reports she is feeling better today. Wearing CPAP. She reports her left arm continues to hurt with movement but is fairly comfortable at rest.  Doppler reports fistula is patent. She has multiple shallow abrasions and scabs on both forearms and abdomen where she reports she has \"itchy spots\" that she picks at and scratches - may be source of infection. Nephrology and Vascular consulted. 11/4: Pt notes she wants to go home. Afebrile overnight. Skin presumed source, but no positive cultures yet. Cr worse this AM.  Tolerating PO. I spoke with pt's . He thinks she needs another day or two. Willing to take her home when released. 11/5: Cr is worse this AM after another dose of vanc.   This has been stopped as wound culture showed WBCs but no organisms yet. Pt is still afebrile. Still having diarrhea. C diff has been sent. :  She reports she feels better. Vanc stopped. Cr cont to worsen - dialysis decision per Renal.  Nurses report stools semi-solid so C diff test not sent. Wound culture with Group G, B Hemolytic strep - Zosyn should cover - bactroban added to all sites of excoriations. :  Overall, except for diarrhea, pt reports she feels better. Pt had loose stool yesterday and another C diff sent - returned positive. Overnight 4- 5 loose stools. Flagyl added po. Will consult GI also - restart Vanc po? Creat continues to worsen - dialysis decision per renal.     :  Pt reports she is feeling better. Denies any pain. Discussed poss rehab/PT/OT and declines all - she does not want to get out of bed. Nurses report 4 stools overnight and they were semi-solid. PO Vanc added per GI. Creat only sl higher (rate of increase is flattening). Watch at least for another day or two to make sure C diff clears and Creat is starting to trend down. Review of Systems:   A comprehensive review of systems was negative except for that written in the HPI.     Allergies   Allergen Reactions    Latex, Natural Rubber Rash     Per pt, had a reaction to latex gloves when an RN administered lotion     Darvon [Propoxyphene] Itching    Peanut Cough    Pineapple Itching    Sulfa (Sulfonamide Antibiotics) Swelling    Tape [Adhesive] Itching     Objective:   Physical Exam:     Visit Vitals    /55 (BP 1 Location: Right arm, BP Patient Position: At rest)    Pulse 84    Temp 98 °F (36.7 °C)    Resp 18    Ht 5' 1\" (1.549 m)    Wt 105.7 kg (233 lb)    SpO2 94%    BMI 44.02 kg/m2    O2 Flow Rate (L/min): 2 l/min O2 Device: CPAP mask    Temp (24hrs), Av °F (36.7 °C), Min:97 °F (36.1 °C), Max:98.7 °F (37.1 °C)         07 -  1900  In: 960 [P.O.:960]  Out: -     General:  Alert, cooperative, no distress, appears stated age; morbidly obese/debilitated. Head:  Normocephalic, without obvious abnormality, atraumatic. Eyes:  Conjunctivae/corneas clear. PERRL, EOMs intact. Nose: Nares normal. Septum midline. Throat: Lips, mucosa, and tongue moist.   Neck: Supple, symmetrical, trachea midline, no adenopathy, thyroid: no enlargement/tenderness/nodules, no carotid bruit and no JVD. Lungs:   Clear to auscultation bilaterally. Chest wall:  No tenderness or deformity. Heart:  Regular rate and rhythm, S1, S2 normal, no murmur, click, rub or gallop. Abdomen:   Soft, no tenderness. Bowel sounds normal. No masses,  No organomegaly. Extremities: no cyanosis. 2+ LE edema. No calf tenderness or cords. Pulses: 2+ and symmetric all extremities. Skin:  turgor normal. Left arm with nearly resolved erythema and no longer warm from volar region to deltoid area. Thrill present at shunt site. multiple shallow abrasions/excoriations and scabs on both forearms and abdomen look better   Neurologic: CNII-XII intact. Alert and oriented X 3. Fine motor of hands and fingers normal.   equal. Gait not tested at this time. Sensation grossly normal to touch. Gross motor of extremities normal.  Psych:  oriented to person, place, slow simple answers     Data Review:     Doppler LUE 11/2/17:  CONCLUSION: No deep vein thrombosis or thrombophlebitis in the veins  visualized. No evidence of thrombus in contralateral right subclavian  vein.  Fistula is widely patent    Recent Days:  Recent Labs      11/08/17   0502  11/07/17   0507  11/06/17   0435   WBC  6.7  7.4  5.9   HGB  10.9*  10.7*  10.9*   HCT  33.7*  32.5*  35.5   PLT  265  223  218     Recent Labs      11/08/17   0502  11/07/17   0507  11/06/17   0435   NA  136  136  137   K  3.7  3.9  3.9   CL  98  99  100   CO2  25  25  27   GLU  135*  124*  80   BUN  87*  86*  82*   CREA  5.37*  5.34*  5.02*   CA  9.0  8.9  8.8     No results for input(s): PH, PCO2, PO2, HCO3, FIO2 in the last 72 hours. 24 Hour Results:  Recent Results (from the past 24 hour(s))   GLUCOSE, POC    Collection Time: 11/07/17  6:50 AM   Result Value Ref Range    Glucose (POC) 115 (H) 65 - 100 mg/dL    Performed by Immanuel Herrera (PCT)    GLUCOSE, POC    Collection Time: 11/07/17 11:27 AM   Result Value Ref Range    Glucose (POC) 117 (H) 65 - 100 mg/dL    Performed by Jay Newell    GLUCOSE, POC    Collection Time: 11/07/17  4:15 PM   Result Value Ref Range    Glucose (POC) 162 (H) 65 - 100 mg/dL    Performed by Yemi Fam    GLUCOSE, POC    Collection Time: 11/07/17  9:17 PM   Result Value Ref Range    Glucose (POC) 174 (H) 65 - 100 mg/dL    Performed by Darren Quezada (PCT)    CBC WITH AUTOMATED DIFF    Collection Time: 11/08/17  5:02 AM   Result Value Ref Range    WBC 6.7 3.6 - 11.0 K/uL    RBC 3.79 (L) 3.80 - 5.20 M/uL    HGB 10.9 (L) 11.5 - 16.0 g/dL    HCT 33.7 (L) 35.0 - 47.0 %    MCV 88.9 80.0 - 99.0 FL    MCH 28.8 26.0 - 34.0 PG    MCHC 32.3 30.0 - 36.5 g/dL    RDW 14.9 (H) 11.5 - 14.5 %    PLATELET 708 791 - 516 K/uL    NEUTROPHILS 75 32 - 75 %    LYMPHOCYTES 15 12 - 49 %    MONOCYTES 5 5 - 13 %    EOSINOPHILS 5 0 - 7 %    BASOPHILS 0 0 - 1 %    ABS. NEUTROPHILS 5.0 1.8 - 8.0 K/UL    ABS. LYMPHOCYTES 1.0 0.8 - 3.5 K/UL    ABS. MONOCYTES 0.3 0.0 - 1.0 K/UL    ABS. EOSINOPHILS 0.3 0.0 - 0.4 K/UL    ABS.  BASOPHILS 0.0 0.0 - 0.1 K/UL   METABOLIC PANEL, BASIC    Collection Time: 11/08/17  5:02 AM   Result Value Ref Range    Sodium 136 136 - 145 mmol/L    Potassium 3.7 3.5 - 5.1 mmol/L    Chloride 98 97 - 108 mmol/L    CO2 25 21 - 32 mmol/L    Anion gap 13 5 - 15 mmol/L    Glucose 135 (H) 65 - 100 mg/dL    BUN 87 (H) 6 - 20 MG/DL    Creatinine 5.37 (H) 0.55 - 1.02 MG/DL    BUN/Creatinine ratio 16 12 - 20      GFR est AA 10 (L) >60 ml/min/1.73m2    GFR est non-AA 8 (L) >60 ml/min/1.73m2    Calcium 9.0 8.5 - 10.1 MG/DL       Medications reviewed  Current Facility-Administered Medications Medication Dose Route Frequency    vancomycin 50 mg/mL oral solution (compounded) 250 mg  250 mg Oral Q6H    famotidine (PEPCID) tablet 20 mg  20 mg Oral DAILY    mupirocin (BACTROBAN) 2 % ointment   Topical BID    cefTRIAXone (ROCEPHIN) 2 g in 0.9% sodium chloride (MBP/ADV) 50 mL  2 g IntraVENous Q24H    nystatin (MYCOSTATIN) 100,000 unit/gram powder   Topical BID    metroNIDAZOLE (FLAGYL) tablet 500 mg  500 mg Oral TID WITH MEALS    insulin glargine (LANTUS) injection 10 Units  10 Units SubCUTAneous QHS    lactobacillus-acidophilus (LACTINEX) 1 Packet  1 Packet Oral BID    loratadine (CLARITIN) tablet 10 mg  10 mg Oral Q48H    carvedilol (COREG) tablet 3.125 mg  3.125 mg Oral BID WITH MEALS    citalopram (CELEXA) tablet 40 mg  40 mg Oral DAILY    doxazosin (CARDURA) tablet 2 mg  2 mg Oral DAILY    sodium chloride (NS) flush 5-10 mL  5-10 mL IntraVENous Q8H    sodium chloride (NS) flush 5-10 mL  5-10 mL IntraVENous PRN    naloxone (NARCAN) injection 0.4 mg  0.4 mg IntraVENous PRN    glucose chewable tablet 16 g  4 Tab Oral PRN    dextrose (D50W) injection syrg 12.5-25 g  12.5-25 g IntraVENous PRN    glucagon (GLUCAGEN) injection 1 mg  1 mg IntraMUSCular PRN    acetaminophen (TYLENOL) tablet 650 mg  650 mg Oral Q4H PRN    diphenhydrAMINE (BENADRYL) capsule 25 mg  25 mg Oral Q4H PRN    ondansetron (ZOFRAN) injection 4 mg  4 mg IntraVENous Q4H PRN    heparin (porcine) injection 5,000 Units  5,000 Units SubCUTAneous Q8H    insulin lispro (HUMALOG) injection   SubCUTAneous AC&HS    fenofibrate nanocrystallized (TRICOR) tablet 145 mg  145 mg Oral DAILY    atorvastatin (LIPITOR) tablet 20 mg  20 mg Oral QHS    arformoterol (BROVANA) neb solution 15 mcg  15 mcg Nebulization BID RT    And    budesonide (PULMICORT) 500 mcg/2 ml nebulizer suspension  500 mcg Nebulization BID RT     Care Plan discussed with: Patient/Renal/GI and Nurse  Total time spent with patient: 30 minutes.   Caty David MD

## 2017-11-08 NOTE — PROGRESS NOTES
Bedside and Verbal shift change report given to VIKA Hubbard (oncoming nurse) by Candace Ly (offgoing nurse). Report included the following information SBAR, Kardex, Procedure Summary, Intake/Output, MAR, Recent Results and Med Rec Status.

## 2017-11-08 NOTE — PROGRESS NOTES
Problem: Falls - Risk of  Goal: *Absence of Falls  Document Moy Fall Risk and appropriate interventions in the flowsheet.    Outcome: Progressing Towards Goal  Fall Risk Interventions:  Mobility Interventions: Patient to call before getting OOB, Bed/chair exit alarm         Medication Interventions: Patient to call before getting OOB    Elimination Interventions: Call light in reach

## 2017-11-09 LAB
ALBUMIN SERPL-MCNC: 2.1 G/DL (ref 3.5–5)
ANION GAP SERPL CALC-SCNC: 14 MMOL/L (ref 5–15)
BUN SERPL-MCNC: 89 MG/DL (ref 6–20)
BUN/CREAT SERPL: 19 (ref 12–20)
CALCIUM SERPL-MCNC: 9 MG/DL (ref 8.5–10.1)
CHLORIDE SERPL-SCNC: 100 MMOL/L (ref 97–108)
CO2 SERPL-SCNC: 24 MMOL/L (ref 21–32)
CREAT SERPL-MCNC: 4.68 MG/DL (ref 0.55–1.02)
GLUCOSE BLD STRIP.AUTO-MCNC: 111 MG/DL (ref 65–100)
GLUCOSE BLD STRIP.AUTO-MCNC: 122 MG/DL (ref 65–100)
GLUCOSE BLD STRIP.AUTO-MCNC: 142 MG/DL (ref 65–100)
GLUCOSE BLD STRIP.AUTO-MCNC: 163 MG/DL (ref 65–100)
GLUCOSE SERPL-MCNC: 137 MG/DL (ref 65–100)
PHOSPHATE SERPL-MCNC: 4.7 MG/DL (ref 2.6–4.7)
POTASSIUM SERPL-SCNC: 3.4 MMOL/L (ref 3.5–5.1)
SERVICE CMNT-IMP: ABNORMAL
SODIUM SERPL-SCNC: 138 MMOL/L (ref 136–145)

## 2017-11-09 PROCEDURE — 74011000258 HC RX REV CODE- 258: Performed by: FAMILY MEDICINE

## 2017-11-09 PROCEDURE — 77030038269 HC DRN EXT URIN PURWCK BARD -A

## 2017-11-09 PROCEDURE — 74011250637 HC RX REV CODE- 250/637: Performed by: FAMILY MEDICINE

## 2017-11-09 PROCEDURE — 82962 GLUCOSE BLOOD TEST: CPT

## 2017-11-09 PROCEDURE — 74011250637 HC RX REV CODE- 250/637: Performed by: INTERNAL MEDICINE

## 2017-11-09 PROCEDURE — 74011250636 HC RX REV CODE- 250/636: Performed by: FAMILY MEDICINE

## 2017-11-09 PROCEDURE — 80069 RENAL FUNCTION PANEL: CPT | Performed by: INTERNAL MEDICINE

## 2017-11-09 PROCEDURE — 74011636637 HC RX REV CODE- 636/637: Performed by: FAMILY MEDICINE

## 2017-11-09 PROCEDURE — 74011000250 HC RX REV CODE- 250: Performed by: INTERNAL MEDICINE

## 2017-11-09 PROCEDURE — 65270000029 HC RM PRIVATE

## 2017-11-09 PROCEDURE — 74011250636 HC RX REV CODE- 250/636: Performed by: INTERNAL MEDICINE

## 2017-11-09 PROCEDURE — 94640 AIRWAY INHALATION TREATMENT: CPT

## 2017-11-09 PROCEDURE — 36415 COLL VENOUS BLD VENIPUNCTURE: CPT | Performed by: INTERNAL MEDICINE

## 2017-11-09 RX ORDER — BUMETANIDE 1 MG/1
1 TABLET ORAL 2 TIMES DAILY
Status: DISCONTINUED | OUTPATIENT
Start: 2017-11-09 | End: 2017-11-10 | Stop reason: HOSPADM

## 2017-11-09 RX ORDER — BUMETANIDE 1 MG/1
1 TABLET ORAL 2 TIMES DAILY
Status: DISCONTINUED | OUTPATIENT
Start: 2017-11-09 | End: 2017-11-09

## 2017-11-09 RX ADMIN — CEFTRIAXONE 2 G: 2 INJECTION, POWDER, FOR SOLUTION INTRAMUSCULAR; INTRAVENOUS at 15:41

## 2017-11-09 RX ADMIN — ONDANSETRON 4 MG: 2 INJECTION INTRAMUSCULAR; INTRAVENOUS at 00:44

## 2017-11-09 RX ADMIN — Medication 10 ML: at 22:38

## 2017-11-09 RX ADMIN — CARVEDILOL 3.12 MG: 3.12 TABLET, FILM COATED ORAL at 17:47

## 2017-11-09 RX ADMIN — VANCOMYCIN HYDROCHLORIDE 250 MG: 1 INJECTION, POWDER, LYOPHILIZED, FOR SOLUTION INTRAVENOUS at 00:35

## 2017-11-09 RX ADMIN — LACTOBACILLUS ACIDOPHILUS / LACTOBACILLUS BULGARICUS 1 PACKET: 100 MILLION CFU STRENGTH GRANULES at 09:00

## 2017-11-09 RX ADMIN — VANCOMYCIN HYDROCHLORIDE 250 MG: 1 INJECTION, POWDER, LYOPHILIZED, FOR SOLUTION INTRAVENOUS at 17:49

## 2017-11-09 RX ADMIN — ARFORMOTEROL TARTRATE 15 MCG: 15 SOLUTION RESPIRATORY (INHALATION) at 19:28

## 2017-11-09 RX ADMIN — FENOFIBRATE 145 MG: 145 TABLET ORAL at 09:00

## 2017-11-09 RX ADMIN — VANCOMYCIN HYDROCHLORIDE 250 MG: 1 INJECTION, POWDER, LYOPHILIZED, FOR SOLUTION INTRAVENOUS at 11:15

## 2017-11-09 RX ADMIN — ONDANSETRON 4 MG: 2 INJECTION INTRAMUSCULAR; INTRAVENOUS at 05:57

## 2017-11-09 RX ADMIN — HEPARIN SODIUM 5000 UNITS: 5000 INJECTION, SOLUTION INTRAVENOUS; SUBCUTANEOUS at 13:35

## 2017-11-09 RX ADMIN — CARVEDILOL 3.12 MG: 3.12 TABLET, FILM COATED ORAL at 09:00

## 2017-11-09 RX ADMIN — DOXAZOSIN 2 MG: 1 TABLET ORAL at 09:00

## 2017-11-09 RX ADMIN — LACTOBACILLUS ACIDOPHILUS / LACTOBACILLUS BULGARICUS 1 PACKET: 100 MILLION CFU STRENGTH GRANULES at 17:47

## 2017-11-09 RX ADMIN — METRONIDAZOLE 500 MG: 250 TABLET ORAL at 11:15

## 2017-11-09 RX ADMIN — HEPARIN SODIUM 5000 UNITS: 5000 INJECTION, SOLUTION INTRAVENOUS; SUBCUTANEOUS at 05:56

## 2017-11-09 RX ADMIN — Medication 10 ML: at 05:56

## 2017-11-09 RX ADMIN — CITALOPRAM HYDROBROMIDE 40 MG: 20 TABLET ORAL at 09:00

## 2017-11-09 RX ADMIN — MUPIROCIN: 20 OINTMENT TOPICAL at 09:05

## 2017-11-09 RX ADMIN — BUMETANIDE 1 MG: 1 TABLET ORAL at 17:46

## 2017-11-09 RX ADMIN — BUDESONIDE 500 MCG: 0.5 INHALANT RESPIRATORY (INHALATION) at 19:28

## 2017-11-09 RX ADMIN — ATORVASTATIN CALCIUM 20 MG: 20 TABLET, FILM COATED ORAL at 22:38

## 2017-11-09 RX ADMIN — VANCOMYCIN HYDROCHLORIDE 250 MG: 1 INJECTION, POWDER, LYOPHILIZED, FOR SOLUTION INTRAVENOUS at 05:56

## 2017-11-09 RX ADMIN — BUDESONIDE 500 MCG: 0.5 INHALANT RESPIRATORY (INHALATION) at 07:54

## 2017-11-09 RX ADMIN — METRONIDAZOLE 500 MG: 250 TABLET ORAL at 17:47

## 2017-11-09 RX ADMIN — HEPARIN SODIUM 5000 UNITS: 5000 INJECTION, SOLUTION INTRAVENOUS; SUBCUTANEOUS at 22:38

## 2017-11-09 RX ADMIN — Medication 10 ML: at 13:35

## 2017-11-09 RX ADMIN — LORATADINE 10 MG: 10 TABLET ORAL at 09:00

## 2017-11-09 RX ADMIN — METRONIDAZOLE 500 MG: 250 TABLET ORAL at 09:00

## 2017-11-09 RX ADMIN — MUPIROCIN: 20 OINTMENT TOPICAL at 17:48

## 2017-11-09 RX ADMIN — INSULIN GLARGINE 10 UNITS: 100 INJECTION, SOLUTION SUBCUTANEOUS at 22:38

## 2017-11-09 RX ADMIN — ARFORMOTEROL TARTRATE 15 MCG: 15 SOLUTION RESPIRATORY (INHALATION) at 07:54

## 2017-11-09 RX ADMIN — NYSTATIN: 100000 POWDER TOPICAL at 17:47

## 2017-11-09 RX ADMIN — FAMOTIDINE 20 MG: 20 TABLET, FILM COATED ORAL at 09:00

## 2017-11-09 RX ADMIN — NYSTATIN: 100000 POWDER TOPICAL at 09:04

## 2017-11-09 NOTE — PROGRESS NOTES
Bedside and Verbal shift change report given to Alcides Vargas RN (oncoming nurse) by Luis Eduardo Reynoso RN (offgoing nurse). Report included the following information SBAR, Kardex, Intake/Output, MAR, Recent Results and Med Rec Status.

## 2017-11-09 NOTE — PROGRESS NOTES
Gastrointestinal Progress Note    11/9/2017    Admit Date: 11/2/2017    Subjective:     New Complaints Today: Continues to improve. Decreased frequency of BM and starting to have some consistency. Denies abdominal pain. Mild nausea that resolves with medication. No vomiting. Tolerating diet.     Current Facility-Administered Medications   Medication Dose Route Frequency    vancomycin 50 mg/mL oral solution (compounded) 250 mg  250 mg Oral Q6H    famotidine (PEPCID) tablet 20 mg  20 mg Oral DAILY    mupirocin (BACTROBAN) 2 % ointment   Topical BID    cefTRIAXone (ROCEPHIN) 2 g in 0.9% sodium chloride (MBP/ADV) 50 mL  2 g IntraVENous Q24H    nystatin (MYCOSTATIN) 100,000 unit/gram powder   Topical BID    metroNIDAZOLE (FLAGYL) tablet 500 mg  500 mg Oral TID WITH MEALS    insulin glargine (LANTUS) injection 10 Units  10 Units SubCUTAneous QHS    lactobacillus-acidophilus (LACTINEX) 1 Packet  1 Packet Oral BID    loratadine (CLARITIN) tablet 10 mg  10 mg Oral Q48H    carvedilol (COREG) tablet 3.125 mg  3.125 mg Oral BID WITH MEALS    citalopram (CELEXA) tablet 40 mg  40 mg Oral DAILY    doxazosin (CARDURA) tablet 2 mg  2 mg Oral DAILY    sodium chloride (NS) flush 5-10 mL  5-10 mL IntraVENous Q8H    sodium chloride (NS) flush 5-10 mL  5-10 mL IntraVENous PRN    naloxone (NARCAN) injection 0.4 mg  0.4 mg IntraVENous PRN    glucose chewable tablet 16 g  4 Tab Oral PRN    dextrose (D50W) injection syrg 12.5-25 g  12.5-25 g IntraVENous PRN    glucagon (GLUCAGEN) injection 1 mg  1 mg IntraMUSCular PRN    acetaminophen (TYLENOL) tablet 650 mg  650 mg Oral Q4H PRN    diphenhydrAMINE (BENADRYL) capsule 25 mg  25 mg Oral Q4H PRN    ondansetron (ZOFRAN) injection 4 mg  4 mg IntraVENous Q4H PRN    heparin (porcine) injection 5,000 Units  5,000 Units SubCUTAneous Q8H    insulin lispro (HUMALOG) injection   SubCUTAneous AC&HS    fenofibrate nanocrystallized (TRICOR) tablet 145 mg  145 mg Oral DAILY    atorvastatin (LIPITOR) tablet 20 mg  20 mg Oral QHS    arformoterol (BROVANA) neb solution 15 mcg  15 mcg Nebulization BID RT    And    budesonide (PULMICORT) 500 mcg/2 ml nebulizer suspension  500 mcg Nebulization BID RT        Objective:     Blood pressure 137/60, pulse 84, temperature 98.1 °F (36.7 °C), resp. rate 19, height 5' 1\" (1.549 m), weight 105.7 kg (233 lb), SpO2 98 %.          11/07 1901 - 11/09 0700  In: 480 [P.O.:480]  Out: -     EXAM:    GENERAL: alert, cooperative, no distress,   LUNGS: no respiratory distress, speaking in complete sentences  ABDOMEN:  Soft, obese, nontender    Data Review    Recent Results (from the past 24 hour(s))   GLUCOSE, POC    Collection Time: 11/08/17 11:11 AM   Result Value Ref Range    Glucose (POC) 179 (H) 65 - 100 mg/dL    Performed by Leonardo Shore    GLUCOSE, POC    Collection Time: 11/08/17  4:33 PM   Result Value Ref Range    Glucose (POC) 151 (H) 65 - 100 mg/dL    Performed by Margarette Diaz    GLUCOSE, POC    Collection Time: 11/08/17  9:14 PM   Result Value Ref Range    Glucose (POC) 146 (H) 65 - 100 mg/dL    Performed by Dariana Tobin (PCT)    GLUCOSE, POC    Collection Time: 11/09/17  7:25 AM   Result Value Ref Range    Glucose (POC) 111 (H) 65 - 100 mg/dL    Performed by Dariana Tobin (PCT)        Assessment:     Active Problems:    CKD (chronic kidney disease) stage 4, GFR 15-29 ml/min ([address] (8/14/2016)      HTN (hypertension), benign (8/14/2016)      DM type 2, uncontrolled, with renal complications ([address] (1/38/1950)      Iron deficiency anemia (8/14/2016)      Chronic bilateral low back pain without sciatica (11/3/2016)      Generalized anxiety disorder (11/3/2016)      Asthma ()      Hyperlipidemia ()      RAI on CPAP ()      Anxiety and depression ()      Arteriovenous fistula infection ([address] (11/2/2017)      Leukocytosis (11/2/2017)      Hypoxia (11/2/2017)      Pleural effusion (11/2/2017)    c diff colitis    Plan:     Once medically stable okay for discharge from GI standpoint. Continue PO Vanc and Flagyl, daily probiotic. May not need vanco after d/c  Will see again as needed. Thanks.     Kenneth Olson PA-C  11/09/17  9:23 AM    I have interviewed and examined patient with addendum to note above and formulation care plan    Kaya Davis M.D.

## 2017-11-09 NOTE — PROGRESS NOTES
Daily Progress Note: 11/9/2017  Nancy Bello MD    Assessment/Plan:   cellulitis/ Leukocytosis - POA, not sepsis. Excoriated skin lesions seem to be the source. AV fistula has been ruled out. No Hx of MRSA. --Antibiotic switched to Rocephin for now. --Blood cx Neg so far. --Vascular has ruled out clot and infection  --Culture of wound; arm pustule: group B strep, beta hemolytic     Hypoxia / Pleural effusion / RAI on CPAP - POA, likely due to fluid overload. --continue bumex and fluid restriction when needed. -- Continue home CPAP.     CKD (chronic kidney disease) stage 4 -5 - Cr worse  --Renal following. Likely ATN poss due to Vanc or Strep  --diuretics and ARB held     DM type 2, uncontrolled, with renal complications - Diabetic/Renal diet and counseling.    --SSI per protocol.    --decrease glargine while ALFONSO   -- A1c useless in setting of anemia.     HTN (hypertension), benign - stable  --Norvasc held for now in case it is making edema worse. --Continue doxazosin, coreg - use bumex when needed     Iron deficiency anemia - Due to ESRD. Epo per renal.     Chronic bilateral low back pain without sciatica - tylenol prn.     Anxiety and depression / Generalized anxiety disorder - POA, stable. I suspect dementia and I would recommend outpatient full neuropsych testing. She is normally on xanax, celexa     Asthma - Stable. Continue Breo and prn albuterol.     Hyperlipidemia - Continue atorvastatin     Diarrhea/C diff: stool C diff positive 11/6  -Flagyl, Vanc po - consulted GI  -contact precautions    Morbid obesity - unchanged. Counseled. Problem List:  Problem List as of 11/9/2017  Date Reviewed: 6/2/2017          Codes Class Noted - Resolved    Arteriovenous fistula infection ([facility] ICD-10-CM: T82. 7XXA  ICD-9-CM: 996.62  11/2/2017 - Present        Leukocytosis ICD-10-CM: T22.133  ICD-9-CM: 288.60  11/2/2017 - Present        Hypoxia ICD-10-CM: R09.02  ICD-9-CM: 799.02  11/2/2017 - Present        Pleural effusion ICD-10-CM: J90  ICD-9-CM: 511.9  11/2/2017 - Present        Asthma ICD-10-CM: J45.909  ICD-9-CM: 493.90  Unknown - Present        Hyperlipidemia ICD-10-CM: E78.5  ICD-9-CM: 272.4  Unknown - Present        RAI on CPAP ICD-10-CM: G47.33, Z99.89  ICD-9-CM: 327.23, V46.8  Unknown - Present        Anxiety and depression ICD-10-CM: F41.8  ICD-9-CM: 300.00, 311  Unknown - Present        Chronic bilateral low back pain without sciatica ICD-10-CM: M54.5, G89.29  ICD-9-CM: 724.2, 338.29  11/3/2016 - Present        Generalized anxiety disorder ICD-10-CM: F41.1  ICD-9-CM: 300.02  11/3/2016 - Present        CKD (chronic kidney disease) stage 4, GFR 15-29 ml/min (HCC) (Chronic) ICD-10-CM: N18.4  ICD-9-CM: 585.4  8/14/2016 - Present        HTN (hypertension), benign (Chronic) ICD-10-CM: I10  ICD-9-CM: 401.1  8/14/2016 - Present        DM type 2, uncontrolled, with renal complications (HCC) (Chronic) ICD-10-CM: E11.29, E11.65  ICD-9-CM: 250.42  8/14/2016 - Present        Iron deficiency anemia (Chronic) ICD-10-CM: D50.9  ICD-9-CM: 280.9  8/14/2016 - Present        RESOLVED: Hypertension ICD-10-CM: I10  ICD-9-CM: 401.9  Unknown - 11/2/2017        RESOLVED: DM type 2 causing renal disease ([facility] ICD-10-CM: E11.29  ICD-9-CM: 250.40  Unknown - 11/2/2017        RESOLVED: CKD stage 4 due to type 2 diabetes mellitus ([facility] ICD-10-CM: E11.22, N18.4  ICD-9-CM: 250.40, 585.4  Unknown - 11/2/2017        RESOLVED: Cellulitis of right lower extremity ICD-10-CM: L03.115  ICD-9-CM: 682.6  6/2/2017 - 11/2/2017        RESOLVED: Asthma with acute exacerbation ICD-10-CM: J45.901  ICD-9-CM: 493.92  6/2/2017 - 11/2/2017        RESOLVED: CKD stage 4 due to type 2 diabetes mellitus ([facility] ICD-10-CM: E11.22, N18.4  ICD-9-CM: 250.40, 585.4  Unknown - 2/11/2017        RESOLVED: DM type 2 causing renal disease ([facility] ICD-10-CM: E11.29  ICD-9-CM: 250.40  Unknown - 2/11/2017        RESOLVED: Hypoglycemia ICD-10-CM: E16.2  ICD-9-CM: 251.2  2/11/2017 - 11/2/2017        RESOLVED: Hyperkalemia ICD-10-CM: E87.5  ICD-9-CM: 276.7  11/3/2016 - 2/11/2017        RESOLVED: Elevated serum creatinine ICD-10-CM: R79.89  ICD-9-CM: 790.99  11/3/2016 - 2/11/2017        RESOLVED: Left leg cellulitis ICD-10-CM: L03.116  ICD-9-CM: 682.6  8/14/2016 - 2/11/2017        RESOLVED: Sepsis ([facility] ICD-10-CM: A41.9  ICD-9-CM: 038.9, 995.91  8/14/2016 - 2/11/2017        RESOLVED: Acute renal failure (ARF) ([facility] ICD-10-CM: N17.9  ICD-9-CM: 584.9  8/14/2016 - 11/2/2017        RESOLVED: RAI (obstructive sleep apnea) (Chronic) ICD-10-CM: P47.79  ICD-9-CM: 327.23  8/14/2016 - 2/11/2017              Subjective:     71 y.o.  female who presented to the Emergency Department complaining of arm infection. She provides limited hx, I think she has dementia. Her  is present and provide tangential statements about her hx. She reports several days of worsening redness in her left arm near site of HD fistula. Fistula needed \"balloon\" about a month ago due to arm edema. It has not yet been used. She has a hx of skin infection, but never MRSA she thinks. We will admit her for management. 11/3:  She reports she is feeling better today. Wearing CPAP. She reports her left arm continues to hurt with movement but is fairly comfortable at rest.  Doppler reports fistula is patent. She has multiple shallow abrasions and scabs on both forearms and abdomen where she reports she has \"itchy spots\" that she picks at and scratches - may be source of infection. Nephrology and Vascular consulted. 11/4: Pt notes she wants to go home. Afebrile overnight. Skin presumed source, but no positive cultures yet. Cr worse this AM.  Tolerating PO. I spoke with pt's . He thinks she needs another day or two. Willing to take her home when released. 11/5: Cr is worse this AM after another dose of vanc.   This has been stopped as wound culture showed WBCs but no organisms yet. Pt is still afebrile. Still having diarrhea. C diff has been sent. 11/6:  She reports she feels better. Vanc stopped. Cr cont to worsen - dialysis decision per Renal.  Nurses report stools semi-solid so C diff test not sent. Wound culture with Group G, B Hemolytic strep - Zosyn should cover - bactroban added to all sites of excoriations. 11/7:  Overall, except for diarrhea, pt reports she feels better. Pt had loose stool yesterday and another C diff sent - returned positive. Overnight 4- 5 loose stools. Flagyl added po. Will consult GI also - restart Vanc po? Creat continues to worsen - dialysis decision per renal.     11/8:  Pt reports she is feeling better. Denies any pain. Discussed poss rehab/PT/OT and declines all - she does not want to get out of bed. Nurses report 4 stools overnight and they were semi-solid. PO Vanc added per GI. Creat only sl higher (rate of increase is flattening). Watch at least for another day or two to make sure C diff clears and Creat is starting to trend down. 11/9:  Feeling \"fairly well. \"  Pt wants to go home tomorrow. Nurses report 4 \"semi-loose\" stools overnight. AM labs pending. 4pm:  AM labs show Creat starting to decrease. Hopefully home tomorrow with 10 day course tx for C diff. Review of Systems:   A comprehensive review of systems was negative except for that written in the HPI.     Allergies   Allergen Reactions    Latex, Natural Rubber Rash     Per pt, had a reaction to latex gloves when an RN administered lotion     Darvon [Propoxyphene] Itching    Peanut Cough    Pineapple Itching    Sulfa (Sulfonamide Antibiotics) Swelling    Tape [Adhesive] Itching     Objective:   Physical Exam:     Visit Vitals    /60 (BP 1 Location: Left arm, BP Patient Position: At rest)    Pulse 84    Temp 98.1 °F (36.7 °C)    Resp 19    Ht 5' 1\" (1.549 m)    Wt 105.7 kg (233 lb)    SpO2 98%    BMI 44.02 kg/m2    O2 Flow Rate (L/min): 2 l/min O2 Device: Room air    Temp (24hrs), Av.9 °F (36.6 °C), Min:97.1 °F (36.2 °C), Max:98.3 °F (36.8 °C)         190 -  0700  In: 480 [P.O.:480]  Out: -     General:  Alert, cooperative, no distress, appears stated age; morbidly obese/debilitated. Head:  Normocephalic, without obvious abnormality, atraumatic. Eyes:  Conjunctivae/corneas clear. PERRL, EOMs intact. Nose: Nares normal. Septum midline. Throat: Lips, mucosa, and tongue moist.   Neck: Supple, symmetrical, trachea midline, no adenopathy, thyroid: no enlargement/tenderness/nodules, no carotid bruit and no JVD. Lungs:   Clear to auscultation bilaterally. Chest wall:  No tenderness or deformity. Heart:  Regular rate and rhythm, S1, S2 normal, no murmur, click, rub or gallop. Abdomen:   Soft, no tenderness. Bowel sounds normal. No masses,  No organomegaly. Extremities: no cyanosis. 1+ LE edema. No calf tenderness or cords. Pulses: 2+ and symmetric all extremities. Skin:  turgor normal. Left arm with nearly resolved erythema and no longer warm from volar region to deltoid area. Thrill present at shunt site. multiple shallow abrasions/excoriations and scabs on both forearms and abdomen nearly resolved   Neurologic: CNII-XII intact. Alert and oriented X 3. Fine motor of hands and fingers normal.   equal. Gait not tested at this time. Sensation grossly normal to touch. Gross motor of extremities normal.  Psych:  oriented to person, place, slow simple answers     Data Review:     Doppler LUE 17:  CONCLUSION: No deep vein thrombosis or thrombophlebitis in the veins  visualized. No evidence of thrombus in contralateral right subclavian  vein.  Fistula is widely patent    Recent Days:  Recent Labs      17   0502  17   0507   WBC  6.7  7.4   HGB  10.9*  10.7*   HCT  33.7*  32.5*   PLT  265  223     Recent Labs      17   0502  17   0507   NA  136  136 K  3.7  3.9   CL  98  99   CO2  25  25   GLU  135*  124*   BUN  87*  86*   CREA  5.37*  5.34*   CA  9.0  8.9     No results for input(s): PH, PCO2, PO2, HCO3, FIO2 in the last 72 hours.     24 Hour Results:  Recent Results (from the past 24 hour(s))   GLUCOSE, POC    Collection Time: 11/08/17 11:11 AM   Result Value Ref Range    Glucose (POC) 179 (H) 65 - 100 mg/dL    Performed by Brett Davidson    GLUCOSE, POC    Collection Time: 11/08/17  4:33 PM   Result Value Ref Range    Glucose (POC) 151 (H) 65 - 100 mg/dL    Performed by Aziza Bowen    GLUCOSE, POC    Collection Time: 11/08/17  9:14 PM   Result Value Ref Range    Glucose (POC) 146 (H) 65 - 100 mg/dL    Performed by Preethi Woods (PCT)    GLUCOSE, POC    Collection Time: 11/09/17  7:25 AM   Result Value Ref Range    Glucose (POC) 111 (H) 65 - 100 mg/dL    Performed by Preethi Woods (PCT)        Medications reviewed  Current Facility-Administered Medications   Medication Dose Route Frequency    vancomycin 50 mg/mL oral solution (compounded) 250 mg  250 mg Oral Q6H    famotidine (PEPCID) tablet 20 mg  20 mg Oral DAILY    mupirocin (BACTROBAN) 2 % ointment   Topical BID    cefTRIAXone (ROCEPHIN) 2 g in 0.9% sodium chloride (MBP/ADV) 50 mL  2 g IntraVENous Q24H    nystatin (MYCOSTATIN) 100,000 unit/gram powder   Topical BID    metroNIDAZOLE (FLAGYL) tablet 500 mg  500 mg Oral TID WITH MEALS    insulin glargine (LANTUS) injection 10 Units  10 Units SubCUTAneous QHS    lactobacillus-acidophilus (LACTINEX) 1 Packet  1 Packet Oral BID    loratadine (CLARITIN) tablet 10 mg  10 mg Oral Q48H    carvedilol (COREG) tablet 3.125 mg  3.125 mg Oral BID WITH MEALS    citalopram (CELEXA) tablet 40 mg  40 mg Oral DAILY    doxazosin (CARDURA) tablet 2 mg  2 mg Oral DAILY    sodium chloride (NS) flush 5-10 mL  5-10 mL IntraVENous Q8H    sodium chloride (NS) flush 5-10 mL  5-10 mL IntraVENous PRN    naloxone (NARCAN) injection 0.4 mg  0.4 mg IntraVENous PRN    glucose chewable tablet 16 g  4 Tab Oral PRN    dextrose (D50W) injection syrg 12.5-25 g  12.5-25 g IntraVENous PRN    glucagon (GLUCAGEN) injection 1 mg  1 mg IntraMUSCular PRN    acetaminophen (TYLENOL) tablet 650 mg  650 mg Oral Q4H PRN    diphenhydrAMINE (BENADRYL) capsule 25 mg  25 mg Oral Q4H PRN    ondansetron (ZOFRAN) injection 4 mg  4 mg IntraVENous Q4H PRN    heparin (porcine) injection 5,000 Units  5,000 Units SubCUTAneous Q8H    insulin lispro (HUMALOG) injection   SubCUTAneous AC&HS    fenofibrate nanocrystallized (TRICOR) tablet 145 mg  145 mg Oral DAILY    atorvastatin (LIPITOR) tablet 20 mg  20 mg Oral QHS    arformoterol (BROVANA) neb solution 15 mcg  15 mcg Nebulization BID RT    And    budesonide (PULMICORT) 500 mcg/2 ml nebulizer suspension  500 mcg Nebulization BID RT     Care Plan discussed with: Patient/Renal/GI and Nurse  Total time spent with patient: 30 minutes.   Shanda Jo MD

## 2017-11-09 NOTE — PROGRESS NOTES
Verbal shift change report given to [employer] (oncoming nurse) by Aroldo Hicks (offgoing nurse). Report included the following information SBAR and MAR.

## 2017-11-09 NOTE — PROGRESS NOTES
[address]  YOB: 1945          Assessment & Plan:   CKD 4 with ARF  · Stable on admission  · Felt to have developed ATN due to vanc  · Repeat pending today. Has been stabilizing. Cellulitis  · On ceftriaxone  · Likely due to pruritis/excoriation  · Continues to improve    HTN  · Controlled  · Continue to hold ARB    Chronic edema  · Will go ahead and resume loop diuretic at reduced dose for now (Bumex 1 mg BID) as she is prone to volume overload  · Does not appear overloaded clinically    DM  · Insulin    Cdiff  · On PO vanc  · Not typically associated with renal dysfunction       Subjective:   CC: f/u CKD  HPI: Creat pending. Doing well. ROS: Still has some diarrhea, no n/v. Some SOB when supine.   Current Facility-Administered Medications   Medication Dose Route Frequency    vancomycin 50 mg/mL oral solution (compounded) 250 mg  250 mg Oral Q6H    famotidine (PEPCID) tablet 20 mg  20 mg Oral DAILY    mupirocin (BACTROBAN) 2 % ointment   Topical BID    cefTRIAXone (ROCEPHIN) 2 g in 0.9% sodium chloride (MBP/ADV) 50 mL  2 g IntraVENous Q24H    nystatin (MYCOSTATIN) 100,000 unit/gram powder   Topical BID    metroNIDAZOLE (FLAGYL) tablet 500 mg  500 mg Oral TID WITH MEALS    insulin glargine (LANTUS) injection 10 Units  10 Units SubCUTAneous QHS    lactobacillus-acidophilus (LACTINEX) 1 Packet  1 Packet Oral BID    loratadine (CLARITIN) tablet 10 mg  10 mg Oral Q48H    carvedilol (COREG) tablet 3.125 mg  3.125 mg Oral BID WITH MEALS    citalopram (CELEXA) tablet 40 mg  40 mg Oral DAILY    doxazosin (CARDURA) tablet 2 mg  2 mg Oral DAILY    sodium chloride (NS) flush 5-10 mL  5-10 mL IntraVENous Q8H    sodium chloride (NS) flush 5-10 mL  5-10 mL IntraVENous PRN    naloxone (NARCAN) injection 0.4 mg  0.4 mg IntraVENous PRN    glucose chewable tablet 16 g  4 Tab Oral PRN    dextrose (D50W) injection syrg 12.5-25 g  12.5-25 g IntraVENous PRN    glucagon (GLUCAGEN) injection 1 mg  1 mg IntraMUSCular PRN    acetaminophen (TYLENOL) tablet 650 mg  650 mg Oral Q4H PRN    diphenhydrAMINE (BENADRYL) capsule 25 mg  25 mg Oral Q4H PRN    ondansetron (ZOFRAN) injection 4 mg  4 mg IntraVENous Q4H PRN    heparin (porcine) injection 5,000 Units  5,000 Units SubCUTAneous Q8H    insulin lispro (HUMALOG) injection   SubCUTAneous AC&HS    fenofibrate nanocrystallized (TRICOR) tablet 145 mg  145 mg Oral DAILY    atorvastatin (LIPITOR) tablet 20 mg  20 mg Oral QHS    arformoterol (BROVANA) neb solution 15 mcg  15 mcg Nebulization BID RT    And    budesonide (PULMICORT) 500 mcg/2 ml nebulizer suspension  500 mcg Nebulization BID RT          Objective:     Vitals:  Blood pressure 137/60, pulse 84, temperature 98.1 °F (36.7 °C), resp. rate 19, height 5' 1\" (1.549 m), weight 105.7 kg (233 lb), SpO2 98 %. Temp (24hrs), Av.9 °F (36.6 °C), Min:97.1 °F (36.2 °C), Max:98.3 °F (36.8 °C)      Intake and Output:      1901 -  0700  In: 480 [P.O.:480]  Out: -     Physical Exam:               GENERAL ASSESSMENT: NAD  CHEST: CTA  HEART: S1S2  ABDOMEN: Soft,NT  EXTREMITY: trace EDEMA, LUE decreased edema          ECG/rhythm:    Data Review      No results for input(s): TNIPOC in the last 72 hours. No lab exists for component: ITNL   No results for input(s): CPK, CKMB, TROIQ in the last 72 hours. Recent Labs      17   0502  17   0507   NA  136  136   K  3.7  3.9   CL  98  99   CO2  25  25   BUN  87*  86*   CREA  5.37*  5.34*   GLU  135*  124*   CA  9.0  8.9   WBC  6.7  7.4   HGB  10.9*  10.7*   HCT  33.7*  32.5*   PLT  265  223      No results for input(s): INR, PTP, APTT in the last 72 hours.     No lab exists for component: INREXT, INREXT  Needs: urine analysis, urine sodium, protein and creatinine  Lab Results   Component Value Date/Time    Sodium urine, random 50 2016 12:55 PM    Creatinine, urine 53.84 11/03/2016 12:55 PM           : Terrance Handley MD  11/9/2017        [facility]:  [email or web address]  Danita Escobedo office:  [address] [address]  [place], [address] [place] [address]  Phone: [phone]  Fax :     [phone]    [place] office:  [address] [facility], [address] [place] [address]  Phone - [phone]  Fax - [phone]

## 2017-11-10 VITALS
TEMPERATURE: 97.8 F | SYSTOLIC BLOOD PRESSURE: 125 MMHG | DIASTOLIC BLOOD PRESSURE: 60 MMHG | BODY MASS INDEX: 43.99 KG/M2 | OXYGEN SATURATION: 95 % | RESPIRATION RATE: 19 BRPM | WEIGHT: 233 LBS | HEART RATE: 87 BPM | HEIGHT: 61 IN

## 2017-11-10 LAB
ANION GAP SERPL CALC-SCNC: 10 MMOL/L (ref 5–15)
BASOPHILS # BLD: 0 K/UL (ref 0–0.1)
BASOPHILS NFR BLD: 1 % (ref 0–1)
BUN SERPL-MCNC: 87 MG/DL (ref 6–20)
BUN/CREAT SERPL: 20 (ref 12–20)
CALCIUM SERPL-MCNC: 9.2 MG/DL (ref 8.5–10.1)
CHLORIDE SERPL-SCNC: 101 MMOL/L (ref 97–108)
CO2 SERPL-SCNC: 27 MMOL/L (ref 21–32)
CREAT SERPL-MCNC: 4.36 MG/DL (ref 0.55–1.02)
EOSINOPHIL # BLD: 0.3 K/UL (ref 0–0.4)
EOSINOPHIL NFR BLD: 7 % (ref 0–7)
ERYTHROCYTE [DISTWIDTH] IN BLOOD BY AUTOMATED COUNT: 15 % (ref 11.5–14.5)
GLUCOSE BLD STRIP.AUTO-MCNC: 101 MG/DL (ref 65–100)
GLUCOSE BLD STRIP.AUTO-MCNC: 151 MG/DL (ref 65–100)
GLUCOSE SERPL-MCNC: 122 MG/DL (ref 65–100)
HCT VFR BLD AUTO: 33 % (ref 35–47)
HGB BLD-MCNC: 10.7 G/DL (ref 11.5–16)
LYMPHOCYTES # BLD: 0.8 K/UL (ref 0.8–3.5)
LYMPHOCYTES NFR BLD: 17 % (ref 12–49)
MCH RBC QN AUTO: 28.8 PG (ref 26–34)
MCHC RBC AUTO-ENTMCNC: 32.4 G/DL (ref 30–36.5)
MCV RBC AUTO: 88.9 FL (ref 80–99)
MONOCYTES # BLD: 0.3 K/UL (ref 0–1)
MONOCYTES NFR BLD: 6 % (ref 5–13)
NEUTS SEG # BLD: 3.4 K/UL (ref 1.8–8)
NEUTS SEG NFR BLD: 69 % (ref 32–75)
PLATELET # BLD AUTO: 283 K/UL (ref 150–400)
POTASSIUM SERPL-SCNC: 3.4 MMOL/L (ref 3.5–5.1)
RBC # BLD AUTO: 3.71 M/UL (ref 3.8–5.2)
SERVICE CMNT-IMP: ABNORMAL
SERVICE CMNT-IMP: ABNORMAL
SODIUM SERPL-SCNC: 138 MMOL/L (ref 136–145)
WBC # BLD AUTO: 4.9 K/UL (ref 3.6–11)

## 2017-11-10 PROCEDURE — 82962 GLUCOSE BLOOD TEST: CPT

## 2017-11-10 PROCEDURE — 80048 BASIC METABOLIC PNL TOTAL CA: CPT | Performed by: FAMILY MEDICINE

## 2017-11-10 PROCEDURE — 36415 COLL VENOUS BLD VENIPUNCTURE: CPT | Performed by: FAMILY MEDICINE

## 2017-11-10 PROCEDURE — 74011250637 HC RX REV CODE- 250/637: Performed by: FAMILY MEDICINE

## 2017-11-10 PROCEDURE — 94640 AIRWAY INHALATION TREATMENT: CPT

## 2017-11-10 PROCEDURE — 74011250637 HC RX REV CODE- 250/637: Performed by: INTERNAL MEDICINE

## 2017-11-10 PROCEDURE — 85025 COMPLETE CBC W/AUTO DIFF WBC: CPT | Performed by: FAMILY MEDICINE

## 2017-11-10 PROCEDURE — 77030038269 HC DRN EXT URIN PURWCK BARD -A

## 2017-11-10 PROCEDURE — 77030011256 HC DRSG MEPILEX <16IN NO BORD MOLN -A

## 2017-11-10 PROCEDURE — 74011000250 HC RX REV CODE- 250: Performed by: INTERNAL MEDICINE

## 2017-11-10 PROCEDURE — 74011250636 HC RX REV CODE- 250/636: Performed by: INTERNAL MEDICINE

## 2017-11-10 RX ORDER — METRONIDAZOLE 500 MG/1
500 TABLET ORAL
Qty: 30 TAB | Refills: 0 | Status: SHIPPED | OUTPATIENT
Start: 2017-11-10 | End: 2018-01-12

## 2017-11-10 RX ORDER — BUDESONIDE 0.5 MG/2ML
500 INHALANT ORAL 2 TIMES DAILY
Qty: 1 ML | Refills: 1 | Status: SHIPPED | OUTPATIENT
Start: 2017-11-10 | End: 2017-11-10

## 2017-11-10 RX ORDER — MUPIROCIN 20 MG/G
OINTMENT TOPICAL 2 TIMES DAILY
Qty: 44 G | Refills: 3 | Status: SHIPPED | OUTPATIENT
Start: 2017-11-10

## 2017-11-10 RX ORDER — L. ACIDOPHILUS/L.BULGARICUS 100MM CELL
1 GRANULES IN PACKET (EA) ORAL 2 TIMES DAILY
Qty: 60 PACKET | Refills: 0 | Status: SHIPPED
Start: 2017-11-10 | End: 2018-01-12 | Stop reason: DRUGHIGH

## 2017-11-10 RX ORDER — POTASSIUM CHLORIDE 750 MG/1
10 TABLET, EXTENDED RELEASE ORAL 2 TIMES DAILY
Qty: 60 TAB | Refills: 1 | Status: ON HOLD | OUTPATIENT
Start: 2017-11-10 | End: 2018-01-12

## 2017-11-10 RX ORDER — BUMETANIDE 2 MG/1
1 TABLET ORAL 2 TIMES DAILY
Qty: 100 TAB | Refills: 0 | Status: SHIPPED
Start: 2017-11-10 | End: 2018-01-12 | Stop reason: DRUGHIGH

## 2017-11-10 RX ORDER — NYSTATIN 100000 [USP'U]/G
POWDER TOPICAL 2 TIMES DAILY
Qty: 1000 G | Refills: 3 | Status: SHIPPED | OUTPATIENT
Start: 2017-11-10

## 2017-11-10 RX ADMIN — BUDESONIDE 500 MCG: 0.5 INHALANT RESPIRATORY (INHALATION) at 07:37

## 2017-11-10 RX ADMIN — FAMOTIDINE 20 MG: 20 TABLET, FILM COATED ORAL at 08:41

## 2017-11-10 RX ADMIN — VANCOMYCIN HYDROCHLORIDE 250 MG: 1 INJECTION, POWDER, LYOPHILIZED, FOR SOLUTION INTRAVENOUS at 05:42

## 2017-11-10 RX ADMIN — METRONIDAZOLE 500 MG: 250 TABLET ORAL at 11:36

## 2017-11-10 RX ADMIN — MUPIROCIN: 20 OINTMENT TOPICAL at 08:44

## 2017-11-10 RX ADMIN — DOXAZOSIN 2 MG: 1 TABLET ORAL at 08:38

## 2017-11-10 RX ADMIN — CARVEDILOL 3.12 MG: 3.12 TABLET, FILM COATED ORAL at 08:41

## 2017-11-10 RX ADMIN — BUMETANIDE 1 MG: 1 TABLET ORAL at 08:40

## 2017-11-10 RX ADMIN — LACTOBACILLUS ACIDOPHILUS / LACTOBACILLUS BULGARICUS 1 PACKET: 100 MILLION CFU STRENGTH GRANULES at 09:47

## 2017-11-10 RX ADMIN — Medication 10 ML: at 05:42

## 2017-11-10 RX ADMIN — HEPARIN SODIUM 5000 UNITS: 5000 INJECTION, SOLUTION INTRAVENOUS; SUBCUTANEOUS at 05:41

## 2017-11-10 RX ADMIN — VANCOMYCIN HYDROCHLORIDE 250 MG: 1 INJECTION, POWDER, LYOPHILIZED, FOR SOLUTION INTRAVENOUS at 00:08

## 2017-11-10 RX ADMIN — METRONIDAZOLE 500 MG: 250 TABLET ORAL at 08:42

## 2017-11-10 RX ADMIN — FENOFIBRATE 145 MG: 145 TABLET ORAL at 08:40

## 2017-11-10 RX ADMIN — CITALOPRAM HYDROBROMIDE 40 MG: 20 TABLET ORAL at 08:39

## 2017-11-10 RX ADMIN — NYSTATIN: 100000 POWDER TOPICAL at 08:44

## 2017-11-10 RX ADMIN — ARFORMOTEROL TARTRATE 15 MCG: 15 SOLUTION RESPIRATORY (INHALATION) at 07:37

## 2017-11-10 NOTE — ROUTINE PROCESS
Bedside shift change report given to Junie Ramirez RN  (oncoming nurse) by Jignesh Rao RN (offgoing nurse). Report included the following information SBAR and Kardex.

## 2017-11-10 NOTE — DISCHARGE SUMMARY
Physician Discharge Summary     Patient ID:    Joelle Hidalgo  [id number]  57 y.o.  1945  Davina Johnson MD    Admit date: 11/2/2017  Discharge date and time: 11/10/2017  Admission Diagnoses: Arteriovenous fistula infection ([facility] Strep wound infection, Cellulitis, ALFONSO on CKD  Chronic Diagnoses:    Problem List as of 11/10/2017  Date Reviewed: 6/2/2017          Codes Class Noted - Resolved    Arteriovenous fistula infection ([facility] ICD-10-CM: T82. 7XXA  ICD-9-CM: 996.62  11/2/2017 - Present        Leukocytosis ICD-10-CM: D72.829  ICD-9-CM: 288.60  11/2/2017 - Present        Hypoxia ICD-10-CM: R09.02  ICD-9-CM: 799.02  11/2/2017 - Present        Pleural effusion ICD-10-CM: J90  ICD-9-CM: 511.9  11/2/2017 - Present        Asthma ICD-10-CM: J45.909  ICD-9-CM: 493.90  Unknown - Present        Hyperlipidemia ICD-10-CM: E78.5  ICD-9-CM: 272.4  Unknown - Present        RAI on CPAP ICD-10-CM: G47.33, Z99.89  ICD-9-CM: 327.23, V46.8  Unknown - Present        Anxiety and depression ICD-10-CM: F41.8  ICD-9-CM: 300.00, 311  Unknown - Present        Chronic bilateral low back pain without sciatica ICD-10-CM: M54.5, G89.29  ICD-9-CM: 724.2, 338.29  11/3/2016 - Present        Generalized anxiety disorder ICD-10-CM: F41.1  ICD-9-CM: 300.02  11/3/2016 - Present        CKD (chronic kidney disease) stage 4, GFR 15-29 ml/min (HCC) (Chronic) ICD-10-CM: N18.4  ICD-9-CM: 585.4  8/14/2016 - Present        HTN (hypertension), benign (Chronic) ICD-10-CM: I10  ICD-9-CM: 401.1  8/14/2016 - Present        DM type 2, uncontrolled, with renal complications (HCC) (Chronic) ICD-10-CM: E11.29, E11.65  ICD-9-CM: 250.42  8/14/2016 - Present        Iron deficiency anemia (Chronic) ICD-10-CM: D50.9  ICD-9-CM: 280.9  8/14/2016 - Present        RESOLVED: Hypertension ICD-10-CM: I10  ICD-9-CM: 401.9  Unknown - 11/2/2017        RESOLVED: DM type 2 causing renal disease ([facility] ICD-10-CM: E11.29  ICD-9-CM: 250.40  Unknown - 11/2/2017 RESOLVED: CKD stage 4 due to type 2 diabetes mellitus ([facility] ICD-10-CM: E11.22, N18.4  ICD-9-CM: 250.40, 585.4  Unknown - 11/2/2017        RESOLVED: Cellulitis of right lower extremity ICD-10-CM: L03.115  ICD-9-CM: 682.6  6/2/2017 - 11/2/2017        RESOLVED: Asthma with acute exacerbation ICD-10-CM: J45.901  ICD-9-CM: 493.92  6/2/2017 - 11/2/2017        RESOLVED: CKD stage 4 due to type 2 diabetes mellitus ([facility] ICD-10-CM: E11.22, N18.4  ICD-9-CM: 250.40, 585.4  Unknown - 2/11/2017        RESOLVED: DM type 2 causing renal disease ([facility] ICD-10-CM: E11.29  ICD-9-CM: 250.40  Unknown - 2/11/2017        RESOLVED: Hypoglycemia ICD-10-CM: E16.2  ICD-9-CM: 251.2  2/11/2017 - 11/2/2017        RESOLVED: Hyperkalemia ICD-10-CM: E87.5  ICD-9-CM: 276.7  11/3/2016 - 2/11/2017        RESOLVED: Elevated serum creatinine ICD-10-CM: R79.89  ICD-9-CM: 790.99  11/3/2016 - 2/11/2017        RESOLVED: Left leg cellulitis ICD-10-CM: L03.116  ICD-9-CM: 682.6  8/14/2016 - 2/11/2017        RESOLVED: Sepsis ([facility] ICD-10-CM: A41.9  ICD-9-CM: 038.9, 995.91  8/14/2016 - 2/11/2017        RESOLVED: Acute renal failure (ARF) ([facility] ICD-10-CM: N17.9  ICD-9-CM: 584.9  8/14/2016 - 11/2/2017        RESOLVED: RAI (obstructive sleep apnea) (Chronic) ICD-10-CM: W92.79  ICD-9-CM: 327.23  8/14/2016 - 2/11/2017            Discharge Medications:   Current Discharge Medication List      START taking these medications    Details   budesonide (PULMICORT) 0.5 mg/2 mL nbsp 2 mL by Nebulization route two (2) times a day. Qty: 1 mL, Refills: 1      metroNIDAZOLE (FLAGYL) 500 mg tablet Take 1 Tab by mouth three (3) times daily (with meals). Indications: Clostridium difficile infection  Qty: 30 Tab, Refills: 0      mupirocin (BACTROBAN) 2 % ointment Apply  to affected area two (2) times a day. Qty: 44 g, Refills: 3      nystatin (MYCOSTATIN) powder Apply  to affected area two (2) times a day.   Qty: 1000 g, Refills: 3      vancomycin 50 mg/mL oral solution (compounded) Take 5 mL by mouth every eight (8) hours. For 7 days  Qty: 1 Bottle, Refills: 0      potassium chloride (KLOR-CON) 10 mEq tablet Take 1 Tab by mouth two (2) times a day. Qty: 60 Tab, Refills: 1         CONTINUE these medications which have CHANGED    Details   bumetanide (BUMEX) 2 mg tablet Take 0.5 Tabs by mouth two (2) times a day. Qty: 100 Tab, Refills: 0         CONTINUE these medications which have NOT CHANGED    Details   amLODIPine (NORVASC) 2.5 mg tablet Take 2.5 mg by mouth daily. fluticasone-vilanterol (BREO ELLIPTA) 200-25 mcg/dose inhaler Take 1 Puff by inhalation daily. ALPRAZolam (XANAX) 0.25 mg tablet Take 0.25 mg by mouth two (2) times a day. atorvastatin (LIPITOR) 20 mg tablet Take 20 mg by mouth nightly. citalopram (CELEXA) 40 mg tablet Take 40 mg by mouth daily. doxazosin (CARDURA) 2 mg tablet Take 2 mg by mouth daily. losartan (COZAAR) 25 mg tablet Take 25 mg by mouth daily. insulin glargine (TOUJEO SOLOSTAR) 300 unit/mL (1.5 mL) inpn 24 Units by SubCUTAneous route daily. multivitamin (ONE A DAY) tablet Take 1 Tab by mouth daily. acetaminophen (TYLENOL) 500 mg tablet Take 1,000 mg by mouth two (2) times daily as needed for Pain. carvedilol (COREG) 3.125 mg tablet Take 3.125 mg by mouth two (2) times daily (with meals). calcium-cholecalciferol, D3, (CALTRATE 600+D) tablet Take 1 Tab by mouth two (2) times a day. fenofibrate nanocrystallized (TRICOR) 145 mg tablet Take 145 mg by mouth daily. loratadine (CLARITIN) 10 mg tablet Take 10 mg by mouth daily. Omeprazole delayed release (PRILOSEC D/R) 20 mg tablet Take 20 mg by mouth daily. calcium polycarbophil (FIBER LAXATIVE, CA POLYCARBO,) 625 mg tablet Take 625 mg by mouth daily. ergocalciferol (ERGOCALCIFEROL) 50,000 unit capsule Take 50,000 Units by mouth every month. First of the month      aspirin 81 mg chewable tablet Take 81 mg by mouth nightly.       senna-docusate (SENNA-C) 8.6-50 mg per tablet Take 2 Tabs by mouth daily. albuterol (PROVENTIL HFA, VENTOLIN HFA) 90 mcg/actuation inhaler Take 2 Puffs by inhalation every four (4) hours as needed for Wheezing. Follow up Care:    1. Fay Estes MD with in 1 weeks  2.  specialists as directed. Diet:  Diabetic Diet, low fat/chol  Disposition:  Home. Advanced Directive:  Discharge Exam:  [See today's progress note.]  CONSULTATIONS: GI  Significant Diagnostic Studies:   Recent Labs      11/10/17   0347  11/08/17   0502   WBC  4.9  6.7   HGB  10.7*  10.9*   HCT  33.0*  33.7*   PLT  283  265     Recent Labs      11/10/17   0347 11/09/17   1119  11/08/17   0502   NA  138  138  136   K  3.4*  3.4*  3.7   CL  101  100  98   CO2  27  24  25   BUN  87*  89*  87*   CREA  4.36*  4.68*  5.37*   GLU  122*  137*  135*   CA  9.2  9.0  9.0   PHOS   --   4.7   --      Recent Labs      11/09/17   1119   ALB  2.1*     HOSPITAL COURSE & TREATMENT RENDERED:   1. See today's progress note:    Daily Progress Note and Discharge Note: 11/10/2017  Trev Espinal MD         Assessment/Plan:   cellulitis/ Leukocytosis - POA, not sepsis. Excoriated skin lesions seem to be the source. AV fistula has been ruled out. No Hx of MRSA. --tx with Rocephin. Blood cultures neg.  --Vascular has ruled out clot and infection  --Culture of wound; arm pustule: group B strep, beta hemolytic      Hypoxia / Pleural effusion / RAI on CPAP - POA, likely due to fluid overload.    --resume bumex at lower dose and outpt titrate as needed. -- Continue home CPAP.       CKD (chronic kidney disease) stage 4 - improving Creat   --Follow up with Renal.  Likely ATN poss due to Vanc or Strep  --diuretics and ARB held      DM type 2, uncontrolled, with renal complications - Diabetic/Renal diet and counseling.    --SSI per protocol.     -- A1c useless in setting of anemia.      HTN (hypertension), benign - stable  --Norvasc restarted outpt at low dose.    --bumex restarted for home use - she reports she will not fluid restrict      Iron deficiency anemia - Due to ESRD.  Epo per renal.  Recheck outpt      Chronic bilateral low back pain without sciatica - tylenol prn.      Anxiety and depression / Generalized anxiety disorder - POA, stable.  I suspect some degree of cognitive dysfunction/early dementia and recommend neuropsych testing at some point.        Asthma - Stable.  Continue Breo and prn albuterol.      Hyperlipidemia - Continue atorvastatin      Diarrhea/C diff: stool C diff positive 11/6  -Flagyl, Vanc po - consulted GI  -finish course outpt     Morbid obesity - unchanged. Counseled.      Hypokalemia - po KCl added outpt     Possible Neurodermatitis causing skin infections - Mupirocin to sites of excoriations.       Subjective:     72 y.o.   female who presented to the Emergency Department complaining of arm infection.  She provides limited hx, I think she has dementia. Morgan Sierra  is present and provide tangential statements about her hx.  She reports several days of worsening redness in her left arm near site of HD fistula.  Fistula needed \"balloon\" about a month ago due to arm edema.  It has not yet been used. Malinda Knott has a hx of skin infection, but never MRSA she thinks. Mimi Mack will admit her for management.     11/3:  She reports she is feeling better today. Wearing CPAP. She reports her left arm continues to hurt with movement but is fairly comfortable at rest.  Doppler reports fistula is patent. She has multiple shallow abrasions and scabs on both forearms and abdomen where she reports she has \"itchy spots\" that she picks at and scratches - may be source of infection. Nephrology and Vascular consulted.        11/4: Pt notes she wants to go home. Afebrile overnight. Skin presumed source, but no positive cultures yet. Cr worse this AM.  Tolerating PO. I spoke with pt's . He thinks she needs another day or two. Willing to take her home when released.     11/5: Cr is worse this AM after another dose of vanc. This has been stopped as wound culture showed WBCs but no organisms yet. Pt is still afebrile. Still having diarrhea. C diff has been sent.       11/6:  She reports she feels better. Vanc stopped. Cr cont to worsen - dialysis decision per Renal.  Nurses report stools semi-solid so C diff test not sent. Wound culture with Group G, B Hemolytic strep - Zosyn should cover - bactroban added to all sites of excoriations.      11/7:  Overall, except for diarrhea, pt reports she feels better. Pt had loose stool yesterday and another C diff sent - returned positive. Overnight 4- 5 loose stools. Flagyl added po. Will consult GI also - restart Vanc po? Creat continues to worsen - dialysis decision per renal.      11/8:  Pt reports she is feeling better. Denies any pain. Discussed poss rehab/PT/OT and declines all - she does not want to get out of bed. Nurses report 4 stools overnight and they were semi-solid. PO Vanc added per GI. Creat only sl higher (rate of increase is flattening). Watch at least for another day or two to make sure C diff clears and Creat is starting to trend down.     11/9:  Feeling \"fairly well. \"  Pt wants to go home tomorrow. Nurses report 4 \"semi-loose\" stools overnight. AM labs pending. 4pm:  AM labs show Creat starting to decrease. Hopefully home tomorrow with 10 day course tx for C diff.     11/10:  She reports she is feeling much better and wants to go home. No N/V/Ab pain at this time. No other complaints. Nurses report only one semi formed stool overnight. GI has signed off. Finish course of Vanc and Flagyl outpt. Creat is decreasing and advised follow up with renal as they direct. Discussed home care and she declines PT/OT/HHC.   She will not consider rehab due to \"bad stays\" in the past.  Discussed the need to follow up with Dr Pillo Qureshi or other in our offices early next wk.  Discussed diet and wt loss and the need to move/get up more but she reports she is satisfied with current status - usually in bed or recliner all day watching [employer]. Risk of inactivity discussed. Discussed her chronic areas of itching/sores (mostly occurs only in areas she can reach) and advised she apply mupirocin to sites and rub rather than cause excoriations. Discussed her meds and how to take them and added KCl and advised she should make appt to have K+ rechecked early next wk. As her BP has moved in to the 150s, will restart her low dose Norvasc and advised recheck of BPs in office. Otherwise she is medically stable for discharge.       Review of Systems:   A comprehensive review of systems was negative except for that written in the HPI.           Allergies   Allergen Reactions    Latex, Natural Rubber Rash       Per pt, had a reaction to latex gloves when an RN administered lotion     Darvon [Propoxyphene] Itching    Peanut Cough    Pineapple Itching    Sulfa (Sulfonamide Antibiotics) Swelling    Tape [Adhesive] Itching      Objective:   Physical Exam:           Visit Vitals    /52 (BP 1 Location: Right arm, BP Patient Position: At rest)    Pulse 88    Temp 97.8 °F (36.6 °C)    Resp 18    Ht 5' 1\" (1.549 m)    Wt 105.7 kg (233 lb)    SpO2 92%    BMI 44.02 kg/m2    O2 Flow Rate (L/min): 2 l/min O2 Device: CPAP mask     Temp (24hrs), Av.1 °F (36.7 °C), Min:97.7 °F (36.5 °C), Max:98.5 °F (36.9 °C)        701 -  1900  In: 480 [P.O.:480]  Out: -      General:  Alert, cooperative, no distress, appears stated age; morbidly obese/debilitated. Head:  Normocephalic, without obvious abnormality, atraumatic. Eyes:  Conjunctivae/corneas clear. PERRL, EOMs intact. Nose: Nares normal. Septum midline.     Throat: Lips, mucosa, and tongue moist.   Neck: Supple, symmetrical, trachea midline, no adenopathy, thyroid: no enlargement/tenderness/nodules, no carotid bruit and no JVD. Lungs:   Clear to auscultation bilaterally. Chest wall:  No tenderness or deformity. Heart:  Regular rate and rhythm, S1, S2 normal, no murmur, click, rub or gallop. Abdomen:   Soft, no tenderness. Bowel sounds normal. No masses,  No organomegaly. Extremities: no cyanosis. Trace -1+ LE edema. No calf tenderness or cords. Pulses: 2+ and symmetric all extremities. Skin:  turgor normal. Left arm with nearly resolved erythema and no longer warm from volar region to deltoid area. Thrill present at shunt site. multiple shallow abrasions/excoriations and scabs on both forearms and abdomen resolving. Neurologic: CNII-XII intact. Alert and oriented X 3. Fine motor of hands and fingers normal.   equal. Gait not tested at this time. Sensation grossly normal to touch. Gross motor of extremities normal.  Psych:  oriented to person, place, slow simple answers      Data Review:     Doppler LUE 11/2/17:  CONCLUSION: No deep vein thrombosis or thrombophlebitis in the veins  visualized. No evidence of thrombus in contralateral right subclavian  vein.  Fistula is widely patent    Signed:  Brian Portillo MD  11/10/2017  7:17 AM

## 2017-11-10 NOTE — PROGRESS NOTES
Discharge Procedure:    IV access removed. Reviewed next due times for all medications.  Reviewed discharge instructions and copy of discharge instructions given to patient

## 2017-11-10 NOTE — PROGRESS NOTES
Pharmacist Discharge Medication Reconciliation    Discharge Provider:  Dr Sarah Galarza       Discharge Medications:      My Medications        TAKE these medications as instructed         Instructions Each Dose to Equal   Morning Noon Evening Bedtime      acetaminophen 500 mg tablet   Commonly known as:  TYLENOL       Your last dose was: Your next dose is: Take 1,000 mg by mouth two (2) times daily as needed for Pain. 1000 mg                        albuterol 90 mcg/actuation inhaler   Commonly known as:  PROVENTIL HFA, VENTOLIN HFA, PROAIR HFA       Your last dose was: Your next dose is: Take 2 Puffs by inhalation every four (4) hours as needed for Wheezing. 2 Puff                        ALPRAZolam 0.25 mg tablet   Commonly known as:  XANAX       Your last dose was: Your next dose is: Take 0.25 mg by mouth two (2) times a day. 0.25 mg                        amLODIPine 2.5 mg tablet   Commonly known as:  NORVASC       Your last dose was: Your next dose is: Take 2.5 mg by mouth daily. 2.5 mg                        aspirin 81 mg chewable tablet       Your last dose was: Your next dose is: Take 81 mg by mouth nightly. 81 mg                        atorvastatin 20 mg tablet   Commonly known as:  LIPITOR       Your last dose was: Your next dose is: Take 20 mg by mouth nightly. 20 mg                        BREO ELLIPTA 200-25 mcg/dose inhaler   Generic drug:  fluticasone-vilanterol       Your last dose was: Your next dose is: Take 1 Puff by inhalation daily. 1 Puff                        bumetanide 2 mg tablet   Commonly known as:  Allegra Peaches       Your last dose was: Your next dose is: Take 0.5 Tabs by mouth two (2) times a day.     1 mg                        calcium-cholecalciferol (D3) tablet   Commonly known as:  CALTRATE 600+D       Your last dose was: Your next dose is: Take 1 Tab by mouth two (2) times a day. 1 Tab                        citalopram 40 mg tablet   Commonly known as:  CELEXA       Your last dose was: Your next dose is: Take 40 mg by mouth daily. 40 mg                        COREG 3.125 mg tablet   Generic drug:  carvedilol       Your last dose was: Your next dose is: Take 3.125 mg by mouth two (2) times daily (with meals). 3.125 mg                        doxazosin 2 mg tablet   Commonly known as:  CARDURA       Your last dose was: Your next dose is: Take 2 mg by mouth daily. 2 mg                        ergocalciferol 50,000 unit capsule   Commonly known as:  ERGOCALCIFEROL       Your last dose was: Your next dose is: Take 50,000 Units by mouth every month. First of the month    45764 Units                        fenofibrate nanocrystallized 145 mg tablet   Commonly known as:  TRICOR       Your last dose was: Your next dose is: Take 145 mg by mouth daily. 145 mg                        FIBER LAXATIVE (CA POLYCARBO) 625 mg tablet   Generic drug:  calcium polycarbophil       Your last dose was: Your next dose is: Take 625 mg by mouth daily. 625 mg                        lactobacillus-acidophilus 100 million cell Grpk   Commonly known as:  LACTINEX       Your last dose was: Your next dose is: Take 1 Packet by mouth two (2) times a day. May also get over-the-counter probiotic in place of this    1 Packet                        loratadine 10 mg tablet   Commonly known as:  CLARITIN       Your last dose was: Your next dose is: Take 10 mg by mouth daily. 10 mg                        losartan 25 mg tablet   Commonly known as:  COZAAR       Your last dose was: Your next dose is: Take 25 mg by mouth daily.     25 mg metroNIDAZOLE 500 mg tablet   Commonly known as:  FLAGYL       Your last dose was: Your next dose is: Take 1 Tab by mouth three (3) times daily (with meals). Indications: Clostridium difficile infection    500 mg                        multivitamin tablet   Commonly known as:  ONE A DAY       Your last dose was: Your next dose is: Take 1 Tab by mouth daily. 1 Tab                        mupirocin 2 % ointment   Commonly known as:  BACTROBAN       Your last dose was: Your next dose is:              Apply  to affected area two (2) times a day. nystatin powder   Commonly known as:  MYCOSTATIN       Your last dose was: Your next dose is:              Apply  to affected area two (2) times a day. Omeprazole delayed release 20 mg tablet   Commonly known as:  PRILOSEC D/R       Your last dose was: Your next dose is: Take 20 mg by mouth daily. 20 mg                        potassium chloride 10 mEq tablet   Commonly known as:  KLOR-CON       Your last dose was: Your next dose is: Take 1 Tab by mouth two (2) times a day. 10 mEq                        Senna-C 8.6-50 mg per tablet   Generic drug:  senna-docusate       Your last dose was: Your next dose is: Take 2 Tabs by mouth daily. 2 Tab                        TOUJEO SOLOSTAR 300 unit/mL (1.5 mL) Inpn   Generic drug:  insulin glargine       Your last dose was: Your next dose is:              24 Units by SubCUTAneous route daily. 24 Units                                 Where to Get Your Medications        Information on where to get these meds will be given to you by the nurse or doctor. !  Ask your nurse or doctor about these medications     bumetanide 2 mg tablet    lactobacillus-acidophilus 100 million cell Grpk    metroNIDAZOLE 500 mg tablet    mupirocin 2 % ointment    nystatin powder    potassium chloride 10 mEq tablet                     The patient's chart, MAR, and AVS were reviewed by   Tara Sebastian.  Tee Serrano Rna 23: [phone]

## 2017-11-10 NOTE — PROGRESS NOTES
Daily Progress Note and Discharge Note: 11/10/2017  Malcolm Mckenna MD    Assessment/Plan:   cellulitis/ Leukocytosis - POA, not sepsis. Excoriated skin lesions seem to be the source. AV fistula has been ruled out. No Hx of MRSA. --tx with Rocephin. Blood cultures neg.  --Vascular has ruled out clot and infection  --Culture of wound; arm pustule: group B strep, beta hemolytic     Hypoxia / Pleural effusion / RAI on CPAP - POA, likely due to fluid overload. --resume bumex at lower dose and outpt titrate as needed. -- Continue home CPAP.     CKD (chronic kidney disease) stage 4 - improving Creat   --Follow up with Renal.  Likely ATN poss due to Vanc or Strep  --diuretics and ARB held     DM type 2, uncontrolled, with renal complications - Diabetic/Renal diet and counseling.    --SSI per protocol. -- A1c useless in setting of anemia.     HTN (hypertension), benign - stable  --Norvasc restarted outpt at low dose. --bumex restarted for home use - she reports she will not fluid restrict     Iron deficiency anemia - Due to ESRD. Epo per renal.  Recheck outpt     Chronic bilateral low back pain without sciatica - tylenol prn.     Anxiety and depression / Generalized anxiety disorder - POA, stable. I suspect some degree of cognitive dysfunction/early dementia and recommend neuropsych testing at some point.       Asthma - Stable. Continue Breo and prn albuterol.     Hyperlipidemia - Continue atorvastatin     Diarrhea/C diff: stool C diff positive 11/6  -Flagyl, Vanc po - consulted GI  -finish course outpt    Morbid obesity - unchanged. Counseled. Hypokalemia - po KCl added outpt    Possible Neurodermatitis causing skin infections - Mupirocin to sites of excoriations. Problem List:  Problem List as of 11/10/2017  Date Reviewed: 6/2/2017          Codes Class Noted - Resolved    Ar[facility]iovenous fistula infection ([facility] ICD-10-CM: T82. 7XXA  ICD-9-CM: 996.62  11/2/2017 - Present        Leukocytosis ICD-10-CM: D72.829  ICD-9-CM: 288.60  11/2/2017 - Present        Hypoxia ICD-10-CM: R09.02  ICD-9-CM: 799.02  11/2/2017 - Present        Pleural effusion ICD-10-CM: J90  ICD-9-CM: 511.9  11/2/2017 - Present        Asthma ICD-10-CM: J45.909  ICD-9-CM: 493.90  Unknown - Present        Hyperlipidemia ICD-10-CM: E78.5  ICD-9-CM: 272.4  Unknown - Present        RAI on CPAP ICD-10-CM: G47.33, Z99.89  ICD-9-CM: 327.23, V46.8  Unknown - Present        Anxiety and depression ICD-10-CM: F41.8  ICD-9-CM: 300.00, 311  Unknown - Present        Chronic bilateral low back pain without sciatica ICD-10-CM: M54.5, G89.29  ICD-9-CM: 724.2, 338.29  11/3/2016 - Present        Generalized anxiety disorder ICD-10-CM: F41.1  ICD-9-CM: 300.02  11/3/2016 - Present        CKD (chronic kidney disease) stage 4, GFR 15-29 ml/min (HCC) (Chronic) ICD-10-CM: N18.4  ICD-9-CM: 585.4  8/14/2016 - Present        HTN (hypertension), benign (Chronic) ICD-10-CM: I10  ICD-9-CM: 401.1  8/14/2016 - Present        DM type 2, uncontrolled, with renal complications (HCC) (Chronic) ICD-10-CM: E11.29, E11.65  ICD-9-CM: 250.42  8/14/2016 - Present        Iron deficiency anemia (Chronic) ICD-10-CM: D50.9  ICD-9-CM: 280.9  8/14/2016 - Present        RESOLVED: Hypertension ICD-10-CM: I10  ICD-9-CM: 401.9  Unknown - 11/2/2017        RESOLVED: DM type 2 causing renal disease ([facility] ICD-10-CM: E11.29  ICD-9-CM: 250.40  Unknown - 11/2/2017        RESOLVED: CKD stage 4 due to type 2 diabetes mellitus ([facility] ICD-10-CM: E11.22, N18.4  ICD-9-CM: 250.40, 585.4  Unknown - 11/2/2017        RESOLVED: Cellulitis of right lower extremity ICD-10-CM: L03.115  ICD-9-CM: 682.6  6/2/2017 - 11/2/2017        RESOLVED: Asthma with acute exacerbation ICD-10-CM: J45.901  ICD-9-CM: 493.92  6/2/2017 - 11/2/2017        RESOLVED: CKD stage 4 due to type 2 diabetes mellitus ([facility] ICD-10-CM: E11.22, N18.4  ICD-9-CM: 250.40, 585.4  Unknown - 2/11/2017        RESOLVED: DM type 2 causing renal disease ([facility] ICD-10-CM: E11.29  ICD-9-CM: 250.40  Unknown - 2/11/2017        RESOLVED: Hypoglycemia ICD-10-CM: E16.2  ICD-9-CM: 251.2  2/11/2017 - 11/2/2017        RESOLVED: Hyperkalemia ICD-10-CM: E87.5  ICD-9-CM: 276.7  11/3/2016 - 2/11/2017        RESOLVED: Elevated serum creatinine ICD-10-CM: R79.89  ICD-9-CM: 790.99  11/3/2016 - 2/11/2017        RESOLVED: Left leg cellulitis ICD-10-CM: L03.116  ICD-9-CM: 682.6  8/14/2016 - 2/11/2017        RESOLVED: Sepsis ([facility] ICD-10-CM: A41.9  ICD-9-CM: 038.9, 995.91  8/14/2016 - 2/11/2017        RESOLVED: Acute renal failure (ARF) ([facility] ICD-10-CM: N17.9  ICD-9-CM: 584.9  8/14/2016 - 11/2/2017        RESOLVED: RAI (obstructive sleep apnea) (Chronic) ICD-10-CM: P17.13  ICD-9-CM: 327.23  8/14/2016 - 2/11/2017              Subjective:     71 y.o.  female who presented to the Emergency Department complaining of arm infection. She provides limited hx, I think she has dementia. Her  is present and provide tangential statements about her hx. She reports several days of worsening redness in her left arm near site of HD fistula. Fistula needed \"balloon\" about a month ago due to arm edema. It has not yet been used. She has a hx of skin infection, but never MRSA she thinks. We will admit her for management. 11/3:  She reports she is feeling better today. Wearing CPAP. She reports her left arm continues to hurt with movement but is fairly comfortable at rest.  Doppler reports fistula is patent. She has multiple shallow abrasions and scabs on both forearms and abdomen where she reports she has \"itchy spots\" that she picks at and scratches - may be source of infection. Nephrology and Vascular consulted. 11/4: Pt notes she wants to go home. Afebrile overnight. Skin presumed source, but no positive cultures yet. Cr worse this AM.  Tolerating PO. I spoke with pt's . He thinks she needs another day or two.   Willing to take her home when released. 11/5: Cr is worse this AM after another dose of vanc. This has been stopped as wound culture showed WBCs but no organisms yet. Pt is still afebrile. Still having diarrhea. C diff has been sent. 11/6:  She reports she feels better. Vanc stopped. Cr cont to worsen - dialysis decision per Renal.  Nurses report stools semi-solid so C diff test not sent. Wound culture with Group G, B Hemolytic strep - Zosyn should cover - bactroban added to all sites of excoriations. 11/7:  Overall, except for diarrhea, pt reports she feels better. Pt had loose stool yesterday and another C diff sent - returned positive. Overnight 4- 5 loose stools. Flagyl added po. Will consult GI also - restart Vanc po? Creat continues to worsen - dialysis decision per renal.     11/8:  Pt reports she is feeling better. Denies any pain. Discussed poss rehab/PT/OT and declines all - she does not want to get out of bed. Nurses report 4 stools overnight and they were semi-solid. PO Vanc added per GI. Creat only sl higher (rate of increase is flattening). Watch at least for another day or two to make sure C diff clears and Creat is starting to trend down. 11/9:  Feeling \"fairly well. \"  Pt wants to go home tomorrow. Nurses report 4 \"semi-loose\" stools overnight. AM labs pending. 4pm:  AM labs show Creat starting to decrease. Hopefully home tomorrow with 10 day course tx for C diff.    11/10:  She reports she is feeling much better and wants to go home. No N/V/Ab pain at this time. No other complaints. Nurses report only one semi formed stool overnight. GI has signed off. Finish course of Vanc and Flagyl outpt. Creat is decreasing and advised follow up with renal as they direct. Discussed home care and she declines PT/OT/HHC. She will not consider rehab due to \"bad stays\" in the past.  Discussed the need to follow up with Dr Drake Ramon or other in our offices early next wk.   Discussed diet and wt loss and the need to move/get up more but she reports she is satisfied with current status - usually in bed or recliner all day watching [employer]. Risk of inactivity discussed. Discussed her chronic areas of itching/sores (mostly occurs only in areas she can reach) and advised she apply mupirocin to sites and rub rather than cause excoriations. Discussed her meds and how to take them and added KCl and advised she should make appt to have K+ rechecked early next wk. As her BP has moved in to the 150s, will restart her low dose Norvasc and advised recheck of BPs in office. Otherwise she is medically stable for discharge. Review of Systems:   A comprehensive review of systems was negative except for that written in the HPI. Allergies   Allergen Reactions    Latex, Natural Rubber Rash     Per pt, had a reaction to latex gloves when an RN administered lotion     Darvon [Propoxyphene] Itching    Peanut Cough    Pineapple Itching    Sulfa (Sulfonamide Antibiotics) Swelling    Tape [Adhesive] Itching     Objective:   Physical Exam:     Visit Vitals    /52 (BP 1 Location: Right arm, BP Patient Position: At rest)    Pulse 88    Temp 97.8 °F (36.6 °C)    Resp 18    Ht 5' 1\" (1.549 m)    Wt 105.7 kg (233 lb)    SpO2 92%    BMI 44.02 kg/m2    O2 Flow Rate (L/min): 2 l/min O2 Device: CPAP mask    Temp (24hrs), Av.1 °F (36.7 °C), Min:97.7 °F (36.5 °C), Max:98.5 °F (36.9 °C)        701 -  1900  In: 480 [P.O.:480]  Out: -     General:  Alert, cooperative, no distress, appears stated age; morbidly obese/debilitated. Head:  Normocephalic, without obvious abnormality, atraumatic. Eyes:  Conjunctivae/corneas clear. PERRL, EOMs intact. Nose: Nares normal. Septum midline. Throat: Lips, mucosa, and tongue moist.   Neck: Supple, symmetrical, trachea midline, no adenopathy, thyroid: no enlargement/tenderness/nodules, no carotid bruit and no JVD.    Lungs:   Clear to auscultation bilaterally. Chest wall:  No tenderness or deformity. Heart:  Regular rate and rhythm, S1, S2 normal, no murmur, click, rub or gallop. Abdomen:   Soft, no tenderness. Bowel sounds normal. No masses,  No organomegaly. Extremities: no cyanosis. Trace -1+ LE edema. No calf tenderness or cords. Pulses: 2+ and symmetric all extremities. Skin:  turgor normal. Left arm with nearly resolved erythema and no longer warm from volar region to deltoid area. Thrill present at shunt site. multiple shallow abrasions/excoriations and scabs on both forearms and abdomen resolving. Neurologic: CNII-XII intact. Alert and oriented X 3. Fine motor of hands and fingers normal.   equal. Gait not tested at this time. Sensation grossly normal to touch. Gross motor of extremities normal.  Psych:  oriented to person, place, slow simple answers     Data Review:     Doppler LUE 11/2/17:  CONCLUSION: No deep vein thrombosis or thrombophlebitis in the veins  visualized. No evidence of thrombus in contralateral right subclavian  vein. Fistula is widely patent    Recent Days:  Recent Labs      11/10/17   0347  11/08/17   0502   WBC  4.9  6.7   HGB  10.7*  10.9*   HCT  33.0*  33.7*   PLT  283  265     Recent Labs      11/10/17   0347  11/09/17   1119  11/08/17   0502   NA  138  138  136   K  3.4*  3.4*  3.7   CL  101  100  98   CO2  27  24  25   GLU  122*  137*  135*   BUN  87*  89*  87*   CREA  4.36*  4.68*  5.37*   CA  9.2  9.0  9.0   PHOS   --   4.7   --    ALB   --   2.1*   --      No results for input(s): PH, PCO2, PO2, HCO3, FIO2 in the last 72 hours.     24 Hour Results:  Recent Results (from the past 24 hour(s))   GLUCOSE, POC    Collection Time: 11/09/17  7:25 AM   Result Value Ref Range    Glucose (POC) 111 (H) 65 - 100 mg/dL    Performed by Blake Blount (PCT)    RENAL FUNCTION PANEL    Collection Time: 11/09/17 11:19 AM   Result Value Ref Range    Sodium 138 136 - 145 mmol/L    Potassium 3.4 (L) 3.5 - 5.1 mmol/L    Chloride 100 97 - 108 mmol/L    CO2 24 21 - 32 mmol/L    Anion gap 14 5 - 15 mmol/L    Glucose 137 (H) 65 - 100 mg/dL    BUN 89 (H) 6 - 20 MG/DL    Creatinine 4.68 (H) 0.55 - 1.02 MG/DL    BUN/Creatinine ratio 19 12 - 20      GFR est AA 11 (L) >60 ml/min/1.73m2    GFR est non-AA 9 (L) >60 ml/min/1.73m2    Calcium 9.0 8.5 - 10.1 MG/DL    Phosphorus 4.7 2.6 - 4.7 MG/DL    Albumin 2.1 (L) 3.5 - 5.0 g/dL   GLUCOSE, POC    Collection Time: 11/09/17 11:36 AM   Result Value Ref Range    Glucose (POC) 122 (H) 65 - 100 mg/dL    Performed by Stuart Guillaume (PCT)    GLUCOSE, POC    Collection Time: 11/09/17  3:43 PM   Result Value Ref Range    Glucose (POC) 142 (H) 65 - 100 mg/dL    Performed by Phyllis Renee    GLUCOSE, POC    Collection Time: 11/09/17  9:28 PM   Result Value Ref Range    Glucose (POC) 163 (H) 65 - 100 mg/dL    Performed by Asad Jones    CBC WITH AUTOMATED DIFF    Collection Time: 11/10/17  3:47 AM   Result Value Ref Range    WBC 4.9 3.6 - 11.0 K/uL    RBC 3.71 (L) 3.80 - 5.20 M/uL    HGB 10.7 (L) 11.5 - 16.0 g/dL    HCT 33.0 (L) 35.0 - 47.0 %    MCV 88.9 80.0 - 99.0 FL    MCH 28.8 26.0 - 34.0 PG    MCHC 32.4 30.0 - 36.5 g/dL    RDW 15.0 (H) 11.5 - 14.5 %    PLATELET 618 094 - 549 K/uL    NEUTROPHILS 69 32 - 75 %    LYMPHOCYTES 17 12 - 49 %    MONOCYTES 6 5 - 13 %    EOSINOPHILS 7 0 - 7 %    BASOPHILS 1 0 - 1 %    ABS. NEUTROPHILS 3.4 1.8 - 8.0 K/UL    ABS. LYMPHOCYTES 0.8 0.8 - 3.5 K/UL    ABS. MONOCYTES 0.3 0.0 - 1.0 K/UL    ABS. EOSINOPHILS 0.3 0.0 - 0.4 K/UL    ABS.  BASOPHILS 0.0 0.0 - 0.1 K/UL   METABOLIC PANEL, BASIC    Collection Time: 11/10/17  3:47 AM   Result Value Ref Range    Sodium 138 136 - 145 mmol/L    Potassium 3.4 (L) 3.5 - 5.1 mmol/L    Chloride 101 97 - 108 mmol/L    CO2 27 21 - 32 mmol/L    Anion gap 10 5 - 15 mmol/L    Glucose 122 (H) 65 - 100 mg/dL    BUN 87 (H) 6 - 20 MG/DL    Creatinine 4.36 (H) 0.55 - 1.02 MG/DL    BUN/Creatinine ratio 20 12 - 20      GFR est AA 12 (L) >60 ml/min/1.73m2    GFR est non-AA 10 (L) >60 ml/min/1.73m2    Calcium 9.2 8.5 - 10.1 MG/DL       Medications reviewed  Current Facility-Administered Medications   Medication Dose Route Frequency    bumetanide (BUMEX) tablet 1 mg  1 mg Oral BID    vancomycin 50 mg/mL oral solution (compounded) 250 mg  250 mg Oral Q6H    famotidine (PEPCID) tablet 20 mg  20 mg Oral DAILY    mupirocin (BACTROBAN) 2 % ointment   Topical BID    cefTRIAXone (ROCEPHIN) 2 g in 0.9% sodium chloride (MBP/ADV) 50 mL  2 g IntraVENous Q24H    nystatin (MYCOSTATIN) 100,000 unit/gram powder   Topical BID    metroNIDAZOLE (FLAGYL) tablet 500 mg  500 mg Oral TID WITH MEALS    insulin glargine (LANTUS) injection 10 Units  10 Units SubCUTAneous QHS    lactobacillus-acidophilus (LACTINEX) 1 Packet  1 Packet Oral BID    loratadine (CLARITIN) tablet 10 mg  10 mg Oral Q48H    carvedilol (COREG) tablet 3.125 mg  3.125 mg Oral BID WITH MEALS    citalopram (CELEXA) tablet 40 mg  40 mg Oral DAILY    doxazosin (CARDURA) tablet 2 mg  2 mg Oral DAILY    sodium chloride (NS) flush 5-10 mL  5-10 mL IntraVENous Q8H    sodium chloride (NS) flush 5-10 mL  5-10 mL IntraVENous PRN    naloxone (NARCAN) injection 0.4 mg  0.4 mg IntraVENous PRN    glucose chewable tablet 16 g  4 Tab Oral PRN    dextrose (D50W) injection syrg 12.5-25 g  12.5-25 g IntraVENous PRN    glucagon (GLUCAGEN) injection 1 mg  1 mg IntraMUSCular PRN    acetaminophen (TYLENOL) tablet 650 mg  650 mg Oral Q4H PRN    diphenhydrAMINE (BENADRYL) capsule 25 mg  25 mg Oral Q4H PRN    ondansetron (ZOFRAN) injection 4 mg  4 mg IntraVENous Q4H PRN    heparin (porcine) injection 5,000 Units  5,000 Units SubCUTAneous Q8H    insulin lispro (HUMALOG) injection   SubCUTAneous AC&HS    fenofibrate nanocrystallized (TRICOR) tablet 145 mg  145 mg Oral DAILY    atorvastatin (LIPITOR) tablet 20 mg  20 mg Oral QHS    arformoterol (BROVANA) neb solution 15 mcg  15 mcg Nebulization BID RT And    budesonide (PULMICORT) 500 mcg/2 ml nebulizer suspension  500 mcg Nebulization BID RT     Care Plan discussed with: Patient/Renal/GI and Nurse  Total time spent with patient: 30 minutes.   Kirk Romberg, MD

## 2017-11-10 NOTE — PROGRESS NOTES
[address]  YOB: 1945          Assessment & Plan:   CKD 4 with ARF  · better  · Felt to have developed ATN due to vanc  · Going homeg today. Cellulitis  · On ceftriaxone  · Likely due to pruritis/excoriation  · Continues to improve    HTN  · Controlled  · Continue to hold ARB    Chronic edema  · bumex    DM  · Insulin    Cdiff  · On PO vanc  · Not typically associated with renal dysfunction       Subjective:   CC: f/u CKD  HPI: Creat better,Doing well. ROS: Still has some diarrhea, no n/v. Some SOB when supine.   Current Facility-Administered Medications   Medication Dose Route Frequency    bumetanide (BUMEX) tablet 1 mg  1 mg Oral BID    vancomycin 50 mg/mL oral solution (compounded) 250 mg  250 mg Oral Q6H    famotidine (PEPCID) tablet 20 mg  20 mg Oral DAILY    mupirocin (BACTROBAN) 2 % ointment   Topical BID    cefTRIAXone (ROCEPHIN) 2 g in 0.9% sodium chloride (MBP/ADV) 50 mL  2 g IntraVENous Q24H    nystatin (MYCOSTATIN) 100,000 unit/gram powder   Topical BID    metroNIDAZOLE (FLAGYL) tablet 500 mg  500 mg Oral TID WITH MEALS    insulin glargine (LANTUS) injection 10 Units  10 Units SubCUTAneous QHS    lactobacillus-acidophilus (LACTINEX) 1 Packet  1 Packet Oral BID    loratadine (CLARITIN) tablet 10 mg  10 mg Oral Q48H    carvedilol (COREG) tablet 3.125 mg  3.125 mg Oral BID WITH MEALS    citalopram (CELEXA) tablet 40 mg  40 mg Oral DAILY    doxazosin (CARDURA) tablet 2 mg  2 mg Oral DAILY    sodium chloride (NS) flush 5-10 mL  5-10 mL IntraVENous Q8H    sodium chloride (NS) flush 5-10 mL  5-10 mL IntraVENous PRN    naloxone (NARCAN) injection 0.4 mg  0.4 mg IntraVENous PRN    glucose chewable tablet 16 g  4 Tab Oral PRN    dextrose (D50W) injection syrg 12.5-25 g  12.5-25 g IntraVENous PRN    glucagon (GLUCAGEN) injection 1 mg  1 mg IntraMUSCular PRN    acetaminophen (TYLENOL) tablet 650 mg  650 mg Oral Q4H PRN    diphenhydrAMINE (BENADRYL) capsule 25 mg  25 mg Oral Q4H PRN    ondansetron (ZOFRAN) injection 4 mg  4 mg IntraVENous Q4H PRN    heparin (porcine) injection 5,000 Units  5,000 Units SubCUTAneous Q8H    insulin lispro (HUMALOG) injection   SubCUTAneous AC&HS    fenofibrate nanocrystallized (TRICOR) tablet 145 mg  145 mg Oral DAILY    atorvastatin (LIPITOR) tablet 20 mg  20 mg Oral QHS    arformoterol (BROVANA) neb solution 15 mcg  15 mcg Nebulization BID RT    And    budesonide (PULMICORT) 500 mcg/2 ml nebulizer suspension  500 mcg Nebulization BID RT          Objective:     Vitals:  Blood pressure 125/55, pulse 86, temperature 97.9 °F (36.6 °C), resp. rate 17, height 5' 1\" (1.549 m), weight 105.7 kg (233 lb), SpO2 96 %. Temp (24hrs), Av °F (36.7 °C), Min:97.7 °F (36.5 °C), Max:98.5 °F (36.9 °C)      Intake and Output:          Physical Exam:               GENERAL ASSESSMENT: NAD  CHEST: CTA  HEART: S1S2  ABDOMEN: Soft,NT  EXTREMITY:  edema          ECG/rhythm:    Data Review      No results for input(s): TNIPOC in the last 72 hours. No lab exists for component: ITNL   No results for input(s): CPK, CKMB, TROIQ in the last 72 hours. Recent Labs      11/10/17   0347  17   1119  17   0502   NA  138  138  136   K  3.4*  3.4*  3.7   CL  101  100  98   CO2  27  24  25   BUN  87*  89*  87*   CREA  4.36*  4.68*  5.37*   GLU  122*  137*  135*   PHOS   --   4.7   --    CA  9.2  9.0  9.0   ALB   --   2.1*   --    WBC  4.9   --   6.7   HGB  10.7*   --   10.9*   HCT  33.0*   --   33.7*   PLT  283   --   265      No results for input(s): INR, PTP, APTT in the last 72 hours.     No lab exists for component: INREXT, INREXT  Needs: urine analysis, urine sodium, protein and creatinine  Lab Results   Component Value Date/Time    Sodium urine, random 50 2016 12:55 PM    Creatinine, urine 53.84 2016 12:55 PM           : Brian Ferro MD  11/10/2017        [facility]:  [email or web address]  [place] office:  [address] [place] [address], [address] [place] [address],[address]  [place], [address] [place] [address]  Phone: [phone]  Fax :     [phone]    [place] office:  [address] [facility] [facility]  Phone - [phone]  Fax - [phone]

## 2017-11-13 NOTE — PROGRESS NOTES
11/13/2017 Received call from [employer] requesting information on the patient as the  had called requesting skilled nursing stay for the patient. Called the  at home and verified this information and he verified this information and that patient had already fallen at home x 1. Made him aware that I would be sending the information to [employer] and they would be contacting him for follow up.  I also suggested to him that he may want to contact Dr. Yelena Stringer to make her aware of his plans also. April Vargas Manager of Case Management

## 2017-11-15 ENCOUNTER — APPOINTMENT (OUTPATIENT)
Dept: GENERAL RADIOLOGY | Age: 72
End: 2017-11-15
Attending: EMERGENCY MEDICINE
Payer: MEDICARE

## 2017-11-15 ENCOUNTER — HOSPITAL ENCOUNTER (EMERGENCY)
Age: 72
Discharge: HOME | End: 2017-11-15
Attending: EMERGENCY MEDICINE
Payer: MEDICARE

## 2017-11-15 VITALS
TEMPERATURE: 98.3 F | HEART RATE: 97 BPM | HEIGHT: 61 IN | OXYGEN SATURATION: 93 % | SYSTOLIC BLOOD PRESSURE: 146 MMHG | WEIGHT: 233 LBS | BODY MASS INDEX: 43.99 KG/M2 | RESPIRATION RATE: 24 BRPM | DIASTOLIC BLOOD PRESSURE: 70 MMHG

## 2017-11-15 DIAGNOSIS — R11.2 NON-INTRACTABLE VOMITING WITH NAUSEA, UNSPECIFIED VOMITING TYPE: Primary | ICD-10-CM

## 2017-11-15 DIAGNOSIS — J90 PLEURAL EFFUSION: ICD-10-CM

## 2017-11-15 LAB
ALBUMIN SERPL-MCNC: 2.5 G/DL (ref 3.5–5)
ALBUMIN/GLOB SERPL: 0.6 {RATIO} (ref 1.1–2.2)
ALP SERPL-CCNC: 68 U/L (ref 45–117)
ALT SERPL-CCNC: 16 U/L (ref 12–78)
ANION GAP SERPL CALC-SCNC: 9 MMOL/L (ref 5–15)
APPEARANCE UR: ABNORMAL
AST SERPL-CCNC: 22 U/L (ref 15–37)
ATRIAL RATE: 99 BPM
BACTERIA URNS QL MICRO: NEGATIVE /HPF
BASOPHILS # BLD: 0 K/UL (ref 0–0.1)
BASOPHILS NFR BLD: 0 % (ref 0–1)
BILIRUB SERPL-MCNC: 0.5 MG/DL (ref 0.2–1)
BILIRUB UR QL: NEGATIVE
BUN SERPL-MCNC: 64 MG/DL (ref 6–20)
BUN/CREAT SERPL: 24 (ref 12–20)
CALCIUM SERPL-MCNC: 9.8 MG/DL (ref 8.5–10.1)
CALCULATED P AXIS, ECG09: 39 DEGREES
CALCULATED R AXIS, ECG10: -24 DEGREES
CALCULATED T AXIS, ECG11: 10 DEGREES
CHLORIDE SERPL-SCNC: 108 MMOL/L (ref 97–108)
CO2 SERPL-SCNC: 29 MMOL/L (ref 21–32)
COLOR UR: ABNORMAL
CREAT SERPL-MCNC: 2.68 MG/DL (ref 0.55–1.02)
DIAGNOSIS, 93000: NORMAL
EOSINOPHIL # BLD: 0.1 K/UL (ref 0–0.4)
EOSINOPHIL NFR BLD: 2 % (ref 0–7)
EPITH CASTS URNS QL MICRO: ABNORMAL /LPF
ERYTHROCYTE [DISTWIDTH] IN BLOOD BY AUTOMATED COUNT: 16.8 % (ref 11.5–14.5)
GLOBULIN SER CALC-MCNC: 4.1 G/DL (ref 2–4)
GLUCOSE SERPL-MCNC: 197 MG/DL (ref 65–100)
GLUCOSE UR STRIP.AUTO-MCNC: NEGATIVE MG/DL
HCT VFR BLD AUTO: 38.8 % (ref 35–47)
HGB BLD-MCNC: 12.2 G/DL (ref 11.5–16)
HGB UR QL STRIP: NEGATIVE
KETONES UR QL STRIP.AUTO: NEGATIVE MG/DL
LEUKOCYTE ESTERASE UR QL STRIP.AUTO: NEGATIVE
LIPASE SERPL-CCNC: 417 U/L (ref 73–393)
LYMPHOCYTES # BLD: 0.8 K/UL (ref 0.8–3.5)
LYMPHOCYTES NFR BLD: 9 % (ref 12–49)
MCH RBC QN AUTO: 29.5 PG (ref 26–34)
MCHC RBC AUTO-ENTMCNC: 31.4 G/DL (ref 30–36.5)
MCV RBC AUTO: 93.9 FL (ref 80–99)
MONOCYTES # BLD: 0.5 K/UL (ref 0–1)
MONOCYTES NFR BLD: 5 % (ref 5–13)
NEUTS SEG # BLD: 7.5 K/UL (ref 1.8–8)
NEUTS SEG NFR BLD: 84 % (ref 32–75)
NITRITE UR QL STRIP.AUTO: NEGATIVE
P-R INTERVAL, ECG05: 158 MS
PH UR STRIP: 5 [PH] (ref 5–8)
PLATELET # BLD AUTO: 312 K/UL (ref 150–400)
POTASSIUM SERPL-SCNC: 4.2 MMOL/L (ref 3.5–5.1)
PROT SERPL-MCNC: 6.6 G/DL (ref 6.4–8.2)
PROT UR STRIP-MCNC: ABNORMAL MG/DL
Q-T INTERVAL, ECG07: 382 MS
QRS DURATION, ECG06: 128 MS
QTC CALCULATION (BEZET), ECG08: 490 MS
RBC # BLD AUTO: 4.13 M/UL (ref 3.8–5.2)
RBC #/AREA URNS HPF: ABNORMAL /HPF (ref 0–5)
SODIUM SERPL-SCNC: 146 MMOL/L (ref 136–145)
SP GR UR REFRACTOMETRY: 1.02 (ref 1–1.03)
UROBILINOGEN UR QL STRIP.AUTO: 0.2 EU/DL (ref 0.2–1)
VENTRICULAR RATE, ECG03: 99 BPM
WBC # BLD AUTO: 9 K/UL (ref 3.6–11)
WBC URNS QL MICRO: ABNORMAL /HPF (ref 0–4)

## 2017-11-15 PROCEDURE — 74022 RADEX COMPL AQT ABD SERIES: CPT

## 2017-11-15 PROCEDURE — 85025 COMPLETE CBC W/AUTO DIFF WBC: CPT | Performed by: EMERGENCY MEDICINE

## 2017-11-15 PROCEDURE — 80053 COMPREHEN METABOLIC PANEL: CPT | Performed by: EMERGENCY MEDICINE

## 2017-11-15 PROCEDURE — 93005 ELECTROCARDIOGRAM TRACING: CPT

## 2017-11-15 PROCEDURE — 94762 N-INVAS EAR/PLS OXIMTRY CONT: CPT

## 2017-11-15 PROCEDURE — 83690 ASSAY OF LIPASE: CPT | Performed by: EMERGENCY MEDICINE

## 2017-11-15 PROCEDURE — 36415 COLL VENOUS BLD VENIPUNCTURE: CPT | Performed by: EMERGENCY MEDICINE

## 2017-11-15 PROCEDURE — 99285 EMERGENCY DEPT VISIT HI MDM: CPT

## 2017-11-15 PROCEDURE — 81001 URINALYSIS AUTO W/SCOPE: CPT | Performed by: EMERGENCY MEDICINE

## 2017-11-15 PROCEDURE — 74011250636 HC RX REV CODE- 250/636: Performed by: EMERGENCY MEDICINE

## 2017-11-15 PROCEDURE — 51701 INSERT BLADDER CATHETER: CPT

## 2017-11-15 PROCEDURE — 77030011943

## 2017-11-15 RX ORDER — ONDANSETRON 2 MG/ML
4 INJECTION INTRAMUSCULAR; INTRAVENOUS
Status: COMPLETED | OUTPATIENT
Start: 2017-11-15 | End: 2017-11-15

## 2017-11-15 RX ORDER — SODIUM CHLORIDE 0.9 % (FLUSH) 0.9 %
5-10 SYRINGE (ML) INJECTION EVERY 8 HOURS
Status: DISCONTINUED | OUTPATIENT
Start: 2017-11-15 | End: 2017-11-15 | Stop reason: HOSPADM

## 2017-11-15 RX ORDER — SODIUM CHLORIDE 0.9 % (FLUSH) 0.9 %
5-10 SYRINGE (ML) INJECTION AS NEEDED
Status: DISCONTINUED | OUTPATIENT
Start: 2017-11-15 | End: 2017-11-15 | Stop reason: HOSPADM

## 2017-11-15 RX ORDER — ONDANSETRON 4 MG/1
4 TABLET, ORALLY DISINTEGRATING ORAL
Qty: 20 TAB | Refills: 0 | Status: SHIPPED | OUTPATIENT
Start: 2017-11-15 | End: 2018-01-12

## 2017-11-15 RX ADMIN — ONDANSETRON 4 MG: 2 INJECTION INTRAMUSCULAR; INTRAVENOUS at 11:15

## 2017-11-15 NOTE — PROGRESS NOTES
11/15/2017   CARE MANAGEMENT NOTE:  CM is following pt in the ER for potential readmission. EMR reviewed. Per chart hx, pt was hospitalized at [facility] from 11/2/17 - 11/10/17 with dx of arteriovenous fistula infection. She returned home but requested clinicals be sent to the [email or web address] to assess for admission and short term rehab. Initial Assessment:  Reportedly, pt resides with her  with a ramp and no steps to the home. Pt has limited mobility and she requires assistance with transfers to [facility]. She routinely sleeps in a recliner for better breathing. Pt has had home health and she was at [facility] in the past.  DME in the home includes a rolling walker, rollator, and BSC. PCP is Dr. Joaquim Land. Today, pt presents to the ER from home with cc: SOB and is progressively weaker since previous hospital discharge. Per RN note, SNF bed is not available. Pt was examined and will not be readmitted to the hospital. Ambulance transport arranged to home via unit secretary.   Nestor

## 2017-11-15 NOTE — ED NOTES
Patient given discharge instruction by Julia Vera. Verbalized understanding, pt discharge home with [facility] and family.

## 2017-11-15 NOTE — DISCHARGE INSTRUCTIONS
Nausea and Vomiting: Care Instructions  Your Care Instructions    When you are nauseated, you may feel weak and sweaty and notice a lot of saliva in your mouth. Nausea often leads to vomiting. Most of the time you do not need to worry about nausea and vomiting, but they can be signs of other illnesses. Two common causes of nausea and vomiting are stomach flu and food poisoning. Nausea and vomiting from viral stomach flu will usually start to improve within 24 hours. Nausea and vomiting from food poisoning may last from 12 to 48 hours. The doctor has checked you carefully, but problems can develop later. If you notice any problems or new symptoms, get medical treatment right away. Follow-up care is a key part of your treatment and safety. Be sure to make and go to all appointments, and call your doctor if you are having problems. It's also a good idea to know your test results and keep a list of the medicines you take. How can you care for yourself at home? · To prevent dehydration, drink plenty of fluids, enough so that your urine is light yellow or clear like water. Choose water and other caffeine-free clear liquids until you feel better. If you have kidney, heart, or liver disease and have to limit fluids, talk with your doctor before you increase the amount of fluids you drink. · Rest in bed until you feel better. · When you are able to eat, try clear soups, mild foods, and liquids until all symptoms are gone for 12 to 48 hours. Other good choices include dry toast, crackers, cooked cereal, and gelatin dessert, such as Jell-O. When should you call for help? Call 911 anytime you think you may need emergency care. For example, call if:  ? · You passed out (lost consciousness). ?Call your doctor now or seek immediate medical care if:  ? · You have symptoms of dehydration, such as:  ¨ Dry eyes and a dry mouth. ¨ Passing only a little dark urine.   ¨ Feeling thirstier than usual.   ? · You have new or worsening belly pain. ? · You have a new or higher fever. ? · You vomit blood or what looks like coffee grounds. ? Watch closely for changes in your health, and be sure to contact your doctor if:  ? · You have ongoing nausea and vomiting. ? · Your vomiting is getting worse. ? · Your vomiting lasts longer than 2 days. ? · You are not getting better as expected. Where can you learn more? Go to http://trish-anastacia.info/. Enter 25 [id number] in the search box to learn more about \"Nausea and Vomiting: Care Instructions. \"  Current as of: March 20, 2017  Content Version: 11.4  © 1114-1012 [employer]. Care instructions adapted under license by [employer] (which disclaims liability or warranty for this information). If you have questions about a medical condition or this instruction, always ask your healthcare professional. [place] [address] any warranty or liability for your use of this information. Learning About Pleural Effusion  What is pleural effusion? Pleural effusion (say \"PLER-bethel ol-[facility]-jskd\") is the buildup of fluid in the space between tissues lining the lungs and chest wall. Because of the fluid buildup, the lungs may not be able to expand completely, which can make it hard to breathe. The lung, or part of it, may collapse. Pleural effusion has many causes, such as pneumonia, cancer, inflammation of the tissues around the lungs, and heart failure. Pleural effusion is usually diagnosed with an X-ray and a physical exam. The doctor listens to the air flow in your lungs. What are the symptoms? Symptoms of pleural effusion may include:  · Trouble breathing. · Shortness of breath. · Chest pain. · Fever. · A cough. Minor pleural effusion may not cause any symptoms. How is pleural effusion treated? Doctors may need to treat the condition that is causing pleural effusion.  For example, you may get medicines to treat pneumonia or congestive heart failure. Minor pleural effusion often goes away on its own without treatment. Removing fluid  Pleural effusion can be treated by removing fluid from the space between the tissues around the lungs. This is done with a needle that's put into the chest (thoracentesis). A small amount of the fluid may be sent to a lab to find out what is causing the buildup of fluid. Removing the fluid may help to relieve symptoms, such as shortness of breath and chest pain. It can help the lungs to expand more fully. In some cases, if pleural effusion doesn't get better, a catheter may be placed in the chest. This is a flexible tube that allows fluid to drain from the lungs. The catheter stays in the chest until the doctor removes it. Some people may get a treatment that removes the fluid and then puts a medicine into the chest cavity. This helps to prevent too much fluid from building up again. Follow-up care is a key part of your treatment and safety. Be sure to make and go to all appointments, and call your doctor if you are having problems. It's also a good idea to know your test results and keep a list of the medicines you take. Where can you learn more? Go to http://trish-anastacia.info/. Enter A920 in the search box to learn more about \"Learning About Pleural Effusion. \"  Current as of: May 12, 2017  Content Version: 11.4  © 7256-0843 [employer]. Care instructions adapted under license by [employer] (which disclaims liability or warranty for this information). If you have questions about a medical condition or this instruction, always ask your healthcare professional. [place] [address] any warranty or liability for your use of this information. We hope that we have addressed all of your medical concerns.  The examination and treatment you received in the Emergency Department were for an emergent problem and were not intended as complete care. It is important that you follow up with your healthcare provider(s) for ongoing care. If your symptoms worsen or do not improve as expected, and you are unable to reach your usual health care provider(s), you should return to the Emergency Department. Today's healthcare is undergoing tremendous change, and patient satisfaction surveys are one of the many tools to assess the quality of medical care. You may receive a survey from the [employer] organization regarding your experience in the Emergency Department. I hope that your experience has been completely positive, particularly the medical care that I provided. As such, please participate in the survey; anything less than excellent does not meet my expectations or intentions. [facility] and [facility] participate in nationally recognized quality of care measures. If your blood pressure is greater than 120/80, as reported below, we urge that you seek medical care to address the potential of high blood pressure, commonly known as hypertension. Hypertension can be hereditary or can be caused by certain medical conditions, pain, stress, or \"white coat syndrome. \"       Please make an appointment with your health care provider(s) for follow up of your Emergency Department visit. VITALS:   Patient Vitals for the past 8 hrs:   Temp Pulse Resp BP SpO2   11/15/17 1230 - 93 20 126/53 95 %   11/15/17 1200 - 95 20 133/50 94 %   11/15/17 1130 - 99 23 150/83 93 %   11/15/17 1114 - 99 27 - 93 %   11/15/17 1027 98.3 °F (36.8 °C) (!) 103 18 (!) 158/115 (!) 89 %          Thank you for allowing us to provide you with medical care today. We realize that you have many choices for your emergency care needs. Please choose us in the future for any continued health care needs. Regards,           Rohan Martin, [address] [place].   Office: [phone]            Recent Results (from the past 24 hour(s))   METABOLIC PANEL, COMPREHENSIVE    Collection Time: 11/15/17 10:51 AM   Result Value Ref Range    Sodium 146 (H) 136 - 145 mmol/L    Potassium 4.2 3.5 - 5.1 mmol/L    Chloride 108 97 - 108 mmol/L    CO2 29 21 - 32 mmol/L    Anion gap 9 5 - 15 mmol/L    Glucose 197 (H) 65 - 100 mg/dL    BUN 64 (H) 6 - 20 MG/DL    Creatinine 2.68 (H) 0.55 - 1.02 MG/DL    BUN/Creatinine ratio 24 (H) 12 - 20      GFR est AA 21 (L) >60 ml/min/1.73m2    GFR est non-AA 18 (L) >60 ml/min/1.73m2    Calcium 9.8 8.5 - 10.1 MG/DL    Bilirubin, total 0.5 0.2 - 1.0 MG/DL    ALT (SGPT) 16 12 - 78 U/L    AST (SGOT) 22 15 - 37 U/L    Alk. phosphatase 68 45 - 117 U/L    Protein, total 6.6 6.4 - 8.2 g/dL    Albumin 2.5 (L) 3.5 - 5.0 g/dL    Globulin 4.1 (H) 2.0 - 4.0 g/dL    A-G Ratio 0.6 (L) 1.1 - 2.2     CBC WITH AUTOMATED DIFF    Collection Time: 11/15/17 10:51 AM   Result Value Ref Range    WBC 9.0 3.6 - 11.0 K/uL    RBC 4.13 3.80 - 5.20 M/uL    HGB 12.2 11.5 - 16.0 g/dL    HCT 38.8 35.0 - 47.0 %    MCV 93.9 80.0 - 99.0 FL    MCH 29.5 26.0 - 34.0 PG    MCHC 31.4 30.0 - 36.5 g/dL    RDW 16.8 (H) 11.5 - 14.5 %    PLATELET 124 729 - 931 K/uL    NEUTROPHILS 84 (H) 32 - 75 %    LYMPHOCYTES 9 (L) 12 - 49 %    MONOCYTES 5 5 - 13 %    EOSINOPHILS 2 0 - 7 %    BASOPHILS 0 0 - 1 %    ABS. NEUTROPHILS 7.5 1.8 - 8.0 K/UL    ABS. LYMPHOCYTES 0.8 0.8 - 3.5 K/UL    ABS. MONOCYTES 0.5 0.0 - 1.0 K/UL    ABS. EOSINOPHILS 0.1 0.0 - 0.4 K/UL    ABS.  BASOPHILS 0.0 0.0 - 0.1 K/UL   LIPASE    Collection Time: 11/15/17 10:51 AM   Result Value Ref Range    Lipase 417 (H) 73 - 393 U/L   URINALYSIS W/MICROSCOPIC    Collection Time: 11/15/17 11:50 AM   Result Value Ref Range    Color DARK YELLOW      Appearance CLOUDY (A) CLEAR      Specific gravity 1.016 1.003 - 1.030      pH (UA) 5.0 5.0 - 8.0      Protein TRACE (A) NEG mg/dL    Glucose NEGATIVE  NEG mg/dL    Ketone NEGATIVE  NEG mg/dL    Bilirubin NEGATIVE  NEG      Blood NEGATIVE  NEG      Urobilinogen 0.2 0.2 - 1.0 EU/dL    Nitrites NEGATIVE  NEG      Leukocyte Esterase NEGATIVE  NEG      WBC 0-4 0 - 4 /hpf    RBC 0-5 0 - 5 /hpf    Epithelial cells FEW FEW /lpf    Bacteria NEGATIVE  NEG /hpf       Xr Abd Acute W 1 V Chest    Result Date: 11/15/2017  EXAM:  XR ABD ACUTE W 1 V CHEST INDICATION:  vomiting, shortness of breath COMPARISON: November 2. FINDINGS: The upright chest radiograph demonstrates airspace disease and effusion and left lower lobe without change. There is atherosclerosis of the aorta. There is no free air beneath the hemidiaphragm. Supine and upright views of the abdomen demonstrate a nonobstructive bowel gas pattern. There is no free intraperitoneal air. No soft tissue masses or pathologic calcifications are identified. The bones are within normal limits. There are clips in the right upper quadrant. IMPRESSION: No acute abdominal findings. Persistent left lower lobe airspace disease and effusion.

## 2017-11-15 NOTE — ED PROVIDER NOTES
HPI Comments: 71 y.o. female with past medical history significant for HTN, asthma, hyperlipidemia, DM, anxiety, RAI on CPAP, CKD, breast CA s/p mastectomy and s/p cholecystectomy who presents from home via EMS with chief complaint of SOB. EMS reports that they were called to the pt's house for SOB. Pt does not have home O2 but has been using CPAP for the last 4 days to try and help her breathing. O2 sats were 90 - 91% upon EMS arrival. Pt additionally notes N/V/D and states that she has vomited once this morning. She additionally notes weakness of her legs which are like \"wet noodles\" and a decrease in ability to walk. Pt states that she has had these complaints since she was released from the hospital last week. She notes that she was admitted with infection of left arm. She states that the swelling of LUE decreased while in the hospital but has returned. She has not measured a temperature above 100.5 F. Pt is not currently undergoing any treatment with h/o breast CA, s/p mastectomy 12 years ago. Pt denies abdominal pain and cough. There are no other acute medical concerns at this time. Social hx: Never smoker. No alcohol use. Pt states that she was scheduled to go to a nursing home today to \"get her legs better\" but that the bed was not ready. Because of her condition, she states that it was recommended for her to come to the hospital.     Old Chart Review: Pt was discharged on 11/10/17 after being here 8 days for AV fistula infection and strep wound infection with cellulitis. While in hospital, pt had an upper extremity DVT study performed and no DVT was identified. CT chest without contrast showed plueral and pericardial effusions. Placed on bumex for fluid overload thought to be causing pleural effusion and hypoxia. PCP: Jesse Lorenzo MD    Note written by Jaye Inman, as dictated by Francesco Black MD 10:37 AM    The history is provided by the patient and the EMS personnel.  No  was used. Past Medical History:   Diagnosis Date    Anxiety and depression     Asthma     CKD stage 4 due to type 2 diabetes mellitus ([facility] [address]     DM type 2 causing renal disease (HCC)     Hyperlipidemia     Hypertension     RAI on CPAP        Past Surgical History:   Procedure Laterality Date    BREAST SURGERY PROCEDURE UNLISTED      mastectomy, bilat    HX CHOLECYSTECTOMY      HX GYN      hysterectomy    HX HEENT      tonsilectomy         Family History:   Problem Relation Age of Onset    Hypertension Other     Diabetes Other        Social History     Social History    Marital status:      Spouse name: N/A    Number of children: N/A    Years of education: N/A     Occupational History    Not on file. Social History Main Topics    Smoking status: Never Smoker    Smokeless tobacco: Not on file    Alcohol use No    Drug use: No    Sexual activity: Not on file     Other Topics Concern    Not on file     Social History Narrative         ALLERGIES: Latex, natural rubber; Darvon [propoxyphene]; Peanut; Pineapple; Sulfa (sulfonamide antibiotics); and Tape [adhesive]    Review of Systems   Constitutional: Negative for appetite change, fever and unexpected weight change. HENT: Negative. Negative for ear pain, hearing loss, nosebleeds, rhinorrhea, sore throat and trouble swallowing. Respiratory: Positive for shortness of breath. Negative for cough and chest tightness. Cardiovascular: Negative. Negative for chest pain and palpitations. Gastrointestinal: Positive for diarrhea, nausea and vomiting. Negative for abdominal distention, abdominal pain and blood in stool. Endocrine: Negative. Genitourinary: Negative for dysuria and hematuria. Musculoskeletal: Negative. Negative for back pain and myalgias. Skin: Negative. Negative for rash. Allergic/Immunologic: Negative. Neurological: Positive for weakness.  Negative for dizziness, syncope and numbness. Hematological: Negative. Psychiatric/Behavioral: Negative. All other systems reviewed and are negative. Vitals:    11/15/17 1027   BP: (!) 158/115   Pulse: (!) 103   Resp: 18   Temp: 98.3 °F (36.8 °C)   SpO2: (!) 89%            Physical Exam   Constitutional: She is oriented to person, place, and time. She appears well-developed and well-nourished. No distress. Appears slightly anxious. Otherwise in minimal acute distress. HENT:   Head: Normocephalic and atraumatic. Right Ear: External ear normal.   Left Ear: External ear normal.   Nose: Nose normal.   Mouth/Throat: Oropharynx is clear and moist.   Eyes: Conjunctivae and EOM are normal. Pupils are equal, round, and reactive to light. Neck: Normal range of motion. Neck supple. No JVD present. No thyromegaly present. Cardiovascular: Normal rate, regular rhythm, normal heart sounds and intact distal pulses. No murmur heard. Heart sounds distant   Pulmonary/Chest: Effort normal. No respiratory distress. She has no wheezes. She has no rales. Breath sounds are distant but grossly clear. Some diminished breath sounds at bilateral bases. Abdominal: Soft. Bowel sounds are normal. She exhibits no distension. There is no tenderness. Musculoskeletal: Normal range of motion. She exhibits no edema. No significant edema of lower extremities. Chronic swelling LUE without erythema or significant tenderness. Neurovascularly intact distally. Neurological: She is alert and oriented to person, place, and time. No cranial nerve deficit. Skin: Skin is warm and dry. No rash noted. Psychiatric: She has a normal mood and affect. Her behavior is normal. Thought content normal.      Note written by Jaye Mckenzie, as dictated by Raghu Mclean MD 10:40 AM      [facility]  ED Course       Procedures    ED EKG interpretation (10:34 AM):  Rhythm: normal sinus rhythm with RBBB pattern. Rate (approx.): 99;  Axis: normal; ST/T wave: non-specific changes; No ectopy. Note written by Jaye Kaur, as dictated by Kirsty Baugh MD 10:41 AM     PROGRESS NOTE:  12:21 PM  UA shows no acute signs of infection. Chemistry shows chronically elevated CREA 2.6 which is improved from previous. BUN 64  No acidocis or elevated anion gap  Lipase normal  Acute series shows persistent left pleural effusion. Pt has no abdominal pain and no vomiting since arrival. Sx not consistent with acute pancreatitis. Assessment and Plan:     Supportive care, anti emetics, and close pcp f/u. LUE swelling is chronic with no acute change. Pt will f/u with PCP. PROGRESS NOTE:  12:53 PM  pt is supposed to be going to the [facility] of [address], [address] home later today. Bed not ready as of now. Will d/c home to go to the [facility] when bed available. Sx of nausea improved substantilly with IV meds. No vomiting in ER, pt feels subjectively better.

## 2017-11-15 NOTE — ED NOTES
Pt cleaned up after incontinent of BM. Straight cath completed, UA sent. Multiple wounds noted to abdomen and perineum, pt tender to area. Primary RN aware.

## 2017-11-15 NOTE — ED TRIAGE NOTES
Patient arrived through triage c/o sob, left hand swelling, and n/v/d. Patient states she has been progressively weak since discharge from hospital. Patient states she was admitted for infection in colon and arm. Patient states she uses cpap at night and has had to use it continuously x a couple of days. Patient states she was supposed to go to a rehab center, but her bed remains unavailable. Patient resting in bed, bed in low position, call bell in reach, monitor x3.

## 2017-11-30 NOTE — PROGRESS NOTES
12/6/2017 12:19 PM Follow up call made to the [id number] [facility]. This pt discharged from [place] [address] inpatient care on 11/10/2017 and this pt was accepted and placed at the [id number] [facility] on 12/52017. Darren Koroma, [address]      11/30/2017 1:27 PM Message received from pt's . He states that his wife needs rehab. BERTO contacted the pt at [phone]. She states that she needs rehab. She confirms that rehab was offered to her prior to he discharge from the hospital. Pt states, \" They wanted me to go to therapy, but I didn't want to. I just wanted to go home. \" BERTO has contacted the [facility] and sent updates. BERTO spoke with Theodora Salinas, . She states that she would follow up on this case with .    Darren Koroma, BSW

## 2018-01-12 ENCOUNTER — APPOINTMENT (OUTPATIENT)
Dept: GENERAL RADIOLOGY | Age: 73
DRG: 193 | End: 2018-01-12
Attending: EMERGENCY MEDICINE
Payer: MEDICARE

## 2018-01-12 ENCOUNTER — HOSPITAL ENCOUNTER (INPATIENT)
Age: 73
LOS: 10 days | Discharge: NURSING, REHAB OR LONG-TERM CARE | DRG: 193 | End: 2018-01-23
Attending: EMERGENCY MEDICINE | Admitting: FAMILY MEDICINE
Payer: MEDICARE

## 2018-01-12 DIAGNOSIS — R09.02 HYPOXIA: ICD-10-CM

## 2018-01-12 DIAGNOSIS — F32.A ANXIETY AND DEPRESSION: ICD-10-CM

## 2018-01-12 DIAGNOSIS — F41.9 ANXIETY AND DEPRESSION: ICD-10-CM

## 2018-01-12 DIAGNOSIS — I50.9 ACUTE CONGESTIVE HEART FAILURE, UNSPECIFIED CONGESTIVE HEART FAILURE TYPE: Primary | ICD-10-CM

## 2018-01-12 PROBLEM — R06.00 DYSPNEA: Status: ACTIVE | Noted: 2018-01-12

## 2018-01-12 PROBLEM — J90 PLEURAL EFFUSION: Status: RESOLVED | Noted: 2017-11-02 | Resolved: 2018-01-12

## 2018-01-12 PROBLEM — T82.7XXA ARTERIOVENOUS FISTULA INFECTION (HCC): Status: RESOLVED | Noted: 2017-11-02 | Resolved: 2018-01-12

## 2018-01-12 PROBLEM — D72.829 LEUKOCYTOSIS: Status: RESOLVED | Noted: 2017-11-02 | Resolved: 2018-01-12

## 2018-01-12 LAB
ALBUMIN SERPL-MCNC: 2.7 G/DL (ref 3.5–5)
ALBUMIN/GLOB SERPL: 0.7 {RATIO} (ref 1.1–2.2)
ALP SERPL-CCNC: 51 U/L (ref 45–117)
ALT SERPL-CCNC: 19 U/L (ref 12–78)
ANION GAP SERPL CALC-SCNC: 10 MMOL/L (ref 5–15)
AST SERPL-CCNC: 40 U/L (ref 15–37)
B PERT DNA SPEC QL NAA+PROBE: NOT DETECTED
BASOPHILS # BLD: 0 K/UL (ref 0–0.1)
BASOPHILS NFR BLD: 0 % (ref 0–1)
BILIRUB SERPL-MCNC: 0.7 MG/DL (ref 0.2–1)
BNP SERPL-MCNC: ABNORMAL PG/ML (ref 0–125)
BUN SERPL-MCNC: 52 MG/DL (ref 6–20)
BUN/CREAT SERPL: 22 (ref 12–20)
C PNEUM DNA SPEC QL NAA+PROBE: NOT DETECTED
CALCIUM SERPL-MCNC: 9 MG/DL (ref 8.5–10.1)
CHLORIDE SERPL-SCNC: 103 MMOL/L (ref 97–108)
CHOLEST SERPL-MCNC: 105 MG/DL
CK MB CFR SERPL CALC: NORMAL % (ref 0–2.5)
CK MB SERPL-MCNC: <1 NG/ML (ref 5–25)
CK SERPL-CCNC: 44 U/L (ref 26–192)
CO2 SERPL-SCNC: 30 MMOL/L (ref 21–32)
CREAT SERPL-MCNC: 2.32 MG/DL (ref 0.55–1.02)
D DIMER PPP FEU-MCNC: 0.7 MG/L FEU (ref 0–0.65)
DIFFERENTIAL METHOD BLD: ABNORMAL
EOSINOPHIL # BLD: 0 K/UL (ref 0–0.4)
EOSINOPHIL NFR BLD: 0 % (ref 0–7)
ERYTHROCYTE [DISTWIDTH] IN BLOOD BY AUTOMATED COUNT: 16.6 % (ref 11.5–14.5)
EST. AVERAGE GLUCOSE BLD GHB EST-MCNC: 120 MG/DL
FLUAV H1 2009 PAND RNA SPEC QL NAA+PROBE: NOT DETECTED
FLUAV H1 RNA SPEC QL NAA+PROBE: NOT DETECTED
FLUAV H3 RNA SPEC QL NAA+PROBE: NOT DETECTED
FLUAV SUBTYP SPEC NAA+PROBE: NOT DETECTED
FLUBV RNA SPEC QL NAA+PROBE: DETECTED
GLOBULIN SER CALC-MCNC: 3.9 G/DL (ref 2–4)
GLUCOSE BLD STRIP.AUTO-MCNC: 133 MG/DL (ref 65–100)
GLUCOSE BLD STRIP.AUTO-MCNC: 143 MG/DL (ref 65–100)
GLUCOSE SERPL-MCNC: 132 MG/DL (ref 65–100)
HADV DNA SPEC QL NAA+PROBE: NOT DETECTED
HBA1C MFR BLD: 5.8 % (ref 4.2–6.3)
HCOV 229E RNA SPEC QL NAA+PROBE: NOT DETECTED
HCOV HKU1 RNA SPEC QL NAA+PROBE: NOT DETECTED
HCOV NL63 RNA SPEC QL NAA+PROBE: NOT DETECTED
HCOV OC43 RNA SPEC QL NAA+PROBE: NOT DETECTED
HCT VFR BLD AUTO: 34.5 % (ref 35–47)
HDLC SERPL-MCNC: 39 MG/DL
HDLC SERPL: 2.7 {RATIO} (ref 0–5)
HGB BLD-MCNC: 11.3 G/DL (ref 11.5–16)
HMPV RNA SPEC QL NAA+PROBE: NOT DETECTED
HPIV1 RNA SPEC QL NAA+PROBE: NOT DETECTED
HPIV2 RNA SPEC QL NAA+PROBE: NOT DETECTED
HPIV3 RNA SPEC QL NAA+PROBE: NOT DETECTED
HPIV4 RNA SPEC QL NAA+PROBE: NOT DETECTED
INR PPP: 1.2 (ref 0.9–1.1)
LACTATE SERPL-SCNC: 1.1 MMOL/L (ref 0.4–2)
LDLC SERPL CALC-MCNC: 27.6 MG/DL (ref 0–100)
LIPASE SERPL-CCNC: 411 U/L (ref 73–393)
LIPID PROFILE,FLP: ABNORMAL
LYMPHOCYTES # BLD: 0.6 K/UL (ref 0.8–3.5)
LYMPHOCYTES NFR BLD: 16 % (ref 12–49)
M PNEUMO DNA SPEC QL NAA+PROBE: NOT DETECTED
MCH RBC QN AUTO: 29.8 PG (ref 26–34)
MCHC RBC AUTO-ENTMCNC: 32.8 G/DL (ref 30–36.5)
MCV RBC AUTO: 91 FL (ref 80–99)
MONOCYTES # BLD: 0.3 K/UL (ref 0–1)
MONOCYTES NFR BLD: 9 % (ref 5–13)
NEUTS SEG # BLD: 2.6 K/UL (ref 1.8–8)
NEUTS SEG NFR BLD: 75 % (ref 32–75)
PLATELET # BLD AUTO: 224 K/UL (ref 150–400)
PLATELET COMMENTS,PCOM: ABNORMAL
POTASSIUM SERPL-SCNC: 3.6 MMOL/L (ref 3.5–5.1)
PROT SERPL-MCNC: 6.6 G/DL (ref 6.4–8.2)
PROTHROMBIN TIME: 12.6 SEC (ref 9–11.1)
RBC # BLD AUTO: 3.79 M/UL (ref 3.8–5.2)
RBC MORPH BLD: ABNORMAL
RSV RNA SPEC QL NAA+PROBE: NOT DETECTED
RV+EV RNA SPEC QL NAA+PROBE: NOT DETECTED
SERVICE CMNT-IMP: ABNORMAL
SERVICE CMNT-IMP: ABNORMAL
SODIUM SERPL-SCNC: 143 MMOL/L (ref 136–145)
TRIGL SERPL-MCNC: 192 MG/DL (ref ?–150)
TROPONIN I SERPL-MCNC: 0.04 NG/ML
VLDLC SERPL CALC-MCNC: 38.4 MG/DL
WBC # BLD AUTO: 3.5 K/UL (ref 3.6–11)

## 2018-01-12 PROCEDURE — 94660 CPAP INITIATION&MGMT: CPT

## 2018-01-12 PROCEDURE — 83605 ASSAY OF LACTIC ACID: CPT | Performed by: EMERGENCY MEDICINE

## 2018-01-12 PROCEDURE — 83036 HEMOGLOBIN GLYCOSYLATED A1C: CPT | Performed by: INTERNAL MEDICINE

## 2018-01-12 PROCEDURE — 71045 X-RAY EXAM CHEST 1 VIEW: CPT

## 2018-01-12 PROCEDURE — 93005 ELECTROCARDIOGRAM TRACING: CPT

## 2018-01-12 PROCEDURE — 99218 HC RM OBSERVATION: CPT

## 2018-01-12 PROCEDURE — 74011250636 HC RX REV CODE- 250/636: Performed by: NURSE PRACTITIONER

## 2018-01-12 PROCEDURE — 74011250636 HC RX REV CODE- 250/636: Performed by: INTERNAL MEDICINE

## 2018-01-12 PROCEDURE — 77030013140 HC MSK NEB VYRM -A

## 2018-01-12 PROCEDURE — 82962 GLUCOSE BLOOD TEST: CPT

## 2018-01-12 PROCEDURE — 87040 BLOOD CULTURE FOR BACTERIA: CPT | Performed by: EMERGENCY MEDICINE

## 2018-01-12 PROCEDURE — 74011250637 HC RX REV CODE- 250/637: Performed by: INTERNAL MEDICINE

## 2018-01-12 PROCEDURE — 36415 COLL VENOUS BLD VENIPUNCTURE: CPT | Performed by: EMERGENCY MEDICINE

## 2018-01-12 PROCEDURE — 74011250637 HC RX REV CODE- 250/637: Performed by: FAMILY MEDICINE

## 2018-01-12 PROCEDURE — 94640 AIRWAY INHALATION TREATMENT: CPT

## 2018-01-12 PROCEDURE — 83690 ASSAY OF LIPASE: CPT | Performed by: EMERGENCY MEDICINE

## 2018-01-12 PROCEDURE — 80061 LIPID PANEL: CPT | Performed by: INTERNAL MEDICINE

## 2018-01-12 PROCEDURE — 99284 EMERGENCY DEPT VISIT MOD MDM: CPT

## 2018-01-12 PROCEDURE — 82550 ASSAY OF CK (CPK): CPT | Performed by: EMERGENCY MEDICINE

## 2018-01-12 PROCEDURE — 85025 COMPLETE CBC W/AUTO DIFF WBC: CPT | Performed by: EMERGENCY MEDICINE

## 2018-01-12 PROCEDURE — 85610 PROTHROMBIN TIME: CPT | Performed by: EMERGENCY MEDICINE

## 2018-01-12 PROCEDURE — 80053 COMPREHEN METABOLIC PANEL: CPT | Performed by: EMERGENCY MEDICINE

## 2018-01-12 PROCEDURE — 83880 ASSAY OF NATRIURETIC PEPTIDE: CPT | Performed by: EMERGENCY MEDICINE

## 2018-01-12 PROCEDURE — 84484 ASSAY OF TROPONIN QUANT: CPT | Performed by: EMERGENCY MEDICINE

## 2018-01-12 PROCEDURE — 85379 FIBRIN DEGRADATION QUANT: CPT | Performed by: INTERNAL MEDICINE

## 2018-01-12 PROCEDURE — 74011250637 HC RX REV CODE- 250/637: Performed by: NURSE PRACTITIONER

## 2018-01-12 PROCEDURE — 74011000250 HC RX REV CODE- 250: Performed by: NURSE PRACTITIONER

## 2018-01-12 PROCEDURE — 87581 M.PNEUMON DNA AMP PROBE: CPT | Performed by: INTERNAL MEDICINE

## 2018-01-12 RX ORDER — ACETAMINOPHEN 325 MG/1
650 TABLET ORAL
Status: DISCONTINUED | OUTPATIENT
Start: 2018-01-12 | End: 2018-01-23 | Stop reason: HOSPADM

## 2018-01-12 RX ORDER — CITALOPRAM 20 MG/1
40 TABLET, FILM COATED ORAL DAILY
Status: DISCONTINUED | OUTPATIENT
Start: 2018-01-13 | End: 2018-01-23 | Stop reason: HOSPADM

## 2018-01-12 RX ORDER — DOXAZOSIN 2 MG/1
2 TABLET ORAL DAILY
Status: DISCONTINUED | OUTPATIENT
Start: 2018-01-13 | End: 2018-01-23 | Stop reason: HOSPADM

## 2018-01-12 RX ORDER — PANTOPRAZOLE SODIUM 40 MG/1
40 TABLET, DELAYED RELEASE ORAL
Status: DISCONTINUED | OUTPATIENT
Start: 2018-01-13 | End: 2018-01-15

## 2018-01-12 RX ORDER — INSULIN GLARGINE 100 [IU]/ML
17 INJECTION, SOLUTION SUBCUTANEOUS DAILY
Status: DISCONTINUED | OUTPATIENT
Start: 2018-01-13 | End: 2018-01-17

## 2018-01-12 RX ORDER — INSULIN LISPRO 100 [IU]/ML
INJECTION, SOLUTION INTRAVENOUS; SUBCUTANEOUS
Status: DISCONTINUED | OUTPATIENT
Start: 2018-01-12 | End: 2018-01-23 | Stop reason: HOSPADM

## 2018-01-12 RX ORDER — AMOXICILLIN 250 MG
2 CAPSULE ORAL DAILY
Status: DISCONTINUED | OUTPATIENT
Start: 2018-01-13 | End: 2018-01-23 | Stop reason: HOSPADM

## 2018-01-12 RX ORDER — OSELTAMIVIR PHOSPHATE 30 MG/1
30 CAPSULE ORAL DAILY
Status: DISCONTINUED | OUTPATIENT
Start: 2018-01-12 | End: 2018-01-17

## 2018-01-12 RX ORDER — BUMETANIDE 1 MG/1
2 TABLET ORAL 2 TIMES DAILY
Status: DISCONTINUED | OUTPATIENT
Start: 2018-01-12 | End: 2018-01-12

## 2018-01-12 RX ORDER — POTASSIUM CHLORIDE 750 MG/1
10 TABLET, FILM COATED, EXTENDED RELEASE ORAL 2 TIMES DAILY
Status: DISCONTINUED | OUTPATIENT
Start: 2018-01-12 | End: 2018-01-16

## 2018-01-12 RX ORDER — GUAIFENESIN 600 MG/1
600 TABLET, EXTENDED RELEASE ORAL EVERY 12 HOURS
Status: DISCONTINUED | OUTPATIENT
Start: 2018-01-12 | End: 2018-01-23 | Stop reason: HOSPADM

## 2018-01-12 RX ORDER — DIPHENHYDRAMINE HCL 25 MG
25 CAPSULE ORAL
Status: DISCONTINUED | OUTPATIENT
Start: 2018-01-12 | End: 2018-01-23 | Stop reason: HOSPADM

## 2018-01-12 RX ORDER — CARVEDILOL 3.12 MG/1
3.12 TABLET ORAL 2 TIMES DAILY WITH MEALS
Status: DISCONTINUED | OUTPATIENT
Start: 2018-01-12 | End: 2018-01-23 | Stop reason: HOSPADM

## 2018-01-12 RX ORDER — IPRATROPIUM BROMIDE AND ALBUTEROL SULFATE 2.5; .5 MG/3ML; MG/3ML
3 SOLUTION RESPIRATORY (INHALATION)
Status: DISCONTINUED | OUTPATIENT
Start: 2018-01-12 | End: 2018-01-18

## 2018-01-12 RX ORDER — GUAIFENESIN 100 MG/5ML
81 LIQUID (ML) ORAL
Status: DISCONTINUED | OUTPATIENT
Start: 2018-01-12 | End: 2018-01-23 | Stop reason: HOSPADM

## 2018-01-12 RX ORDER — BUMETANIDE 1 MG/1
1 TABLET ORAL 2 TIMES DAILY
Status: DISCONTINUED | OUTPATIENT
Start: 2018-01-13 | End: 2018-01-23 | Stop reason: HOSPADM

## 2018-01-12 RX ORDER — ONDANSETRON 2 MG/ML
4 INJECTION INTRAMUSCULAR; INTRAVENOUS
Status: DISCONTINUED | OUTPATIENT
Start: 2018-01-12 | End: 2018-01-23 | Stop reason: HOSPADM

## 2018-01-12 RX ORDER — LOSARTAN POTASSIUM 50 MG/1
25 TABLET ORAL DAILY
Status: DISCONTINUED | OUTPATIENT
Start: 2018-01-13 | End: 2018-01-23 | Stop reason: HOSPADM

## 2018-01-12 RX ORDER — INSULIN LISPRO 100 [IU]/ML
5 INJECTION, SOLUTION INTRAVENOUS; SUBCUTANEOUS
COMMUNITY

## 2018-01-12 RX ORDER — SODIUM CHLORIDE 0.9 % (FLUSH) 0.9 %
5-10 SYRINGE (ML) INJECTION EVERY 8 HOURS
Status: DISCONTINUED | OUTPATIENT
Start: 2018-01-12 | End: 2018-01-23 | Stop reason: HOSPADM

## 2018-01-12 RX ORDER — BUDESONIDE 0.5 MG/2ML
500 INHALANT ORAL
Status: DISCONTINUED | OUTPATIENT
Start: 2018-01-12 | End: 2018-01-23 | Stop reason: HOSPADM

## 2018-01-12 RX ORDER — SODIUM CHLORIDE 0.9 % (FLUSH) 0.9 %
5-10 SYRINGE (ML) INJECTION AS NEEDED
Status: DISCONTINUED | OUTPATIENT
Start: 2018-01-12 | End: 2018-01-23 | Stop reason: HOSPADM

## 2018-01-12 RX ORDER — DEXTROSE 50 % IN WATER (D50W) INTRAVENOUS SYRINGE
12.5-25 AS NEEDED
Status: DISCONTINUED | OUTPATIENT
Start: 2018-01-12 | End: 2018-01-23 | Stop reason: HOSPADM

## 2018-01-12 RX ORDER — ARFORMOTEROL TARTRATE 15 UG/2ML
15 SOLUTION RESPIRATORY (INHALATION)
Status: DISCONTINUED | OUTPATIENT
Start: 2018-01-12 | End: 2018-01-23 | Stop reason: HOSPADM

## 2018-01-12 RX ORDER — MAGNESIUM SULFATE 100 %
4 CRYSTALS MISCELLANEOUS AS NEEDED
Status: DISCONTINUED | OUTPATIENT
Start: 2018-01-12 | End: 2018-01-23 | Stop reason: HOSPADM

## 2018-01-12 RX ORDER — ALPRAZOLAM 0.25 MG/1
0.25 TABLET ORAL 2 TIMES DAILY
Status: DISCONTINUED | OUTPATIENT
Start: 2018-01-12 | End: 2018-01-23 | Stop reason: HOSPADM

## 2018-01-12 RX ORDER — BUMETANIDE 2 MG/1
1 TABLET ORAL 2 TIMES DAILY
COMMUNITY

## 2018-01-12 RX ORDER — L. ACIDOPHILUS/L.BULGARICUS 100MM CELL
1 GRANULES IN PACKET (EA) ORAL 2 TIMES DAILY
COMMUNITY

## 2018-01-12 RX ORDER — LORATADINE 10 MG/1
10 TABLET ORAL DAILY
Status: DISCONTINUED | OUTPATIENT
Start: 2018-01-13 | End: 2018-01-23 | Stop reason: HOSPADM

## 2018-01-12 RX ORDER — HEPARIN SODIUM 5000 [USP'U]/ML
5000 INJECTION, SOLUTION INTRAVENOUS; SUBCUTANEOUS EVERY 8 HOURS
Status: DISCONTINUED | OUTPATIENT
Start: 2018-01-12 | End: 2018-01-23 | Stop reason: HOSPADM

## 2018-01-12 RX ORDER — HEPARIN SODIUM 5000 [USP'U]/ML
5000 INJECTION, SOLUTION INTRAVENOUS; SUBCUTANEOUS EVERY 12 HOURS
Status: DISCONTINUED | OUTPATIENT
Start: 2018-01-12 | End: 2018-01-12

## 2018-01-12 RX ORDER — ATORVASTATIN CALCIUM 20 MG/1
20 TABLET, FILM COATED ORAL
Status: DISCONTINUED | OUTPATIENT
Start: 2018-01-12 | End: 2018-01-23 | Stop reason: HOSPADM

## 2018-01-12 RX ADMIN — OSELTAMIVIR PHOSPHATE 30 MG: 30 CAPSULE ORAL at 21:40

## 2018-01-12 RX ADMIN — Medication 10 ML: at 21:40

## 2018-01-12 RX ADMIN — POTASSIUM CHLORIDE 10 MEQ: 750 TABLET, FILM COATED, EXTENDED RELEASE ORAL at 18:15

## 2018-01-12 RX ADMIN — BUDESONIDE 500 MCG: 0.5 INHALANT RESPIRATORY (INHALATION) at 20:23

## 2018-01-12 RX ADMIN — ALPRAZOLAM 0.25 MG: 0.25 TABLET ORAL at 18:04

## 2018-01-12 RX ADMIN — GUAIFENESIN 600 MG: 600 TABLET, EXTENDED RELEASE ORAL at 21:40

## 2018-01-12 RX ADMIN — METHYLPREDNISOLONE SODIUM SUCCINATE 40 MG: 40 INJECTION, POWDER, FOR SOLUTION INTRAMUSCULAR; INTRAVENOUS at 18:04

## 2018-01-12 RX ADMIN — ATORVASTATIN CALCIUM 20 MG: 20 TABLET, FILM COATED ORAL at 21:40

## 2018-01-12 RX ADMIN — IPRATROPIUM BROMIDE AND ALBUTEROL SULFATE 3 ML: .5; 3 SOLUTION RESPIRATORY (INHALATION) at 23:21

## 2018-01-12 RX ADMIN — HEPARIN SODIUM 5000 UNITS: 5000 INJECTION, SOLUTION INTRAVENOUS; SUBCUTANEOUS at 18:04

## 2018-01-12 RX ADMIN — ACETAMINOPHEN 650 MG: 325 TABLET ORAL at 18:04

## 2018-01-12 RX ADMIN — IPRATROPIUM BROMIDE AND ALBUTEROL SULFATE 3 ML: .5; 3 SOLUTION RESPIRATORY (INHALATION) at 20:23

## 2018-01-12 RX ADMIN — CARVEDILOL 3.12 MG: 3.12 TABLET, FILM COATED ORAL at 18:04

## 2018-01-12 RX ADMIN — METHYLPREDNISOLONE SODIUM SUCCINATE 40 MG: 40 INJECTION, POWDER, FOR SOLUTION INTRAMUSCULAR; INTRAVENOUS at 21:40

## 2018-01-12 RX ADMIN — Medication 10 ML: at 18:04

## 2018-01-12 RX ADMIN — BUMETANIDE 2 MG: 1 TABLET ORAL at 18:04

## 2018-01-12 RX ADMIN — ASPIRIN 81 MG 81 MG: 81 TABLET ORAL at 21:40

## 2018-01-12 NOTE — CONSULTS
Name: Delores Barksdale [facility]: [facility]   : 1945 Admit Date: 2018   Phone: [phone]  Room: 01/   PCP: Igor Tanner MD  MRN: [id number]   Date: 2018  Code: Prior        HPI:      Chart and notes reviewed. Data reviewed. I review the patient's current medications in the medical record at each encounter.  I have evaluated and examined the patient.     4:21 PM       History was obtained from patient and . I was asked by Terry Romero MD to see Sridhar Bennett in consultation for a chief complaint of RAI, dyspnea. History of Present Illness:   is a pleasant, 71year old lady that presented to [facility] today with worsening dyspnea from her PCP's office. She reports that she felt that she was getting bronchitis over the last two days with increasing cough, wheezing, SOB, and sputum production. Reports that she has also had some nausea and vomiting, as well. Coughing up thick, discolored sputum that she describes as orange currently. Has had low grade fevers and chills. She also reports increased LE swelling and 20 lb weight gain over the last two weeks. She has been unable to take her diuretics. Recently was discharged home from an extended rehab stay after she was hospitalized here in November for multiple issues including cellulitis, ALFONSO, and AV fistula infection. She has a history of RAI, which is managed by , on CPAP set on automode. She last saw him in 2017, at that time she was using her CPAP approximately 17 hours per day. She does use her machine when she is not sleeping due to SOB. She has never required oxygen. Her home asthma regimen includes low dose Breo and Proventil as needed. She reports that she has only been using the Proventil for the last couple of days. Prior to that, she was not using it.    Her other PMH includes, anxiety, CKD (stage IV, AV fistula in place), DM, HTN, hyperlipidemia, morbid obesity, and chronic back pain. Images:  Personally reviewed. CXR:  No significant chagne in left basilar opacity consistent with effusion/atelectasis/airspace disease. Moderate cardiomegaly is stable.     WBC 3.5  Hgb 11.3  Plt 224  K 3.6  Creat 2.32  Pro-BNP 04231  Trop .04  ECHO 2016:  EF 75%    Past Medical History:   Diagnosis Date    Anxiety and depression     Asthma     CKD stage 4 due to type 2 diabetes mellitus [address] [address]     DM type 2 causing renal disease ([facility] [address]     Hyperlipidemia     Hypertension     Hypoventilation syndrome     Morbid obesity (HCC)     RAI on CPAP        Past Surgical History:   Procedure Laterality Date    BREAST SURGERY PROCEDURE UNLISTED      mastectomy, bilat    HX CHOLECYSTECTOMY      HX GYN      hysterectomy    HX HEENT      tonsilectomy       Family History   Problem Relation Age of Onset    Hypertension Other     Diabetes Other        Social History   Substance Use Topics    Smoking status: Never Smoker    Smokeless tobacco: Not on file    Alcohol use No       Allergies   Allergen Reactions    Latex, Natural Rubber Rash     Per pt, had a reaction to latex gloves when an RN administered lotion     Darvon [Propoxyphene] Itching    Peanut Cough    Pineapple Itching    Sulfa (Sulfonamide Antibiotics) Swelling    Tape [Adhesive] Itching       Current Facility-Administered Medications   Medication Dose Route Frequency    sodium chloride (NS) flush 5-10 mL  5-10 mL IntraVENous PRN    albuterol-ipratropium (DUO-NEB) 2.5 MG-0.5 MG/3 ML  3 mL Nebulization Q4H RT    methylPREDNISolone (PF) (SOLU-MEDROL) injection 40 mg  40 mg IntraVENous Q8H    budesonide (PULMICORT) 500 mcg/2 ml nebulizer suspension  500 mcg Nebulization BID RT    arformoterol (BROVANA) neb solution 15 mcg  15 mcg Nebulization BID RT    guaiFENesin ER (MUCINEX) tablet 600 mg  600 mg Oral Q12H     Current Outpatient Prescriptions   Medication Sig    bumetanide (BUMEX) 2 mg tablet Take 2 mg by mouth two (2) times a day.  lactobacillus-acidophilus (LACTINEX) 100 million cell grpk Take 1 Packet by mouth daily.  mupirocin (BACTROBAN) 2 % ointment Apply  to affected area two (2) times a day.  amLODIPine (NORVASC) 2.5 mg tablet Take 2.5 mg by mouth daily.  fluticasone-vilanterol (BREO ELLIPTA) 200-25 mcg/dose inhaler Take 1 Puff by inhalation daily.  ALPRAZolam (XANAX) 0.25 mg tablet Take 0.25 mg by mouth two (2) times a day.  atorvastatin (LIPITOR) 20 mg tablet Take 20 mg by mouth nightly.  citalopram (CELEXA) 40 mg tablet Take 40 mg by mouth daily.  doxazosin (CARDURA) 2 mg tablet Take 2 mg by mouth daily.  losartan (COZAAR) 25 mg tablet Take 25 mg by mouth daily.  multivitamin (ONE A DAY) tablet Take 1 Tab by mouth daily.  acetaminophen (TYLENOL) 500 mg tablet Take 1,000 mg by mouth daily as needed for Pain.  carvedilol (COREG) 3.125 mg tablet Take 3.125 mg by mouth two (2) times daily (with meals).  calcium-cholecalciferol, D3, (CALTRATE 600+D) tablet Take 1 Tab by mouth two (2) times a day.  fenofibrate nanocrystallized (TRICOR) 145 mg tablet Take 145 mg by mouth daily.  loratadine (CLARITIN) 10 mg tablet Take 10 mg by mouth daily.  Omeprazole delayed release (PRILOSEC D/R) 20 mg tablet Take 20 mg by mouth daily.  calcium polycarbophil (FIBER LAXATIVE, CA POLYCARBO,) 625 mg tablet Take 625 mg by mouth daily.  ergocalciferol (ERGOCALCIFEROL) 50,000 unit capsule Take 50,000 Units by mouth every month. First of the month    aspirin 81 mg chewable tablet Take 81 mg by mouth nightly.  senna-docusate (SENNA-C) 8.6-50 mg per tablet Take 2 Tabs by mouth every evening.  albuterol (PROVENTIL HFA, VENTOLIN HFA) 90 mcg/actuation inhaler Take 2 Puffs by inhalation every four (4) hours as needed for Wheezing.  nystatin (MYCOSTATIN) powder Apply  to affected area two (2) times a day.     potassium chloride (KLOR-CON) 10 mEq tablet Take 1 Tab by mouth two (2) times a day.  insulin glargine (TOUJEO SOLOSTAR) 300 unit/mL (1.5 mL) inpn 22 Units by SubCUTAneous route daily. REVIEW OF SYSTEMS   Negative except as stated in the HPI. Physical Exam:   Visit Vitals    /67    Pulse 88    Temp 98.6 °F (37 °C)    Resp 24    Ht 5' 1\" (1.549 m)    Wt 116.1 kg (256 lb)    SpO2 95%    BMI 48.37 kg/m2       General:  Alert, cooperative, slightly dyspneic with speaking, appears stated age. Head:  Normocephalic, without obvious abnormality, atraumatic. Eyes:  Conjunctivae/corneas clear. Nose: Nares normal. Septum midline. Mucosa normal.    Throat: Lips, mucosa, and tongue normal. Teeth and gums normal.   Neck: Supple, symmetrical, trachea midline, no adenopathy   Lungs:   Wheezing heard bilaterally, anterior exam only   Chest wall:  No tenderness or deformity. Heart:  Regular rate and rhythm, S1, S2 normal, no murmur, click, rub or gallop. Abdomen:   Soft, non-tender. Bowel sounds normal.    Extremities: Extremities normal, atraumatic, no cyanosis, pitting LE edema R>L. Pulses: 2+ and symmetric all extremities.    Skin: Skin color, texture, turgor normal. No rashes or lesions   Lymph nodes: Cervical, supraclavicular nodes normal.   Neurologic: Grossly nonfocal       Lab Results   Component Value Date/Time    Sodium 143 01/12/2018 01:50 PM    Potassium 3.6 01/12/2018 01:50 PM    Chloride 103 01/12/2018 01:50 PM    CO2 30 01/12/2018 01:50 PM    BUN 52 01/12/2018 01:50 PM    Creatinine 2.32 01/12/2018 01:50 PM    Glucose 132 01/12/2018 01:50 PM    Calcium 9.0 01/12/2018 01:50 PM    Magnesium 1.8 11/03/2017 05:12 AM    Phosphorus 4.7 11/09/2017 11:19 AM    Lactic acid 1.1 01/12/2018 01:50 PM       Lab Results   Component Value Date/Time    WBC 3.5 01/12/2018 01:50 PM    HGB 11.3 01/12/2018 01:50 PM    PLATELET 004 08/97/1664 01:50 PM    MCV 91.0 01/12/2018 01:50 PM       Lab Results   Component Value Date/Time    INR 1.2 01/12/2018 01:50 PM    AST (SGOT) 40 01/12/2018 01:50 PM    Alk. phosphatase 51 01/12/2018 01:50 PM    Protein, total 6.6 01/12/2018 01:50 PM    Albumin 2.7 01/12/2018 01:50 PM    Globulin 3.9 01/12/2018 01:50 PM       Lab Results   Component Value Date/Time    Iron 124 06/06/2017 12:44 AM    TIBC 310 06/06/2017 12:44 AM    Iron % saturation 40 06/06/2017 12:44 AM    Ferritin 1120 06/06/2017 12:44 AM       No results found for: SR, CRP, JETHRO, ANAIGG, RA, RPR, RPRT, VDRLT, VDRLS, TSH, TSHEXT     No results found for: PH, PHI, PCO2, PCO2I, PO2, PO2I, HCO3, HCO3I, FIO2, FIO2I    Lab Results   Component Value Date/Time    CK 44 01/12/2018 01:50 PM    CK-MB Index Cannot be calculated 01/12/2018 01:50 PM    Troponin-I, Qt. 0.04 01/12/2018 01:50 PM        Lab Results   Component Value Date/Time    Culture result:  11/04/2017 08:10 AM     MODERATE STREPTOCOCCI, BETA HEMOLYTIC GROUP G Penicillin and ampicillin are drugs of choice for treatment of beta-hemolytic streptococcal infections. Susceptibility testing of penicillins and beta-lactams approved by the FDA for treatment of beta-hemolytic streptococcal infections need not be performed routinely, because nonsusceptible isolates are extremely rare.  CLSI 2012    Culture result: NO GROWTH 5 DAYS 11/02/2017 05:58 PM    Culture result: NO GROWTH 5 DAYS 06/10/2017 07:30 AM       Lab Results   Component Value Date/Time    Hepatitis B surface Ag <0.10 11/09/2016 06:28 AM       Lab Results   Component Value Date/Time    CK 44 01/12/2018 01:50 PM       Lab Results   Component Value Date/Time    Color DARK YELLOW 11/15/2017 11:50 AM    Appearance CLOUDY 11/15/2017 11:50 AM    Specific gravity >1.030 08/15/2016 04:10 PM    pH (UA) 5.0 11/15/2017 11:50 AM    Protein TRACE 11/15/2017 11:50 AM    Glucose NEGATIVE  11/15/2017 11:50 AM    Ketone NEGATIVE  11/15/2017 11:50 AM    Bilirubin NEGATIVE  11/15/2017 11:50 AM    Blood NEGATIVE  11/15/2017 11:50 AM    Urobilinogen 0.2 11/15/2017 11:50 AM    Nitrites NEGATIVE  11/15/2017 11:50 AM    Leukocyte Esterase NEGATIVE  11/15/2017 11:50 AM    WBC 0-4 11/15/2017 11:50 AM    RBC 0-5 11/15/2017 11:50 AM    Bacteria NEGATIVE  11/15/2017 11:50 AM       IMPRESSION  · Acute Respiratory Failure with Hypoxia; multifactorial including acute bronchitis, volume overload, ? CHF, CKD, OHS  · Asthma with Acute Exacerbation  · RAI & OHS  · CKD  · Hypertension  · Morbid Obesity  · Debility    PLAN  · Supplemental O2 to keep sats >90%  · Auto CPAP at night and with naps  · Start scheduled Duo-Nebs  · Scheduled Pulmicort/Brovana nebs  · Add low dose Solu-Medrol  · Check respiratory viral panel and sputum culture  · Mucinex  · Check ECHO  · BLE dopplers  · Nephrology consult placed  · DVT Prophylaxis:  Heparin SubQ    Thank you very much for the consult. We will follow along with you in Ms. Campbell's care.       Matthieu Patiño NP

## 2018-01-12 NOTE — ROUTINE PROCESS
Primary Nurse Adrian Banegas RN and Malou Butcher RN performed a dual skin assessment on this patient. Impairment noted- see wound doc flow sheet. Luis Felipe score is 17.

## 2018-01-12 NOTE — PROGRESS NOTES
1645: Pulmonary NP on floor & aware of pt's SOB. Stated to place pt on continuous pulse ox. Confirmed she does not want pt on telemetry. NP stated no hx of cardiac issues. Stated this is her baseline. Will continue to monitor. 1853: Received call from [facility] lab w/ critical result. See flowsheet for details. Notified MD Marino Whiteside. Orders received see MAR for details. 2000: Bedside and Verbal shift change report given to [facility] (oncoming nurse) by Lara SANDY (offgoing nurse). Report included the following information SBAR, Kardex, Intake/Output and Recent Results.

## 2018-01-12 NOTE — ED PROVIDER NOTES
HPI Comments: 71 y.o. female with past medical history significant for HTN, asthma, DM type 2 causing renal disease, hyperlipidemia, CKD stage 4, RAI on CPAP, anxiety and depression who presents from home via EMS with chief complaint of SOB. Pt has been experiencing SOB with a productive cough for the past 2 days with associated chills, vomiting, and decreased appetite. She states she is coughing up \"fur balls. \" She was recently hospitalized for a colon infection, lung infection, and L-arm infection and received Abx at that time. Pt denies use of home O2. Pt denies abd pain or diarrhea. There are no other acute medical concerns at this time. Social hx: no tobacco use; no EtOH use  PCP: Camila Howard MD    Note written by Jaye Galeas, as dictated by Birda Meckel, MD 1:39 PM    The history is provided by the patient. No  was used. Past Medical History:   Diagnosis Date    Anxiety and depression     Asthma     CKD stage 4 due to type 2 diabetes mellitus ([facility] [address]     DM type 2 causing renal disease (HCC)     Hyperlipidemia     Hypertension     RAI on CPAP        Past Surgical History:   Procedure Laterality Date    BREAST SURGERY PROCEDURE UNLISTED      mastectomy, bilat    HX CHOLECYSTECTOMY      HX GYN      hysterectomy    HX HEENT      tonsilectomy         Family History:   Problem Relation Age of Onset    Hypertension Other     Diabetes Other        Social History     Social History    Marital status:      Spouse name: N/A    Number of children: N/A    Years of education: N/A     Occupational History    Not on file. Social History Main Topics    Smoking status: Never Smoker    Smokeless tobacco: Not on file    Alcohol use No    Drug use: No    Sexual activity: Not on file     Other Topics Concern    Not on file     Social History Narrative         ALLERGIES: Latex, natural rubber; Darvon [propoxyphene];  Peanut; Pineapple; Sulfa (sulfonamide antibiotics); and Tape [adhesive]    Review of Systems   Constitutional: Positive for appetite change (decreased) and chills. Respiratory: Positive for cough and shortness of breath. Cardiovascular: Negative for chest pain. Gastrointestinal: Positive for vomiting. Negative for abdominal pain and diarrhea. Skin: Negative for rash. All other systems reviewed and are negative. Vitals:    01/12/18 1333   BP: 134/72   Pulse: 94   Resp: 18   Temp: 98.6 °F (37 °C)   SpO2: 100%   Weight: 116.1 kg (256 lb)   Height: 5' 1\" (1.549 m)            Physical Exam   Constitutional: She is oriented to person, place, and time. She appears well-developed and well-nourished.   m obese,BIBEMS on NC, NAD, AxOx4, speaking in complete sentences     HENT:   Head: Normocephalic and atraumatic. Nose: Nose normal.   Mouth/Throat: Oropharynx is clear and moist.   Cn intact       Eyes: Conjunctivae and EOM are normal. Pupils are equal, round, and reactive to light. Right eye exhibits no discharge. Left eye exhibits no discharge. No scleral icterus. Neck: Normal range of motion. Neck supple. No JVD present. No tracheal deviation present. Cardiovascular: Normal rate, regular rhythm, normal heart sounds and intact distal pulses. Exam reveals no gallop and no friction rub. No murmur heard. Pulmonary/Chest: Effort normal and breath sounds normal. No respiratory distress. She has no wheezes. She has no rales. She exhibits no tenderness. ctab    Distant heart sounds   Abdominal: Soft. Bowel sounds are normal. There is no tenderness. There is no rebound and no guarding. nttp     Genitourinary: No vaginal discharge found. Genitourinary Comments: Pt denies urinary/ vaginal complaints   Musculoskeletal: Normal range of motion. She exhibits edema. She exhibits no tenderness or deformity.    LUE - noted swelling/ pt has bilateral arm skin changes ;  L = R;        Neurological: She is alert and oriented to person, place, and time. She has normal reflexes. No cranial nerve deficit. She exhibits normal muscle tone. Coordination normal.   pt has motor/ CV/ Sensation grossly intact to all extremities, R = L in strength;   Skin: Skin is warm and dry. No rash noted. No erythema. No pallor. Psychiatric: She has a normal mood and affect. Her behavior is normal. Thought content normal.   Nursing note and vitals reviewed. MDM  ED Course       Procedures    Chief Complaint   Patient presents with    Abdominal Pain    Shortness of Breath       1:55 PM  The patients presenting problems have been discussed, and they are in agreement with the care plan formulated and outlined with them. I have encouraged them to ask questions as they arise throughout their visit. MEDICATIONS GIVEN:  Medications   sodium chloride (NS) flush 5-10 mL (not administered)       LABS REVIEWED:  Labs Reviewed   CULTURE, BLOOD   CULTURE, BLOOD   METABOLIC PANEL, COMPREHENSIVE   CBC WITH AUTOMATED DIFF   TROPONIN I   NT-PRO BNP   URINALYSIS W/ REFLEX CULTURE   PROTHROMBIN TIME + INR   NT-PRO BNP   LIPASE   LACTIC ACID   CK W/ CKMB & INDEX   CBC WITH AUTOMATED DIFF       RADIOLOGY RESULTS:  The following have been ordered and reviewed:  _____________________________________________________________________  _____________________________________________________________________    EKG interpretation: (Preliminary)  Rhythm: normal sinus rhythm in a RBBB Pattern; and regular . Rate (approx.): 96; Axis: normal; P wave: normal; QRS interval: normal ; ST/T wave: normal; Negative acute significant segmental elevations/ unchanged compared to study 11/15/2017    PROCEDURES:        CONSULTATIONS:       PROGRESS NOTES:      DIAGNOSIS:    1. Acute congestive heart failure, unspecified congestive heart failure type ([address]    2.  Hypoxia        PLAN:  1- chf/ hypoxia at home/ will have evaluated for admission;       ED COURSE: The patients hospital course has been uncomplicated. 2:55 PM  Patient is being evaluated for admission to the hospital by the hospitalist, Dr Abraham Carrero . The results of their tests and reasons for their admission have been discussed with them and/or available family. They convey agreement and understanding for the need to be admitted and for their admission diagnosis. Consultation has been made with the inpatient physician specialist for hospitalization.

## 2018-01-12 NOTE — H&P
[address]  [facility]  [phone]    Hospitalist Admission Note      NAME:  Roselyn Husbands   :   1945   MRN:  [id number]     PCP:  Ruben Rosado MD     Date/Time:  2018 3:01 PM          Subjective:     CHIEF COMPLAINT: dyspnea     HISTORY OF PRESENT ILLNESS:     Ms. Bebeto Johnson is a 71 y.o.  female who presented to the Emergency Department complaining of dyspnea, worse over last 2 days. She reports orthopnea, has to sleep upright, but that has been true for years. Cannot report whether any LOPEZ, since she has not gotten out of her chair for 2 days at home, after returning from SNF where she made little progress over 37 days. Has chronic mild hypoxia but has never qualified for oxygen, even check at SNF a few days ago. Uses CPAP. Reports a few days of nausea and cough. Reports sick contacts at the SNF. Has missed some meds due to the nausea. We will admit her for management.       Past Medical History:   Diagnosis Date    Anxiety and depression     Asthma     CKD stage 4 due to type 2 diabetes mellitus ([facility] [address]     DM type 2 causing renal disease ([facility] [address] [facility]     Hyperlipidemia     Hypertension     Hypoventilation syndrome     Morbid obesity (HCC)     RAI on CPAP         Past Surgical History:   Procedure Laterality Date    BREAST SURGERY PROCEDURE UNLISTED      mastectomy, bilat    HX CHOLECYSTECTOMY      HX GYN      hysterectomy    HX HEENT      tonsilectomy       Social History   Substance Use Topics    Smoking status: Never Smoker    Smokeless tobacco: Not on file    Alcohol use No        Family History   Problem Relation Age of Onset    Hypertension Other     Diabetes Other         Allergies   Allergen Reactions    Latex, Natural Rubber Rash     Per pt, had a reaction to latex gloves when an RN administered lotion     Darvon [Propoxyphene] Itching    Peanut Cough    Pineapple Itching    Sulfa (Sulfonamide Antibiotics) Swelling    Tape [Adhesive] Itching        Prior to Admission medications    Medication Sig Start Date End Date Taking? Authorizing Provider   ondansetron (ZOFRAN ODT) 4 mg disintegrating tablet Take 1 Tab by mouth every eight (8) hours as needed for Nausea. 11/15/17   Rohan Ma MD   bumetanide (BUMEX) 2 mg tablet Take 0.5 Tabs by mouth two (2) times a day. 11/10/17   Ronda Arellano MD   metroNIDAZOLE (FLAGYL) 500 mg tablet Take 1 Tab by mouth three (3) times daily (with meals). Indications: Clostridium difficile infection 11/10/17   Ronda Arellano MD   pirocin [facility]) 2 % ointment Apply  to affected area two (2) times a day. 11/10/17   Ronda Arellano MD   nystatin (MYCOSTATIN) powder Apply  to affected area two (2) times a day. 11/10/17   Ronda Arellano MD   potassium chloride (KLOR-CON) 10 mEq tablet Take 1 Tab by mouth two (2) times a day. 11/10/17   Ronda Arellano MD   lactobacillus-acidophilus (LACTINEX) 100 million cell grpk Take 1 Packet by mouth two (2) times a day. May also get over-the-counter probiotic in place of this 11/10/17   Toi Calderon MD   amLODIPine (NORVASC) 2.5 mg tablet Take 2.5 mg by mouth daily. Historical Provider   fluticasone-vilanterol (BREO ELLIPTA) 200-25 mcg/dose inhaler Take 1 Puff by inhalation daily. Historical Provider   ALPRAZolam [place]) 0.25 mg tablet Take 0.25 mg by mouth two (2) times a day. Historical Provider   atorvastatin (LIPITOR) 20 mg tablet Take 20 mg by mouth nightly. Historical Provider   citalopram (CELEXA) 40 mg tablet Take 40 mg by mouth daily. Historical Provider   doxazosin (CARDURA) 2 mg tablet Take 2 mg by mouth daily. Historical Provider   losartan (COZAAR) 25 mg tablet Take 25 mg by mouth daily. Historical Provider   insulin glargine (TOUJEO SOLOSTAR) 300 unit/mL (1.5 mL) inpn 24 Units by SubCUTAneous route daily.     Historical Provider   multivitamin (ONE A DAY) tablet Take 1 Tab by mouth daily. Historical Provider   acetaminophen (TYLENOL) 500 mg tablet Take 1,000 mg by mouth two (2) times daily as needed for Pain. Historical Provider   carvedilol (COREG) 3.125 mg tablet Take 3.125 mg by mouth two (2) times daily (with meals). Historical Provider   calcium-cholecalciferol, D3, (CALTRATE 600+D) tablet Take 1 Tab by mouth two (2) times a day. Historical Provider   fenofibrate nanocrystallized (TRICOR) 145 mg tablet Take 145 mg by mouth daily. Historical Provider   loratadine (CLARITIN) 10 mg tablet Take 10 mg by mouth daily. Historical Provider   Omeprazole delayed release (PRILOSEC D/R) 20 mg tablet Take 20 mg by mouth daily. Historical Provider   calcium polycarbophil (FIBER LAXATIVE, CA POLYCARBO,) 625 mg tablet Take 625 mg by mouth daily. Historical Provider   ergocalciferol (ERGOCALCIFEROL) 50,000 unit capsule Take 50,000 Units by mouth every month. First of the month    Ruben Romero MD   aspirin 81 mg chewable tablet Take 81 mg by mouth nightly. Ruben Romero MD   senna-docusate (SENNA-C) 8.6-50 mg per tablet Take 2 Tabs by mouth daily. Ruben Romero MD   albuterol (PROVENTIL HFA, VENTOLIN HFA) 90 mcg/actuation inhaler Take 2 Puffs by inhalation every four (4) hours as needed for Wheezing.     Ruben Romero MD       Review of Systems:  (bold if positive, if negative)    Gen:  Weight gainfatigueEyes:  ENT:  CVS:  Pulm:  Cough, dyspnea,GI:  Abdominal pain, nausea, emesis,  :    MS:  arthritisSkin:  Psych:  depression, anxiety,Endo:    Hem:  Renal:    Neuro:  weakness      Objective:      VITALS:    Vital signs reviewed; most recent are:    Visit Vitals    /72 (BP 1 Location: Right arm, BP Patient Position: At rest)    Pulse 94    Temp 98.6 °F (37 °C)    Resp 18    Ht 5' 1\" (1.549 m)    Wt 116.1 kg (256 lb)    SpO2 100%    BMI 48.37 kg/m2     SpO2 Readings from Last 6 Encounters:   01/12/18 100%   11/15/17 93%   11/10/17 95%   06/10/17 97% 02/14/17 96%   11/17/16 97%        No intake or output data in the 24 hours ending 01/12/18 1504     Exam:     Physical Exam:    Gen:  Morbid obse, in no acute distress  HEENT:  Pink conjunctivae, PERRL, hearing intact to voice, NC on nose  Neck:  Supple, without masses, thyroid non-tender  Resp:  No accessory muscle use, distant breath sounds without wheezes rales or rhonchi  Card:  No murmurs, tachycardic S1, S2 without thrills, bruits, 3+ peripheral edema  Abd:  Soft, non-tender, non-distended, normoactive bowel sounds are present, no mass  Lymph:  No cervical or inguinal adenopathy  Musc:  No cyanosis or clubbing  Skin:  No rashes or ulcers, skin turgor is good  Neuro:  Cranial nerves are grossly intact, general motor weakness, follows commands vaguely  Psych:  Poor insight, oriented to person, place and time, alert, anxious     Labs:    Recent Labs      01/12/18   1350   WBC  3.5*   HGB  11.3*   HCT  34.5*   PLT  224     Recent Labs      01/12/18   1350   NA  143   K  3.6   CL  103   CO2  30   GLU  132*   BUN  52*   CREA  2.32*   CA  9.0   ALB  2.7*   TBILI  0.7   SGOT  40*   ALT  19     Lab Results   Component Value Date/Time    Glucose (POC) 151 11/10/2017 11:27 AM    Glucose (POC) 101 11/10/2017 06:57 AM     No results for input(s): PH, PCO2, PO2, HCO3, FIO2 in the last 72 hours. Recent Labs      01/12/18   1350   INR  1.2*     All Micro Results     Procedure Component Value Units Date/Time    RESPIRATORY PANEL,PCR,NASOPHARYNGEAL [[id number]]     Order Status:  Sent Specimen:  NASOPHARYNGEAL SWAB     CULTURE, BLOOD [[id number]] Collected:  01/12/18 1350    Order Status:  Completed Specimen:  Blood Updated:  01/12/18 1409    CULTURE, BLOOD [[id number]]     Order Status:  Sent Specimen:  Blood           I have reviewed previous records       Assessment and Plan:      Dyspnea - POA, at rest, in the ER today I record sats of 92% on RA at rest with deep breaths.   I doubt CHF, based on excellent ECHO (75% EF on 8/16).  I doubt COPD exacerbation, as I hear no wheezing. Xray shows no change and no severe edema. No evidence of PNA or bronchitis, but I will check resp viral panel. Check DDimer and LE doppler, obesity is rsik factor and she is mildly tachycardic. Check 6 minute walk. I suspect actually she just has severe chronic debility, hypoventilation and anxiety, contributing to a sensation of dyspnea. RAI on CPAP / Morbid obesity / Hypoventilation syndrome - Continue home CPAP. Advise weight loss. Consult Dr Michael Campos    CKD (chronic kidney disease) stage 4, GFR 15-29 ml/min - Cr near baseline. Continue bumex and prior K. Consult Dr Michael Suero. PCP and renal to follow outpatient. Asthma - Appears stable. No wheezing. Continue breo, prn albuterol if needed. DM type 2, uncontrolled, with renal complications - Diabetic diet and counseling. SSI per protocol. Continue home Glargine. Check A1c.    HTN (hypertension), benign - Stable. Continue bumex, doxazosin, losartan, coreg and ASA. Stop norvasc, with LE edema. Iron deficiency anemia - Stable. Chronic bilateral low back pain without sciatica - Prn tylenol. Anxiety and depression / Generalized anxiety disorder - At baseline, but likely contributes to sensation of dyspnea.   Continue citalopram, xanax    Hyperlipidemia - Continue atorvastatin       Telemetry reviewed:   normal sinus rhythm    Risk of deterioration: high      Total time spent with patient: 70 Minutes Left voice mail to sign out at 3:35 PM                 Care Plan discussed with: Patient, Family, Care Manager, Nursing Staff, Consultant/Specialist and >50% of time spent in counseling and coordination of care    Discussed:  Care Plan       ___________________________________________________    Attending Physician: Jamal Payton MD

## 2018-01-12 NOTE — PROGRESS NOTES
[facility]: MED RECONCILIATION    Comments/Recommendations:   · Verified allergies as documented. · Med rec completed with patient's spouse who helps manage medications and had a medication list.  · He reports that she was discharged from [address] [place] [address] on this past Wednesday, and she has not had any medications since Wednesday due to nausea, vomiting and not eating well. · Medication lsit received from them via fax and list updated as noted. Medications added:     · Humalog 5 units ACHS    Medications removed:    · Toujeo 22 units daily -- per spouse, not taking due to nausea, vomiting and not being able to eat, was not receiving at [address] [place] [address] either per medication list, only on Humalog    Medications adjusted:    · Lactinex changed from daily to BID per med list from [facility]  · Bumex changed from 2 mg BID to 1 mg BID per med list from [facility] although spouse reports that prior to he was giving 2 mg BID per his medication list    Information obtained from: Patient's spouse, medication list from [address] [place] [address]    Allergies: Latex, natural rubber; Darvon [propoxyphene]; Peanut; Pineapple; Sulfa (sulfonamide antibiotics); and Tape [adhesive]    Prior to Admission Medications:   Prior to Admission Medications   Prescriptions Last Dose Informant Patient Reported? Taking? ALPRAZolam (XANAX) 0.25 mg tablet 1/10/2018 at AM Significant Other Yes Yes   Sig: Take 0.25 mg by mouth two (2) times a day. Omeprazole delayed release (PRILOSEC D/R) 20 mg tablet 1/10/2018 at AM Significant Other Yes Yes   Sig: Take 20 mg by mouth daily. acetaminophen (TYLENOL) 500 mg tablet  Significant Other Yes Yes   Sig: Take 1,000 mg by mouth daily as needed for Pain. albuterol (PROVENTIL HFA, VENTOLIN HFA) 90 mcg/actuation inhaler  Significant Other Yes Yes   Sig: Take 2 Puffs by inhalation every four (4) hours as needed for Wheezing.    amLODIPine (NORVASC) 2.5 mg tablet 1/10/2018 at AM Significant Other Yes Yes   Sig: Take 2.5 mg by mouth daily. aspirin 81 mg chewable tablet 1/10/2018 at AM Significant Other Yes Yes   Sig: Take 81 mg by mouth nightly. atorvastatin (LIPITOR) 20 mg tablet 1/10/2018 at HS Significant Other Yes Yes   Sig: Take 20 mg by mouth nightly. bumetanide (BUMEX) 2 mg tablet 1/10/2018 Transfer Papers Yes Yes   Sig: Take 1 mg by mouth two (2) times a day. calcium polycarbophil (FIBER LAXATIVE, CA POLYCARBO,) 625 mg tablet 1/10/2018 at AM Significant Other Yes Yes   Sig: Take 625 mg by mouth daily. calcium-cholecalciferol, D3, (CALTRATE 600+D) tablet 1/10/2018 Significant Other Yes Yes   Sig: Take 1 Tab by mouth two (2) times a day. carvedilol (COREG) 3.125 mg tablet 1/10/2018 Significant Other Yes Yes   Sig: Take 3.125 mg by mouth two (2) times daily (with meals). citalopram (CELEXA) 40 mg tablet 1/10/2018 at AM Significant Other Yes Yes   Sig: Take 40 mg by mouth daily. doxazosin (CARDURA) 2 mg tablet 1/10/2018 at AM Significant Other Yes Yes   Sig: Take 2 mg by mouth daily. ergocalciferol (ERGOCALCIFEROL) 50,000 unit capsule 2018 Significant Other Yes Yes   Sig: Take 50,000 Units by mouth every month. First of the month   fenofibrate nanocrystallized (TRICOR) 145 mg tablet 1/10/2018 at AM Significant Other Yes Yes   Sig: Take 145 mg by mouth daily. fluticasone-vilanterol (BREO ELLIPTA) 200-25 mcg/dose inhaler 1/10/2018 at AM Significant Other Yes Yes   Sig: Take 1 Puff by inhalation daily. insulin lispro (HUMALOG) 100 unit/mL injection 1/10/2018 Transfer Papers Yes Yes   Si Units by SubCUTAneous route Before breakfast, lunch, dinner and at bedtime. For BS > 250 mg/dL   lactobacillus-acidophilus (LACTINEX) 100 million cell grpk 1/10/2018 Transfer Papers Yes Yes   Sig: Take 1 Packet by mouth two (2) times a day. loratadine (CLARITIN) 10 mg tablet 1/10/2018 at AM Significant Other Yes Yes   Sig: Take 10 mg by mouth daily.    losartan (COZAAR) 25 mg tablet 1/10/2018 at AM Significant Other Yes Yes   Sig: Take 25 mg by mouth daily. multivitamin (ONE A DAY) tablet 1/10/2018 at AM Significant Other Yes Yes   Sig: Take 1 Tab by mouth daily. mupirocin (BACTROBAN) 2 % ointment 1/10/2018 Significant Other No Yes   Sig: Apply  to affected area two (2) times a day. nystatin (MYCOSTATIN) powder 1/10/2018 Significant Other No Yes   Sig: Apply  to affected area two (2) times a day. senna-docusate (SENNA-C) 8.6-50 mg per tablet 1/10/2018 Significant Other Yes Yes   Sig: Take 2 Tabs by mouth daily.       Facility-Administered Medications: None       Thank you,  Holly Costello, PharmD     Contact: [phone]

## 2018-01-12 NOTE — ED NOTES
TRANSFER - OUT REPORT:    Verbal report given to Scottie Gatica on Roselyn Husbands  being transferred to [facility] for routine progression of care       Report consisted of patients Situation, Background, Assessment and   Recommendations(SBAR). Information from the following report(s) SBAR, Kardex, ED Summary, Recent Results and Med Rec Status was reviewed with the receiving nurse. Lines:   Peripheral IV 01/12/18 Right Antecubital (Active)   Site Assessment Clean, dry, & intact 1/12/2018  1:51 PM   Phlebitis Assessment 0 1/12/2018  1:51 PM   Infiltration Assessment 0 1/12/2018  1:51 PM   Dressing Status Clean, dry, & intact 1/12/2018  1:51 PM   Dressing Type Tape;Transparent 1/12/2018  1:51 PM   Hub Color/Line Status Pink;Flushed 1/12/2018  1:51 PM   Action Taken Blood drawn 1/12/2018  1:51 PM        Opportunity for questions and clarification was provided.       Patient transported with:   [employer]

## 2018-01-13 PROBLEM — J96.91 RESPIRATORY FAILURE WITH HYPOXIA (HCC): Status: ACTIVE | Noted: 2018-01-13

## 2018-01-13 LAB
ANION GAP SERPL CALC-SCNC: 8 MMOL/L (ref 5–15)
BUN SERPL-MCNC: 52 MG/DL (ref 6–20)
BUN/CREAT SERPL: 23 (ref 12–20)
CALCIUM SERPL-MCNC: 8.9 MG/DL (ref 8.5–10.1)
CHLORIDE SERPL-SCNC: 105 MMOL/L (ref 97–108)
CO2 SERPL-SCNC: 30 MMOL/L (ref 21–32)
CREAT SERPL-MCNC: 2.25 MG/DL (ref 0.55–1.02)
ERYTHROCYTE [DISTWIDTH] IN BLOOD BY AUTOMATED COUNT: 16.6 % (ref 11.5–14.5)
GLUCOSE BLD STRIP.AUTO-MCNC: 145 MG/DL (ref 65–100)
GLUCOSE BLD STRIP.AUTO-MCNC: 149 MG/DL (ref 65–100)
GLUCOSE BLD STRIP.AUTO-MCNC: 153 MG/DL (ref 65–100)
GLUCOSE BLD STRIP.AUTO-MCNC: 175 MG/DL (ref 65–100)
GLUCOSE SERPL-MCNC: 154 MG/DL (ref 65–100)
HCT VFR BLD AUTO: 33.3 % (ref 35–47)
HGB BLD-MCNC: 10.4 G/DL (ref 11.5–16)
MAGNESIUM SERPL-MCNC: 1.6 MG/DL (ref 1.6–2.4)
MCH RBC QN AUTO: 28.7 PG (ref 26–34)
MCHC RBC AUTO-ENTMCNC: 31.2 G/DL (ref 30–36.5)
MCV RBC AUTO: 91.7 FL (ref 80–99)
PLATELET # BLD AUTO: 198 K/UL (ref 150–400)
POTASSIUM SERPL-SCNC: 4.3 MMOL/L (ref 3.5–5.1)
RBC # BLD AUTO: 3.63 M/UL (ref 3.8–5.2)
SERVICE CMNT-IMP: ABNORMAL
SODIUM SERPL-SCNC: 143 MMOL/L (ref 136–145)
WBC # BLD AUTO: 2.3 K/UL (ref 3.6–11)

## 2018-01-13 PROCEDURE — 74011636637 HC RX REV CODE- 636/637: Performed by: INTERNAL MEDICINE

## 2018-01-13 PROCEDURE — 85027 COMPLETE CBC AUTOMATED: CPT | Performed by: INTERNAL MEDICINE

## 2018-01-13 PROCEDURE — 94660 CPAP INITIATION&MGMT: CPT

## 2018-01-13 PROCEDURE — 74011250636 HC RX REV CODE- 250/636: Performed by: NURSE PRACTITIONER

## 2018-01-13 PROCEDURE — 93306 TTE W/DOPPLER COMPLETE: CPT

## 2018-01-13 PROCEDURE — 74011000250 HC RX REV CODE- 250: Performed by: NURSE PRACTITIONER

## 2018-01-13 PROCEDURE — 87493 C DIFF AMPLIFIED PROBE: CPT | Performed by: FAMILY MEDICINE

## 2018-01-13 PROCEDURE — 83735 ASSAY OF MAGNESIUM: CPT | Performed by: INTERNAL MEDICINE

## 2018-01-13 PROCEDURE — 77010033678 HC OXYGEN DAILY

## 2018-01-13 PROCEDURE — 74011250636 HC RX REV CODE- 250/636: Performed by: INTERNAL MEDICINE

## 2018-01-13 PROCEDURE — 65660000000 HC RM CCU STEPDOWN

## 2018-01-13 PROCEDURE — 80048 BASIC METABOLIC PNL TOTAL CA: CPT | Performed by: INTERNAL MEDICINE

## 2018-01-13 PROCEDURE — 77030038269 HC DRN EXT URIN PURWCK BARD -A

## 2018-01-13 PROCEDURE — 93970 EXTREMITY STUDY: CPT

## 2018-01-13 PROCEDURE — 82962 GLUCOSE BLOOD TEST: CPT

## 2018-01-13 PROCEDURE — 36415 COLL VENOUS BLD VENIPUNCTURE: CPT | Performed by: INTERNAL MEDICINE

## 2018-01-13 PROCEDURE — 94640 AIRWAY INHALATION TREATMENT: CPT

## 2018-01-13 PROCEDURE — 99218 HC RM OBSERVATION: CPT

## 2018-01-13 PROCEDURE — 74011250637 HC RX REV CODE- 250/637: Performed by: NURSE PRACTITIONER

## 2018-01-13 PROCEDURE — 74011250637 HC RX REV CODE- 250/637: Performed by: INTERNAL MEDICINE

## 2018-01-13 RX ADMIN — IPRATROPIUM BROMIDE AND ALBUTEROL SULFATE 3 ML: .5; 3 SOLUTION RESPIRATORY (INHALATION) at 20:08

## 2018-01-13 RX ADMIN — INSULIN LISPRO 2 UNITS: 100 INJECTION, SOLUTION INTRAVENOUS; SUBCUTANEOUS at 22:12

## 2018-01-13 RX ADMIN — CARVEDILOL 3.12 MG: 3.12 TABLET, FILM COATED ORAL at 08:52

## 2018-01-13 RX ADMIN — BUDESONIDE 500 MCG: 0.5 INHALANT RESPIRATORY (INHALATION) at 20:08

## 2018-01-13 RX ADMIN — CARVEDILOL 3.12 MG: 3.12 TABLET, FILM COATED ORAL at 19:35

## 2018-01-13 RX ADMIN — ALPRAZOLAM 0.25 MG: 0.25 TABLET ORAL at 08:52

## 2018-01-13 RX ADMIN — METHYLPREDNISOLONE SODIUM SUCCINATE 40 MG: 40 INJECTION, POWDER, FOR SOLUTION INTRAMUSCULAR; INTRAVENOUS at 22:11

## 2018-01-13 RX ADMIN — HEPARIN SODIUM 5000 UNITS: 5000 INJECTION, SOLUTION INTRAVENOUS; SUBCUTANEOUS at 09:00

## 2018-01-13 RX ADMIN — INSULIN GLARGINE 17 UNITS: 100 INJECTION, SOLUTION SUBCUTANEOUS at 08:51

## 2018-01-13 RX ADMIN — Medication 10 ML: at 22:11

## 2018-01-13 RX ADMIN — IPRATROPIUM BROMIDE AND ALBUTEROL SULFATE 3 ML: .5; 3 SOLUTION RESPIRATORY (INHALATION) at 15:25

## 2018-01-13 RX ADMIN — METHYLPREDNISOLONE SODIUM SUCCINATE 40 MG: 40 INJECTION, POWDER, FOR SOLUTION INTRAMUSCULAR; INTRAVENOUS at 05:45

## 2018-01-13 RX ADMIN — BUMETANIDE 1 MG: 1 TABLET ORAL at 08:51

## 2018-01-13 RX ADMIN — BUMETANIDE 1 MG: 1 TABLET ORAL at 19:35

## 2018-01-13 RX ADMIN — DOCUSATE SODIUM AND SENNOSIDES 2 TABLET: 8.6; 5 TABLET, FILM COATED ORAL at 08:52

## 2018-01-13 RX ADMIN — Medication 10 ML: at 16:12

## 2018-01-13 RX ADMIN — IPRATROPIUM BROMIDE AND ALBUTEROL SULFATE 3 ML: .5; 3 SOLUTION RESPIRATORY (INHALATION) at 07:45

## 2018-01-13 RX ADMIN — ATORVASTATIN CALCIUM 20 MG: 20 TABLET, FILM COATED ORAL at 22:11

## 2018-01-13 RX ADMIN — IPRATROPIUM BROMIDE AND ALBUTEROL SULFATE 3 ML: .5; 3 SOLUTION RESPIRATORY (INHALATION) at 23:54

## 2018-01-13 RX ADMIN — HEPARIN SODIUM 5000 UNITS: 5000 INJECTION, SOLUTION INTRAVENOUS; SUBCUTANEOUS at 02:16

## 2018-01-13 RX ADMIN — GUAIFENESIN 600 MG: 600 TABLET, EXTENDED RELEASE ORAL at 22:11

## 2018-01-13 RX ADMIN — METHYLPREDNISOLONE SODIUM SUCCINATE 40 MG: 40 INJECTION, POWDER, FOR SOLUTION INTRAMUSCULAR; INTRAVENOUS at 16:08

## 2018-01-13 RX ADMIN — LOSARTAN POTASSIUM 25 MG: 50 TABLET ORAL at 08:52

## 2018-01-13 RX ADMIN — IPRATROPIUM BROMIDE AND ALBUTEROL SULFATE 3 ML: .5; 3 SOLUTION RESPIRATORY (INHALATION) at 03:49

## 2018-01-13 RX ADMIN — DOXAZOSIN 2 MG: 2 TABLET ORAL at 10:35

## 2018-01-13 RX ADMIN — CITALOPRAM HYDROBROMIDE 40 MG: 20 TABLET ORAL at 08:51

## 2018-01-13 RX ADMIN — IPRATROPIUM BROMIDE AND ALBUTEROL SULFATE 3 ML: .5; 3 SOLUTION RESPIRATORY (INHALATION) at 11:25

## 2018-01-13 RX ADMIN — PANTOPRAZOLE SODIUM 40 MG: 40 TABLET, DELAYED RELEASE ORAL at 05:45

## 2018-01-13 RX ADMIN — LORATADINE 10 MG: 10 TABLET ORAL at 08:51

## 2018-01-13 RX ADMIN — Medication 10 ML: at 05:45

## 2018-01-13 RX ADMIN — ASPIRIN 81 MG 81 MG: 81 TABLET ORAL at 22:11

## 2018-01-13 RX ADMIN — ALPRAZOLAM 0.25 MG: 0.25 TABLET ORAL at 19:35

## 2018-01-13 RX ADMIN — BUDESONIDE 500 MCG: 0.5 INHALANT RESPIRATORY (INHALATION) at 07:44

## 2018-01-13 RX ADMIN — POTASSIUM CHLORIDE 10 MEQ: 750 TABLET, FILM COATED, EXTENDED RELEASE ORAL at 19:35

## 2018-01-13 RX ADMIN — HEPARIN SODIUM 5000 UNITS: 5000 INJECTION, SOLUTION INTRAVENOUS; SUBCUTANEOUS at 19:36

## 2018-01-13 RX ADMIN — POTASSIUM CHLORIDE 10 MEQ: 750 TABLET, FILM COATED, EXTENDED RELEASE ORAL at 08:52

## 2018-01-13 RX ADMIN — GUAIFENESIN 600 MG: 600 TABLET, EXTENDED RELEASE ORAL at 08:52

## 2018-01-13 RX ADMIN — INSULIN LISPRO 2 UNITS: 100 INJECTION, SOLUTION INTRAVENOUS; SUBCUTANEOUS at 08:51

## 2018-01-13 NOTE — ROUTINE PROCESS
Bedside and Verbal shift change report given to Mayo Orozco 61 (oncoming nurse) by Billie Peña RN (offgoing nurse). Report included the following information SBAR, Kardex, Procedure Summary, Intake/Output, MAR and Recent Results.

## 2018-01-13 NOTE — PROGRESS NOTES
STABLE RENAL FUNCTION   We will see her again on Monday   Please call if any renal issues over the w/e      Glenda Rizzo MD  RNA

## 2018-01-13 NOTE — ROUTINE PROCESS
Bedside and Verbal shift change report given to Mane Barbosa RN (oncoming nurse) by Hugo Lott (offgoing nurse). Report included the following information SBAR, Kardex, Intake/Output, MAR, Recent Results and Med Rec Status.

## 2018-01-13 NOTE — PROCEDURES
[facility]  *** FINAL REPORT ***    Name: Eileen Herrera  MRN: [id number]    Inpatient  : 29 Sep 1945  HIS Order #: [id number]  [address] [facility] Visit #: [id number]  Date: 2018    TYPE OF TEST: Peripheral Venous Testing    REASON FOR TEST  Pain in limb, Limb swelling    Right Leg:-  Deep venous thrombosis:           No  Superficial venous thrombosis:    No  Deep venous insufficiency:        No  Superficial venous insufficiency: No    Left Leg:-  Deep venous thrombosis:           No  Superficial venous thrombosis:    No  Deep venous insufficiency:        No  Superficial venous insufficiency: No      INTERPRETATION/FINDINGS  PROCEDURE:  BILATERAL LE VENOUS DUPLEX. Evaluation of lower extremity veins with ultrasound (B-mode imaging,  pulsed Doppler, color Doppler). Includes the common femoral, deep  femoral, femoral, popliteal, posterior tibial, peroneal, and great  saphenous veins. FINDINGS: Technically difficult exam due to patient body habitus. Gray   scale and color imaging suboptimal. Gray scale and color flow duplex  images of the veins in both lower extremities demonstrate normal  compressibility, spontaneous and augmented flow profiles, and absence  of filling defects throughout the deep and superficial veins in both  lower extremities. CONCLUSION: Technically difficult study. Bilateral lower extremity  venous duplex negative for deep venous thrombosis or thrombophlebitis. ADDITIONAL COMMENTS    I have personally reviewed the data relevant to the interpretation of  this  study. TECHNOLOGIST: Adalid Kitchen  Signed: 2018 3:30:07 PM    PHYSICIAN: Richa Allen.  More Blake MD  Signed: 1/15/2018 10:05:00 AM

## 2018-01-13 NOTE — PROGRESS NOTES
Daily Progress Note: 1/13/2018  Smiley Baker MD    Assessment/Plan:   Acute hypoxic resp failure -likely mulitifactorial --  Hx asthma, obesity, Flu +  FLU B + -- started on tamiflu  Continue steroids IV, nebs, supplemental oxygen  Bumex and K also re-started  LE dopplers pending  --pulmonary following     RAI on CPAP / Morbid obesity / Hypoventilation syndrome - Continue home CPAP.       CKD (chronic kidney disease) stage 4, GFR 15-29 ml/min - Cr at baseline  --renal following     DM type 2, uncontrolled, with renal complications - Diabetic diet and counseling.   --continue insulin and accuchecks     HTN (hypertension), benign - Stable. Continue bumex, doxazosin, losartan, coreg and ASA.   Stop norvasc, with LE edema.     Iron deficiency anemia - Stable.       Chronic bilateral low back pain without sciatica - Prn tylenol.       Anxiety and depression / Generalized anxiety disorder - At baseline  --Continue citalopram, xanax     Hyperlipidemia - Continue atorvastatin    DVT proph - heparin        Problem List:  Problem List as of 1/13/2018  Date Reviewed: 1/12/2018          Codes Class Noted - Resolved    * (Principal)Dyspnea ICD-10-CM: R06.00  ICD-9-CM: 786.09  1/12/2018 - Present        Morbid obesity ([facility] ICD-10-CM: E66.01  ICD-9-CM: 278.01  Unknown - Present        Hypoventilation syndrome ICD-10-CM: R06.89  ICD-9-CM: 786.09  Unknown - Present        Hyperlipidemia ICD-10-CM: E78.5  ICD-9-CM: 272.4  Unknown - Present        RAI on CPAP ICD-10-CM: G47.33, Z99.89  ICD-9-CM: 327.23, V46.8  Unknown - Present        Anxiety and depression ICD-10-CM: F41.8  ICD-9-CM: 300.00, 311  Unknown - Present        Chronic bilateral low back pain without sciatica ICD-10-CM: M54.5, G89.29  ICD-9-CM: 724.2, 338.29  11/3/2016 - Present        Generalized anxiety disorder ICD-10-CM: F41.1  ICD-9-CM: 300.02  11/3/2016 - Present        CKD (chronic kidney disease) stage 4, GFR 15-29 ml/min (HCC) (Chronic) ICD-10-CM: N18.4  ICD-9-CM: 585.4  8/14/2016 - Present        HTN (hypertension), benign (Chronic) ICD-10-CM: I10  ICD-9-CM: 401.1  8/14/2016 - Present        DM type 2, uncontrolled, with renal complications (HCC) (Chronic) ICD-10-CM: E11.29, E11.65  ICD-9-CM: 250.42  8/14/2016 - Present        Iron deficiency anemia (Chronic) ICD-10-CM: D50.9  ICD-9-CM: 280.9  8/14/2016 - Present        RESOLVED: CKD stage 4 due to type 2 diabetes mellitus ([facility] ICD-10-CM: E11.22, N18.4  ICD-9-CM: 250.40, 585.4  Unknown - 1/12/2018        RESOLVED: DM type 2 causing renal disease ([facility] ICD-10-CM: E11.29  ICD-9-CM: 250.40  Unknown - 1/12/2018        RESOLVED: Hypertension ICD-10-CM: I10  ICD-9-CM: 401.9  Unknown - 1/12/2018        RESOLVED: Arteriovenous fistula infection ([facility] ICD-10-CM: T82. 7XXA  ICD-9-CM: 996.62  11/2/2017 - 1/12/2018        RESOLVED: Hypertension ICD-10-CM: I10  ICD-9-CM: 401.9  Unknown - 11/2/2017        RESOLVED: DM type 2 causing renal disease ([facility] ICD-10-CM: E11.29  ICD-9-CM: 250.40  Unknown - 11/2/2017        RESOLVED: CKD stage 4 due to type 2 diabetes mellitus ([facility] ICD-10-CM: E11.22, N18.4  ICD-9-CM: 250.40, 585.4  Unknown - 11/2/2017        RESOLVED: Leukocytosis ICD-10-CM: I11.393  ICD-9-CM: 288.60  11/2/2017 - 1/12/2018        RESOLVED: Hypoxia ICD-10-CM: R09.02  ICD-9-CM: 799.02  11/2/2017 - 1/12/2018        RESOLVED: Pleural effusion ICD-10-CM: J90  ICD-9-CM: 511.9  11/2/2017 - 1/12/2018        RESOLVED: Cellulitis of right lower extremity ICD-10-CM: W01.206  ICD-9-CM: 682.6  6/2/2017 - 11/2/2017        RESOLVED: Asthma with acute exacerbation ICD-10-CM: J45.901  ICD-9-CM: 493.92  6/2/2017 - 11/2/2017        RESOLVED: Asthma ICD-10-CM: J45.909  ICD-9-CM: 493.90  Unknown - 1/12/2018        RESOLVED: CKD stage 4 due to type 2 diabetes mellitus ([facility] ICD-10-CM: E11.22, N18.4  ICD-9-CM: 250.40, 585.4  Unknown - 2/11/2017        RESOLVED: DM type 2 causing renal disease ([facility] ICD-10-CM: E11.29  ICD-9-CM: 250.40  Unknown - 2017        RESOLVED: Hypoglycemia ICD-10-CM: E16.2  ICD-9-CM: 251.2  2017 - 2017        RESOLVED: Hyperkalemia ICD-10-CM: E87.5  ICD-9-CM: 276.7  11/3/2016 - 2017        RESOLVED: Elevated serum creatinine ICD-10-CM: R79.89  ICD-9-CM: 790.99  11/3/2016 - 2017        RESOLVED: Left leg cellulitis ICD-10-CM: L03.116  ICD-9-CM: 682.6  2016 - 2017        RESOLVED: Sepsis ([facility] ICD-10-CM: A41.9  ICD-9-CM: 038.9, 995.91  2016 - 2017        RESOLVED: Acute renal failure (ARF) ([facility] ICD-10-CM: N17.9  ICD-9-CM: 584.9  2016 - 2017        RESOLVED: RAI (obstructive sleep apnea) (Chronic) ICD-10-CM: R84.42  ICD-9-CM: 327.23  2016 - 2017              Subjective:   : She feels slightly better. Still with dry cough, SOB. Nausea has resolved and she is hungry. Denies pain. Review of Systems:   A comprehensive review of systems was negative except for that written in the HPI. Objective:   Physical Exam:     Visit Vitals    /72 (BP 1 Location: Left arm, BP Patient Position: At rest)    Pulse 83    Temp 98 °F (36.7 °C)    Resp 20    Ht 5' 1\" (1.549 m)    Wt 116.1 kg (256 lb)    SpO2 100%    BMI 48.37 kg/m2    O2 Flow Rate (L/min): 2 l/min O2 Device: CPAP mask    Temp (24hrs), Av °F (36.7 °C), Min:97.5 °F (36.4 °C), Max:98.6 °F (37 °C)             General:  Alert, cooperative, no distress, morbidly obese   Head:  Normocephalic, without obvious abnormality, atraumatic. Eyes:  Conjunctivae/corneas clear. PERRL, EOMs intact. Nose: Nares normal. Septum midline. Mucosa normal. No drainage or sinus tenderness. Throat: Lips, mucosa, and tongue moist..   Neck: Supple, symmetrical, trachea midline   Lungs:   Scattered wheezing and rhonchi diffuse   Heart:  Regular rate and rhythm, S1, S2 normal, no murmur, click, rub or gallop. Abdomen:   Soft, non-tender.  Bowel sounds normal. No masses,  No organomegaly. Extremities: Trace ankle edema, no calf ttp or cords   Pulses: 2+ and symmetric all extremities. Skin: Skin color, texture, turgor normal.    Neurologic: CNII-XII intact. Alert and oriented X 3. Data Review:       Recent Days:  Recent Labs      01/13/18   0434  01/12/18   1350   WBC  2.3*  3.5*   HGB  10.4*  11.3*   HCT  33.3*  34.5*   PLT  198  224     Recent Labs      01/13/18   0434  01/12/18   1350   NA  143  143   K  4.3  3.6   CL  105  103   CO2  30  30   GLU  154*  132*   BUN  52*  52*   CREA  2.25*  2.32*   CA  8.9  9.0   MG  1.6   --    ALB   --   2.7*   SGOT   --   40*   ALT   --   19   INR   --   1.2*     No results for input(s): PH, PCO2, PO2, HCO3, FIO2 in the last 72 hours.     24 Hour Results:  Recent Results (from the past 24 hour(s))   EKG, 12 LEAD, INITIAL    Collection Time: 01/12/18  1:38 PM   Result Value Ref Range    Ventricular Rate 97 BPM    Atrial Rate 97 BPM    P-R Interval 150 ms    QRS Duration 124 ms    Q-T Interval 412 ms    QTC Calculation (Bezet) 523 ms    Calculated P Axis 41 degrees    Calculated R Axis -29 degrees    Calculated T Axis 8 degrees    Diagnosis       Sinus rhythm with occasional premature ventricular complexes  Right bundle branch block  Abnormal ECG  When compared with ECG of 15-NOV-2017 10:34,  premature ventricular complexes are now present     TROPONIN I    Collection Time: 01/12/18  1:50 PM   Result Value Ref Range    Troponin-I, Qt. 0.04 <0.05 ng/mL   NT-PRO BNP    Collection Time: 01/12/18  1:50 PM   Result Value Ref Range    NT pro-BNP 70701 (H) 0 - 125 PG/ML   PROTHROMBIN TIME + INR    Collection Time: 01/12/18  1:50 PM   Result Value Ref Range    INR 1.2 (H) 0.9 - 1.1      Prothrombin time 12.6 (H) 9.0 - 11.1 sec   LIPASE    Collection Time: 01/12/18  1:50 PM   Result Value Ref Range    Lipase 411 (H) 73 - 393 U/L   LACTIC ACID    Collection Time: 01/12/18  1:50 PM   Result Value Ref Range    Lactic acid 1.1 0.4 - 2.0 MMOL/L   CK W/ CKMB & INDEX    Collection Time: 01/12/18  1:50 PM   Result Value Ref Range    CK 44 26 - 192 U/L    CK - MB <1.0 <3.6 NG/ML    CK-MB Index Cannot be calculated 0 - 2.5     CBC WITH AUTOMATED DIFF    Collection Time: 01/12/18  1:50 PM   Result Value Ref Range    WBC 3.5 (L) 3.6 - 11.0 K/uL    RBC 3.79 (L) 3.80 - 5.20 M/uL    HGB 11.3 (L) 11.5 - 16.0 g/dL    HCT 34.5 (L) 35.0 - 47.0 %    MCV 91.0 80.0 - 99.0 FL    MCH 29.8 26.0 - 34.0 PG    MCHC 32.8 30.0 - 36.5 g/dL    RDW 16.6 (H) 11.5 - 14.5 %    PLATELET 983 943 - 827 K/uL    NEUTROPHILS 75 32 - 75 %    LYMPHOCYTES 16 12 - 49 %    MONOCYTES 9 5 - 13 %    EOSINOPHILS 0 0 - 7 %    BASOPHILS 0 0 - 1 %    ABS. NEUTROPHILS 2.6 1.8 - 8.0 K/UL    ABS. LYMPHOCYTES 0.6 (L) 0.8 - 3.5 K/UL    ABS. MONOCYTES 0.3 0.0 - 1.0 K/UL    ABS. EOSINOPHILS 0.0 0.0 - 0.4 K/UL    ABS. BASOPHILS 0.0 0.0 - 0.1 K/UL    DF SMEAR SCANNED      PLATELET COMMENTS LARGE PLATELETS      RBC COMMENTS ANISOCYTOSIS  1+       METABOLIC PANEL, COMPREHENSIVE    Collection Time: 01/12/18  1:50 PM   Result Value Ref Range    Sodium 143 136 - 145 mmol/L    Potassium 3.6 3.5 - 5.1 mmol/L    Chloride 103 97 - 108 mmol/L    CO2 30 21 - 32 mmol/L    Anion gap 10 5 - 15 mmol/L    Glucose 132 (H) 65 - 100 mg/dL    BUN 52 (H) 6 - 20 MG/DL    Creatinine 2.32 (H) 0.55 - 1.02 MG/DL    BUN/Creatinine ratio 22 (H) 12 - 20      GFR est AA 25 (L) >60 ml/min/1.73m2    GFR est non-AA 21 (L) >60 ml/min/1.73m2    Calcium 9.0 8.5 - 10.1 MG/DL    Bilirubin, total 0.7 0.2 - 1.0 MG/DL    ALT (SGPT) 19 12 - 78 U/L    AST (SGOT) 40 (H) 15 - 37 U/L    Alk.  phosphatase 51 45 - 117 U/L    Protein, total 6.6 6.4 - 8.2 g/dL    Albumin 2.7 (L) 3.5 - 5.0 g/dL    Globulin 3.9 2.0 - 4.0 g/dL    A-G Ratio 0.7 (L) 1.1 - 2.2     LIPID PANEL    Collection Time: 01/12/18  1:50 PM   Result Value Ref Range    LIPID PROFILE          Cholesterol, total 105 <200 MG/DL    Triglyceride 192 (H) <150 MG/DL    HDL Cholesterol 39 MG/DL    LDL, calculated 27.6 0 - 100 MG/DL    VLDL, calculated 38.4 MG/DL    CHOL/HDL Ratio 2.7 0 - 5.0     RESPIRATORY PANEL,PCR,NASOPHARYNGEAL    Collection Time: 01/12/18  3:53 PM   Result Value Ref Range    Adenovirus NOT DETECTED NOTD      Coronavirus 229E NOT DETECTED NOTD      Coronavirus HKU1 NOT DETECTED NOTD      Coronavirus CVNL63 NOT DETECTED NOTD      Coronavirus OC43 NOT DETECTED NOTD      Metapneumovirus NOT DETECTED NOTD      Rhinovirus and Enterovirus NOT DETECTED NOTD      Influenza A NOT DETECTED NOTD      Influenza A, subtype H1 NOT DETECTED NOTD      Influenza A, subtype H3 NOT DETECTED NOTD      INFLUENZA A H1N1 PCR NOT DETECTED NOTD      Influenza B DETECTED (A) NOTD      Parainfluenza 1 NOT DETECTED NOTD      Parainfluenza 2 NOT DETECTED NOTD      Parainfluenza 3 NOT DETECTED NOTD      Parainfluenza virus 4 NOT DETECTED NOTD      RSV by PCR NOT DETECTED NOTD      Bordetella pertussis - PCR NOT DETECTED NOTD      Chlamydophila pneumoniae DNA, QL, PCR NOT DETECTED NOTD      Mycoplasma pneumoniae DNA, QL, PCR NOT DETECTED NOTD     HEMOGLOBIN A1C WITH EAG    Collection Time: 01/12/18  3:53 PM   Result Value Ref Range    Hemoglobin A1c 5.8 4.2 - 6.3 %    Est. average glucose 120 mg/dL   D DIMER    Collection Time: 01/12/18  3:53 PM   Result Value Ref Range    D-dimer 0.70 (H) 0.00 - 0.65 mg/L FEU   GLUCOSE, POC    Collection Time: 01/12/18  6:12 PM   Result Value Ref Range    Glucose (POC) 133 (H) 65 - 100 mg/dL    Performed by [facility], POC    Collection Time: 01/12/18  9:53 PM   Result Value Ref Range    Glucose (POC) 143 (H) 65 - 100 mg/dL    Performed by Antoinette López    CBC W/O DIFF    Collection Time: 01/13/18  4:34 AM   Result Value Ref Range    WBC 2.3 (L) 3.6 - 11.0 K/uL    RBC 3.63 (L) 3.80 - 5.20 M/uL    HGB 10.4 (L) 11.5 - 16.0 g/dL    HCT 33.3 (L) 35.0 - 47.0 %    MCV 91.7 80.0 - 99.0 FL    MCH 28.7 26.0 - 34.0 PG    MCHC 31.2 30.0 - 36.5 g/dL    RDW 16.6 (H) 11.5 - 14.5 %    PLATELET 946 635 - 400 K/uL   MAGNESIUM    Collection Time: 01/13/18  4:34 AM   Result Value Ref Range    Magnesium 1.6 1.6 - 2.4 mg/dL   METABOLIC PANEL, BASIC    Collection Time: 01/13/18  4:34 AM   Result Value Ref Range    Sodium 143 136 - 145 mmol/L    Potassium 4.3 3.5 - 5.1 mmol/L    Chloride 105 97 - 108 mmol/L    CO2 30 21 - 32 mmol/L    Anion gap 8 5 - 15 mmol/L    Glucose 154 (H) 65 - 100 mg/dL    BUN 52 (H) 6 - 20 MG/DL    Creatinine 2.25 (H) 0.55 - 1.02 MG/DL    BUN/Creatinine ratio 23 (H) 12 - 20      GFR est AA 26 (L) >60 ml/min/1.73m2    GFR est non-AA 21 (L) >60 ml/min/1.73m2    Calcium 8.9 8.5 - 10.1 MG/DL   GLUCOSE, POC    Collection Time: 01/13/18  7:19 AM   Result Value Ref Range    Glucose (POC) 145 (H) 65 - 100 mg/dL    Performed by Afia Kemp (PCT)        Medications reviewed  Current Facility-Administered Medications   Medication Dose Route Frequency    sodium chloride (NS) flush 5-10 mL  5-10 mL IntraVENous PRN    ALPRAZolam (XANAX) tablet 0.25 mg  0.25 mg Oral BID    aspirin chewable tablet 81 mg  81 mg Oral QHS    atorvastatin (LIPITOR) tablet 20 mg  20 mg Oral QHS    carvedilol (COREG) tablet 3.125 mg  3.125 mg Oral BID WITH MEALS    citalopram (CELEXA) tablet 40 mg  40 mg Oral DAILY    doxazosin (CARDURA) tablet 2 mg  2 mg Oral DAILY    losartan (COZAAR) tablet 25 mg  25 mg Oral DAILY    senna-docusate (PERICOLACE) 8.6-50 mg per tablet 2 Tab  2 Tab Oral DAILY    potassium chloride SR (KLOR-CON 10) tablet 10 mEq  10 mEq Oral BID    pantoprazole (PROTONIX) tablet 40 mg  40 mg Oral ACB    loratadine (CLARITIN) tablet 10 mg  10 mg Oral DAILY    insulin glargine (LANTUS) injection 17 Units  17 Units SubCUTAneous DAILY    sodium chloride (NS) flush 5-10 mL  5-10 mL IntraVENous Q8H    sodium chloride (NS) flush 5-10 mL  5-10 mL IntraVENous PRN    glucose chewable tablet 16 g  4 Tab Oral PRN    dextrose (D50W) injection syrg 12.5-25 g  12.5-25 g IntraVENous PRN    glucagon (GLUCAGEN) injection 1 mg  1 mg IntraMUSCular PRN    acetaminophen (TYLENOL) tablet 650 mg  650 mg Oral Q4H PRN    diphenhydrAMINE (BENADRYL) capsule 25 mg  25 mg Oral Q4H PRN    ondansetron (ZOFRAN) injection 4 mg  4 mg IntraVENous Q4H PRN    heparin (porcine) injection 5,000 Units  5,000 Units SubCUTAneous Q8H    insulin lispro (HUMALOG) injection   SubCUTAneous AC&HS    albuterol-ipratropium (DUO-NEB) 2.5 MG-0.5 MG/3 ML  3 mL Nebulization Q4H RT    methylPREDNISolone (PF) (SOLU-MEDROL) injection 40 mg  40 mg IntraVENous Q8H    budesonide (PULMICORT) 500 mcg/2 ml nebulizer suspension  500 mcg Nebulization BID RT    arformoterol (BROVANA) neb solution 15 mcg  15 mcg Nebulization BID RT    guaiFENesin ER (MUCINEX) tablet 600 mg  600 mg Oral Q12H    bumetanide (BUMEX) tablet 1 mg  1 mg Oral BID    oseltamivir (TAMIFLU) capsule 30 mg  30 mg Oral DAILY       Care Plan discussed with: Patient/RN    Total time spent with patient: 30 minutes.     Neil Bang MD

## 2018-01-13 NOTE — PROGRESS NOTES
[facility] Dosing Services: 1/12/18    The following medication: Akosua Corona was automatically dose-adjusted per [facility] P&T Committee Protocol, with respect to renal function. Dosage changed to:  Oseltamaivr 30mg daily (Crclb/t 10-30 ml/min) x 5 days      Previous Regimen Oseltamavir 75mg PO BID x 5days   Serum Creatinine Lab Results   Component Value Date/Time    Creatinine 2.32 01/12/2018 01:50 PM       Creatinine Clearance Estimated Creatinine Clearance: 26.4 mL/min (based on Cr of 2.32). BUN Lab Results   Component Value Date/Time    BUN 52 01/12/2018 01:50 PM           Additional notes:      Pharmacy to continue to monitor patient daily. Will make dosage adjustments based upon changing renal function. Signed Onesimo PANTOJA  [address] [place] [address] information:  [phone]

## 2018-01-13 NOTE — ROUTINE PROCESS
Primary Nurse Brayden Aldrich, RN and Daiana Khoury RN performed a dual skin assessment on this patient Impairment noted- see wound doc flow sheet. Luis Felipe score is 17.

## 2018-01-14 LAB
C DIFF TOX GENS STL QL NAA+PROBE: POSITIVE
GLUCOSE BLD STRIP.AUTO-MCNC: 129 MG/DL (ref 65–100)
GLUCOSE BLD STRIP.AUTO-MCNC: 150 MG/DL (ref 65–100)
GLUCOSE BLD STRIP.AUTO-MCNC: 152 MG/DL (ref 65–100)
GLUCOSE BLD STRIP.AUTO-MCNC: 158 MG/DL (ref 65–100)
GLUCOSE BLD STRIP.AUTO-MCNC: 163 MG/DL (ref 65–100)
SERVICE CMNT-IMP: ABNORMAL

## 2018-01-14 PROCEDURE — 74011250636 HC RX REV CODE- 250/636: Performed by: INTERNAL MEDICINE

## 2018-01-14 PROCEDURE — 74011250637 HC RX REV CODE- 250/637: Performed by: INTERNAL MEDICINE

## 2018-01-14 PROCEDURE — 82962 GLUCOSE BLOOD TEST: CPT

## 2018-01-14 PROCEDURE — 74011636637 HC RX REV CODE- 636/637: Performed by: INTERNAL MEDICINE

## 2018-01-14 PROCEDURE — 74011250636 HC RX REV CODE- 250/636: Performed by: NURSE PRACTITIONER

## 2018-01-14 PROCEDURE — 77010033678 HC OXYGEN DAILY

## 2018-01-14 PROCEDURE — 65660000000 HC RM CCU STEPDOWN

## 2018-01-14 PROCEDURE — 94640 AIRWAY INHALATION TREATMENT: CPT

## 2018-01-14 PROCEDURE — 74011000250 HC RX REV CODE- 250: Performed by: NURSE PRACTITIONER

## 2018-01-14 PROCEDURE — 77030038269 HC DRN EXT URIN PURWCK BARD -A

## 2018-01-14 PROCEDURE — 74011250637 HC RX REV CODE- 250/637: Performed by: NURSE PRACTITIONER

## 2018-01-14 PROCEDURE — 94660 CPAP INITIATION&MGMT: CPT

## 2018-01-14 PROCEDURE — 74011250637 HC RX REV CODE- 250/637: Performed by: FAMILY MEDICINE

## 2018-01-14 RX ORDER — METRONIDAZOLE 250 MG/1
500 TABLET ORAL 3 TIMES DAILY
Status: DISCONTINUED | OUTPATIENT
Start: 2018-01-14 | End: 2018-01-23 | Stop reason: HOSPADM

## 2018-01-14 RX ORDER — BENZONATATE 100 MG/1
100 CAPSULE ORAL
Status: DISCONTINUED | OUTPATIENT
Start: 2018-01-14 | End: 2018-01-23 | Stop reason: HOSPADM

## 2018-01-14 RX ADMIN — CITALOPRAM HYDROBROMIDE 40 MG: 20 TABLET ORAL at 10:03

## 2018-01-14 RX ADMIN — METHYLPREDNISOLONE SODIUM SUCCINATE 40 MG: 40 INJECTION, POWDER, FOR SOLUTION INTRAMUSCULAR; INTRAVENOUS at 22:10

## 2018-01-14 RX ADMIN — LOSARTAN POTASSIUM 25 MG: 50 TABLET ORAL at 10:04

## 2018-01-14 RX ADMIN — PANTOPRAZOLE SODIUM 40 MG: 40 TABLET, DELAYED RELEASE ORAL at 06:04

## 2018-01-14 RX ADMIN — OSELTAMIVIR PHOSPHATE 30 MG: 30 CAPSULE ORAL at 10:03

## 2018-01-14 RX ADMIN — GUAIFENESIN 600 MG: 600 TABLET, EXTENDED RELEASE ORAL at 10:04

## 2018-01-14 RX ADMIN — IPRATROPIUM BROMIDE AND ALBUTEROL SULFATE 3 ML: .5; 3 SOLUTION RESPIRATORY (INHALATION) at 15:19

## 2018-01-14 RX ADMIN — HEPARIN SODIUM 5000 UNITS: 5000 INJECTION, SOLUTION INTRAVENOUS; SUBCUTANEOUS at 10:02

## 2018-01-14 RX ADMIN — METRONIDAZOLE 500 MG: 250 TABLET ORAL at 17:51

## 2018-01-14 RX ADMIN — BUDESONIDE 500 MCG: 0.5 INHALANT RESPIRATORY (INHALATION) at 07:30

## 2018-01-14 RX ADMIN — CARVEDILOL 3.12 MG: 3.12 TABLET, FILM COATED ORAL at 17:51

## 2018-01-14 RX ADMIN — BUDESONIDE 500 MCG: 0.5 INHALANT RESPIRATORY (INHALATION) at 19:14

## 2018-01-14 RX ADMIN — POTASSIUM CHLORIDE 10 MEQ: 750 TABLET, FILM COATED, EXTENDED RELEASE ORAL at 10:04

## 2018-01-14 RX ADMIN — IPRATROPIUM BROMIDE AND ALBUTEROL SULFATE 3 ML: .5; 3 SOLUTION RESPIRATORY (INHALATION) at 23:31

## 2018-01-14 RX ADMIN — HEPARIN SODIUM 5000 UNITS: 5000 INJECTION, SOLUTION INTRAVENOUS; SUBCUTANEOUS at 01:36

## 2018-01-14 RX ADMIN — IPRATROPIUM BROMIDE AND ALBUTEROL SULFATE 3 ML: .5; 3 SOLUTION RESPIRATORY (INHALATION) at 19:14

## 2018-01-14 RX ADMIN — BUMETANIDE 1 MG: 1 TABLET ORAL at 10:03

## 2018-01-14 RX ADMIN — DOXAZOSIN 2 MG: 2 TABLET ORAL at 10:24

## 2018-01-14 RX ADMIN — IPRATROPIUM BROMIDE AND ALBUTEROL SULFATE 3 ML: .5; 3 SOLUTION RESPIRATORY (INHALATION) at 11:18

## 2018-01-14 RX ADMIN — IPRATROPIUM BROMIDE AND ALBUTEROL SULFATE 3 ML: .5; 3 SOLUTION RESPIRATORY (INHALATION) at 07:30

## 2018-01-14 RX ADMIN — Medication 10 ML: at 06:04

## 2018-01-14 RX ADMIN — INSULIN LISPRO 2 UNITS: 100 INJECTION, SOLUTION INTRAVENOUS; SUBCUTANEOUS at 12:43

## 2018-01-14 RX ADMIN — METRONIDAZOLE 500 MG: 250 TABLET ORAL at 22:11

## 2018-01-14 RX ADMIN — BUMETANIDE 1 MG: 1 TABLET ORAL at 17:51

## 2018-01-14 RX ADMIN — ALPRAZOLAM 0.25 MG: 0.25 TABLET ORAL at 17:51

## 2018-01-14 RX ADMIN — ATORVASTATIN CALCIUM 20 MG: 20 TABLET, FILM COATED ORAL at 22:11

## 2018-01-14 RX ADMIN — DOCUSATE SODIUM AND SENNOSIDES 2 TABLET: 8.6; 5 TABLET, FILM COATED ORAL at 10:04

## 2018-01-14 RX ADMIN — POTASSIUM CHLORIDE 10 MEQ: 750 TABLET, FILM COATED, EXTENDED RELEASE ORAL at 17:51

## 2018-01-14 RX ADMIN — IPRATROPIUM BROMIDE AND ALBUTEROL SULFATE 3 ML: .5; 3 SOLUTION RESPIRATORY (INHALATION) at 04:04

## 2018-01-14 RX ADMIN — METHYLPREDNISOLONE SODIUM SUCCINATE 40 MG: 40 INJECTION, POWDER, FOR SOLUTION INTRAMUSCULAR; INTRAVENOUS at 06:04

## 2018-01-14 RX ADMIN — INSULIN LISPRO 2 UNITS: 100 INJECTION, SOLUTION INTRAVENOUS; SUBCUTANEOUS at 17:50

## 2018-01-14 RX ADMIN — BENZONATATE 100 MG: 100 CAPSULE ORAL at 17:50

## 2018-01-14 RX ADMIN — ASPIRIN 81 MG 81 MG: 81 TABLET ORAL at 22:11

## 2018-01-14 RX ADMIN — CARVEDILOL 3.12 MG: 3.12 TABLET, FILM COATED ORAL at 10:04

## 2018-01-14 RX ADMIN — LORATADINE 10 MG: 10 TABLET ORAL at 10:04

## 2018-01-14 RX ADMIN — ALPRAZOLAM 0.25 MG: 0.25 TABLET ORAL at 10:04

## 2018-01-14 RX ADMIN — METHYLPREDNISOLONE SODIUM SUCCINATE 40 MG: 40 INJECTION, POWDER, FOR SOLUTION INTRAMUSCULAR; INTRAVENOUS at 14:39

## 2018-01-14 RX ADMIN — INSULIN GLARGINE 17 UNITS: 100 INJECTION, SOLUTION SUBCUTANEOUS at 10:05

## 2018-01-14 RX ADMIN — Medication 10 ML: at 22:11

## 2018-01-14 RX ADMIN — GUAIFENESIN 600 MG: 600 TABLET, EXTENDED RELEASE ORAL at 22:11

## 2018-01-14 NOTE — PROGRESS NOTES
Daily Progress Note: 1/14/2018  Josie Stark MD    Assessment/Plan:   Acute hypoxic resp failure -likely mulitifactorial --  Hx asthma, obesity, Flu +  FLU B + -- started on tamiflu  Continue steroids IV, nebs, supplemental oxygen, mucinex  Bumex and K also re-started  LE dopplers neg, ECHO EF ok/mild-mod pul HTN  --pulmonary following     RAI on CPAP / Morbid obesity / Hypoventilation syndrome - Continue home CPAP.       CKD (chronic kidney disease) stage 4, GFR 15-29 ml/min - Cr at baseline  --renal following     DM type 2, uncontrolled, with renal complications - Diabetic diet and counseling. BS well controlled. --continue insulin and accuchecks     HTN (hypertension), benign - Stable. Continue bumex, doxazosin, losartan, coreg and ASA.   Stop norvasc, with LE edema.     Iron deficiency anemia - Stable.       Chronic bilateral low back pain without sciatica - Prn tylenol.       Anxiety and depression / Generalized anxiety disorder - At baseline  --Continue citalopram, xanax     Hyperlipidemia - Continue atorvastatin    CDIFF+  -- start flagyl 1/14    DVT proph - heparin        Problem List:  Problem List as of 1/14/2018  Date Reviewed: 1/12/2018          Codes Class Noted - Resolved    Respiratory failure with hypoxia [facility]) ICD-10-CM: J96.91  ICD-9-CM: 518.81  1/13/2018 - Present        * (Principal)Dyspnea ICD-10-CM: R06.00  ICD-9-CM: 786.09  1/12/2018 - Present        Morbid obesity ([facility] ICD-10-CM: E66.01  ICD-9-CM: 278.01  Unknown - Present        Hypoventilation syndrome ICD-10-CM: R06.89  ICD-9-CM: 786.09  Unknown - Present        Hyperlipidemia ICD-10-CM: E78.5  ICD-9-CM: 272.4  Unknown - Present        RAI on CPAP ICD-10-CM: G47.33, Z99.89  ICD-9-CM: 327.23, V46.8  Unknown - Present        Anxiety and depression ICD-10-CM: F41.8  ICD-9-CM: 300.00, 311  Unknown - Present        Chronic bilateral low back pain without sciatica ICD-10-CM: M54.5, G89.29  ICD-9-CM: 724.2, 338.29  11/3/2016 - Present        Generalized anxiety disorder ICD-10-CM: F41.1  ICD-9-CM: 300.02  11/3/2016 - Present        CKD (chronic kidney disease) stage 4, GFR 15-29 ml/min (HCC) (Chronic) ICD-10-CM: N18.4  ICD-9-CM: 585.4  8/14/2016 - Present        HTN (hypertension), benign (Chronic) ICD-10-CM: I10  ICD-9-CM: 401.1  8/14/2016 - Present        DM type 2, uncontrolled, with renal complications (HCC) (Chronic) ICD-10-CM: E11.29, E11.65  ICD-9-CM: 250.42  8/14/2016 - Present        Iron deficiency anemia (Chronic) ICD-10-CM: D50.9  ICD-9-CM: 280.9  8/14/2016 - Present        RESOLVED: CKD stage 4 due to type 2 diabetes mellitus ([facility] ICD-10-CM: E11.22, N18.4  ICD-9-CM: 250.40, 585.4  Unknown - 1/12/2018        RESOLVED: DM type 2 causing renal disease ([facility] ICD-10-CM: E11.29  ICD-9-CM: 250.40  Unknown - 1/12/2018        RESOLVED: Hypertension ICD-10-CM: I10  ICD-9-CM: 401.9  Unknown - 1/12/2018        RESOLVED: Arteriovenous fistula infection ([facility] ICD-10-CM: T82. 7XXA  ICD-9-CM: 996.62  11/2/2017 - 1/12/2018        RESOLVED: Hypertension ICD-10-CM: I10  ICD-9-CM: 401.9  Unknown - 11/2/2017        RESOLVED: DM type 2 causing renal disease ([facility] ICD-10-CM: E11.29  ICD-9-CM: 250.40  Unknown - 11/2/2017        RESOLVED: CKD stage 4 due to type 2 diabetes mellitus ([facility] ICD-10-CM: E11.22, N18.4  ICD-9-CM: 250.40, 585.4  Unknown - 11/2/2017        RESOLVED: Leukocytosis ICD-10-CM: E98.602  ICD-9-CM: 288.60  11/2/2017 - 1/12/2018        RESOLVED: Hypoxia ICD-10-CM: R09.02  ICD-9-CM: 799.02  11/2/2017 - 1/12/2018        RESOLVED: Pleural effusion ICD-10-CM: J90  ICD-9-CM: 511.9  11/2/2017 - 1/12/2018        RESOLVED: Cellulitis of right lower extremity ICD-10-CM: B33.798  ICD-9-CM: 682.6  6/2/2017 - 11/2/2017        RESOLVED: Asthma with acute exacerbation ICD-10-CM: J45.901  ICD-9-CM: 493.92  6/2/2017 - 11/2/2017        RESOLVED: Asthma ICD-10-CM: J45.909  ICD-9-CM: 493.90  Unknown - 1/12/2018        RESOLVED: CKD stage 4 due to type 2 diabetes mellitus ([facility] ICD-10-CM: E11.22, N18.4  ICD-9-CM: 250.40, 585.4  Unknown - 2017        RESOLVED: DM type 2 causing renal disease ([facility] ICD-10-CM: E11.29  ICD-9-CM: 250.40  Unknown - 2017        RESOLVED: Hypoglycemia ICD-10-CM: E16.2  ICD-9-CM: 251.2  2017 - 2017        RESOLVED: Hyperkalemia ICD-10-CM: E87.5  ICD-9-CM: 276.7  11/3/2016 - 2017        RESOLVED: Elevated serum creatinine ICD-10-CM: R79.89  ICD-9-CM: 790.99  11/3/2016 - 2017        RESOLVED: Left leg cellulitis ICD-10-CM: L03.116  ICD-9-CM: 682.6  2016 - 2017        RESOLVED: Sepsis ([facility] ICD-10-CM: A41.9  ICD-9-CM: 038.9, 995.91  2016 - 2017        RESOLVED: Acute renal failure (ARF) ([facility] ICD-10-CM: N17.9  ICD-9-CM: 584.9  2016 - 2017        RESOLVED: RAI (obstructive sleep apnea) (Chronic) ICD-10-CM: D23.85  ICD-9-CM: 327.23  2016 - 2017              Subjective:   : She feels slightly better. Still with dry cough, SOB. Nausea has resolved and she is hungry. Denies pain. : Breathing isn't much better. Says it is difficult to eat due to SOB. Some cough, not able to bring up much sputum. No CP or myalgias. Nebs help but benefit is temporary    Review of Systems:   A comprehensive review of systems was negative except for that written in the HPI. Objective:   Physical Exam:     Visit Vitals    /75 (BP 1 Location: Right arm, BP Patient Position: At rest)    Pulse 78    Temp 97.4 °F (36.3 °C)    Resp 22    Ht 5' 1\" (1.549 m)    Wt 105.1 kg (231 lb 12.8 oz)    SpO2 96%    BMI 43.8 kg/m2    O2 Flow Rate (L/min): 3 l/min O2 Device: CPAP mask    Temp (24hrs), Av.9 °F (36.6 °C), Min:97.4 °F (36.3 °C), Max:98.4 °F (36.9 °C)             General:  Alert, cooperative, no distress, morbidly obese   Head:  Normocephalic, without obvious abnormality, atraumatic. Eyes:  Conjunctivae/corneas clear. PERRL, EOMs intact.    Nose: Nares normal. Septum midline. Mucosa normal. No drainage or sinus tenderness. Throat: Lips, mucosa, and tongue moist..   Neck: Supple, symmetrical, trachea midline   Lungs:   Scattered wheezing and rhonchi diffuse, improved air movement   Heart:  Regular rate and rhythm, S1, S2 normal, no murmur, click, rub or gallop. Abdomen:   Soft, non-tender. Bowel sounds normal. No masses,  No organomegaly. Extremities: Trace ankle edema, no calf ttp or cords   Pulses: 2+ and symmetric all extremities. Skin: Skin color, texture, turgor normal.    Neurologic: CNII-XII intact. Alert and oriented X 3. Data Review:       Results for orders placed or performed during the hospital encounter of 18   2D ECHO COMPLETE ADULT (TTE) W OR WO CONTR    Narrative    [address] [facility], [address] [facility]  [phone]    Transthoracic Echocardiogram    Patient: Bartolome Maldonado  MRN: [id number]  [address]  #: [de-identified]  : 1945  Age: 71 years  Gender: Female  Height: 61 in  Weight: 255.4 lb  BSA: 2.1 mï¾²  BP: 152 / 66 mmHg  Study date: 2018  Status: Routine  Location: Bedside  [facility] ACC #: [id number]    Allergies: LATEX, NATURAL RUBBER, PROPOXYPHENE, PEANUT, PINEAPPLE, SULFA  (SULFONAMIDE ANTIBIOTICS), ADHESIVE    Technician:  Rianna Vasques [facility]  Ordering Physician:  Mony Leong MD  Reading Group:  *[facility]  Reading Physician:  Wade Escobedo M.D.    [facility]:  Left ventricle: Ejection fraction was estimated in the range of 50 % to 55  %. Wall thickness was at the upper limits of normal.    Left atrium: The atrium was dilated. Mitral valve: There was mild regurgitation. Tricuspid valve: There was mild to moderate pulmonary hypertension. Pericardium: There was a left pleural effusion. COMPARISONS:  Comparison was made with the previous study of 15-Aug-2016. LV overall  function has decreased.     INDICATIONS: Dyspnea    HISTORY: Prior history: CKD, HTN, DM, Anemia, RAI    PROCEDURE: This was a routine study. The study included complete 2D  imaging, M-mode, complete spectral Doppler, and color Doppler. The heart  rate was 84 bpm, at the start of the study. Systolic blood pressure was  152 mmHg, at the start of the study. Diastolic blood pressure was 66 mmHg,  at the start of the study. Images were obtained from the parasternal,  apical, subcostal, and suprasternal notch acoustic windows. Image quality  was adequate. LEFT VENTRICLE: Size was normal. Ejection fraction was estimated in the  range of 50 % to 55 %. Wall thickness was at the upper limits of normal.    RIGHT VENTRICLE: The size was normal. Systolic function was normal.    LEFT ATRIUM: The atrium was dilated. ATRIAL SEPTUM: The atrial septum appeared intact. RIGHT ATRIUM: Size was at the upper limits of normal.    MITRAL VALVE: There was annular calcification. Normal valve structure. No  echocardiographic evidence for prolapse. DOPPLER: There was mild  regurgitation. AORTIC VALVE: Normal valve structure. The valve was trileaflet. DOPPLER:  Transaortic velocity was within the normal range. There was no stenosis. There was no regurgitation. TRICUSPID VALVE: Normal valve structure. DOPPLER: There was no  regurgitation. There was mild to moderate pulmonary hypertension. PULMONIC VALVE: Not well visualized, but normal Doppler findings. DOPPLER:  There was trivial regurgitation. AORTA: The root exhibited normal size. PERICARDIUM: There was no pericardial effusion. There was a left pleural  effusion.     SYSTEM MEASUREMENT TABLES    2D  LVOT Diam: 1.9 cm  Ao Diam: 2.7 cm  LA Diam: 4.1 cm  IVSd: 1.1 cm  LVIDd: 4.7 cm  LVIDs: 3.6 cm  LVPWd: 1.1 cm  SV(Teich): 50.4 ml  LAESV Index (A-L): 36.2 ml/m2    CW  AV Vmax: 1.3 m/s  AV maxP.1 mmHg  MV VTI: 39.4 cm  MV meanP.2 mmHg  TR Vmax: 3 m/s  TR maxP.3 mmHg  RAP: 3 mmHg    PW  LVOT Vmax: 0.8 m/s  E/E': 22.2  RVSP: 39.3 mmHg    Prepared and E-signed by    Jose Hawthorne. Floyd Posadas M.D. Signed 13-Jan-2018 16:00:31           Recent Days:  Recent Labs      01/13/18   0434  01/12/18   1350   WBC  2.3*  3.5*   HGB  10.4*  11.3*   HCT  33.3*  34.5*   PLT  198  224     Recent Labs      01/13/18   0434  01/12/18   1350   NA  143  143   K  4.3  3.6   CL  105  103   CO2  30  30   GLU  154*  132*   BUN  52*  52*   CREA  2.25*  2.32*   CA  8.9  9.0   MG  1.6   --    ALB   --   2.7*   SGOT   --   40*   ALT   --   19   INR   --   1.2*     No results for input(s): PH, PCO2, PO2, HCO3, FIO2 in the last 72 hours.     24 Hour Results:  Recent Results (from the past 24 hour(s))   GLUCOSE, POC    Collection Time: 01/13/18 11:05 AM   Result Value Ref Range    Glucose (POC) 149 (H) 65 - 100 mg/dL    Performed by Lele Peacock (PCT)    GLUCOSE, POC    Collection Time: 01/13/18  4:32 PM   Result Value Ref Range    Glucose (POC) 153 (H) 65 - 100 mg/dL    Performed by Lele Peacock (PCT)    GLUCOSE, POC    Collection Time: 01/13/18  9:39 PM   Result Value Ref Range    Glucose (POC) 175 (H) 65 - 100 mg/dL    Performed by Robe Ramos        Medications reviewed  Current Facility-Administered Medications   Medication Dose Route Frequency    sodium chloride (NS) flush 5-10 mL  5-10 mL IntraVENous PRN    ALPRAZolam (XANAX) tablet 0.25 mg  0.25 mg Oral BID    aspirin chewable tablet 81 mg  81 mg Oral QHS    atorvastatin (LIPITOR) tablet 20 mg  20 mg Oral QHS    carvedilol (COREG) tablet 3.125 mg  3.125 mg Oral BID WITH MEALS    citalopram (CELEXA) tablet 40 mg  40 mg Oral DAILY    doxazosin (CARDURA) tablet 2 mg  2 mg Oral DAILY    losartan (COZAAR) tablet 25 mg  25 mg Oral DAILY    senna-docusate (PERICOLACE) 8.6-50 mg per tablet 2 Tab  2 Tab Oral DAILY    potassium chloride SR (KLOR-CON 10) tablet 10 mEq  10 mEq Oral BID    pantoprazole (PROTONIX) tablet 40 mg  40 mg Oral ACB    loratadine (CLARITIN) tablet 10 mg  10 mg Oral DAILY    insulin glargine (LANTUS) injection 17 Units  17 Units SubCUTAneous DAILY    sodium chloride (NS) flush 5-10 mL  5-10 mL IntraVENous Q8H    sodium chloride (NS) flush 5-10 mL  5-10 mL IntraVENous PRN    glucose chewable tablet 16 g  4 Tab Oral PRN    dextrose (D50W) injection syrg 12.5-25 g  12.5-25 g IntraVENous PRN    glucagon (GLUCAGEN) injection 1 mg  1 mg IntraMUSCular PRN    acetaminophen (TYLENOL) tablet 650 mg  650 mg Oral Q4H PRN    diphenhydrAMINE (BENADRYL) capsule 25 mg  25 mg Oral Q4H PRN    ondansetron (ZOFRAN) injection 4 mg  4 mg IntraVENous Q4H PRN    heparin (porcine) injection 5,000 Units  5,000 Units SubCUTAneous Q8H    insulin lispro (HUMALOG) injection   SubCUTAneous AC&HS    albuterol-ipratropium (DUO-NEB) 2.5 MG-0.5 MG/3 ML  3 mL Nebulization Q4H RT    methylPREDNISolone (PF) (SOLU-MEDROL) injection 40 mg  40 mg IntraVENous Q8H    budesonide (PULMICORT) 500 mcg/2 ml nebulizer suspension  500 mcg Nebulization BID RT    arformoterol (BROVANA) neb solution 15 mcg  15 mcg Nebulization BID RT    guaiFENesin ER (MUCINEX) tablet 600 mg  600 mg Oral Q12H    bumetanide (BUMEX) tablet 1 mg  1 mg Oral BID    oseltamivir (TAMIFLU) capsule 30 mg  30 mg Oral DAILY       Care Plan discussed with: Patient        Ladene Leventhal, MD

## 2018-01-14 NOTE — PROGRESS NOTES
Bedside and Verbal shift change report given to Javon Zuniga 70 (oncoming nurse) by Rhiannon Newell (offgoing nurse). Report included the following information SBAR, Kardex, Procedure Summary, Intake/Output, MAR, Recent Results and Med Rec Status.

## 2018-01-15 LAB
ALBUMIN SERPL-MCNC: 2.4 G/DL (ref 3.5–5)
ALBUMIN/GLOB SERPL: 0.7 {RATIO} (ref 1.1–2.2)
ALP SERPL-CCNC: 47 U/L (ref 45–117)
ALT SERPL-CCNC: 14 U/L (ref 12–78)
ANION GAP SERPL CALC-SCNC: 6 MMOL/L (ref 5–15)
AST SERPL-CCNC: 26 U/L (ref 15–37)
ATRIAL RATE: 97 BPM
BASOPHILS # BLD: 0 K/UL (ref 0–0.1)
BASOPHILS NFR BLD: 0 % (ref 0–1)
BILIRUB SERPL-MCNC: 0.5 MG/DL (ref 0.2–1)
BUN SERPL-MCNC: 66 MG/DL (ref 6–20)
BUN/CREAT SERPL: 27 (ref 12–20)
CALCIUM SERPL-MCNC: 8.8 MG/DL (ref 8.5–10.1)
CALCULATED P AXIS, ECG09: 41 DEGREES
CALCULATED R AXIS, ECG10: -29 DEGREES
CALCULATED T AXIS, ECG11: 8 DEGREES
CHLORIDE SERPL-SCNC: 107 MMOL/L (ref 97–108)
CO2 SERPL-SCNC: 31 MMOL/L (ref 21–32)
CREAT SERPL-MCNC: 2.45 MG/DL (ref 0.55–1.02)
DIAGNOSIS, 93000: NORMAL
EOSINOPHIL # BLD: 0 K/UL (ref 0–0.4)
EOSINOPHIL NFR BLD: 0 % (ref 0–7)
ERYTHROCYTE [DISTWIDTH] IN BLOOD BY AUTOMATED COUNT: 16.3 % (ref 11.5–14.5)
GLOBULIN SER CALC-MCNC: 3.5 G/DL (ref 2–4)
GLUCOSE BLD STRIP.AUTO-MCNC: 107 MG/DL (ref 65–100)
GLUCOSE BLD STRIP.AUTO-MCNC: 115 MG/DL (ref 65–100)
GLUCOSE BLD STRIP.AUTO-MCNC: 129 MG/DL (ref 65–100)
GLUCOSE BLD STRIP.AUTO-MCNC: 92 MG/DL (ref 65–100)
GLUCOSE SERPL-MCNC: 126 MG/DL (ref 65–100)
HCT VFR BLD AUTO: 35.1 % (ref 35–47)
HGB BLD-MCNC: 10.9 G/DL (ref 11.5–16)
LYMPHOCYTES # BLD: 0.4 K/UL (ref 0.8–3.5)
LYMPHOCYTES NFR BLD: 7 % (ref 12–49)
MCH RBC QN AUTO: 29 PG (ref 26–34)
MCHC RBC AUTO-ENTMCNC: 31.1 G/DL (ref 30–36.5)
MCV RBC AUTO: 93.4 FL (ref 80–99)
MONOCYTES # BLD: 0.2 K/UL (ref 0–1)
MONOCYTES NFR BLD: 4 % (ref 5–13)
NEUTS SEG # BLD: 4.3 K/UL (ref 1.8–8)
NEUTS SEG NFR BLD: 89 % (ref 32–75)
P-R INTERVAL, ECG05: 150 MS
PLATELET # BLD AUTO: 180 K/UL (ref 150–400)
POTASSIUM SERPL-SCNC: 4.6 MMOL/L (ref 3.5–5.1)
PROT SERPL-MCNC: 5.9 G/DL (ref 6.4–8.2)
Q-T INTERVAL, ECG07: 412 MS
QRS DURATION, ECG06: 124 MS
QTC CALCULATION (BEZET), ECG08: 523 MS
RBC # BLD AUTO: 3.76 M/UL (ref 3.8–5.2)
SERVICE CMNT-IMP: ABNORMAL
SERVICE CMNT-IMP: NORMAL
SODIUM SERPL-SCNC: 144 MMOL/L (ref 136–145)
VENTRICULAR RATE, ECG03: 97 BPM
WBC # BLD AUTO: 4.8 K/UL (ref 3.6–11)

## 2018-01-15 PROCEDURE — 94660 CPAP INITIATION&MGMT: CPT

## 2018-01-15 PROCEDURE — 82962 GLUCOSE BLOOD TEST: CPT

## 2018-01-15 PROCEDURE — 77030038269 HC DRN EXT URIN PURWCK BARD -A

## 2018-01-15 PROCEDURE — 80053 COMPREHEN METABOLIC PANEL: CPT | Performed by: FAMILY MEDICINE

## 2018-01-15 PROCEDURE — 74011636637 HC RX REV CODE- 636/637: Performed by: INTERNAL MEDICINE

## 2018-01-15 PROCEDURE — 94640 AIRWAY INHALATION TREATMENT: CPT

## 2018-01-15 PROCEDURE — 74011250637 HC RX REV CODE- 250/637: Performed by: NURSE PRACTITIONER

## 2018-01-15 PROCEDURE — 74011000250 HC RX REV CODE- 250: Performed by: NURSE PRACTITIONER

## 2018-01-15 PROCEDURE — 36415 COLL VENOUS BLD VENIPUNCTURE: CPT | Performed by: FAMILY MEDICINE

## 2018-01-15 PROCEDURE — 74011250637 HC RX REV CODE- 250/637: Performed by: FAMILY MEDICINE

## 2018-01-15 PROCEDURE — 74011250637 HC RX REV CODE- 250/637: Performed by: INTERNAL MEDICINE

## 2018-01-15 PROCEDURE — 85025 COMPLETE CBC W/AUTO DIFF WBC: CPT | Performed by: FAMILY MEDICINE

## 2018-01-15 PROCEDURE — 74011250636 HC RX REV CODE- 250/636: Performed by: NURSE PRACTITIONER

## 2018-01-15 PROCEDURE — 77010033678 HC OXYGEN DAILY

## 2018-01-15 PROCEDURE — 65660000000 HC RM CCU STEPDOWN

## 2018-01-15 PROCEDURE — 74011250636 HC RX REV CODE- 250/636: Performed by: INTERNAL MEDICINE

## 2018-01-15 RX ORDER — FAMOTIDINE 20 MG/1
20 TABLET, FILM COATED ORAL DAILY
Status: DISCONTINUED | OUTPATIENT
Start: 2018-01-16 | End: 2018-01-23 | Stop reason: HOSPADM

## 2018-01-15 RX ADMIN — METRONIDAZOLE 500 MG: 250 TABLET ORAL at 09:38

## 2018-01-15 RX ADMIN — METRONIDAZOLE 500 MG: 250 TABLET ORAL at 22:27

## 2018-01-15 RX ADMIN — PANTOPRAZOLE SODIUM 40 MG: 40 TABLET, DELAYED RELEASE ORAL at 07:02

## 2018-01-15 RX ADMIN — BUDESONIDE 500 MCG: 0.5 INHALANT RESPIRATORY (INHALATION) at 08:03

## 2018-01-15 RX ADMIN — BUMETANIDE 1 MG: 1 TABLET ORAL at 18:33

## 2018-01-15 RX ADMIN — IPRATROPIUM BROMIDE AND ALBUTEROL SULFATE 3 ML: .5; 3 SOLUTION RESPIRATORY (INHALATION) at 19:57

## 2018-01-15 RX ADMIN — METRONIDAZOLE 500 MG: 250 TABLET ORAL at 18:50

## 2018-01-15 RX ADMIN — LOSARTAN POTASSIUM 25 MG: 50 TABLET ORAL at 09:37

## 2018-01-15 RX ADMIN — ASPIRIN 81 MG 81 MG: 81 TABLET ORAL at 22:27

## 2018-01-15 RX ADMIN — ALPRAZOLAM 0.25 MG: 0.25 TABLET ORAL at 09:39

## 2018-01-15 RX ADMIN — BUDESONIDE 500 MCG: 0.5 INHALANT RESPIRATORY (INHALATION) at 19:57

## 2018-01-15 RX ADMIN — DOXAZOSIN 2 MG: 2 TABLET ORAL at 09:50

## 2018-01-15 RX ADMIN — Medication 10 ML: at 22:27

## 2018-01-15 RX ADMIN — CARVEDILOL 3.12 MG: 3.12 TABLET, FILM COATED ORAL at 09:39

## 2018-01-15 RX ADMIN — LORATADINE 10 MG: 10 TABLET ORAL at 09:39

## 2018-01-15 RX ADMIN — IPRATROPIUM BROMIDE AND ALBUTEROL SULFATE 3 ML: .5; 3 SOLUTION RESPIRATORY (INHALATION) at 23:26

## 2018-01-15 RX ADMIN — GUAIFENESIN 600 MG: 600 TABLET, EXTENDED RELEASE ORAL at 09:51

## 2018-01-15 RX ADMIN — POTASSIUM CHLORIDE 10 MEQ: 750 TABLET, FILM COATED, EXTENDED RELEASE ORAL at 09:38

## 2018-01-15 RX ADMIN — OSELTAMIVIR PHOSPHATE 30 MG: 30 CAPSULE ORAL at 09:38

## 2018-01-15 RX ADMIN — DOCUSATE SODIUM AND SENNOSIDES 2 TABLET: 8.6; 5 TABLET, FILM COATED ORAL at 09:39

## 2018-01-15 RX ADMIN — CITALOPRAM HYDROBROMIDE 40 MG: 20 TABLET ORAL at 09:38

## 2018-01-15 RX ADMIN — BUMETANIDE 1 MG: 1 TABLET ORAL at 09:39

## 2018-01-15 RX ADMIN — Medication 10 ML: at 07:02

## 2018-01-15 RX ADMIN — ALPRAZOLAM 0.25 MG: 0.25 TABLET ORAL at 18:49

## 2018-01-15 RX ADMIN — METHYLPREDNISOLONE SODIUM SUCCINATE 40 MG: 40 INJECTION, POWDER, FOR SOLUTION INTRAMUSCULAR; INTRAVENOUS at 22:26

## 2018-01-15 RX ADMIN — HEPARIN SODIUM 5000 UNITS: 5000 INJECTION, SOLUTION INTRAVENOUS; SUBCUTANEOUS at 18:33

## 2018-01-15 RX ADMIN — Medication 10 ML: at 18:52

## 2018-01-15 RX ADMIN — ATORVASTATIN CALCIUM 20 MG: 20 TABLET, FILM COATED ORAL at 22:26

## 2018-01-15 RX ADMIN — IPRATROPIUM BROMIDE AND ALBUTEROL SULFATE 3 ML: .5; 3 SOLUTION RESPIRATORY (INHALATION) at 08:03

## 2018-01-15 RX ADMIN — INSULIN GLARGINE 17 UNITS: 100 INJECTION, SOLUTION SUBCUTANEOUS at 09:34

## 2018-01-15 RX ADMIN — GUAIFENESIN 600 MG: 600 TABLET, EXTENDED RELEASE ORAL at 22:27

## 2018-01-15 RX ADMIN — CARVEDILOL 3.12 MG: 3.12 TABLET, FILM COATED ORAL at 18:55

## 2018-01-15 RX ADMIN — IPRATROPIUM BROMIDE AND ALBUTEROL SULFATE 3 ML: .5; 3 SOLUTION RESPIRATORY (INHALATION) at 15:51

## 2018-01-15 RX ADMIN — POTASSIUM CHLORIDE 10 MEQ: 750 TABLET, FILM COATED, EXTENDED RELEASE ORAL at 18:33

## 2018-01-15 RX ADMIN — IPRATROPIUM BROMIDE AND ALBUTEROL SULFATE 3 ML: .5; 3 SOLUTION RESPIRATORY (INHALATION) at 12:17

## 2018-01-15 RX ADMIN — METHYLPREDNISOLONE SODIUM SUCCINATE 40 MG: 40 INJECTION, POWDER, FOR SOLUTION INTRAMUSCULAR; INTRAVENOUS at 07:02

## 2018-01-15 NOTE — ROUTINE PROCESS
Bedside and Verbal shift change report given to Juliet Springer RN (oncoming nurse) by Joseph Escalera RN (offgoing nurse). Report included the following information SBAR, Kardex, MAR, Recent Results and Med Rec Status also notified her that per Dr. Noemi Allison next 2 doses of heparin.

## 2018-01-15 NOTE — PROGRESS NOTES
[facility] PROGRESS NOTE  Pulmonary, Critical Care, and Sleep Medicine    Name: Sridhar Bennett MRN: [id number]   : 1945 Hospital: [facility]   Date: 1/15/2018  Admission Date: 2018     Chart and notes reviewed. Data reviewed. I review the patient's current medications in the medical record at each encounter.  I have evaluated and examined the patient. .     Overnight events reviewed:  Afebrile  BP stable  O2 sats 93% on 3L NC  Hgb 10.9  Creat 2.45; stable  Respiratory viral panel + for Influenza B  BLE dopplers negative   ECHO:  EF 50-55%, LA dilated, mild MV regurgitation, mild to moderate pulmonary HTN, left pleural effusion    ROS:  Feeling about the same today. Breathing feels bad. Reports SOB when she lays down flat to turn. Reports difficulty with swallowing foods that has been going on for \"some time. \"  Denies fever, chills, cough, or sputum production. 12 point ROS reviewed and negative other than noted above. Vital Signs:  Visit Vitals    /74    Pulse (P) 90    Temp (P) 97.6 °F (36.4 °C)    Resp (P) 18    Ht 5' 1\" (1.549 m)    Wt 103.9 kg (229 lb)    SpO2 (P) 100%    BMI 43.27 kg/m2       O2 Device: (P) Nasal cannula   O2 Flow Rate (L/min): 3 l/min   Temp (24hrs), Av.7 °F (36.5 °C), Min:97.3 °F (36.3 °C), Max:98.4 °F (36.9 °C)       Intake/Output:   Last shift:         Last 3 shifts: 1901 - 01/15 0700  In: 240 [P.O.:240]  Out: 600 [Urine:600]    Intake/Output Summary (Last 24 hours) at 01/15/18 1255  Last data filed at 18 1600   Gross per 24 hour   Intake                0 ml   Output              200 ml   Net             -200 ml        Telemetry:     Physical Exam:   General:  Alert, cooperative, no distress, obese, appears stated age. Head:  Normocephalic, without obvious abnormality, atraumatic. Eyes:  Conjunctivae/corneas clear. Nose: Nares normal. Septum midline.  Mucosa normal.    Throat: Lips, mucosa, and tongue normal. Teeth and gums normal.   Neck: Supple, symmetrical, trachea midline, no adenopathy   Lungs:   Decreased bs with few rhonchi, anterior exam only. Chest wall:  No tenderness or deformity. Heart:  Regular rate and rhythm, S1, S2 normal, no murmur, click, rub or gallop. Abdomen:   Soft, non-tender. Bowel sounds normal.    Extremities: Extremities normal, atraumatic, no cyanosis, trace LE edema   Pulses: 2+ and symmetric all extremities. Skin: Skin color, texture, turgor normal. No rashes or lesions   Lymph nodes: Cervical, supraclavicular  nodes normal.   Neurologic: Grossly nonfocal     DATA:  MAR reviewed and pertinent medications noted or modified as needed    Labs:  Recent Labs      01/15/18   0546  01/13/18   0434  01/12/18   1350   WBC  4.8  2.3*  3.5*   HGB  10.9*  10.4*  11.3*   HCT  35.1  33.3*  34.5*   PLT  180  198  224     Recent Labs      01/15/18   0546  01/13/18   0434  01/12/18   1350   NA  144  143  143   K  4.6  4.3  3.6   CL  107  105  103   CO2  31  30  30   GLU  126*  154*  132*   BUN  66*  52*  52*   CREA  2.45*  2.25*  2.32*   CA  8.8  8.9  9.0   MG   --   1.6   --    ALB  2.4*   --   2.7*   TBILI  0.5   --   0.7   SGOT  26   --   40*   ALT  14   --   19   INR   --    --   1.2*       Imaging:  I have personally reviewed the patients radiographs and reports. CXR 1/12:  No significant change in left basilar opacity consistent with effusion/atelectasis/airspace disease. Moderate cardiomegaly. MPRESSION  · Acute Respiratory Failure with Hypoxia  · Asthma with Acute Exacerbation  · Influenza B+  · RAI & OHS  · CKD  · Hypertension  · Morbid Obesity  · Debility     PLAN  · Supplemental O2 to keep sats >90%  · Auto CPAP at night and with naps  · Continue scheduled Duo-Nebs  · Scheduled Pulmicort/Brovana nebs  · Wean Solu-Medrol slightly today to 40mg q12h  · Consult speech to eval swallowing  · F/U sputum culture  · Mucinex  · Renal following; Bumex restarted today. Watch I&Os.   · CPAP at night and with naps  · DVT Prophylaxis:  Heparin SubQ        Felisha Dhillon NP

## 2018-01-15 NOTE — PROGRESS NOTES
Received evaluation b/c patient told pulmonary about long term dysphagia with solids. Chart reviewed. Patient currently on Bipap.  Will see in am.

## 2018-01-15 NOTE — PROGRESS NOTES
26- Notified Dr. Juliet Mitchell that pt coughing more. Telephone order with readback for 100mg PO Tessalon Pearls x3 daily as needed. 26- notified Dr. Juliet Mitchell that pt C. Diff sample came back positive. See orders  1900- notified Dr. Michael Leone that ozzing moderate amount of blood from SQ heparin injection site this AM. Notified her that afternoon dose was held.  Per Dr. Juliet Mitchell- hold next 2 doses will check CBC in AM.

## 2018-01-15 NOTE — DIABETES MGMT
DTC Consult Note         Recommendations/ Comments: Consult received. Chart reviewed. Pt resides at a SNF per documentatioon. Assessment of home management is not appropriate at this time. Will continue to follow. Estimated Creatinine Clearance: 23.3 mL/min (based on Cr of 2.45). POC Glucose last 24hrs:   Lab Results   Component Value Date/Time     (H) 01/15/2018 05:46 AM    GLUCPOC 107 (H) 01/15/2018 07:31 AM    GLUCPOC 152 (H) 01/14/2018 09:39 PM    GLUCPOC 150 (H) 01/14/2018 05:36 PM        Inpatient medications for glucose management:  1. Correction Scale: Lispro (Humalog) Normal Sensitivity scale to cover for glucose > 139 mg/dL before meals and for glucose >199 at bedtime      2. Lantus 17 units daily       Consult received from Dr. Surya Justice MD for  [x]    Assessment of home management  []    Meal planning  []    Meter / Monitoring  []    New Diagnosis  []    Insulin education  []    Insulin pump notification  []    Medication recommendations  []    Hospital glucose management  []    Sylvia Carroll  []    Outpatient Education - Information forwarded to [address] [place] [address] who will follow-up with pt 1 week after discharge    Chart reviewed and initial evaluation complete on Frederick Boateng . Frederick Boateng is a 70 yo female with a past medical history relevant for  DM type 2, per Dr. Damian Moon H&P dated 1/12/2018. She resides at a SNF. Outpatient medications for glucose management per past medical records   1. Humalog 5 units before meals and at bedtime for glucose > 250 mg/dL     A1c reflects excellent control. Lab Results   Component Value Date/Time    Hemoglobin A1c 5.8 01/12/2018 03:53 PM    Hemoglobin A1c 5.7 11/02/2017 05:12 AM           Thank you. Alix Dorsey.  Asya Bain, MPH, BSN, [address]

## 2018-01-15 NOTE — PROGRESS NOTES
[address]  YOB: 1945          Assessment & Plan:   CKD 4  · Stable  · Creatinine at baseline    Multifactorial Respiratory insufficiency  · Steroids, nebs, Bipap  · Continue Bumex, she was off of it a couple days  · Flu +, on tamiflu    HTN  · Reasonably controlled    AVF in LUE with access arm edema  · Follows with vascular center       Subjective:   CC: CKD  HPI: Renal function stable.  On Tamiflu for FluB  ROS: +cough +sob  Current Facility-Administered Medications   Medication Dose Route Frequency    [START ON 1/16/2018] famotidine (PEPCID) tablet 20 mg  20 mg Oral DAILY    benzonatate (TESSALON) capsule 100 mg  100 mg Oral TID PRN    metroNIDAZOLE (FLAGYL) tablet 500 mg  500 mg Oral TID    sodium chloride (NS) flush 5-10 mL  5-10 mL IntraVENous PRN    ALPRAZolam (XANAX) tablet 0.25 mg  0.25 mg Oral BID    aspirin chewable tablet 81 mg  81 mg Oral QHS    atorvastatin (LIPITOR) tablet 20 mg  20 mg Oral QHS    carvedilol (COREG) tablet 3.125 mg  3.125 mg Oral BID WITH MEALS    citalopram (CELEXA) tablet 40 mg  40 mg Oral DAILY    doxazosin (CARDURA) tablet 2 mg  2 mg Oral DAILY    losartan (COZAAR) tablet 25 mg  25 mg Oral DAILY    senna-docusate (PERICOLACE) 8.6-50 mg per tablet 2 Tab  2 Tab Oral DAILY    potassium chloride SR (KLOR-CON 10) tablet 10 mEq  10 mEq Oral BID    loratadine (CLARITIN) tablet 10 mg  10 mg Oral DAILY    insulin glargine (LANTUS) injection 17 Units  17 Units SubCUTAneous DAILY    sodium chloride (NS) flush 5-10 mL  5-10 mL IntraVENous Q8H    sodium chloride (NS) flush 5-10 mL  5-10 mL IntraVENous PRN    glucose chewable tablet 16 g  4 Tab Oral PRN    dextrose (D50W) injection syrg 12.5-25 g  12.5-25 g IntraVENous PRN    glucagon (GLUCAGEN) injection 1 mg  1 mg IntraMUSCular PRN    acetaminophen (TYLENOL) tablet 650 mg  650 mg Oral Q4H PRN    diphenhydrAMINE (BENADRYL) capsule 25 mg  25 mg Oral Q4H PRN    ondansetron (ZOFRAN) injection 4 mg  4 mg IntraVENous Q4H PRN    heparin (porcine) injection 5,000 Units  5,000 Units SubCUTAneous Q8H    insulin lispro (HUMALOG) injection   SubCUTAneous AC&HS    albuterol-ipratropium (DUO-NEB) 2.5 MG-0.5 MG/3 ML  3 mL Nebulization Q4H RT    methylPREDNISolone (PF) (SOLU-MEDROL) injection 40 mg  40 mg IntraVENous Q8H    budesonide (PULMICORT) 500 mcg/2 ml nebulizer suspension  500 mcg Nebulization BID RT    arformoterol (BROVANA) neb solution 15 mcg  15 mcg Nebulization BID RT    guaiFENesin ER (MUCINEX) tablet 600 mg  600 mg Oral Q12H    bumetanide (BUMEX) tablet 1 mg  1 mg Oral BID    oseltamivir (TAMIFLU) capsule 30 mg  30 mg Oral DAILY          Objective:     Vitals:  Blood pressure 149/74, pulse 92, temperature 97.5 °F (36.4 °C), resp. rate 20, height 5' 1\" (1.549 m), weight 103.9 kg (229 lb), SpO2 93 %. Temp (24hrs), Av.9 °F (36.6 °C), Min:97.3 °F (36.3 °C), Max:98.5 °F (36.9 °C)      Intake and Output:      1901 - 01/15 0700  In: 240 [P.O.:240]  Out: 600 [Urine:600]    Physical Exam:               GENERAL ASSESSMENT: chronically ill, obese  CHEST: B wheezes and rhonchi  HEART: S1S2  ABDOMEN: Soft,NT  EXTREMITY: Chronic LE EDEMA; LUE AVF +t/b LUE edema          ECG/rhythm:    Data Review      No results for input(s): TNIPOC in the last 72 hours.     No lab exists for component: ITNL   Recent Labs      18   1350   CPK  44   CKMB  <1.0   TROIQ  0.04     Recent Labs      01/15/18   0546  18   0434  18   1350   NA  144  143  143   K  4.6  4.3  3.6   CL  107  105  103   CO2  31  30  30   BUN  66*  52*  52*   CREA  2.45*  2.25*  2.32*   GLU  126*  154*  132*   MG   --   1.6   --    CA  8.8  8.9  9.0   ALB  2.4*   --   2.7*   WBC  4.8  2.3*  3.5*   HGB  10.9*  10.4*  11.3*   HCT  35.1  33.3*  34.5*   PLT  180  198  224      Recent Labs      18   1350   INR  1.2*   PTP  12.6*     Needs: urine analysis, urine sodium, protein and creatinine  Lab Results   Component Value Date/Time    Sodium urine, random 50 11/03/2016 12:55 PM    Creatinine, urine 53.84 11/03/2016 12:55 PM           : Andria Wong MD  1/15/2018        [facility]:  [email or web address]  Sarina Bakerdavid office:  [address] [place] [address], [address] [place] [address],[address]  [address]  Phone: [phone]  Fax :     [phone]    [place] office:  [address]  Phone - [phone]  Fax - [phone]

## 2018-01-15 NOTE — PROGRESS NOTES
Daily Progress Note: 1/15/2018  Matteo Martin MD    Assessment/Plan:   Acute hypoxic resp failure -likely mulitifactorial --  Hx asthma, obesity, Flu +  FLU B + -- started on tamiflu  Continue steroids IV, nebs, supplemental oxygen, mucinex  Bumex and K also re-started  LE dopplers neg, ECHO EF ok/mild-mod pul HTN  --pulmonary following     RAI on CPAP / Morbid obesity / Hypoventilation syndrome - Continue home CPAP.       CKD (chronic kidney disease) stage 4, GFR 15-29 ml/min - Cr at baseline  --renal following     DM type 2, uncontrolled, with renal complications - Diabetic diet and counseling. BS well controlled. --continue insulin and accuchecks     HTN (hypertension), benign - Stable. Continue bumex, doxazosin, losartan, coreg and ASA.   Stop norvasc, with LE edema.     Iron deficiency anemia - Stable.       Chronic bilateral low back pain without sciatica - Prn tylenol.       Anxiety and depression / Generalized anxiety disorder - At baseline  --Continue citalopram, xanax     Hyperlipidemia - Continue atorvastatin    CDIFF+  -- start flagyl 1/14    DVT proph - heparin        Problem List:  Problem List as of 1/15/2018  Date Reviewed: 1/12/2018          Codes Class Noted - Resolved    Respiratory failure with hypoxia [facility]) ICD-10-CM: J96.91  ICD-9-CM: 518.81  1/13/2018 - Present        * (Principal)Dyspnea ICD-10-CM: R06.00  ICD-9-CM: 786.09  1/12/2018 - Present        Morbid obesity ([facility] ICD-10-CM: E66.01  ICD-9-CM: 278.01  Unknown - Present        Hypoventilation syndrome ICD-10-CM: R06.89  ICD-9-CM: 786.09  Unknown - Present        Hyperlipidemia ICD-10-CM: E78.5  ICD-9-CM: 272.4  Unknown - Present        RAI on CPAP ICD-10-CM: G47.33, Z99.89  ICD-9-CM: 327.23, V46.8  Unknown - Present        Anxiety and depression ICD-10-CM: F41.8  ICD-9-CM: 300.00, 311  Unknown - Present        Chronic bilateral low back pain without sciatica ICD-10-CM: M54.5, G89.29  ICD-9-CM: 724.2, 338.29  11/3/2016 - Present        Generalized anxiety disorder ICD-10-CM: F41.1  ICD-9-CM: 300.02  11/3/2016 - Present        CKD (chronic kidney disease) stage 4, GFR 15-29 ml/min (HCC) (Chronic) ICD-10-CM: N18.4  ICD-9-CM: 585.4  8/14/2016 - Present        HTN (hypertension), benign (Chronic) ICD-10-CM: I10  ICD-9-CM: 401.1  8/14/2016 - Present        DM type 2, uncontrolled, with renal complications (HCC) (Chronic) ICD-10-CM: E11.29, E11.65  ICD-9-CM: 250.42  8/14/2016 - Present        Iron deficiency anemia (Chronic) ICD-10-CM: D50.9  ICD-9-CM: 280.9  8/14/2016 - Present        RESOLVED: CKD stage 4 due to type 2 diabetes mellitus ([facility] ICD-10-CM: E11.22, N18.4  ICD-9-CM: 250.40, 585.4  Unknown - 1/12/2018        RESOLVED: DM type 2 causing renal disease ([facility] ICD-10-CM: E11.29  ICD-9-CM: 250.40  Unknown - 1/12/2018        RESOLVED: Hypertension ICD-10-CM: I10  ICD-9-CM: 401.9  Unknown - 1/12/2018        RESOLVED: Arteriovenous fistula infection ([facility] ICD-10-CM: T82. 7XXA  ICD-9-CM: 996.62  11/2/2017 - 1/12/2018        RESOLVED: Hypertension ICD-10-CM: I10  ICD-9-CM: 401.9  Unknown - 11/2/2017        RESOLVED: DM type 2 causing renal disease ([facility] ICD-10-CM: E11.29  ICD-9-CM: 250.40  Unknown - 11/2/2017        RESOLVED: CKD stage 4 due to type 2 diabetes mellitus ([facility] ICD-10-CM: E11.22, N18.4  ICD-9-CM: 250.40, 585.4  Unknown - 11/2/2017        RESOLVED: Leukocytosis ICD-10-CM: F25.882  ICD-9-CM: 288.60  11/2/2017 - 1/12/2018        RESOLVED: Hypoxia ICD-10-CM: R09.02  ICD-9-CM: 799.02  11/2/2017 - 1/12/2018        RESOLVED: Pleural effusion ICD-10-CM: J90  ICD-9-CM: 511.9  11/2/2017 - 1/12/2018        RESOLVED: Cellulitis of right lower extremity ICD-10-CM: O36.350  ICD-9-CM: 682.6  6/2/2017 - 11/2/2017        RESOLVED: Asthma with acute exacerbation ICD-10-CM: J45.901  ICD-9-CM: 493.92  6/2/2017 - 11/2/2017        RESOLVED: Asthma ICD-10-CM: J45.909  ICD-9-CM: 493.90  Unknown - 1/12/2018        RESOLVED: CKD stage 4 due to type 2 diabetes mellitus ([facility] ICD-10-CM: E11.22, N18.4  ICD-9-CM: 250.40, 585.4  Unknown - 2017        RESOLVED: DM type 2 causing renal disease ([facility] ICD-10-CM: E11.29  ICD-9-CM: 250.40  Unknown - 2017        RESOLVED: Hypoglycemia ICD-10-CM: E16.2  ICD-9-CM: 251.2  2017 - 2017        RESOLVED: Hyperkalemia ICD-10-CM: E87.5  ICD-9-CM: 276.7  11/3/2016 - 2017        RESOLVED: Elevated serum creatinine ICD-10-CM: R79.89  ICD-9-CM: 790.99  11/3/2016 - 2017        RESOLVED: Left leg cellulitis ICD-10-CM: L03.116  ICD-9-CM: 682.6  2016 - 2017        RESOLVED: Sepsis ([facility] ICD-10-CM: A41.9  ICD-9-CM: 038.9, 995.91  2016 - 2017        RESOLVED: Acute renal failure (ARF) ([facility] ICD-10-CM: N17.9  ICD-9-CM: 584.9  2016 - 2017        RESOLVED: RAI (obstructive sleep apnea) (Chronic) ICD-10-CM: F72.84  ICD-9-CM: 327.23  2016 - 2017              Subjective:   : She feels slightly better. Still with dry cough, SOB. Nausea has resolved and she is hungry. Denies pain. : Breathing isn't much better. Says it is difficult to eat due to SOB. Some cough, not able to bring up much sputum. No CP or myalgias. Nebs help but benefit is temporary. 1/15:  Breathing better. Less SOB. Still coughing but better. Few loose stools. She thinks she is feeling better. C diff still positive - On flagyl po for C diff. On Tamiflu for Influenza. Review of Systems:   A comprehensive review of systems was negative except for that written in the HPI.     Objective:   Physical Exam:     Visit Vitals    /65 (BP 1 Location: Right arm, BP Patient Position: At rest)    Pulse 77    Temp 97.3 °F (36.3 °C)    Resp 23    Ht 5' 1\" (1.549 m)    Wt 103.9 kg (229 lb)    SpO2 96%    BMI 43.27 kg/m2    O2 Flow Rate (L/min): 3 l/min O2 Device: CPAP mask    Temp (24hrs), Av.9 °F (36.6 °C), Min:97.3 °F (36.3 °C), Max:98.5 °F (36.9 °C) 01/13 1901 - 01/15 0700  In: 240 [P.O.:240]  Out: 600 [Urine:600]    General:  Alert, cooperative, no distress, morbidly obese   Head:  Normocephalic, without obvious abnormality, atraumatic. Eyes:  Conjunctivae/corneas clear. PERRL, EOMs intact. Nose: Nares normal. Septum midline. Mucosa normal. No drainage or sinus tenderness. Throat: Lips, mucosa, and tongue moist..   Neck: Supple, symmetrical, trachea midline   Lungs:   Scattered wheezing and rhonchi diffuse, improved air movement   Heart:  Regular rate and rhythm, S1, S2 normal, no murmur, click, rub or gallop. Abdomen:   Soft, non-tender. Bowel sounds normal. No masses,  No organomegaly. Extremities: Trace ankle edema, no calf ttp or cords   Pulses: 2+ and symmetric all extremities. Skin: Skin color, texture, turgor normal.    Neurologic: CNII-XII intact. Alert and oriented X 3.         Data Review:       Recent Days:  Recent Labs      01/15/18   0546  01/13/18   0434  01/12/18   1350   WBC  4.8  2.3*  3.5*   HGB  10.9*  10.4*  11.3*   HCT  35.1  33.3*  34.5*   PLT  180  198  224     Recent Labs      01/15/18   0546  01/13/18   0434  01/12/18   1350   NA  144  143  143   K  4.6  4.3  3.6   CL  107  105  103   CO2  31  30  30   GLU  126*  154*  132*   BUN  66*  52*  52*   CREA  2.45*  2.25*  2.32*   CA  8.8  8.9  9.0   MG   --   1.6   --    ALB  2.4*   --   2.7*   SGOT  26   --   40*   ALT  14   --   19   INR   --    --   1.2*     24 Hour Results:  Recent Results (from the past 24 hour(s))   GLUCOSE, POC    Collection Time: 01/14/18  7:34 AM   Result Value Ref Range    Glucose (POC) 129 (H) 65 - 100 mg/dL    Performed by Judit Page (PCT)    GLUCOSE, POC    Collection Time: 01/14/18 11:40 AM   Result Value Ref Range    Glucose (POC) 163 (H) 65 - 100 mg/dL    Performed by Judit Page (PCT)    GLUCOSE, POC    Collection Time: 01/14/18  1:27 PM   Result Value Ref Range    Glucose (POC) 158 (H) 65 - 100 mg/dL    Performed by Willy Lew (PCT) GLUCOSE, POC    Collection Time: 01/14/18  5:36 PM   Result Value Ref Range    Glucose (POC) 150 (H) 65 - 100 mg/dL    Performed by Archie Simons (PCT)    GLUCOSE, POC    Collection Time: 01/14/18  9:39 PM   Result Value Ref Range    Glucose (POC) 152 (H) 65 - 100 mg/dL    Performed by Jon Carmen (PCT)    CBC WITH AUTOMATED DIFF    Collection Time: 01/15/18  5:46 AM   Result Value Ref Range    WBC 4.8 3.6 - 11.0 K/uL    RBC 3.76 (L) 3.80 - 5.20 M/uL    HGB 10.9 (L) 11.5 - 16.0 g/dL    HCT 35.1 35.0 - 47.0 %    MCV 93.4 80.0 - 99.0 FL    MCH 29.0 26.0 - 34.0 PG    MCHC 31.1 30.0 - 36.5 g/dL    RDW 16.3 (H) 11.5 - 14.5 %    PLATELET 223 748 - 453 K/uL    NEUTROPHILS 89 (H) 32 - 75 %    LYMPHOCYTES 7 (L) 12 - 49 %    MONOCYTES 4 (L) 5 - 13 %    EOSINOPHILS 0 0 - 7 %    BASOPHILS 0 0 - 1 %    ABS. NEUTROPHILS 4.3 1.8 - 8.0 K/UL    ABS. LYMPHOCYTES 0.4 (L) 0.8 - 3.5 K/UL    ABS. MONOCYTES 0.2 0.0 - 1.0 K/UL    ABS. EOSINOPHILS 0.0 0.0 - 0.4 K/UL    ABS. BASOPHILS 0.0 0.0 - 0.1 K/UL   METABOLIC PANEL, COMPREHENSIVE    Collection Time: 01/15/18  5:46 AM   Result Value Ref Range    Sodium 144 136 - 145 mmol/L    Potassium 4.6 3.5 - 5.1 mmol/L    Chloride 107 97 - 108 mmol/L    CO2 31 21 - 32 mmol/L    Anion gap 6 5 - 15 mmol/L    Glucose 126 (H) 65 - 100 mg/dL    BUN 66 (H) 6 - 20 MG/DL    Creatinine 2.45 (H) 0.55 - 1.02 MG/DL    BUN/Creatinine ratio 27 (H) 12 - 20      GFR est AA 24 (L) >60 ml/min/1.73m2    GFR est non-AA 19 (L) >60 ml/min/1.73m2    Calcium 8.8 8.5 - 10.1 MG/DL    Bilirubin, total 0.5 0.2 - 1.0 MG/DL    ALT (SGPT) 14 12 - 78 U/L    AST (SGOT) 26 15 - 37 U/L    Alk.  phosphatase 47 45 - 117 U/L    Protein, total 5.9 (L) 6.4 - 8.2 g/dL    Albumin 2.4 (L) 3.5 - 5.0 g/dL    Globulin 3.5 2.0 - 4.0 g/dL    A-G Ratio 0.7 (L) 1.1 - 2.2       Medications reviewed  Current Facility-Administered Medications   Medication Dose Route Frequency    benzonatate (TESSALON) capsule 100 mg  100 mg Oral TID PRN    metroNIDAZOLE (FLAGYL) tablet 500 mg  500 mg Oral TID    sodium chloride (NS) flush 5-10 mL  5-10 mL IntraVENous PRN    ALPRAZolam (XANAX) tablet 0.25 mg  0.25 mg Oral BID    aspirin chewable tablet 81 mg  81 mg Oral QHS    atorvastatin (LIPITOR) tablet 20 mg  20 mg Oral QHS    carvedilol (COREG) tablet 3.125 mg  3.125 mg Oral BID WITH MEALS    citalopram (CELEXA) tablet 40 mg  40 mg Oral DAILY    doxazosin (CARDURA) tablet 2 mg  2 mg Oral DAILY    losartan (COZAAR) tablet 25 mg  25 mg Oral DAILY    senna-docusate (PERICOLACE) 8.6-50 mg per tablet 2 Tab  2 Tab Oral DAILY    potassium chloride SR (KLOR-CON 10) tablet 10 mEq  10 mEq Oral BID    pantoprazole (PROTONIX) tablet 40 mg  40 mg Oral ACB    loratadine (CLARITIN) tablet 10 mg  10 mg Oral DAILY    insulin glargine (LANTUS) injection 17 Units  17 Units SubCUTAneous DAILY    sodium chloride (NS) flush 5-10 mL  5-10 mL IntraVENous Q8H    sodium chloride (NS) flush 5-10 mL  5-10 mL IntraVENous PRN    glucose chewable tablet 16 g  4 Tab Oral PRN    dextrose (D50W) injection syrg 12.5-25 g  12.5-25 g IntraVENous PRN    glucagon (GLUCAGEN) injection 1 mg  1 mg IntraMUSCular PRN    acetaminophen (TYLENOL) tablet 650 mg  650 mg Oral Q4H PRN    diphenhydrAMINE (BENADRYL) capsule 25 mg  25 mg Oral Q4H PRN    ondansetron (ZOFRAN) injection 4 mg  4 mg IntraVENous Q4H PRN    heparin (porcine) injection 5,000 Units  5,000 Units SubCUTAneous Q8H    insulin lispro (HUMALOG) injection   SubCUTAneous AC&HS    albuterol-ipratropium (DUO-NEB) 2.5 MG-0.5 MG/3 ML  3 mL Nebulization Q4H RT    methylPREDNISolone (PF) (SOLU-MEDROL) injection 40 mg  40 mg IntraVENous Q8H    budesonide (PULMICORT) 500 mcg/2 ml nebulizer suspension  500 mcg Nebulization BID RT    arformoterol (BROVANA) neb solution 15 mcg  15 mcg Nebulization BID RT    guaiFENesin ER (MUCINEX) tablet 600 mg  600 mg Oral Q12H    bumetanide (BUMEX) tablet 1 mg  1 mg Oral BID    oseltamivir (TAMIFLU) capsule 30 mg  30 mg Oral DAILY     Care Plan discussed with: Isreal Echevarria MD

## 2018-01-15 NOTE — PROGRESS NOTES
Nutrition Assessment:    RECOMMENDATIONS/INTERVENTION(S):   Continue current diet- DCC/Card/1800FR  Monitor PO intakes, wt, BG  Add Ensure HP - BID- snacks. ASSESSMENT:   1/15: 71 yr old female admitted for dypsnea. PMHx: CKD4, HTN, DM, morbid obesity. Pt on Cardiac/Diabetic Consistent Carb 1800 Fluid Restriction- continue diet as is. Intakes documented as 75%. Pt appetite improving as breathing becomes easier. BG elevated, on Solumedrol. Pt had BM today. C. Diff test positive. Pt with 2+ BLE, LUE. Stage 2 sacrum pressure ulcer. Added Ensure HP BID for snacks for added protein for wound healing without as many added kcal. Monitor skin integrity. GFR 19, BUN 66. SUBJECTIVE/OBJECTIVE:   Diet Order: Consistent carb, Cardiac  % Eaten:  Patient Vitals for the past 168 hrs:   % Diet Eaten   01/14/18 0930 75 %     Pertinent Medications: [x] Reviewed    Past Medical History:   Diagnosis Date    Anxiety and depression     Asthma     CKD stage 4 due to type 2 diabetes mellitus ([facility]     DM type 2 causing renal disease ([facility]     Hyperlipidemia     Hypertension     Hypoventilation syndrome     Morbid obesity ([facility]     RAI on CPAP         Chemistries:  Lab Results   Component Value Date/Time    Sodium 144 01/15/2018 05:46 AM    Potassium 4.6 01/15/2018 05:46 AM    Chloride 107 01/15/2018 05:46 AM    CO2 31 01/15/2018 05:46 AM    Anion gap 6 01/15/2018 05:46 AM    Glucose 126 01/15/2018 05:46 AM    BUN 66 01/15/2018 05:46 AM    Creatinine 2.45 01/15/2018 05:46 AM    BUN/Creatinine ratio 27 01/15/2018 05:46 AM    GFR est AA 24 01/15/2018 05:46 AM    GFR est non-AA 19 01/15/2018 05:46 AM    Calcium 8.8 01/15/2018 05:46 AM    AST (SGOT) 26 01/15/2018 05:46 AM    Alk.  phosphatase 47 01/15/2018 05:46 AM    Protein, total 5.9 01/15/2018 05:46 AM    Albumin 2.4 01/15/2018 05:46 AM    Globulin 3.5 01/15/2018 05:46 AM    A-G Ratio 0.7 01/15/2018 05:46 AM    ALT (SGPT) 14 01/15/2018 05:46 AM      Anthropometrics: Height: 5' 1\" (154.9 cm) Weight: 103.9 kg (229 lb)    IBW (%IBW):   ( ) UBW (%UBW):   (  %)    BMI: Body mass index is 43.27 kg/(m^2). This BMI is indicative of:     [] Underweight    [] Normal    [] Overweight    []  Obesity    [x]  Extreme Obesity (BMI>40)    Estimated Nutrition Needs (Based on): 1109 Kcals/day (BMR(1491x1.3)-250) , 104 g (125g/day(1.0-1.2g/kg)) Protein  Carbohydrate: At Least 130 g/day  Fluids: 1650mL/day    Last BM: 1/15   [x]Active     []Hyperactive  []Hypoactive       [] Absent   BS  Skin:    [] Intact   [] Incision  [x] Breakdown- Stage 2 sacrum  [] DTI   [] Tears/Excoriation/Abrasion  []Edema [] Other:    Wt Readings from Last 30 Encounters:   01/15/18 103.9 kg (229 lb)   11/15/17 105.7 kg (233 lb)   11/02/17 105.7 kg (233 lb)   06/10/17 126.9 kg (279 lb 12.2 oz)   02/11/17 126.6 kg (279 lb)   11/11/16 126.6 kg (279 lb 3.2 oz)   08/15/16 125.6 kg (277 lb)      NUTRITION DIAGNOSES:   Problem:  Altered nutrition-related lab values     Etiology: related to endocrine dysfunction     Signs/Symptoms: as evidenced by , 152, 150, 158 mg/dL      NUTRITION INTERVENTIONS:  Meals/Snacks: General/healthful diet   Supplements: Commercial supplement              GOAL:   Pt will consume >50% of meals w BG < 150 mg/dL w/i 3-5 days    Cultural, Confucianist, or Ethnic Dietary Needs: None     LEARNING NEEDS (Diet, Food/Nutrient-Drug Interaction):    [x] None Identified   [] Identified and Education Provided/Documented   [] Identified and Pt declined/was not appropriate      [x] Interdisciplinary Care Plan Reviewed/Documented    [x] Discharge Needs:    TBD   [] No Nutrition Related Discharge Needs    NUTRITION RISK:   Pt Is At Nutrition Risk  [x]     No Nutrition Risk Identified  []       PT SEEN FOR:    []  MD Consult: []Calorie Count      []Diabetic Diet Education        []Diet Education     []Electrolyte Management     []General Nutrition Management and Supplements     []Management of Tube Feeding     []TPN Recommendations    [x]  RN Referral:  []MST score >=2     []Enteral/Parenteral Nutrition PTA     []Pregnant: Gestational DM or Multigestation                 [x] Pressure Ulcer      []  Low BMI      []  Length of Stay       [] Dysphagia Diet     [] Ventilator      [] Follow-Up        Previous Recommendations:   [] Implemented          [] Not Implemented          [x] Not Applicable    Previous Goal:   [] Met              [] Progressing Towards Goal              [] Not Progressing Towards Goal   [x] Not Applicable              Shelly Castorena, [address] [place] [address]  Pager: [phone]  Office: [phone]

## 2018-01-15 NOTE — PROGRESS NOTES
CM Note:  Met with pt for d/c planning. PTA pt was needing help with ADL's--her  was helping her. She stays in a recliner most of the time and sleeps in it as well. DME at home: RW, rollator, cane, w/c, BSC, shower chair; cpap is supplied by [employer]. She had home health in the past.  She has been to rehab at [facility] and [facility]. Her emergency contact is her , Jorden Enciso ([phone]). Pt has Rx coverage and gets her medications by mail from [facility] [address] and from [facility] on [address]. Will continue to follow and assist with any needs prior to d/c. KRISTINA Aguilar, VIKA    Care Management Interventions  PCP Verified by CM:  Yes (PCP is Dr. Elvin August.)  Palliative Care Criteria Met (RRAT>21 & CHF Dx)?: No  MyChart Signup: No  Discharge Durable Medical Equipment: No  Physical Therapy Consult: No  Occupational Therapy Consult: No  Speech Therapy Consult: Yes  Current Support Network: Lives with Spouse (Pt lives with her  on the ground floor of a 2 story house with a ramp to enter.)  Confirm Follow Up Transport:  (to be determined)  Plan discussed with Pt/Family/Caregiver: Yes  Discharge Location  Discharge Placement: Unable to determine at this time

## 2018-01-15 NOTE — WOUND CARE
Wound care consult:  Initial visit for skin and wound assessment, present on admission, alert, no distress, family at bedside. Assessment  All skin folds and bony prominences assessed, turned with staff assistance. Lower pannus skin folds pink and moist intact. Large round abdominal area. Scattered dry scabs and partial thickness scabs from patient chronically \"picking the areas\" Heels and elbows intact. Sacral area intact, chronically alvin pink from history of skin breakdown, scattered excoriation from moisture and limited mobility. Blanching skin. Treatment  Zinc to open areas on the lower abdomen. Repositioned in bed   Heels floated    Recommendations/Plan  Turn, reposition every 2 hours as tolerated, float heels as tolerated   Incontinent care with comfort shields. Apply Zinc to all open areas, moisture barrier as needed. Will follow, reconsult as needed.   Garnette Spiegel RN Freddi Duane

## 2018-01-16 LAB
ALBUMIN SERPL-MCNC: 2.5 G/DL (ref 3.5–5)
ALBUMIN/GLOB SERPL: 0.7 {RATIO} (ref 1.1–2.2)
ALP SERPL-CCNC: 44 U/L (ref 45–117)
ALT SERPL-CCNC: 8 U/L (ref 12–78)
ANION GAP SERPL CALC-SCNC: 8 MMOL/L (ref 5–15)
AST SERPL-CCNC: 37 U/L (ref 15–37)
BASOPHILS # BLD: 0 K/UL (ref 0–0.1)
BASOPHILS NFR BLD: 0 % (ref 0–1)
BILIRUB SERPL-MCNC: 0.7 MG/DL (ref 0.2–1)
BUN SERPL-MCNC: 69 MG/DL (ref 6–20)
BUN/CREAT SERPL: 30 (ref 12–20)
CALCIUM SERPL-MCNC: 8.9 MG/DL (ref 8.5–10.1)
CHLORIDE SERPL-SCNC: 107 MMOL/L (ref 97–108)
CO2 SERPL-SCNC: 28 MMOL/L (ref 21–32)
CREAT SERPL-MCNC: 2.32 MG/DL (ref 0.55–1.02)
EOSINOPHIL # BLD: 0 K/UL (ref 0–0.4)
EOSINOPHIL NFR BLD: 0 % (ref 0–7)
ERYTHROCYTE [DISTWIDTH] IN BLOOD BY AUTOMATED COUNT: 16.3 % (ref 11.5–14.5)
GLOBULIN SER CALC-MCNC: 3.7 G/DL (ref 2–4)
GLUCOSE BLD STRIP.AUTO-MCNC: 126 MG/DL (ref 65–100)
GLUCOSE BLD STRIP.AUTO-MCNC: 171 MG/DL (ref 65–100)
GLUCOSE BLD STRIP.AUTO-MCNC: 81 MG/DL (ref 65–100)
GLUCOSE BLD STRIP.AUTO-MCNC: 91 MG/DL (ref 65–100)
GLUCOSE SERPL-MCNC: 98 MG/DL (ref 65–100)
HCT VFR BLD AUTO: 38.9 % (ref 35–47)
HGB BLD-MCNC: 11.9 G/DL (ref 11.5–16)
LYMPHOCYTES # BLD: 0.3 K/UL (ref 0.8–3.5)
LYMPHOCYTES NFR BLD: 6 % (ref 12–49)
MCH RBC QN AUTO: 29.5 PG (ref 26–34)
MCHC RBC AUTO-ENTMCNC: 30.6 G/DL (ref 30–36.5)
MCV RBC AUTO: 96.3 FL (ref 80–99)
MONOCYTES # BLD: 0.2 K/UL (ref 0–1)
MONOCYTES NFR BLD: 4 % (ref 5–13)
NEUTS SEG # BLD: 4.5 K/UL (ref 1.8–8)
NEUTS SEG NFR BLD: 90 % (ref 32–75)
PLATELET # BLD AUTO: 170 K/UL (ref 150–400)
POTASSIUM SERPL-SCNC: 5.4 MMOL/L (ref 3.5–5.1)
PROT SERPL-MCNC: 6.2 G/DL (ref 6.4–8.2)
RBC # BLD AUTO: 4.04 M/UL (ref 3.8–5.2)
SERVICE CMNT-IMP: ABNORMAL
SERVICE CMNT-IMP: ABNORMAL
SERVICE CMNT-IMP: NORMAL
SERVICE CMNT-IMP: NORMAL
SODIUM SERPL-SCNC: 143 MMOL/L (ref 136–145)
WBC # BLD AUTO: 5 K/UL (ref 3.6–11)

## 2018-01-16 PROCEDURE — 74011250637 HC RX REV CODE- 250/637: Performed by: FAMILY MEDICINE

## 2018-01-16 PROCEDURE — 85025 COMPLETE CBC W/AUTO DIFF WBC: CPT | Performed by: FAMILY MEDICINE

## 2018-01-16 PROCEDURE — 74011000250 HC RX REV CODE- 250: Performed by: NURSE PRACTITIONER

## 2018-01-16 PROCEDURE — 82962 GLUCOSE BLOOD TEST: CPT

## 2018-01-16 PROCEDURE — 74011250636 HC RX REV CODE- 250/636: Performed by: INTERNAL MEDICINE

## 2018-01-16 PROCEDURE — 94640 AIRWAY INHALATION TREATMENT: CPT

## 2018-01-16 PROCEDURE — 80053 COMPREHEN METABOLIC PANEL: CPT | Performed by: FAMILY MEDICINE

## 2018-01-16 PROCEDURE — 77030038269 HC DRN EXT URIN PURWCK BARD -A

## 2018-01-16 PROCEDURE — 97116 GAIT TRAINING THERAPY: CPT

## 2018-01-16 PROCEDURE — 97162 PT EVAL MOD COMPLEX 30 MIN: CPT

## 2018-01-16 PROCEDURE — 36415 COLL VENOUS BLD VENIPUNCTURE: CPT | Performed by: FAMILY MEDICINE

## 2018-01-16 PROCEDURE — 74011250637 HC RX REV CODE- 250/637: Performed by: INTERNAL MEDICINE

## 2018-01-16 PROCEDURE — 74011250636 HC RX REV CODE- 250/636: Performed by: PHYSICIAN ASSISTANT

## 2018-01-16 PROCEDURE — 74011250637 HC RX REV CODE- 250/637: Performed by: NURSE PRACTITIONER

## 2018-01-16 PROCEDURE — 97530 THERAPEUTIC ACTIVITIES: CPT

## 2018-01-16 PROCEDURE — 74011636637 HC RX REV CODE- 636/637: Performed by: INTERNAL MEDICINE

## 2018-01-16 PROCEDURE — 92610 EVALUATE SWALLOWING FUNCTION: CPT

## 2018-01-16 PROCEDURE — 74011250636 HC RX REV CODE- 250/636: Performed by: NURSE PRACTITIONER

## 2018-01-16 PROCEDURE — 65660000000 HC RM CCU STEPDOWN

## 2018-01-16 RX ORDER — METRONIDAZOLE 500 MG/1
500 TABLET ORAL 3 TIMES DAILY
Qty: 90 TAB | Refills: 0 | Status: SHIPPED
Start: 2018-01-16

## 2018-01-16 RX ORDER — OSELTAMIVIR PHOSPHATE 30 MG/1
30 CAPSULE ORAL DAILY
Qty: 3 CAP | Refills: 0 | Status: SHIPPED
Start: 2018-01-16

## 2018-01-16 RX ORDER — BUMETANIDE 1 MG/1
1 TABLET ORAL 2 TIMES DAILY
Qty: 30 TAB | Refills: 0 | Status: SHIPPED
Start: 2018-01-16

## 2018-01-16 RX ADMIN — DOXAZOSIN 2 MG: 2 TABLET ORAL at 10:07

## 2018-01-16 RX ADMIN — BUMETANIDE 1 MG: 1 TABLET ORAL at 09:51

## 2018-01-16 RX ADMIN — INSULIN LISPRO 2 UNITS: 100 INJECTION, SOLUTION INTRAVENOUS; SUBCUTANEOUS at 17:51

## 2018-01-16 RX ADMIN — Medication 10 ML: at 05:55

## 2018-01-16 RX ADMIN — LOSARTAN POTASSIUM 25 MG: 50 TABLET ORAL at 09:52

## 2018-01-16 RX ADMIN — OSELTAMIVIR PHOSPHATE 30 MG: 30 CAPSULE ORAL at 09:51

## 2018-01-16 RX ADMIN — ALPRAZOLAM 0.25 MG: 0.25 TABLET ORAL at 09:52

## 2018-01-16 RX ADMIN — POTASSIUM CHLORIDE 10 MEQ: 750 TABLET, FILM COATED, EXTENDED RELEASE ORAL at 09:52

## 2018-01-16 RX ADMIN — HEPARIN SODIUM 5000 UNITS: 5000 INJECTION, SOLUTION INTRAVENOUS; SUBCUTANEOUS at 09:50

## 2018-01-16 RX ADMIN — METRONIDAZOLE 500 MG: 250 TABLET ORAL at 17:51

## 2018-01-16 RX ADMIN — HEPARIN SODIUM 5000 UNITS: 5000 INJECTION, SOLUTION INTRAVENOUS; SUBCUTANEOUS at 01:21

## 2018-01-16 RX ADMIN — METHYLPREDNISOLONE SODIUM SUCCINATE 40 MG: 40 INJECTION, POWDER, FOR SOLUTION INTRAMUSCULAR; INTRAVENOUS at 09:58

## 2018-01-16 RX ADMIN — IPRATROPIUM BROMIDE AND ALBUTEROL SULFATE 3 ML: .5; 3 SOLUTION RESPIRATORY (INHALATION) at 07:27

## 2018-01-16 RX ADMIN — ATORVASTATIN CALCIUM 20 MG: 20 TABLET, FILM COATED ORAL at 21:56

## 2018-01-16 RX ADMIN — CARVEDILOL 3.12 MG: 3.12 TABLET, FILM COATED ORAL at 17:51

## 2018-01-16 RX ADMIN — Medication 10 ML: at 21:54

## 2018-01-16 RX ADMIN — BUDESONIDE 500 MCG: 0.5 INHALANT RESPIRATORY (INHALATION) at 07:27

## 2018-01-16 RX ADMIN — IPRATROPIUM BROMIDE AND ALBUTEROL SULFATE 3 ML: .5; 3 SOLUTION RESPIRATORY (INHALATION) at 23:40

## 2018-01-16 RX ADMIN — ALPRAZOLAM 0.25 MG: 0.25 TABLET ORAL at 17:51

## 2018-01-16 RX ADMIN — IPRATROPIUM BROMIDE AND ALBUTEROL SULFATE 3 ML: .5; 3 SOLUTION RESPIRATORY (INHALATION) at 20:04

## 2018-01-16 RX ADMIN — LORATADINE 10 MG: 10 TABLET ORAL at 09:52

## 2018-01-16 RX ADMIN — IPRATROPIUM BROMIDE AND ALBUTEROL SULFATE 3 ML: .5; 3 SOLUTION RESPIRATORY (INHALATION) at 11:04

## 2018-01-16 RX ADMIN — CARVEDILOL 3.12 MG: 3.12 TABLET, FILM COATED ORAL at 09:51

## 2018-01-16 RX ADMIN — BUMETANIDE 1 MG: 1 TABLET ORAL at 17:51

## 2018-01-16 RX ADMIN — BUDESONIDE 500 MCG: 0.5 INHALANT RESPIRATORY (INHALATION) at 20:04

## 2018-01-16 RX ADMIN — DOCUSATE SODIUM AND SENNOSIDES 2 TABLET: 8.6; 5 TABLET, FILM COATED ORAL at 09:51

## 2018-01-16 RX ADMIN — ASPIRIN 81 MG 81 MG: 81 TABLET ORAL at 21:56

## 2018-01-16 RX ADMIN — FAMOTIDINE 20 MG: 20 TABLET, FILM COATED ORAL at 09:52

## 2018-01-16 RX ADMIN — HEPARIN SODIUM 5000 UNITS: 5000 INJECTION, SOLUTION INTRAVENOUS; SUBCUTANEOUS at 17:51

## 2018-01-16 RX ADMIN — INSULIN GLARGINE 17 UNITS: 100 INJECTION, SOLUTION SUBCUTANEOUS at 09:49

## 2018-01-16 RX ADMIN — GUAIFENESIN 600 MG: 600 TABLET, EXTENDED RELEASE ORAL at 09:52

## 2018-01-16 RX ADMIN — METRONIDAZOLE 500 MG: 250 TABLET ORAL at 21:56

## 2018-01-16 RX ADMIN — METRONIDAZOLE 500 MG: 250 TABLET ORAL at 09:51

## 2018-01-16 RX ADMIN — METHYLPREDNISOLONE SODIUM SUCCINATE 30 MG: 40 INJECTION, POWDER, FOR SOLUTION INTRAMUSCULAR; INTRAVENOUS at 21:54

## 2018-01-16 RX ADMIN — IPRATROPIUM BROMIDE AND ALBUTEROL SULFATE 3 ML: .5; 3 SOLUTION RESPIRATORY (INHALATION) at 04:05

## 2018-01-16 RX ADMIN — GUAIFENESIN 600 MG: 600 TABLET, EXTENDED RELEASE ORAL at 21:56

## 2018-01-16 RX ADMIN — CITALOPRAM HYDROBROMIDE 40 MG: 20 TABLET ORAL at 09:51

## 2018-01-16 NOTE — PROGRESS NOTES
Daily Progress Note: 1/16/2018  Clarice Torres MD    Assessment/Plan:   Acute hypoxic resp failure -likely mulitifactorial --  Hx asthma, obesity, Flu +  FLU B + -- finish tamiflu  wean steroids, cont nebs, supplemental oxygen as at home, mucinex  Bumex and K also re-started - monitor BMP outpt at [facility] later this wk  LE dopplers neg, ECHO EF ok/mild-mod pul HTN  --pulmonary has seen     RAI on CPAP / Morbid obesity / Hypoventilation syndrome - Continue home CPAP.       CKD (chronic kidney disease) stage 4, GFR 15-29 ml/min - Cr at baseline  --renal following     DM type 2, uncontrolled, with renal complications - Diabetic diet and counseling. BS well controlled. --resume home meds - counseled about need for wt loss/diet     HTN (hypertension), benign - Stable.  Resume home meds     Iron deficiency anemia - Stable.       Chronic bilateral low back pain without sciatica - Prn tylenol.       Anxiety and depression / Generalized anxiety disorder - At baseline  --Continue citalopram, xanax     Hyperlipidemia - Continue atorvastatin    CDIFF+  -- cont flagyl 1/14 outpt for 1mo        Problem List:  Problem List as of 1/16/2018  Date Reviewed: 1/12/2018          Codes Class Noted - Resolved    Respiratory failure with hypoxia [facility]) ICD-10-CM: J96.91  ICD-9-CM: 518.81  1/13/2018 - Present        * (Principal)Dyspnea ICD-10-CM: R06.00  ICD-9-CM: 786.09  1/12/2018 - Present        Morbid obesity ([facility] ICD-10-CM: E66.01  ICD-9-CM: 278.01  Unknown - Present        Hypoventilation syndrome ICD-10-CM: R06.89  ICD-9-CM: 786.09  Unknown - Present        Hyperlipidemia ICD-10-CM: E78.5  ICD-9-CM: 272.4  Unknown - Present        RAI on CPAP ICD-10-CM: G47.33, Z99.89  ICD-9-CM: 327.23, V46.8  Unknown - Present        Anxiety and depression ICD-10-CM: F41.8  ICD-9-CM: 300.00, 311  Unknown - Present        Chronic bilateral low back pain without sciatica ICD-10-CM: M54.5, G89.29  ICD-9-CM: 724.2, 338.29  11/3/2016 - Present        Generalized anxiety disorder ICD-10-CM: F41.1  ICD-9-CM: 300.02  11/3/2016 - Present        CKD (chronic kidney disease) stage 4, GFR 15-29 ml/min (HCC) (Chronic) ICD-10-CM: N18.4  ICD-9-CM: 585.4  8/14/2016 - Present        HTN (hypertension), benign (Chronic) ICD-10-CM: I10  ICD-9-CM: 401.1  8/14/2016 - Present        DM type 2, uncontrolled, with renal complications (HCC) (Chronic) ICD-10-CM: E11.29, E11.65  ICD-9-CM: 250.42  8/14/2016 - Present        Iron deficiency anemia (Chronic) ICD-10-CM: D50.9  ICD-9-CM: 280.9  8/14/2016 - Present        RESOLVED: CKD stage 4 due to type 2 diabetes mellitus ([facility] ICD-10-CM: E11.22, N18.4  ICD-9-CM: 250.40, 585.4  Unknown - 1/12/2018        RESOLVED: DM type 2 causing renal disease ([facility] ICD-10-CM: E11.29  ICD-9-CM: 250.40  Unknown - 1/12/2018        RESOLVED: Hypertension ICD-10-CM: I10  ICD-9-CM: 401.9  Unknown - 1/12/2018        RESOLVED: Arteriovenous fistula infection ([facility] ICD-10-CM: T82. 7XXA  ICD-9-CM: 996.62  11/2/2017 - 1/12/2018        RESOLVED: Hypertension ICD-10-CM: I10  ICD-9-CM: 401.9  Unknown - 11/2/2017        RESOLVED: DM type 2 causing renal disease ([facility] ICD-10-CM: E11.29  ICD-9-CM: 250.40  Unknown - 11/2/2017        RESOLVED: CKD stage 4 due to type 2 diabetes mellitus ([facility] ICD-10-CM: E11.22, N18.4  ICD-9-CM: 250.40, 585.4  Unknown - 11/2/2017        RESOLVED: Leukocytosis ICD-10-CM: T03.639  ICD-9-CM: 288.60  11/2/2017 - 1/12/2018        RESOLVED: Hypoxia ICD-10-CM: R09.02  ICD-9-CM: 799.02  11/2/2017 - 1/12/2018        RESOLVED: Pleural effusion ICD-10-CM: J90  ICD-9-CM: 511.9  11/2/2017 - 1/12/2018        RESOLVED: Cellulitis of right lower extremity ICD-10-CM: P61.585  ICD-9-CM: 682.6  6/2/2017 - 11/2/2017        RESOLVED: Asthma with acute exacerbation ICD-10-CM: J45.901  ICD-9-CM: 493.92  6/2/2017 - 11/2/2017        RESOLVED: Asthma ICD-10-CM: J45.909  ICD-9-CM: 493.90  Unknown - 1/12/2018        RESOLVED: CKD stage 4 due to type 2 diabetes mellitus ([facility] ICD-10-CM: E11.22, N18.4  ICD-9-CM: 250.40, 585.4  Unknown - 2/11/2017        RESOLVED: DM type 2 causing renal disease ([facility] ICD-10-CM: E11.29  ICD-9-CM: 250.40  Unknown - 2/11/2017        RESOLVED: Hypoglycemia ICD-10-CM: E16.2  ICD-9-CM: 251.2  2/11/2017 - 11/2/2017        RESOLVED: Hyperkalemia ICD-10-CM: E87.5  ICD-9-CM: 276.7  11/3/2016 - 2/11/2017        RESOLVED: Elevated serum creatinine ICD-10-CM: R79.89  ICD-9-CM: 790.99  11/3/2016 - 2/11/2017        RESOLVED: Left leg cellulitis ICD-10-CM: L03.116  ICD-9-CM: 682.6  8/14/2016 - 2/11/2017        RESOLVED: Sepsis ([facility] ICD-10-CM: A41.9  ICD-9-CM: 038.9, 995.91  8/14/2016 - 2/11/2017        RESOLVED: Acute renal failure (ARF) ([facility] ICD-10-CM: N17.9  ICD-9-CM: 584.9  8/14/2016 - 11/2/2017        RESOLVED: RAI (obstructive sleep apnea) (Chronic) ICD-10-CM: S75.77  ICD-9-CM: 327.23  8/14/2016 - 2/11/2017              Subjective:   1/13: She feels slightly better. Still with dry cough, SOB. Nausea has resolved and she is hungry. Denies pain. 1/14: Breathing isn't much better. Says it is difficult to eat due to SOB. Some cough, not able to bring up much sputum. No CP or myalgias. Nebs help but benefit is temporary. 1/15:  Breathing better. Less SOB. Still coughing but better. Few loose stools. She thinks she is feeling better. C diff still positive - On flagyl po for C diff. On Tamiflu for Influenza. 1/16:  She reports she is feeling much better and wants to go home. Very little cough. No SOB. No HA or new myalgias other than her chronic back pain. Nurse report soft stools for last 2 days and no further watery stools. She will need to have recheck of labs outpt. Discussed diabetic diet and wt loss. Discussed f/u with PCP and need for recheck of lab this wk. Review of Systems:   A comprehensive review of systems was negative except for that written in the HPI.     Objective: Physical Exam:     Visit Vitals    /45 (BP 1 Location: Right arm, BP Patient Position: At rest)    Pulse 78    Temp 96.6 °F (35.9 °C)    Resp 18    Ht 5' 1\" (1.549 m)    Wt 103.9 kg (229 lb)    SpO2 100%    BMI 43.27 kg/m2    O2 Flow Rate (L/min): 3 l/min O2 Device: CPAP mask    Temp (24hrs), Av.6 °F (36.4 °C), Min:96.6 °F (35.9 °C), Max:98.1 °F (36.7 °C)    01/15 1901 -  0700  In: -   Out: 577 [NZBUA:678]   701 - 01/15 1900  In: 240 [P.O.:240]  Out: 600 [Urine:600]    General:  Alert, cooperative, no distress, morbidly obese   Head:  Normocephalic, without obvious abnormality, atraumatic. Eyes:  Conjunctivae/corneas clear. PERRL, EOMs intact. Nose: Nares normal. Septum midline. Mucosa normal. No drainage or sinus tenderness. Throat: Lips, mucosa, and tongue moist..   Neck: Supple, symmetrical, trachea midline   Lungs:   A few scattered rhonchi  - diffuse, improved air movement   Heart:  Regular rate and rhythm, S1, S2 normal, no murmur, click, rub or gallop. Abdomen:   Soft, non-tender. Bowel sounds normal. No masses,  No organomegaly. Extremities: Trace ankle edema, no calf ttp or cords   Pulses: 2+ and symmetric all extremities. Skin: Skin color, texture, turgor normal.    Neurologic: CNII-XII intact. Alert and oriented X 3.         Data Review:       Recent Days:  Recent Labs      18   0410  01/15/18   0546   WBC  5.0  4.8   HGB  11.9  10.9*   HCT  38.9  35.1   PLT  170  180     Recent Labs      18   0410  01/15/18   0546   NA  143  144   K  5.4*  4.6   CL  107  107   CO2  28  31   GLU  98  126*   BUN  69*  66*   CREA  2.32*  2.45*   CA  8.9  8.8   ALB  2.5*  2.4*   SGOT  37  26   ALT  8*  14     24 Hour Results:  Recent Results (from the past 24 hour(s))   GLUCOSE, POC    Collection Time: 01/15/18  7:31 AM   Result Value Ref Range    Glucose (POC) 107 (H) 65 - 100 mg/dL    Performed by [address] [place] [address], POC    Collection Time: 01/15/18 12:49 PM Result Value Ref Range    Glucose (POC) 129 (H) 65 - 100 mg/dL    Performed by Alicia Walker    GLUCOSE, POC    Collection Time: 01/15/18  3:58 PM   Result Value Ref Range    Glucose (POC) 92 65 - 100 mg/dL    Performed by Fabricio Gee (PCT)    GLUCOSE, POC    Collection Time: 01/15/18  9:18 PM   Result Value Ref Range    Glucose (POC) 115 (H) 65 - 100 mg/dL    Performed by Cameron tSreet (PCT)    METABOLIC PANEL, COMPREHENSIVE    Collection Time: 01/16/18  4:10 AM   Result Value Ref Range    Sodium 143 136 - 145 mmol/L    Potassium 5.4 (H) 3.5 - 5.1 mmol/L    Chloride 107 97 - 108 mmol/L    CO2 28 21 - 32 mmol/L    Anion gap 8 5 - 15 mmol/L    Glucose 98 65 - 100 mg/dL    BUN 69 (H) 6 - 20 MG/DL    Creatinine 2.32 (H) 0.55 - 1.02 MG/DL    BUN/Creatinine ratio 30 (H) 12 - 20      GFR est AA 25 (L) >60 ml/min/1.73m2    GFR est non-AA 21 (L) >60 ml/min/1.73m2    Calcium 8.9 8.5 - 10.1 MG/DL    Bilirubin, total 0.7 0.2 - 1.0 MG/DL    ALT (SGPT) 8 (L) 12 - 78 U/L    AST (SGOT) 37 15 - 37 U/L    Alk. phosphatase 44 (L) 45 - 117 U/L    Protein, total 6.2 (L) 6.4 - 8.2 g/dL    Albumin 2.5 (L) 3.5 - 5.0 g/dL    Globulin 3.7 2.0 - 4.0 g/dL    A-G Ratio 0.7 (L) 1.1 - 2.2     CBC WITH AUTOMATED DIFF    Collection Time: 01/16/18  4:10 AM   Result Value Ref Range    WBC 5.0 3.6 - 11.0 K/uL    RBC 4.04 3.80 - 5.20 M/uL    HGB 11.9 11.5 - 16.0 g/dL    HCT 38.9 35.0 - 47.0 %    MCV 96.3 80.0 - 99.0 FL    MCH 29.5 26.0 - 34.0 PG    MCHC 30.6 30.0 - 36.5 g/dL    RDW 16.3 (H) 11.5 - 14.5 %    PLATELET 677 664 - 514 K/uL    NEUTROPHILS 90 (H) 32 - 75 %    LYMPHOCYTES 6 (L) 12 - 49 %    MONOCYTES 4 (L) 5 - 13 %    EOSINOPHILS 0 0 - 7 %    BASOPHILS 0 0 - 1 %    ABS. NEUTROPHILS 4.5 1.8 - 8.0 K/UL    ABS. LYMPHOCYTES 0.3 (L) 0.8 - 3.5 K/UL    ABS. MONOCYTES 0.2 0.0 - 1.0 K/UL    ABS. EOSINOPHILS 0.0 0.0 - 0.4 K/UL    ABS.  BASOPHILS 0.0 0.0 - 0.1 K/UL     Medications reviewed  Current Facility-Administered Medications   Medication Dose Route Frequency    famotidine (PEPCID) tablet 20 mg  20 mg Oral DAILY    methylPREDNISolone (PF) (SOLU-MEDROL) injection 40 mg  40 mg IntraVENous Q12H    benzonatate (TESSALON) capsule 100 mg  100 mg Oral TID PRN    metroNIDAZOLE (FLAGYL) tablet 500 mg  500 mg Oral TID    sodium chloride (NS) flush 5-10 mL  5-10 mL IntraVENous PRN    ALPRAZolam (XANAX) tablet 0.25 mg  0.25 mg Oral BID    aspirin chewable tablet 81 mg  81 mg Oral QHS    atorvastatin (LIPITOR) tablet 20 mg  20 mg Oral QHS    carvedilol (COREG) tablet 3.125 mg  3.125 mg Oral BID WITH MEALS    citalopram (CELEXA) tablet 40 mg  40 mg Oral DAILY    doxazosin (CARDURA) tablet 2 mg  2 mg Oral DAILY    losartan (COZAAR) tablet 25 mg  25 mg Oral DAILY    senna-docusate (PERICOLACE) 8.6-50 mg per tablet 2 Tab  2 Tab Oral DAILY    potassium chloride SR (KLOR-CON 10) tablet 10 mEq  10 mEq Oral BID    loratadine (CLARITIN) tablet 10 mg  10 mg Oral DAILY    insulin glargine (LANTUS) injection 17 Units  17 Units SubCUTAneous DAILY    sodium chloride (NS) flush 5-10 mL  5-10 mL IntraVENous Q8H    sodium chloride (NS) flush 5-10 mL  5-10 mL IntraVENous PRN    glucose chewable tablet 16 g  4 Tab Oral PRN    dextrose (D50W) injection syrg 12.5-25 g  12.5-25 g IntraVENous PRN    glucagon (GLUCAGEN) injection 1 mg  1 mg IntraMUSCular PRN    acetaminophen (TYLENOL) tablet 650 mg  650 mg Oral Q4H PRN    diphenhydrAMINE (BENADRYL) capsule 25 mg  25 mg Oral Q4H PRN    ondansetron (ZOFRAN) injection 4 mg  4 mg IntraVENous Q4H PRN    heparin (porcine) injection 5,000 Units  5,000 Units SubCUTAneous Q8H    insulin lispro (HUMALOG) injection   SubCUTAneous AC&HS    albuterol-ipratropium (DUO-NEB) 2.5 MG-0.5 MG/3 ML  3 mL Nebulization Q4H RT    budesonide (PULMICORT) 500 mcg/2 ml nebulizer suspension  500 mcg Nebulization BID RT    arformoterol (BROVANA) neb solution 15 mcg  15 mcg Nebulization BID RT    guaiFENesin ER (MUCINEX) tablet 600 mg 600 mg Oral Q12H    bumetanide (BUMEX) tablet 1 mg  1 mg Oral BID    oseltamivir (TAMIFLU) capsule 30 mg  30 mg Oral DAILY     Care Plan discussed with: Malorie Jacob MD

## 2018-01-16 NOTE — PROGRESS NOTES
Problem: Mobility Impaired (Adult and Pediatric)  Goal: *Acute Goals and Plan of Care (Insert Text)  Physical Therapy Goals  Initiated 1/16/2018  1. Patient will move from supine to sit and sit to supine  in bed with supervision/set-up within 7 day(s). 2.  Patient will transfer from bed to chair and chair to bed with minimal assistance/contact guard assist using the least restrictive device within 7 day(s). 3.  Patient will perform sit to stand with minimal assistance/contact guard assist within 7 day(s). 4.  Patient will ambulate with minimal assistance/contact guard assist for 50 feet with the least restrictive device within 7 day(s). physical Therapy EVALUATION  Patient: Natalie Flores (34 y.o. female)  Date: 1/16/2018  Primary Diagnosis: Dyspnea  Respiratory failure with hypoxia (HCC)        Precautions:   Fall;contact (chronic c-diff and droplet due to flu)    ASSESSMENT :  Based on the objective data described below, the patient presents with admission due to dyspnea/resp failure/flu. Received supine in bed on 2LO2.  present. Both stating that pt with recent rehab stay of 37days and discharge home for 2days with unfortunate return to hospital.  Pt stating that she felt her progress was minimal at SNF walking 76' with RW and does not want to return to same rehab setting. Prefers to return home with HHPT with 's assist.  Supine to sit with Mod A x2 with HOB elevated. Would be Max A if bed flat. Noted coughing. Sit to stand with Mod A x2. Gait of 5' to chair with same. Noted SOB on room air with desat to 89%. Good recovery back on 2L to 96%. Left in NAD with call bell and  present. Both agree to call nursing when ready to return to bed. Likely will need rehab vs HHPT. May be able to tolerate IPrehab to make faster gains. Patient will benefit from skilled intervention to address the above impairments.   Patients rehabilitation potential is considered to be Good  Factors which may influence rehabilitation potential include:   []         None noted  []         Mental ability/status  [x]         Medical condition  []         Home/family situation and support systems  []         Safety awareness  []         Pain tolerance/management  []         Other:      PLAN :  Recommendations and Planned Interventions:  [x]           Bed Mobility Training             [x]    Neuromuscular Re-Education  [x]           Transfer Training                   []    Orthotic/Prosthetic Training  [x]           Gait Training                         []    Modalities  [x]           Therapeutic Exercises           []    Edema Management/Control  [x]           Therapeutic Activities            [x]    Patient and Family Training/Education  []           Other (comment):    Frequency/Duration: Patient will be followed by physical therapy  5 times a week to address goals. Discharge Recommendations: Rehab vs HH  Further Equipment Recommendations for Discharge: has all DME     SUBJECTIVE:   Patient stated I am afraid that I might fall.     OBJECTIVE DATA SUMMARY:   HISTORY:    Past Medical History:   Diagnosis Date    Anxiety and depression     Asthma     CKD stage 4 due to type 2 [facility] mellitus ([facility]     DM type 2 causing renal disease ([facility]     Hyperlipidemia     Hypertension     Hypoventilation syndrome     Morbid obesity (HCC)     RAI on CPAP      Past Surgical History:   Procedure Laterality Date    BREAST SURGERY PROCEDURE UNLISTED      mastectomy, bilat    HX CHOLECYSTECTOMY      HX GYN      hysterectomy    HX HEENT      tonsilectomy     Prior Level of Function/Home Situation: lives with  who helps her with ADL's; walks with rollator; recent d/c from SNF for rehab; sleeps in recliner  Personal factors and/or comorbidities impacting plan of care: resp fail with hypoxia; B mastectomy; DM; anxiety d/o; CKD    Home Situation  Home Environment: Private residence  Wheelchair Ramp: Yes  One/Two Story Residence: Two story, live on 1st floor  Living Alone: No  Support Systems: Spouse/Significant Other/Partner  Patient Expects to be Discharged to[de-identified] Unknown  Current DME Used/Available at Home: Wheelchair, Walker, rollator, Christean Filter, straight, Grab bars, Commode, bedside, Shower chair  Tub or Shower Type: Shower    EXAMINATION/PRESENTATION/DECISION MAKING:   Critical Behavior:  Neurologic State: Alert  Orientation Level: Oriented X4  Cognition: Follows commands  Safety/Judgement: Awareness of environment, Fall prevention, Insight into deficits  Hearing: Auditory  Auditory Impairment: None  Skin:  IV  Edema: BLE Mod  Range Of Motion:  AROM: Generally decreased, functional                       Strength:    Strength: Generally decreased, functional                    Tone & Sensation:   Tone: Normal              Sensation: Intact               Coordination:  Coordination: Generally decreased, functional  Vision:      Functional Mobility:  Bed Mobility:  Rolling: Contact guard assistance  Supine to Sit: Moderate assistance;Assist x2     Scooting: Supervision  Transfers:  Sit to Stand: Moderate assistance;Minimum assistance;Assist x2  Stand to Sit: Minimum assistance                       Balance:   Sitting: Intact  Standing: Impaired; With support  Standing - Static: Constant support; Fair  Standing - Dynamic : Poor  Ambulation/Gait Training:  Distance (ft): 5 Feet (ft)  Assistive Device: Walker, rolling;Gait belt  Ambulation - Level of Assistance:  Moderate assistance;Minimal assistance                 Base of Support: Widened     Speed/Rina: Slow;Shuffled;Pace decreased (<100 feet/min)  Step Length: Right shortened;Left shortened                              Therapeutic Exercises:   Encouraged ankle pumping    Functional Measure:  Barthel Index:    Bathin  Bladder: 5  Bowels: 5  Groomin  Dressin  Feeding: 10  Mobility: 0  Stairs: 0  Toilet Use: 5  Transfer (Bed to Chair and Back): 10  Total: 45       Barthel and G-code impairment scale:  Percentage of impairment CH  0% CI  1-19% CJ  20-39% CK  40-59% CL  60-79% CM  80-99% CN  100%   Barthel Score 0-100 100 99-80 79-60 59-40 20-39 1-19   0   Barthel Score 0-20 20 17-19 13-16 9-12 5-8 1-4 0      The Barthel ADL Index: Guidelines  1. The index should be used as a record of what a patient does, not as a record of what a patient could do. 2. The main aim is to establish degree of independence from any help, physical or verbal, however minor and for whatever reason. 3. The need for supervision renders the patient not independent. 4. A patient's performance should be established using the best available evidence. Asking the patient, friends/relatives and nurses are the usual sources, but direct observation and common sense are also important. However direct testing is not needed. 5. Usually the patient's performance over the preceding 24-48 hours is important, but occasionally longer periods will be relevant. 6. Middle categories imply that the patient supplies over 50 per cent of the effort. 7. Use of aids to be independent is allowed. Citlali Powell., Barthel, D.W. ([id number]). Functional evaluation: the Barthel Index. [address] [facility] (14)2. JOVANNI Camargo, Judit Edmondson., [place] Jay[facility]ariBapt[facility], [address] [place] [address] (1999). Measuring the change indisability after inpatient rehabilitation; comparison of the responsiveness of the Barthel Index and Functional Hudson Measure. Journal of Neurology, Neurosurgery, and Psychiatry, 66(4), 806-155. Maci Martinez, N.J.A, SCOTTY Viera, & Lyssa Alfonso, M.A. (2004.) Assessment of post-stroke quality of life in cost-effectiveness studies: The usefulness of the Barthel Index and the EuroQoL-5D. Quality of Life Research, 13, 461-83       G codes:   In compliance with CMSs Claims Based Outcome Reporting, the following G-code set was chosen for this patient based on their primary functional limitation being treated: The outcome measure chosen to determine the severity of the functional limitation was the barthel with a score of 45/100 which was correlated with the impairment scale. ? Mobility - Walking and Moving Around:     - CURRENT STATUS: CK - 40%-59% impaired, limited or restricted    - GOAL STATUS: CJ - 20%-39% impaired, limited or restricted    - D/C STATUS:  ---------------To be determined---------------      Physical Therapy Evaluation Charge Determination   History Examination Presentation Decision-Making   HIGH Complexity :3+ comorbidities / personal factors will impact the outcome/ POC  MEDIUM Complexity : 3 Standardized tests and measures addressing body structure, function, activity limitation and / or participation in recreation  MEDIUM Complexity : Evolving with changing characteristics  Other outcome measures barthel  MEDIUM      Based on the above components, the patient evaluation is determined to be of the following complexity level: MEDIUM    Pain:  Pain Scale 1: Numeric (0 - 10)  Pain Intensity 1: 0              Activity Tolerance:   fair  Please refer to the flowsheet for vital signs taken during this treatment. After treatment:   [x]         Patient left in no apparent distress sitting up in chair  []         Patient left in no apparent distress in bed  [x]         Call bell left within reach  [x]         Nursing notified  [x]         Caregiver present  []         Bed alarm activated    COMMUNICATION/EDUCATION:   The patients plan of care was discussed with: Registered Nurse and . [x]         Fall prevention education was provided and the patient/caregiver indicated understanding. [x]         Patient/family have participated as able in goal setting and plan of care. [x]         Patient/family agree to work toward stated goals and plan of care. []         Patient understands intent and goals of therapy, but is neutral about his/her participation.   [] Patient is unable to participate in goal setting and plan of care.     Thank you for this referral.  Missy Estrada, PT   Time Calculation: 30 mins

## 2018-01-16 NOTE — PROGRESS NOTES
[facility] PROGRESS NOTE  Pulmonary, Critical Care, and Sleep Medicine    Name: Jimmy Valenzuela MRN: [id number]   : 1945 Hospital: [facility]   Date: 2018  Admission Date: 2018     Chart and notes reviewed. Data reviewed. I review the patient's current medications in the medical record at each encounter.  I have evaluated and examined the patient. Overnight events reviewed:  Afebrile  BP stable  O2 sats 97% on 3L NC  Hgb 11.9  Creat 2.32 - stable  Respiratory viral panel + for Influenza B  BLE dopplers negative   ECHO:  EF 50-55%, LA dilated, mild MV regurgitation, mild to moderate pulmonary HTN, left pleural effusion    ROS:  Feeling a little bit better today. Breathing improved. Slept with CPAP overnight, but not requiring any PAP therapy during the day. Denies fever, chills, cough, or sputum production. Reports generalized weakness, especially in her legs. This has been an ongoing issue per her , but was improving at SNF prior to admission. 12 point ROS reviewed and negative other than noted above. Vital Signs:  Visit Vitals    /72    Pulse 85    Temp 97.5 °F (36.4 °C)    Resp 18    Ht 5' 1\" (1.549 m)    Wt 106.4 kg (234 lb 9.1 oz)    SpO2 97%    BMI 44.32 kg/m2       O2 Device: CPAP mask   O2 Flow Rate (L/min): 3 l/min   Temp (24hrs), Av.6 °F (36.4 °C), Min:96.6 °F (35.9 °C), Max:98.1 °F (36.7 °C)       Intake/Output:   Last shift:         Last 3 shifts: 1901 -  0700  In: -   Out: 300 [Urine:300]    Intake/Output Summary (Last 24 hours) at 18 1213  Last data filed at 18 0148   Gross per 24 hour   Intake                0 ml   Output              300 ml   Net             -300 ml          Physical Exam:   General:  Alert, cooperative, no distress, obese, appears stated age. Head:  Normocephalic, without obvious abnormality, atraumatic. Eyes:  Conjunctivae/corneas clear.    Nose: Nares normal. Septum midline. Mucosa normal.    Throat: Lips, mucosa, and tongue normal. Teeth and gums normal.   Neck: Supple, symmetrical, trachea midline, no adenopathy   Lungs:   Decreased breath sounds with scattered bilateral rhonchi   Chest wall:  No tenderness or deformity. Heart:  Regular rate and rhythm, S1, S2 normal, no murmur, click, rub or gallop. Abdomen:   Soft, non-tender. Bowel sounds normal.    Extremities: Extremities normal, atraumatic, no cyanosis, trace LE edema   Pulses: 2+ and symmetric all extremities. Skin: Skin color, texture, turgor normal. No rashes or lesions   Lymph nodes: Cervical, supraclavicular  nodes normal.   Neurologic: Grossly nonfocal     DATA:  MAR reviewed and pertinent medications noted or modified as needed    Labs:  Recent Labs      01/16/18   0410  01/15/18   0546   WBC  5.0  4.8   HGB  11.9  10.9*   HCT  38.9  35.1   PLT  170  180     Recent Labs      01/16/18   0410  01/15/18   0546   NA  143  144   K  5.4*  4.6   CL  107  107   CO2  28  31   GLU  98  126*   BUN  69*  66*   CREA  2.32*  2.45*   CA  8.9  8.8   ALB  2.5*  2.4*   TBILI  0.7  0.5   SGOT  37  26   ALT  8*  14       Imaging:  No new images this morning    CXR 1/12:  No significant change in left basilar opacity consistent with effusion/atelectasis/airspace disease. Moderate cardiomegaly.     MPRESSION  · Acute Respiratory Failure with Hypoxia  · Asthma with Acute Exacerbation  · Influenza B+  · RAI & OHS  · CKD  · Hypertension  · Morbid Obesity  · Debility     PLAN  · Supplemental O2 to keep sats >90%  · Auto CPAP at night and with naps  · Continue scheduled Duonebs  · Scheduled Pulmicort/Brovana nebs  · Complete Tamoflu course tomorrow morning  · Wean Solumedrol to 30mg q12h; can likely transition to prednisone taper tomorrow if she continues to improve  · Consult speech eval appreciated; following for pharyngeal dysphagia with solids  · Mucinex  · Flagyl for Cdiff per primary  team  · Renal following; continuing Bumex.   Watch creat and I/Os  · CPAP at night and with naps  · PT/OT consult  · GI prophylaxis: Pepcid  · DVT Prophylaxis:  sub q heparin        Marli Randle, [address]

## 2018-01-16 NOTE — PROGRESS NOTES
1610:  PT recommending rehab. I spoke with pt's  who would like to have referrals sent to [facility] and [facility]. Referral sent in [place]. Still needs OT eval.  Order requested from Dr. Rm Oh. VIKA Donis    CM Note:  Met with pt for dispo plan. She wants to go home. She & her  stated she had completed rehab at [facility] and went home with home health from [facility]. At an MD appointment she was told to come to the hospital.  She has not been out of bed since being admitted. Called Dr. Rm Oh for PT/OT consult to evaluate her.   VIKA Donis

## 2018-01-16 NOTE — PROGRESS NOTES
Problem: Dysphagia (Adult)  Goal: *Acute Goals and Plan of Care (Insert Text)  Swallowing goals initiated 1-16-18:  1)  Tolerate solids without gagging by using compensatory strategies by 1-19-18  Speech LAnguage Pathology bedside swallow evaluation  Patient: Concha Lau (45 y.o. female)  Date: 1/16/2018  Primary Diagnosis: Dyspnea  Respiratory failure with hypoxia (HCC)        Precautions: aspiration       ASSESSMENT :  Based on the objective data described below, the patient presents with c/o pharyngeal dysphagia with solids. THis has been present since the start of her debility in the last year. She had some dysphagia therapy at SNF for weakness. Patient was admitted with flu and c-diff. PMH:  CPAP, A&D, asthma, CKD, DM. XOL, obesity, RAI, OHS, has need to sleep sitting upright for a long time. Chronic mild hypoxia, bilateral mastectomy. Patient will benefit from skilled intervention to address the above impairments. Patients rehabilitation potential is considered to be Fair  Factors which may influence rehabilitation potential include:   []            None noted  [x]            Mental ability/status  [x]            Medical condition  []            Home/family situation and support systems  []            Safety awareness  []            Pain tolerance/management  []            Other:      PLAN :  Recommendations and Planned Interventions:  Continue diet as tolerated. Frequency/Duration: Patient will be followed by speech-language pathology 2 times a week to address goals. Discharge Recommendations: To Be Determined     SUBJECTIVE:   Patient stated I had swallowing therapy at the [facility].     OBJECTIVE:     Past Medical History:   Diagnosis Date    Anxiety and depression     Asthma     CKD stage 4 due to type 2 diabetes mellitus ([address]     DM type 2 causing renal disease ([address]     Hyperlipidemia     Hypertension     Hypoventilation syndrome     Morbid obesity (HCC)     RAI on CPAP Past Surgical History:   Procedure Laterality Date    BREAST SURGERY PROCEDURE UNLISTED      mastectomy, bilat    HX CHOLECYSTECTOMY      HX GYN      hysterectomy    HX HEENT      tonsilectomy     Prior Level of Function/Home Situation:   Home Situation  Home Environment: Rehabilitation facility  One/Two Story Residence: Other (Comment)  Living Alone: No  Support Systems: Family member(s)  Patient Expects to be Discharged to[de-identified] Rehabilitation facility  Current DME Used/Available at Home: None  Diet prior to admission: regular, thins  Current Diet:  Regular, thins=salt and cardiac restrictions   Cognitive and Communication Status:  Neurologic State: Alert  Orientation Level: Oriented X4  Cognition: Appropriate decision making        Safety/Judgement: Insight into deficits  Oral Assessment:  Oral Assessment  Labial: No impairment  Dentition: Limited;Natural (fairly good)  Lingual: No impairment  P.O. Trials:  Patient Position: upright in bed  Vocal quality prior to P.O.: No impairment  Consistency Presented: All consistencies  How Presented: Self-fed/presented   ORAL PHASE:   Bolus Acceptance: No impairment  Bolus Formation/Control: No impairment     Propulsion: No impairment  Oral Residue: None   PHARYGNEAL PHASE:   Initiation of Swallow: Delayed (# of seconds) (she appeared hesitant)  Laryngeal Elevation: Weak (? but difficult to palpate due to thyroid nodule)  Aspiration Signs/Symptoms:  (patient c/o choking on solids \"they stick in my throat\")  Pharyngeal Phase Characteristics: Suspected pharyngeal residue      patient took at least 15 pills with applesauce. She c/o pharyngeal residue at times, but was able to use a combo of applesacue and soda to clear her throat. NOMS:   The NOMS functional outcome measure was used to quantify this patient's level of swallowing impairment. Based on the NOMS, the patient was determined to be at level 5 for swallow function     G Codes:   In compliance with CMSs Claims Based Outcome Reporting, the following G-code set was chosen for this patient based the use of the NOMS functional outcome to quantify this patient's level of swallowing impairment. Using the NOMS, the patient was determined to be at level 5 for swallow function which correlates with the CJ= 20-39% level of severity. Based on the objective assessment provided within this note, the current, goal, and discharge g-codes are as follows:    Swallow  Swallowing:   Swallow Current Status CJ= 20-39%   Swallow Goal Status CJ= 20-39%      NOMS Swallowing Levels:  Level 1 (CN): NPO  Level 2 (CM): NPO but takes consistency in therapy  Level 3 (CL): Takes less than 50% of nutrition p.o. and continues with nonoral feedings; and/or safe with mod cues; and/or max diet restriction  Level 4 (CK): Safe swallow but needs mod cues; and/or mod diet restriction; and/or still requires some nonoral feeding/supplements  Level 5 (CJ): Safe swallow with min diet restriction; and/or needs min cues  Level 6 (CI): Independent with p.o.; rare cues; usually self cues; may need to avoid some foods or needs extra time  Level 7 ([address] [place] [address]): Independent for all p.o.  LUIS. (2003). National Outcomes Measurement System (NOMS): Adult Speech-Language Pathology User's Guide. Pain:  Pain Scale 1: Numeric (0 - 10)  Pain Intensity 1: 0     After treatment:   []            Patient left in no apparent distress sitting up in chair  []            Patient left in no apparent distress in bed  []            Call bell left within reach  []            Nursing notified  []            Caregiver present  []            Bed alarm activated    COMMUNICATION/EDUCATION:   The patients plan of care including recommendations, planned interventions, and recommended diet changes were discussed with: Registered Nurse. Patient was educated regarding Her deficit(s) of DYSPHAGIA  as this relates to Her diagnosis of FLU AND C-DIFF.   She demonstrated Good understanding as evidenced by discussion. .  []            Posted safety precautions in patient's room. [x]            Patient/family have participated as able in goal setting and plan of care. [x]            Patient/family agree to work toward stated goals and plan of care. []            Patient understands intent and goals of therapy, but is neutral about his/her participation. []            Patient is unable to participate in goal setting and plan of care.     Thank you for this referral.  YANIQUE Morse  Time Calculation: 30 mins

## 2018-01-16 NOTE — PROGRESS NOTES
Bedside and Verbal shift change report given to Sal (oncoming nurse) by Jeromy Lewis (offgoing nurse). Report included the following information SBAR, Kardex, Procedure Summary, Intake/Output, MAR, Recent Results and Cardiac Rhythm NSR.

## 2018-01-16 NOTE — PROGRESS NOTES
[address]  YOB: 1945          Assessment & Plan:   CKD 4  · Stable  · Creatinine at baseline    Hyperkalemia  · Dc KCL  · Labs in 1 week    Multifactorial Respiratory insufficiency  · Steroids, nebs, Bipap  · Continue Bumex, she was off of it a couple days  · Flu +, on tamiflu    HTN  · Reasonably controlled    AVF in E with access arm edema  · Follows with vascular center       Subjective:   CC: CKD  HPI: Renal function stable. On Tamiflu for FluB, K worse.   On steroids  ROS: +cough +sob  Current Facility-Administered Medications   Medication Dose Route Frequency    influenza vaccine 2017-18 (3 yrs+)(PF) (FLUZONE QUAD/FLUARIX QUAD) injection 0.5 mL  0.5 mL IntraMUSCular PRIOR TO DISCHARGE    famotidine (PEPCID) tablet 20 mg  20 mg Oral DAILY    methylPREDNISolone (PF) (SOLU-MEDROL) injection 40 mg  40 mg IntraVENous Q12H    benzonatate (TESSALON) capsule 100 mg  100 mg Oral TID PRN    metroNIDAZOLE (FLAGYL) tablet 500 mg  500 mg Oral TID    sodium chloride (NS) flush 5-10 mL  5-10 mL IntraVENous PRN    ALPRAZolam (XANAX) tablet 0.25 mg  0.25 mg Oral BID    aspirin chewable tablet 81 mg  81 mg Oral QHS    atorvastatin (LIPITOR) tablet 20 mg  20 mg Oral QHS    carvedilol (COREG) tablet 3.125 mg  3.125 mg Oral BID WITH MEALS    citalopram (CELEXA) tablet 40 mg  40 mg Oral DAILY    doxazosin (CARDURA) tablet 2 mg  2 mg Oral DAILY    losartan (COZAAR) tablet 25 mg  25 mg Oral DAILY    senna-docusate (PERICOLACE) 8.6-50 mg per tablet 2 Tab  2 Tab Oral DAILY    loratadine (CLARITIN) tablet 10 mg  10 mg Oral DAILY    insulin glargine (LANTUS) injection 17 Units  17 Units SubCUTAneous DAILY    sodium chloride (NS) flush 5-10 mL  5-10 mL IntraVENous Q8H    sodium chloride (NS) flush 5-10 mL  5-10 mL IntraVENous PRN    glucose chewable tablet 16 g  4 Tab Oral PRN    dextrose (D50W) injection syrg 12.5-25 g 12.5-25 g IntraVENous PRN    glucagon (GLUCAGEN) injection 1 mg  1 mg IntraMUSCular PRN    acetaminophen (TYLENOL) tablet 650 mg  650 mg Oral Q4H PRN    diphenhydrAMINE (BENADRYL) capsule 25 mg  25 mg Oral Q4H PRN    ondansetron (ZOFRAN) injection 4 mg  4 mg IntraVENous Q4H PRN    heparin (porcine) injection 5,000 Units  5,000 Units SubCUTAneous Q8H    insulin lispro (HUMALOG) injection   SubCUTAneous AC&HS    albuterol-ipratropium (DUO-NEB) 2.5 MG-0.5 MG/3 ML  3 mL Nebulization Q4H RT    budesonide (PULMICORT) 500 mcg/2 ml nebulizer suspension  500 mcg Nebulization BID RT    arformoterol (BROVANA) neb solution 15 mcg  15 mcg Nebulization BID RT    guaiFENesin ER (MUCINEX) tablet 600 mg  600 mg Oral Q12H    bumetanide (BUMEX) tablet 1 mg  1 mg Oral BID    oseltamivir (TAMIFLU) capsule 30 mg  30 mg Oral DAILY          Objective:     Vitals:  Blood pressure 134/76, pulse 77, temperature 97.5 °F (36.4 °C), resp. rate 18, height 5' 1\" (1.549 m), weight 103.9 kg (229 lb), SpO2 99 %. Temp (24hrs), Av.6 °F (36.4 °C), Min:96.6 °F (35.9 °C), Max:98.1 °F (36.7 °C)      Intake and Output:      1901 -  0700  In: -   Out: 300 [Urine:300]    Physical Exam:               GENERAL ASSESSMENT: chronically ill, obese  CHEST: B wheezes and rhonchi  HEART: S1S2  ABDOMEN: Soft,NT  EXTREMITY: Chronic LE EDEMA; LUE AVF +t/b LUE edema          ECG/rhythm:    Data Review      No results for input(s): TNIPOC in the last 72 hours. No lab exists for component: ITNL   No results for input(s): CPK, CKMB, TROIQ in the last 72 hours. Recent Labs      18   0410  01/15/18   0546   NA  143  144   K  5.4*  4.6   CL  107  107   CO2  28  31   BUN  69*  66*   CREA  2.32*  2.45*   GLU  98  126*   CA  8.9  8.8   ALB  2.5*  2.4*   WBC  5.0  4.8   HGB  11.9  10.9*   HCT  38.9  35.1   PLT  170  180      No results for input(s): INR, PTP, APTT in the last 72 hours.     No lab exists for component: INREXT, INREXT  Needs: urine analysis, urine sodium, protein and creatinine  Lab Results   Component Value Date/Time    Sodium urine, random 50 11/03/2016 12:55 PM    Creatinine, urine 53.84 11/03/2016 12:55 PM           : Raza Fregoso MD  1/16/2018        [facility]:  [email or web address]  Jamari Cameron office:  [address] [place] [address], [address] [place] [address],[address]  [place], [address] [facility]  Phone: [phone]  Fax :     [phone]    [place] office:  [address] [facility], [address]  Phone - [phone]  Fax - [phone]

## 2018-01-17 LAB
GLUCOSE BLD STRIP.AUTO-MCNC: 105 MG/DL (ref 65–100)
GLUCOSE BLD STRIP.AUTO-MCNC: 116 MG/DL (ref 65–100)
GLUCOSE BLD STRIP.AUTO-MCNC: 117 MG/DL (ref 65–100)
GLUCOSE BLD STRIP.AUTO-MCNC: 42 MG/DL (ref 65–100)
GLUCOSE BLD STRIP.AUTO-MCNC: 47 MG/DL (ref 65–100)
GLUCOSE BLD STRIP.AUTO-MCNC: 50 MG/DL (ref 65–100)
GLUCOSE BLD STRIP.AUTO-MCNC: 56 MG/DL (ref 65–100)
GLUCOSE BLD STRIP.AUTO-MCNC: 70 MG/DL (ref 65–100)
GLUCOSE BLD STRIP.AUTO-MCNC: 73 MG/DL (ref 65–100)
GLUCOSE BLD STRIP.AUTO-MCNC: 75 MG/DL (ref 65–100)
GLUCOSE BLD STRIP.AUTO-MCNC: 82 MG/DL (ref 65–100)
SERVICE CMNT-IMP: ABNORMAL
SERVICE CMNT-IMP: NORMAL

## 2018-01-17 PROCEDURE — 97165 OT EVAL LOW COMPLEX 30 MIN: CPT

## 2018-01-17 PROCEDURE — 65660000000 HC RM CCU STEPDOWN

## 2018-01-17 PROCEDURE — 97535 SELF CARE MNGMENT TRAINING: CPT

## 2018-01-17 PROCEDURE — 74011636637 HC RX REV CODE- 636/637: Performed by: INTERNAL MEDICINE

## 2018-01-17 PROCEDURE — 74011250637 HC RX REV CODE- 250/637: Performed by: FAMILY MEDICINE

## 2018-01-17 PROCEDURE — 82962 GLUCOSE BLOOD TEST: CPT

## 2018-01-17 PROCEDURE — 74011000250 HC RX REV CODE- 250: Performed by: INTERNAL MEDICINE

## 2018-01-17 PROCEDURE — 92526 ORAL FUNCTION THERAPY: CPT

## 2018-01-17 PROCEDURE — 74011250636 HC RX REV CODE- 250/636: Performed by: INTERNAL MEDICINE

## 2018-01-17 PROCEDURE — 94640 AIRWAY INHALATION TREATMENT: CPT

## 2018-01-17 PROCEDURE — 97530 THERAPEUTIC ACTIVITIES: CPT

## 2018-01-17 PROCEDURE — 74011250637 HC RX REV CODE- 250/637: Performed by: INTERNAL MEDICINE

## 2018-01-17 PROCEDURE — 74011000250 HC RX REV CODE- 250: Performed by: NURSE PRACTITIONER

## 2018-01-17 PROCEDURE — 74011250637 HC RX REV CODE- 250/637: Performed by: NURSE PRACTITIONER

## 2018-01-17 PROCEDURE — 74011250637 HC RX REV CODE- 250/637: Performed by: PHYSICIAN ASSISTANT

## 2018-01-17 PROCEDURE — 74011636637 HC RX REV CODE- 636/637: Performed by: FAMILY MEDICINE

## 2018-01-17 PROCEDURE — 94660 CPAP INITIATION&MGMT: CPT

## 2018-01-17 RX ORDER — PREDNISONE 20 MG/1
20 TABLET ORAL 2 TIMES DAILY WITH MEALS
Status: DISCONTINUED | OUTPATIENT
Start: 2018-01-17 | End: 2018-01-23 | Stop reason: HOSPADM

## 2018-01-17 RX ORDER — INSULIN GLARGINE 100 [IU]/ML
10 INJECTION, SOLUTION SUBCUTANEOUS DAILY
Status: DISCONTINUED | OUTPATIENT
Start: 2018-01-18 | End: 2018-01-23 | Stop reason: HOSPADM

## 2018-01-17 RX ADMIN — IPRATROPIUM BROMIDE AND ALBUTEROL SULFATE 3 ML: .5; 3 SOLUTION RESPIRATORY (INHALATION) at 19:41

## 2018-01-17 RX ADMIN — DEXTROSE MONOHYDRATE 25 G: 25 INJECTION, SOLUTION INTRAVENOUS at 09:51

## 2018-01-17 RX ADMIN — PREDNISONE 20 MG: 20 TABLET ORAL at 09:41

## 2018-01-17 RX ADMIN — INSULIN GLARGINE 10 UNITS: 100 INJECTION, SOLUTION SUBCUTANEOUS at 09:42

## 2018-01-17 RX ADMIN — IPRATROPIUM BROMIDE AND ALBUTEROL SULFATE 3 ML: .5; 3 SOLUTION RESPIRATORY (INHALATION) at 23:44

## 2018-01-17 RX ADMIN — CITALOPRAM HYDROBROMIDE 40 MG: 20 TABLET ORAL at 09:42

## 2018-01-17 RX ADMIN — OSELTAMIVIR PHOSPHATE 30 MG: 30 CAPSULE ORAL at 09:41

## 2018-01-17 RX ADMIN — BUMETANIDE 1 MG: 1 TABLET ORAL at 18:35

## 2018-01-17 RX ADMIN — GUAIFENESIN 600 MG: 600 TABLET, EXTENDED RELEASE ORAL at 22:04

## 2018-01-17 RX ADMIN — HEPARIN SODIUM 5000 UNITS: 5000 INJECTION, SOLUTION INTRAVENOUS; SUBCUTANEOUS at 09:42

## 2018-01-17 RX ADMIN — ATORVASTATIN CALCIUM 20 MG: 20 TABLET, FILM COATED ORAL at 22:03

## 2018-01-17 RX ADMIN — ASPIRIN 81 MG 81 MG: 81 TABLET ORAL at 22:04

## 2018-01-17 RX ADMIN — METRONIDAZOLE 500 MG: 250 TABLET ORAL at 16:17

## 2018-01-17 RX ADMIN — DOXAZOSIN 2 MG: 2 TABLET ORAL at 09:41

## 2018-01-17 RX ADMIN — LORATADINE 10 MG: 10 TABLET ORAL at 09:42

## 2018-01-17 RX ADMIN — HEPARIN SODIUM 5000 UNITS: 5000 INJECTION, SOLUTION INTRAVENOUS; SUBCUTANEOUS at 18:35

## 2018-01-17 RX ADMIN — HEPARIN SODIUM 5000 UNITS: 5000 INJECTION, SOLUTION INTRAVENOUS; SUBCUTANEOUS at 02:30

## 2018-01-17 RX ADMIN — CARVEDILOL 3.12 MG: 3.12 TABLET, FILM COATED ORAL at 09:41

## 2018-01-17 RX ADMIN — ACETAMINOPHEN 650 MG: 325 TABLET ORAL at 09:41

## 2018-01-17 RX ADMIN — BENZONATATE 100 MG: 100 CAPSULE ORAL at 09:42

## 2018-01-17 RX ADMIN — METRONIDAZOLE 500 MG: 250 TABLET ORAL at 22:03

## 2018-01-17 RX ADMIN — IPRATROPIUM BROMIDE AND ALBUTEROL SULFATE 3 ML: .5; 3 SOLUTION RESPIRATORY (INHALATION) at 11:39

## 2018-01-17 RX ADMIN — IPRATROPIUM BROMIDE AND ALBUTEROL SULFATE 3 ML: .5; 3 SOLUTION RESPIRATORY (INHALATION) at 08:58

## 2018-01-17 RX ADMIN — FAMOTIDINE 20 MG: 20 TABLET, FILM COATED ORAL at 09:41

## 2018-01-17 RX ADMIN — IPRATROPIUM BROMIDE AND ALBUTEROL SULFATE 3 ML: .5; 3 SOLUTION RESPIRATORY (INHALATION) at 15:30

## 2018-01-17 RX ADMIN — METRONIDAZOLE 500 MG: 250 TABLET ORAL at 09:42

## 2018-01-17 RX ADMIN — IPRATROPIUM BROMIDE AND ALBUTEROL SULFATE 3 ML: .5; 3 SOLUTION RESPIRATORY (INHALATION) at 04:00

## 2018-01-17 RX ADMIN — GUAIFENESIN 600 MG: 600 TABLET, EXTENDED RELEASE ORAL at 09:41

## 2018-01-17 RX ADMIN — CARVEDILOL 3.12 MG: 3.12 TABLET, FILM COATED ORAL at 18:35

## 2018-01-17 RX ADMIN — ALPRAZOLAM 0.25 MG: 0.25 TABLET ORAL at 18:35

## 2018-01-17 RX ADMIN — Medication 10 ML: at 14:00

## 2018-01-17 RX ADMIN — PREDNISONE 20 MG: 20 TABLET ORAL at 18:35

## 2018-01-17 RX ADMIN — DOCUSATE SODIUM AND SENNOSIDES 2 TABLET: 8.6; 5 TABLET, FILM COATED ORAL at 09:42

## 2018-01-17 RX ADMIN — Medication 10 ML: at 22:04

## 2018-01-17 RX ADMIN — BUMETANIDE 1 MG: 1 TABLET ORAL at 09:42

## 2018-01-17 RX ADMIN — ALPRAZOLAM 0.25 MG: 0.25 TABLET ORAL at 09:42

## 2018-01-17 RX ADMIN — BUDESONIDE 500 MCG: 0.5 INHALANT RESPIRATORY (INHALATION) at 19:41

## 2018-01-17 RX ADMIN — LOSARTAN POTASSIUM 25 MG: 50 TABLET ORAL at 09:41

## 2018-01-17 NOTE — PROGRESS NOTES
CM Note:  Pt stated she did not want to go to [facility] and would prefer [facility] or home health.   VIKA Donis

## 2018-01-17 NOTE — PROGRESS NOTES
Bedside and Verbal shift change report given to Macarena Galo RN (oncoming nurse) by [facility] TATIANNA WOOD RN (offgoing nurse). Report included the following information SBAR, Kardex, Procedure Summary, Intake/Output and MAR. Blood sugar low this AM 62,13,90,42,25 was 81 last night patient does not remember eating snack.

## 2018-01-17 NOTE — PROGRESS NOTES
Spiritual Care Assessment/Progress Notes    Mike Eagle [id number]  [id number]    1945  71 y.o.  female    Patient Telephone Number: [phone] (home)   Yazidism Affiliation: Celina Elam   Language: English   Extended Emergency Contact Information  Primary Emergency Contact: Lizeth Baker  Address: [address] [place] [address], [address] [place] [address] Phone: [phone]  Relation: Spouse   Patient Active Problem List    Diagnosis Date Noted    Respiratory failure with hypoxia ([facility] 01/13/2018    Dyspnea 01/12/2018    Morbid obesity ([facility]     Hypoventilation syndrome     Hyperlipidemia     RAI on CPAP     Anxiety and depression     Chronic bilateral low back pain without sciatica 11/03/2016    Generalized anxiety disorder 11/03/2016    CKD (chronic kidney disease) stage 4, GFR 15-29 ml/min ([facility] 08/14/2016    HTN (hypertension), benign 08/14/2016    DM type 2, uncontrolled, with renal complications ([facility] 20/61/8430    Iron deficiency anemia 08/14/2016        Date: 1/17/2018       Level of Yazidism/Spiritual Activity:  [x]         Involved in abilio tradition/spiritual practice    []         Not involved in abilio tradition/spiritual practice  [x]         Spiritually oriented    []         Claims no spiritual orientation    []         seeking spiritual identity  []         Feels alienated from Quaker practice/tradition  []         Feels angry about Quaker practice/tradition  [x]         Spirituality/Quaker PRAYER IS a resource for coping at this time.   []         Not able to assess due to medical condition    Services Provided Today:  []         crisis intervention    []         reading Scriptures  [x]         spiritual assessment    [x]         prayer  [x]         empathic listening/emotional support  []         rites and rituals (cite in comments)  []         life review     []         Quaker support  []         theological development   [] advocacy  []         ethical dialog     []         blessing  []         bereavement support    []         support to family  []         anticipatory grief support   []         help with AMD  []         spiritual guidance    []         meditation      Spiritual Care Needs  []         Emotional Support  [x]         Spiritual/Zoroastrian Care  []         Loss/Adjustment  []         Advocacy/Referral                /Ethics  []         No needs expressed at               this time  []         Other: (note in               comments)  [address]  []         Follow up visits with               pt/family  []         Provide materials  []         Schedule sacraments  []         Contact Community               Clergy  [x]         Follow up as needed  []         Other: (note in               comments)     Comments: Initial spiritual assessment in 5 Med Surg. Miss Zaynab Degroot was in a chair. She shared she has been in and out of the hospital many times. Her  also struggles with a Cancer diagnosis. They belong to a [employer] in the area though neither have been able to attend for a while. She requested prayer. Provided spiritual presence and prayer. She shared she was pleased the new young Ernestina Claw of her [employer] was by to visit today. Advised of  Availability.   Visited by: Vicenta Sampson [address] [facility] David Johnson ([id number])

## 2018-01-17 NOTE — PROGRESS NOTES
[facility] PROGRESS NOTE  Pulmonary, Critical Care, and Sleep Medicine    Name: Jimmy Valenzuela MRN: [id number]   : 1945 Hospital: [facility]   Date: 2018  Admission Date: 2018     Chart and notes reviewed. Data reviewed. I review the patient's current medications in the medical record at each encounter.  I have evaluated and examined the patient. Overnight events reviewed:  Afebrile  BP stable  O2 sats 100% on 2L  Respiratory viral panel + for Influenza B  BLE dopplers negative   ECHO:  EF 50-55%, LA dilated, mild MV regurgitation, mild to moderate pulmonary HTN, left pleural effusion    ROS:  Feeling better today. Was able to get OOB and work with therapy yesterday. Breathing improved. Denies fever, chills, or sputum production. Has intermittent cough. Reports generalized weakness. This has been an ongoing issue per her , but was improving at SNF prior to admission. 12 point ROS reviewed and negative other than noted above. Vital Signs:  Visit Vitals    /68 (BP 1 Location: Right arm, BP Patient Position: At rest)    Pulse 77    Temp 97.8 °F (36.6 °C)    Resp 19    Ht 5' 1\" (1.549 m)    Wt 106.4 kg (234 lb 9.1 oz)    SpO2 100%    BMI 44.32 kg/m2       O2 Device: Nasal cannula   O2 Flow Rate (L/min): 2 l/min   Temp (24hrs), Av.8 °F (36.6 °C), Min:96.7 °F (35.9 °C), Max:98.7 °F (37.1 °C)       Intake/Output:   Last shift:         Last 3 shifts: 01/15 1901 -  0700  In: -   Out: 300 [Urine:300]  No intake or output data in the 24 hours ending 18 1120       Physical Exam:   General:  Alert, cooperative, no distress, obese, appears stated age. Head:  Normocephalic, without obvious abnormality, atraumatic. Eyes:  Conjunctivae/corneas clear. Nose: Nares normal. Septum midline.  Mucosa normal.    Throat: Lips, mucosa, and tongue normal. Teeth and gums normal.   Neck: Supple, symmetrical, trachea midline, no adenopathy   Lungs:   Decreased breath sounds with scattered bilateral rhonchi in upeer airway. Posterior lung fields mostly clear. Chest wall:  No tenderness or deformity. Heart:  Regular rate and rhythm, S1, S2 normal, no murmur, click, rub or gallop. Abdomen:   Soft, non-tender. Bowel sounds normal.    Extremities: Extremities normal, atraumatic, no cyanosis, trace LE edema   Pulses: 2+ and symmetric all extremities. Skin: Skin color, texture, turgor normal. No rashes or lesions   Lymph nodes: Cervical, supraclavicular  nodes normal.   Neurologic: Grossly nonfocal     DATA:  MAR reviewed and pertinent medications noted or modified as needed    Labs:  Recent Labs      01/16/18   0410  01/15/18   0546   WBC  5.0  4.8   HGB  11.9  10.9*   HCT  38.9  35.1   PLT  170  180     Recent Labs      01/16/18   0410  01/15/18   0546   NA  143  144   K  5.4*  4.6   CL  107  107   CO2  28  31   GLU  98  126*   BUN  69*  66*   CREA  2.32*  2.45*   CA  8.9  8.8   ALB  2.5*  2.4*   TBILI  0.7  0.5   SGOT  37  26   ALT  8*  14       Imaging:  No new images this morning    CXR 1/12:  No significant change in left basilar opacity consistent with effusion/atelectasis/airspace disease. Moderate cardiomegaly. MPRESSION  · Acute Respiratory Failure with Hypoxia  · Asthma with Acute Exacerbation  · Influenza B+  · RAI & OHS  · CKD  · Hypertension  · Morbid Obesity  · Debility     PLAN  · Supplemental O2 to keep sats >90%  · Auto CPAP at night and with naps  · Continue scheduled Duonebs  · Scheduled Pulmicort/Brovana nebs  · Completed Tamiflu course this morning  · Prednisone taper   · Consulted speech and eval appreciated; following for pharyngeal dysphagia with solids  · Mucinex  · Flagyl for Cdiff per primary  team  · Renal following; continuing Bumex.   Watch creat and I/Os  · CPAP at night and with naps  · PT/OT eval appreciated  · Dispo: rehab  · GI prophylaxis: Pepcid  · DVT Prophylaxis:  sub q heparin    Patient is stable from a pulmonary standpoint. We will sign off and arrange for outpatient follow up in 2-4 weeks. Please call with questions.       Festus Noyola

## 2018-01-17 NOTE — PROGRESS NOTES
Daily Progress Note: 1/17/2018  Aleah Spring MD    Assessment/Plan:   Acute hypoxic resp failure -likely mulitifactorial --  Hx asthma, obesity, Flu +  FLU B + -- finished tamiflu  wean steroids, cont nebs, supplemental oxygen as at home, mucinex  Bumex and K also re-started - monitor BMP outpt   LE dopplers neg, ECHO EF ok/mild-mod pul HTN  --pulmonary has seen     RAI on CPAP / Morbid obesity / Hypoventilation syndrome - Continue home CPAP.       CKD (chronic kidney disease) stage 4, GFR 15-29 ml/min - Cr at baseline  --renal following     DM type 2, uncontrolled, with renal complications - Diabetic diet and counseling. BS well controlled. --resume home meds - counseled about need for wt loss/diet     HTN (hypertension), benign - Stable. Resume home meds     Iron deficiency anemia - Stable.       Chronic bilateral low back pain without sciatica - Prn tylenol.       Anxiety and depression / Generalized anxiety disorder - At baseline  --Continue citalopram, xanax     Hyperlipidemia - Continue atorvastatin    CDIFF+  -- cont flagyl 1/14 outpt for 1mo    Dispo:  Family would now like to consider rehab again.          Problem List:  Problem List as of 1/17/2018  Date Reviewed: 1/12/2018          Codes Class Noted - Resolved    Respiratory failure with hypoxia [facility]) ICD-10-CM: J96.91  ICD-9-CM: 518.81  1/13/2018 - Present        * (Principal)Dyspnea ICD-10-CM: R06.00  ICD-9-CM: 786.09  1/12/2018 - Present        Morbid obesity ([facility] [address] ICD-10-CM: E66.01  ICD-9-CM: 278.01  Unknown - Present        Hypoventilation syndrome ICD-10-CM: R06.89  ICD-9-CM: 786.09  Unknown - Present        Hyperlipidemia ICD-10-CM: E78.5  ICD-9-CM: 272.4  Unknown - Present        RAI on CPAP ICD-10-CM: G47.33, Z99.89  ICD-9-CM: 327.23, V46.8  Unknown - Present        Anxiety and depression ICD-10-CM: F41.8  ICD-9-CM: 300.00, 311  Unknown - Present        Chronic bilateral low back pain without sciatica ICD-10-CM: M54.5, G89.29  ICD-9-CM: 724.2, 338.29  11/3/2016 - Present        Generalized anxiety disorder ICD-10-CM: F41.1  ICD-9-CM: 300.02  11/3/2016 - Present        CKD (chronic kidney disease) stage 4, GFR 15-29 ml/min (HCC) (Chronic) ICD-10-CM: N18.4  ICD-9-CM: 585.4  8/14/2016 - Present        HTN (hypertension), benign (Chronic) ICD-10-CM: I10  ICD-9-CM: 401.1  8/14/2016 - Present        DM type 2, uncontrolled, with renal complications (HCC) (Chronic) ICD-10-CM: E11.29, E11.65  ICD-9-CM: 250.42  8/14/2016 - Present        Iron deficiency anemia (Chronic) ICD-10-CM: D50.9  ICD-9-CM: 280.9  8/14/2016 - Present        RESOLVED: CKD stage 4 due to type 2 diabetes mellitus ([facility] [place] [facility] ICD-10-CM: E11.22, N18.4  ICD-9-CM: 250.40, 585.4  Unknown - 1/12/2018        RESOLVED: DM type 2 causing renal disease ([facility] [place] [facility] ICD-10-CM: E11.29  ICD-9-CM: 250.40  Unknown - 1/12/2018        RESOLVED: Hypertension ICD-10-CM: I10  ICD-9-CM: 401.9  Unknown - 1/12/2018        RESOLVED: Arteriovenous fistula infection ([facility] [place] [facility] ICD-10-CM: T82. 7XXA  ICD-9-CM: 996.62  11/2/2017 - 1/12/2018        RESOLVED: Hypertension ICD-10-CM: I10  ICD-9-CM: 401.9  Unknown - 11/2/2017        RESOLVED: DM type 2 causing renal disease ([facility] [place] [facility] ICD-10-CM: E11.29  ICD-9-CM: 250.40  Unknown - 11/2/2017        RESOLVED: CKD stage 4 due to type 2 diabetes mellitus ([facility] [place] [facility] ICD-10-CM: E11.22, N18.4  ICD-9-CM: 250.40, 585.4  Unknown - 11/2/2017        RESOLVED: Leukocytosis ICD-10-CM: K80.285  ICD-9-CM: 288.60  11/2/2017 - 1/12/2018        RESOLVED: Hypoxia ICD-10-CM: R09.02  ICD-9-CM: 799.02  11/2/2017 - 1/12/2018        RESOLVED: Pleural effusion ICD-10-CM: J90  ICD-9-CM: 511.9  11/2/2017 - 1/12/2018        RESOLVED: Cellulitis of right lower extremity ICD-10-CM: R33.918  ICD-9-CM: 682.6  6/2/2017 - 11/2/2017        RESOLVED: Asthma with acute exacerbation ICD-10-CM: J45.901  ICD-9-CM: 493.92  6/2/2017 - 11/2/2017        RESOLVED: Asthma ICD-10-CM: J45.909  ICD-9-CM: 493.90 Unknown - 1/12/2018        RESOLVED: CKD stage 4 due to type 2 diabetes mellitus ([facility] ICD-10-CM: E11.22, N18.4  ICD-9-CM: 250.40, 585.4  Unknown - 2/11/2017        RESOLVED: DM type 2 causing renal disease ([facility] ICD-10-CM: E11.29  ICD-9-CM: 250.40  Unknown - 2/11/2017        RESOLVED: Hypoglycemia ICD-10-CM: E16.2  ICD-9-CM: 251.2  2/11/2017 - 11/2/2017        RESOLVED: Hyperkalemia ICD-10-CM: E87.5  ICD-9-CM: 276.7  11/3/2016 - 2/11/2017        RESOLVED: Elevated serum creatinine ICD-10-CM: R79.89  ICD-9-CM: 790.99  11/3/2016 - 2/11/2017        RESOLVED: Left leg cellulitis ICD-10-CM: L03.116  ICD-9-CM: 682.6  8/14/2016 - 2/11/2017        RESOLVED: Sepsis ([facility] ICD-10-CM: A41.9  ICD-9-CM: 038.9, 995.91  8/14/2016 - 2/11/2017        RESOLVED: Acute renal failure (ARF) ([facility] ICD-10-CM: N17.9  ICD-9-CM: 584.9  8/14/2016 - 11/2/2017        RESOLVED: RAI (obstructive sleep apnea) (Chronic) ICD-10-CM: I77.52  ICD-9-CM: 327.23  8/14/2016 - 2/11/2017              Subjective:   1/13: She feels slightly better. Still with dry cough, SOB. Nausea has resolved and she is hungry. Denies pain. 1/14: Breathing isn't much better. Says it is difficult to eat due to SOB. Some cough, not able to bring up much sputum. No CP or myalgias. Nebs help but benefit is temporary. 1/15:  Breathing better. Less SOB. Still coughing but better. Few loose stools. She thinks she is feeling better. C diff still positive - On flagyl po for C diff. On Tamiflu for Influenza. 1/16:  She reports she is feeling much better and wants to go home. Very little cough. No SOB. No HA or new myalgias other than her chronic back pain. Nurse report soft stools for last 2 days and no further watery stools. She will need to have recheck of labs outpt. Discussed diabetic diet and wt loss. Discussed f/u with PCP and need for recheck of lab this wk.      1/17:  Continues to grad improve. Little cough.   Looks comfortable on 2 liters at this moment. Pt reports she wants to go home but family wants her to go back to rehab - will consult case management to see if [facility] will take her back. Continues to improve. Wean to po Pred today. Review of Systems:   A comprehensive review of systems was negative except for that written in the HPI. Objective:   Physical Exam:     Visit Vitals    /64 (BP 1 Location: Right arm, BP Patient Position: At rest)    Pulse 78    Temp 98.1 °F (36.7 °C)    Resp 19    Ht 5' 1\" (1.549 m)    Wt 106.4 kg (234 lb 9.1 oz)    SpO2 96%    BMI 44.32 kg/m2    O2 Flow Rate (L/min): 2 l/min O2 Device: CPAP mask    Temp (24hrs), Av.7 °F (36.5 °C), Min:96.7 °F (35.9 °C), Max:98.7 °F (37.1 °C)        01/15 0701 -  1900  In: -   Out: 300 [Urine:300]    General:  Alert, cooperative, no distress, morbidly obese   Head:  Normocephalic, without obvious abnormality, atraumatic. Eyes:  Conjunctivae/corneas clear. PERRL, EOMs intact. Nose: Nares normal. Septum midline. Mucosa normal. No drainage or sinus tenderness. Throat: Lips, mucosa, and tongue moist..   Neck: Supple, symmetrical, trachea midline   Lungs:   A few scattered rhonchi  - diffuse, improved air movement   Heart:  Regular rate and rhythm, S1, S2 normal, no murmur, click, rub or gallop. Abdomen:   Soft, non-tender. Bowel sounds normal. No masses,  No organomegaly. Extremities: Trace ankle edema, no calf ttp or cords   Pulses: 2+ and symmetric all extremities. Skin: Skin color, texture, turgor normal.    Neurologic: CNII-XII intact. Alert and oriented X 3.         Data Review:       Recent Days:  Recent Labs      18   0410  01/15/18   0546   WBC  5.0  4.8   HGB  11.9  10.9*   HCT  38.9  35.1   PLT  170  180     Recent Labs      18   0410  01/15/18   0546   NA  143  144   K  5.4*  4.6   CL  107  107   CO2  28  31   GLU  98  126*   BUN  69*  66*   CREA  2.32*  2.45*   CA  8.9  8.8   ALB  2.5*  2.4*   SGOT  37  26   ALT  8* 14     24 Hour Results:  Recent Results (from the past 24 hour(s))   GLUCOSE, POC    Collection Time: 01/16/18  7:19 AM   Result Value Ref Range    Glucose (POC) 91 65 - 100 mg/dL    Performed by [facility]    GLUCOSE, POC    Collection Time: 01/16/18 11:38 AM   Result Value Ref Range    Glucose (POC) 126 (H) 65 - 100 mg/dL    Performed by [facility]    GLUCOSE, POC    Collection Time: 01/16/18  4:31 PM   Result Value Ref Range    Glucose (POC) 171 (H) 65 - 100 mg/dL    Performed by [facility]    GLUCOSE, POC    Collection Time: 01/16/18 10:11 PM   Result Value Ref Range    Glucose (POC) 81 65 - 100 mg/dL    Performed by Shiva Saavedra (PCT)      Medications reviewed  Current Facility-Administered Medications   Medication Dose Route Frequency    influenza vaccine 2017-18 (3 yrs+)(PF) (FLUZONE QUAD/FLUARIX QUAD) injection 0.5 mL  0.5 mL IntraMUSCular PRIOR TO DISCHARGE    methylPREDNISolone (PF) (SOLU-MEDROL) injection 30 mg  30 mg IntraVENous Q12H    famotidine (PEPCID) tablet 20 mg  20 mg Oral DAILY    benzonatate (TESSALON) capsule 100 mg  100 mg Oral TID PRN    metroNIDAZOLE (FLAGYL) tablet 500 mg  500 mg Oral TID    sodium chloride (NS) flush 5-10 mL  5-10 mL IntraVENous PRN    ALPRAZolam (XANAX) tablet 0.25 mg  0.25 mg Oral BID    aspirin chewable tablet 81 mg  81 mg Oral QHS    atorvastatin (LIPITOR) tablet 20 mg  20 mg Oral QHS    carvedilol (COREG) tablet 3.125 mg  3.125 mg Oral BID WITH MEALS    citalopram (CELEXA) tablet 40 mg  40 mg Oral DAILY    doxazosin (CARDURA) tablet 2 mg  2 mg Oral DAILY    losartan (COZAAR) tablet 25 mg  25 mg Oral DAILY    senna-docusate (PERICOLACE) 8.6-50 mg per tablet 2 Tab  2 Tab Oral DAILY    loratadine (CLARITIN) tablet 10 mg  10 mg Oral DAILY    insulin glargine (LANTUS) injection 17 Units  17 Units SubCUTAneous DAILY    sodium chloride (NS) flush 5-10 mL  5-10 mL IntraVENous Q8H    sodium chloride (NS) flush 5-10 mL  5-10 mL IntraVENous PRN    glucose chewable tablet 16 g  4 Tab Oral PRN    dextrose (D50W) injection syrg 12.5-25 g  12.5-25 g IntraVENous PRN    glucagon (GLUCAGEN) injection 1 mg  1 mg IntraMUSCular PRN    acetaminophen (TYLENOL) tablet 650 mg  650 mg Oral Q4H PRN    diphenhydrAMINE (BENADRYL) capsule 25 mg  25 mg Oral Q4H PRN    ondansetron (ZOFRAN) injection 4 mg  4 mg IntraVENous Q4H PRN    heparin (porcine) injection 5,000 Units  5,000 Units SubCUTAneous Q8H    insulin lispro (HUMALOG) injection   SubCUTAneous AC&HS    albuterol-ipratropium (DUO-NEB) 2.5 MG-0.5 MG/3 ML  3 mL Nebulization Q4H RT    budesonide (PULMICORT) 500 mcg/2 ml nebulizer suspension  500 mcg Nebulization BID RT    arformoterol (BROVANA) neb solution 15 mcg  15 mcg Nebulization BID RT    guaiFENesin ER (MUCINEX) tablet 600 mg  600 mg Oral Q12H    bumetanide (BUMEX) tablet 1 mg  1 mg Oral BID    oseltamivir (TAMIFLU) capsule 30 mg  30 mg Oral DAILY     Care Plan discussed with: Loise Lesches, MD

## 2018-01-17 NOTE — ROUTINE PROCESS
Bedside and Verbal shift change report given to Emerson (oncoming nurse) by Chandana (offgoing nurse). Report included the following information SBAR, Kardex, MAR, Accordion and Recent Results.

## 2018-01-17 NOTE — PROGRESS NOTES
Problem: Dysphagia (Adult)  Goal: *Acute Goals and Plan of Care (Insert Text)  Swallowing goals initiated 1-16-18:  1)  Tolerate solids without gagging by using compensatory strategies by 1-19-18   Speech language pathology dysphagia treatment  Patient: Roselyn Rey (27 y.o. female)  Date: 1/17/2018  Diagnosis: Dyspnea  Respiratory failure with hypoxia (HCC) Dyspnea       Precautions: aspiration Fall    ASSESSMENT:  Patient tolerating some soft, moist solids without gagging. Still unable to eat meats/breads. Progression toward goals:  []         Improving appropriately and progressing toward goals  [x]         Improving slowly and progressing toward goals  []         Not making progress toward goals and plan of care will be adjusted     PLAN:  Recommendations and Planned Interventions:  Ok for [facility] soft, thins  Patient continues to benefit from skilled intervention to address the above impairments. Continue treatment per established plan of care. Discharge Recommendations:  Skilled Nursing Facility     SUBJECTIVE:   Patient stated I'm just so weak b/c of my condition. .    OBJECTIVE:   Cognitive and Communication Status:  Neurologic State: Alert  Orientation Level: Oriented X4  Cognition: Follows commands  Perception: Appears intact  Perseveration: No perseveration noted  Safety/Judgement: Awareness of environment, Fall prevention, Insight into deficits  Dysphagia Treatment:  Oral Assessment:     P.O. Trials:  Patient Position: upright in bed  Vocal quality prior to P.O.: No impairment  Consistency Presented: Thin liquid;Mechanical soft  How Presented: Self-fed/presented;Spoon;Straw      ORA PHASE:   Chewing mildly reduced  PHARYNGEAL PHASE:   Mild-moderate weakness.    No overt s/s aspiration or gagging today                                           Exercises:  Laryngeal Exercises: Pain:           After treatment:   []              Patient left in no apparent distress sitting up in chair  []              Patient left in no apparent distress in bed  []              Call bell left within reach  []              Nursing notified  []              Caregiver present  []              Bed alarm activated    COMMUNICATION/EDUCATION:   Patient was educated regarding Her deficit(s) of dysphagia as this relates to Her diagnosis of weakness. She demonstrated Fair understanding as evidenced by discussion. .    The patients plan of care including recommendations, planned interventions, and recommended diet changes were discussed with: Certified Nursing Assistant/Patient Care Technician.  []              Posted safety precautions in patient's room.     YANIQUE Granda  Time Calculation: 10 mins

## 2018-01-17 NOTE — PROGRESS NOTES
[address]  YOB: 1945          Assessment & Plan:   CKD 4  · Stable yesterday  · Creatinine at baseline    Hyperkalemia  · Dc KCL  · Labs in 1 week    Multifactorial Respiratory insufficiency  · Steroids, nebs, Bipap  · Continue Bumex, she was off of it a couple days  · Flu +, on tamiflu    HTN  · Reasonably controlled    AVF in E with access arm edema  · Follows with vascular center       Subjective:   CC: CKD  HPI: Renal function pending today.  On Tamiflu for FluB, K worse yesterday  On steroids  ROS: +cough +sob  Current Facility-Administered Medications   Medication Dose Route Frequency    predniSONE (DELTASONE) tablet 20 mg  20 mg Oral BID WITH MEALS    [START ON 1/18/2018] insulin glargine (LANTUS) injection 10 Units  10 Units SubCUTAneous DAILY    influenza vaccine 2017-18 (3 yrs+)(PF) (FLUZONE QUAD/FLUARIX QUAD) injection 0.5 mL  0.5 mL IntraMUSCular PRIOR TO DISCHARGE    famotidine (PEPCID) tablet 20 mg  20 mg Oral DAILY    benzonatate (TESSALON) capsule 100 mg  100 mg Oral TID PRN    metroNIDAZOLE (FLAGYL) tablet 500 mg  500 mg Oral TID    sodium chloride (NS) flush 5-10 mL  5-10 mL IntraVENous PRN    ALPRAZolam (XANAX) tablet 0.25 mg  0.25 mg Oral BID    aspirin chewable tablet 81 mg  81 mg Oral QHS    atorvastatin (LIPITOR) tablet 20 mg  20 mg Oral QHS    carvedilol (COREG) tablet 3.125 mg  3.125 mg Oral BID WITH MEALS    citalopram (CELEXA) tablet 40 mg  40 mg Oral DAILY    doxazosin (CARDURA) tablet 2 mg  2 mg Oral DAILY    losartan (COZAAR) tablet 25 mg  25 mg Oral DAILY    senna-docusate (PERICOLACE) 8.6-50 mg per tablet 2 Tab  2 Tab Oral DAILY    loratadine (CLARITIN) tablet 10 mg  10 mg Oral DAILY    sodium chloride (NS) flush 5-10 mL  5-10 mL IntraVENous Q8H    sodium chloride (NS) flush 5-10 mL  5-10 mL IntraVENous PRN    glucose chewable tablet 16 g  4 Tab Oral PRN    dextrose (D50W) injection syrg 12.5-25 g  12.5-25 g IntraVENous PRN    glucagon (GLUCAGEN) injection 1 mg  1 mg IntraMUSCular PRN    acetaminophen (TYLENOL) tablet 650 mg  650 mg Oral Q4H PRN    diphenhydrAMINE (BENADRYL) capsule 25 mg  25 mg Oral Q4H PRN    ondansetron (ZOFRAN) injection 4 mg  4 mg IntraVENous Q4H PRN    heparin (porcine) injection 5,000 Units  5,000 Units SubCUTAneous Q8H    insulin lispro (HUMALOG) injection   SubCUTAneous AC&HS    albuterol-ipratropium (DUO-NEB) 2.5 MG-0.5 MG/3 ML  3 mL Nebulization Q4H RT    budesonide (PULMICORT) 500 mcg/2 ml nebulizer suspension  500 mcg Nebulization BID RT    arformoterol (BROVANA) neb solution 15 mcg  15 mcg Nebulization BID RT    guaiFENesin ER (MUCINEX) tablet 600 mg  600 mg Oral Q12H    bumetanide (BUMEX) tablet 1 mg  1 mg Oral BID          Objective:     Vitals:  Blood pressure 154/64, pulse 82, temperature 98 °F (36.7 °C), resp. rate 19, height 5' 1\" (1.549 m), weight 106.4 kg (234 lb 9.1 oz), SpO2 100 %. Temp (24hrs), Av.9 °F (36.6 °C), Min:96.7 °F (35.9 °C), Max:98.7 °F (37.1 °C)      Intake and Output:   07 - 1900  In: 480 [P.O.:480]  Out: -   01/15 190 -  0700  In: -   Out: 300 [Urine:300]    Physical Exam:               GENERAL ASSESSMENT: chronically ill, obese  CHEST: B wheezes and rhonchi  HEART: S1S2  ABDOMEN: Soft,NT  EXTREMITY: Chronic LE EDEMA; LUE AVF +t/b LUE edema          ECG/rhythm:    Data Review      No results for input(s): TNIPOC in the last 72 hours. No lab exists for component: ITNL   No results for input(s): CPK, CKMB, TROIQ in the last 72 hours. Recent Labs      18   0410  01/15/18   0546   NA  143  144   K  5.4*  4.6   CL  107  107   CO2  28  31   BUN  69*  66*   CREA  2.32*  2.45*   GLU  98  126*   CA  8.9  8.8   ALB  2.5*  2.4*   WBC  5.0  4.8   HGB  11.9  10.9*   HCT  38.9  35.1   PLT  170  180      No results for input(s): INR, PTP, APTT in the last 72 hours.     No lab exists for component: INREXT, INREXT  Needs: urine analysis, urine sodium, protein and creatinine  Lab Results   Component Value Date/Time    Sodium urine, random 50 11/03/2016 12:55 PM    Creatinine, urine 53.84 11/03/2016 12:55 PM           : Katherin Gaviria MD  1/17/2018        [facility]:  [email or web address]  [place]ecilia [place] office:  [address] [place] [address], [address] [place] [address],[address]  [address]  Phone: [phone]  Fax :     [phone]    [place] office:  [address] [facility], [address] [facility]  Phone - [phone]  Fax - [phone]

## 2018-01-17 NOTE — PROGRESS NOTES
Problem: Mobility Impaired (Adult and Pediatric)  Goal: *Acute Goals and Plan of Care (Insert Text)  Physical Therapy Goals  Initiated 1/16/2018  1. Patient will move from supine to sit and sit to supine  in bed with supervision/set-up within 7 day(s). 2.  Patient will transfer from bed to chair and chair to bed with minimal assistance/contact guard assist using the least restrictive device within 7 day(s). 3.  Patient will perform sit to stand with minimal assistance/contact guard assist within 7 day(s). 4.  Patient will ambulate with minimal assistance/contact guard assist for 50 feet with the least restrictive device within 7 day(s). physical Therapy TREATMENT  Patient: Natalie Flores (62 y.o. female)  Date: 1/17/2018  Diagnosis: Dyspnea  Respiratory failure with hypoxia (HCC) Dyspnea       Precautions: Fall  Chart, physical therapy assessment, plan of care and goals were reviewed. ASSESSMENT:  Pt comes to sit with mod assist (HOB elevated). Pt mod assist of 2 sit to stand. Pt mobilized to chair with RW min assist of 2. Pt is unsteady and is at increased risk for falls. Pt left sitting in chair. Pt progressing slowly. continue goals. Progression toward goals:  []      Improving appropriately and progressing toward goals  [x]      Improving slowly and progressing toward goals  []      Not making progress toward goals and plan of care will be adjusted     PLAN:  Patient continues to benefit from skilled intervention to address the above impairments. Continue treatment per established plan of care.   Discharge Recommendations:  Rehab vs Skilled Nursing Facility  Further Equipment Recommendations for Discharge:  rolling walker     SUBJECTIVE:       OBJECTIVE DATA SUMMARY:   Critical Behavior:  Neurologic State: Alert  Orientation Level: Oriented X4  Cognition: Follows commands  Safety/Judgement: Insight into deficits  Functional Mobility Training:  Bed Mobility:     Supine to Sit: Moderate assistance;Assist x1;Additional time (HOB elevated )     Scooting: Moderate assistance;Assist x1 (pulling right hip forward for feet to floor)         Transfers:  Sit to Stand: Moderate assistance;Assist x2; Additional time (rock and stand on 3)  Stand to Sit: Minimum assistance; Additional time        Bed to Chair: Assist x2;Minimum assistance (short distance to chair )                    Balance:  Sitting: Intact  Standing: With support  Standing - Static: Good  Standing - Dynamic : Fair  Ambulation/Gait Training:  Distance (ft): 3 Feet (ft)  Assistive Device: Walker, rolling;Gait belt  Ambulation - Level of Assistance: Minimal assistance; Moderate assistance;Assist x2        Gait Abnormalities: Decreased step clearance              Speed/Rina: Pace decreased (<100 feet/min)  Step Length: Left shortened;Right shortened                    Pain:  Pain Scale 1: Numeric (0 - 10)  Pain Intensity 1: 0              Activity Tolerance:   Pt tolerated treatment fairly well. Please refer to the flowsheet for vital signs taken during this treatment.   After treatment:   [x] Patient left in no apparent distress sitting up in chair  [] Patient left in no apparent distress in bed  [] Call bell left within reach  [] Nursing notified  [] Caregiver present  [] Bed alarm activated    COMMUNICATION/COLLABORATION:   The patients plan of care was discussed with: Physical Therapist    Jeff Lugo PTA   Time Calculation: 23 mins

## 2018-01-17 NOTE — ROUTINE PROCESS
HYPOGLYCEMIC EPISODE DOCUMENTATION    Patient with hypoglycemic episode(s) at 1636(time) on 1/17/18(date). BG value(s) pre-treatment 64    Was patient symptomatic? [] yes, [x] no  Patient was treated with the following rescue medications/treatments: [] D50                [] Glucose tablets                [] Glucagon                [x] 4oz juice                [] 6oz reg soda                [] 8oz low fat milk  BG value post-treatment: 70, 117  Once BG treated and value greater than 80mg/dl, pt was provided with the following:  [] snack  [x] meal  Name of MD notified:Dr. Edmundo Boyle  The following orders were received: No orders received at this time.

## 2018-01-17 NOTE — PROGRESS NOTES
Problem: Self Care Deficits Care Plan (Adult)  Goal: *Acute Goals and Plan of Care (Insert Text)  Occupational Therapy Goals  Initiated 1/17/2018  1. Patient will perform lower body dressing with minimum assistance within 7 day(s). 2.  Patient will perform upper body dressing with supervision/set-up within 7 day(s). 3.  Patient will perform bathing with minimal assistance/contact guard assist within 7 day(s). 4.  Patient will perform toilet transfers with minimal assistance/contact guard assist within 7 day(s). 5.  Patient will perform all aspects of toileting with minimal assistance/contact guard assist within 7 day(s). Occupational Therapy EVALUATION  Patient: Jimmy Valenzuela (51 y.o. female)  Date: 1/17/2018  Primary Diagnosis: Dyspnea  Respiratory failure with hypoxia [facility])        Precautions:   Fall    ASSESSMENT :  Based on the objective data described below, the patient presents with hospital admission secondary to dyspnea, respiratory failure with hypoxia and influenza. Patient received supine in bed with HOB elevated. Patient agreeable to OT evaluation and noted on 3L supplemental O2. Patient requires mod assist x 1 with HOB elevated and mod assist to scoot forward to EOB to allow bilateral feet to floor. Patient performs sit to stand with mod x 2. Once standing, patient able to transfer to bedside chair set up next to bed. Patient noted with SOB upon sitting, but recovers within 1 minute and able to converse again. Patient agreeable to remain seated in chair and call for assist upon desire to return home. Patient will benefit from rehab setting at discharge to improve safety, independence and functional mobility. Patient will benefit from skilled intervention to address the above impairments.   Patients rehabilitation potential is considered to be Good  Factors which may influence rehabilitation potential include:   []             None noted  []             Mental ability/status  [x]             Medical condition  []             Home/family situation and support systems  []             Safety awareness  []             Pain tolerance/management  []             Other:      PLAN :  Recommendations and Planned Interventions:  [x]               Self Care Training                  [x]        Therapeutic Activities  [x]               Functional Mobility Training    []        Cognitive Retraining  [x]               Therapeutic Exercises           [x]        Endurance Activities  [x]               Balance Training                   []        Neuromuscular Re-Education  []               Visual/Perceptual Training     [x]   Home Safety Training  [x]               Patient Education                 [x]        Family Training/Education  []               Other (comment):    Frequency/Duration: Patient will be followed by occupational therapy 5 times a week to address goals. Discharge Recommendations: Rehab and Home Health  Further Equipment Recommendations for Discharge: TBD     SUBJECTIVE:   Patient stated I just get SOB.     OBJECTIVE DATA SUMMARY:   HISTORY:   Past Medical History:   Diagnosis Date    Anxiety and depression     Asthma     CKD stage 4 due to type 2 diabetes mellitus [facility]     DM type 2 causing renal disease ([facility]     Hyperlipidemia     Hypertension     Hypoventilation syndrome     Morbid obesity (HCC)     RAI on CPAP      Past Surgical History:   Procedure Laterality Date    BREAST SURGERY PROCEDURE UNLISTED      mastectomy, bilat    HX CHOLECYSTECTOMY      HX GYN      hysterectomy    HX HEENT      tonsilectomy       Prior Level of Function/Environment/Context: assist from spouse with ADLs  Occupations in which the patient is/was successful, what are the barriers preventing that success:   Performance Patterns (routines, roles, habits, and rituals):   Personal Interests and/or values:   Expanded or extensive additional review of patient history:     Home Situation  Home Environment: Private residence  Wheelchair Ramp: Yes  One/Two Story Residence: Two story, live on 1st floor  Living Alone: No  Support Systems: Spouse/Significant Other/Partner  Patient Expects to be Discharged to[de-identified] Unknown  Current DME Used/Available at Home: Wheelchair, Walker, rollator, [address], straight, Grab bars, Commode, bedside, Shower chair  Tub or Shower Type: Shower  [x]  Right hand dominant   []  Left hand dominant    EXAMINATION OF PERFORMANCE DEFICITS:  Cognitive/Behavioral Status:  Neurologic State: Alert  Orientation Level: Oriented X4  Cognition: Follows commands  Perception: Appears intact  Perseveration: No perseveration noted  Safety/Judgement: Insight into deficits    Skin: intact as seen    Edema: none noted     Hearing: Auditory  Auditory Impairment: None    Vision/Perceptual:                   Range of Motion:  AROM: Within functional limits        Strength:  Strength: Generally decreased, functional                Coordination:  Coordination: Generally decreased, functional  Fine Motor Skills-Upper: Left Intact; Right Intact    Gross Motor Skills-Upper: Left Intact; Right Intact    Tone & Sensation:  Tone: Normal  Sensation: Intact                      Balance:  Sitting: Intact  Standing: Impaired; With support  Standing - Static: Constant support; Fair  Standing - Dynamic : Fair    Functional Mobility and Transfers for ADLs:  Bed Mobility:  Supine to Sit: Moderate assistance;Assist x1;Additional time (HOB elevated )  Scooting: Moderate assistance;Assist x1 (pulling right hip forward for feet to floor)    Transfers:  Sit to Stand: Moderate assistance;Assist x2; Additional time (rock and stand on 3)  Stand to Sit: Minimum assistance; Additional time  Bed to Chair: Assist x2;Minimum assistance (short distance to chair )  Toilet Transfer : Assist x2;Minimum assistance [facility] for safety )    ADL Assessment:  Feeding: Independent    Oral Facial Hygiene/Grooming: Minimum assistance (seated, left arm swelling- assist for bilateral tasks )    Bathing: Maximum assistance    Upper Body Dressing: Minimum assistance    Lower Body Dressing: Total assistance    Toileting: Total assistance                ADL Intervention and task modifications:       Cognitive Retraining  Safety/Judgement: Insight into deficits    Therapeutic Exercise:     Functional Measure:  Barthel Index:    Bathin  Bladder: 5  Bowels: 5  Groomin  Dressin  Feeding: 10  Mobility: 0  Stairs: 0  Toilet Use: 5  Transfer (Bed to Chair and Back): 5  Total: 40       Barthel and G-code impairment scale:  Percentage of impairment CH  0% CI  1-19% CJ  20-39% CK  40-59% CL  60-79% CM  80-99% CN  100%   Barthel Score 0-100 100 99-80 79-60 59-40 20-39 1-19   0   Barthel Score 0-20 20 17-19 13-16 9-12 5-8 1-4 0      The Barthel ADL Index: Guidelines  1. The index should be used as a record of what a patient does, not as a record of what a patient could do. 2. The main aim is to establish degree of independence from any help, physical or verbal, however minor and for whatever reason. 3. The need for supervision renders the patient not independent. 4. A patient's performance should be established using the best available evidence. Asking the patient, friends/relatives and nurses are the usual sources, but direct observation and common sense are also important. However direct testing is not needed. 5. Usually the patient's performance over the preceding 24-48 hours is important, but occasionally longer periods will be relevant. 6. Middle categories imply that the patient supplies over 50 per cent of the effort. 7. Use of aids to be independent is allowed. Elizabeth Newell., Barthel, D.W. (8353). Functional evaluation: the Barthel Index. [address] [facility] (14)2. JOVANNI Garza, Trev Crowder.Nay Killen, [address] ().  Measuring the change indisability after inpatient rehabilitation; comparison of the responsiveness of the Barthel Index and Functional McMullen Measure. Journal of Neurology, Neurosurgery, and Psychiatry, 66(4), 582-943. CHARU Romeor, SCOTTY Viera, & Fidel Perkins M.A. (2004.) Assessment of post-stroke quality of life in cost-effectiveness studies: The usefulness of the Barthel Index and the EuroQoL-5D. Quality of Life Research, 13, 297-17         G codes: In compliance with CMSs Claims Based Outcome Reporting, the following G-code set was chosen for this patient based on their primary functional limitation being treated: The outcome measure chosen to determine the severity of the functional limitation was the Barthel Index with a score of 40/100 which was correlated with the impairment scale. ? Self Care:     - CURRENT STATUS: CK - 40%-59% impaired, limited or restricted    - GOAL STATUS: CJ - 20%-39% impaired, limited or restricted    - D/C STATUS:  ---------------To be determined---------------     Occupational Therapy Evaluation Charge Determination   History Examination Decision-Making   LOW Complexity : Brief history review  LOW Complexity : 1-3 performance deficits relating to physical, cognitive , or psychosocial skils that result in activity limitations and / or participation restrictions  MEDIUM Complexity : Patient may present with comorbidities that affect occupational performnce. Miniml to moderate modification of tasks or assistance (eg, physical or verbal ) with assesment(s) is necessary to enable patient to complete evaluation       Based on the above components, the patient evaluation is determined to be of the following complexity level: MEDIUM  Pain:                    Activity Tolerance:   VSS  Please refer to the flowsheet for vital signs taken during this treatment.   After treatment:   [x] Patient left in no apparent distress sitting up in chair  [] Patient left in no apparent distress in bed  [x] Call bell left within reach  [x] Nursing notified  [] Caregiver present  [] Bed alarm activated    COMMUNICATION/EDUCATION:   The patients plan of care was discussed with: Physical Therapist and Registered Nurse. [x] Home safety education was provided and the patient/caregiver indicated understanding. [x] Patient/family have participated as able in goal setting and plan of care. [x] Patient/family agree to work toward stated goals and plan of care. [] Patient understands intent and goals of therapy, but is neutral about his/her participation. [] Patient is unable to participate in goal setting and plan of care. This patients plan of care is appropriate for delegation to [facility].     Thank you for this referral.  Collin Curling, OTR/L  Time Calculation: 28 mins

## 2018-01-18 LAB
ALBUMIN SERPL-MCNC: 2.4 G/DL (ref 3.5–5)
ALBUMIN/GLOB SERPL: 0.7 {RATIO} (ref 1.1–2.2)
ALP SERPL-CCNC: 42 U/L (ref 45–117)
ALT SERPL-CCNC: 12 U/L (ref 12–78)
ANION GAP SERPL CALC-SCNC: 5 MMOL/L (ref 5–15)
AST SERPL-CCNC: 22 U/L (ref 15–37)
BACTERIA SPEC CULT: NORMAL
BASOPHILS # BLD: 0 K/UL (ref 0–0.1)
BASOPHILS NFR BLD: 0 % (ref 0–1)
BILIRUB SERPL-MCNC: 0.6 MG/DL (ref 0.2–1)
BUN SERPL-MCNC: 71 MG/DL (ref 6–20)
BUN/CREAT SERPL: 32 (ref 12–20)
CALCIUM SERPL-MCNC: 8.8 MG/DL (ref 8.5–10.1)
CHLORIDE SERPL-SCNC: 108 MMOL/L (ref 97–108)
CO2 SERPL-SCNC: 32 MMOL/L (ref 21–32)
CREAT SERPL-MCNC: 2.21 MG/DL (ref 0.55–1.02)
EOSINOPHIL # BLD: 0 K/UL (ref 0–0.4)
EOSINOPHIL NFR BLD: 0 % (ref 0–7)
ERYTHROCYTE [DISTWIDTH] IN BLOOD BY AUTOMATED COUNT: 16.3 % (ref 11.5–14.5)
GLOBULIN SER CALC-MCNC: 3.3 G/DL (ref 2–4)
GLUCOSE BLD STRIP.AUTO-MCNC: 137 MG/DL (ref 65–100)
GLUCOSE BLD STRIP.AUTO-MCNC: 168 MG/DL (ref 65–100)
GLUCOSE BLD STRIP.AUTO-MCNC: 256 MG/DL (ref 65–100)
GLUCOSE BLD STRIP.AUTO-MCNC: 99 MG/DL (ref 65–100)
GLUCOSE SERPL-MCNC: 124 MG/DL (ref 65–100)
HCT VFR BLD AUTO: 34.8 % (ref 35–47)
HGB BLD-MCNC: 10.9 G/DL (ref 11.5–16)
LYMPHOCYTES # BLD: 0.4 K/UL (ref 0.8–3.5)
LYMPHOCYTES NFR BLD: 7 % (ref 12–49)
MCH RBC QN AUTO: 29.1 PG (ref 26–34)
MCHC RBC AUTO-ENTMCNC: 31.3 G/DL (ref 30–36.5)
MCV RBC AUTO: 92.8 FL (ref 80–99)
MONOCYTES # BLD: 0.2 K/UL (ref 0–1)
MONOCYTES NFR BLD: 4 % (ref 5–13)
NEUTS SEG # BLD: 4.3 K/UL (ref 1.8–8)
NEUTS SEG NFR BLD: 89 % (ref 32–75)
PLATELET # BLD AUTO: 196 K/UL (ref 150–400)
POTASSIUM SERPL-SCNC: 4.6 MMOL/L (ref 3.5–5.1)
PROT SERPL-MCNC: 5.7 G/DL (ref 6.4–8.2)
RBC # BLD AUTO: 3.75 M/UL (ref 3.8–5.2)
SERVICE CMNT-IMP: ABNORMAL
SERVICE CMNT-IMP: NORMAL
SERVICE CMNT-IMP: NORMAL
SODIUM SERPL-SCNC: 145 MMOL/L (ref 136–145)
WBC # BLD AUTO: 4.8 K/UL (ref 3.6–11)

## 2018-01-18 PROCEDURE — 74011250637 HC RX REV CODE- 250/637: Performed by: FAMILY MEDICINE

## 2018-01-18 PROCEDURE — 74011636637 HC RX REV CODE- 636/637: Performed by: INTERNAL MEDICINE

## 2018-01-18 PROCEDURE — 74011250637 HC RX REV CODE- 250/637: Performed by: NURSE PRACTITIONER

## 2018-01-18 PROCEDURE — 77010033678 HC OXYGEN DAILY

## 2018-01-18 PROCEDURE — 85025 COMPLETE CBC W/AUTO DIFF WBC: CPT | Performed by: FAMILY MEDICINE

## 2018-01-18 PROCEDURE — 74011250636 HC RX REV CODE- 250/636: Performed by: INTERNAL MEDICINE

## 2018-01-18 PROCEDURE — 82962 GLUCOSE BLOOD TEST: CPT

## 2018-01-18 PROCEDURE — 94640 AIRWAY INHALATION TREATMENT: CPT

## 2018-01-18 PROCEDURE — 74011000250 HC RX REV CODE- 250: Performed by: NURSE PRACTITIONER

## 2018-01-18 PROCEDURE — 97530 THERAPEUTIC ACTIVITIES: CPT

## 2018-01-18 PROCEDURE — 74011636637 HC RX REV CODE- 636/637: Performed by: FAMILY MEDICINE

## 2018-01-18 PROCEDURE — 65660000000 HC RM CCU STEPDOWN

## 2018-01-18 PROCEDURE — 36415 COLL VENOUS BLD VENIPUNCTURE: CPT | Performed by: FAMILY MEDICINE

## 2018-01-18 PROCEDURE — 80053 COMPREHEN METABOLIC PANEL: CPT | Performed by: FAMILY MEDICINE

## 2018-01-18 PROCEDURE — 77030038269 HC DRN EXT URIN PURWCK BARD -A

## 2018-01-18 PROCEDURE — 74011250637 HC RX REV CODE- 250/637: Performed by: INTERNAL MEDICINE

## 2018-01-18 RX ORDER — IPRATROPIUM BROMIDE AND ALBUTEROL SULFATE 2.5; .5 MG/3ML; MG/3ML
3 SOLUTION RESPIRATORY (INHALATION)
Status: DISCONTINUED | OUTPATIENT
Start: 2018-01-19 | End: 2018-01-23 | Stop reason: HOSPADM

## 2018-01-18 RX ADMIN — ALPRAZOLAM 0.25 MG: 0.25 TABLET ORAL at 10:12

## 2018-01-18 RX ADMIN — HEPARIN SODIUM 5000 UNITS: 5000 INJECTION, SOLUTION INTRAVENOUS; SUBCUTANEOUS at 01:57

## 2018-01-18 RX ADMIN — IPRATROPIUM BROMIDE AND ALBUTEROL SULFATE 3 ML: .5; 3 SOLUTION RESPIRATORY (INHALATION) at 04:25

## 2018-01-18 RX ADMIN — LORATADINE 10 MG: 10 TABLET ORAL at 10:11

## 2018-01-18 RX ADMIN — DOCUSATE SODIUM AND SENNOSIDES 2 TABLET: 8.6; 5 TABLET, FILM COATED ORAL at 10:11

## 2018-01-18 RX ADMIN — HEPARIN SODIUM 5000 UNITS: 5000 INJECTION, SOLUTION INTRAVENOUS; SUBCUTANEOUS at 17:56

## 2018-01-18 RX ADMIN — PREDNISONE 20 MG: 20 TABLET ORAL at 16:20

## 2018-01-18 RX ADMIN — GUAIFENESIN 600 MG: 600 TABLET, EXTENDED RELEASE ORAL at 21:28

## 2018-01-18 RX ADMIN — INSULIN LISPRO 3 UNITS: 100 INJECTION, SOLUTION INTRAVENOUS; SUBCUTANEOUS at 17:55

## 2018-01-18 RX ADMIN — IPRATROPIUM BROMIDE AND ALBUTEROL SULFATE 3 ML: .5; 3 SOLUTION RESPIRATORY (INHALATION) at 15:23

## 2018-01-18 RX ADMIN — HEPARIN SODIUM 5000 UNITS: 5000 INJECTION, SOLUTION INTRAVENOUS; SUBCUTANEOUS at 10:12

## 2018-01-18 RX ADMIN — METRONIDAZOLE 500 MG: 250 TABLET ORAL at 10:11

## 2018-01-18 RX ADMIN — GUAIFENESIN 600 MG: 600 TABLET, EXTENDED RELEASE ORAL at 10:12

## 2018-01-18 RX ADMIN — CARVEDILOL 3.12 MG: 3.12 TABLET, FILM COATED ORAL at 16:20

## 2018-01-18 RX ADMIN — CARVEDILOL 3.12 MG: 3.12 TABLET, FILM COATED ORAL at 10:12

## 2018-01-18 RX ADMIN — Medication 10 ML: at 21:28

## 2018-01-18 RX ADMIN — IPRATROPIUM BROMIDE AND ALBUTEROL SULFATE 3 ML: .5; 3 SOLUTION RESPIRATORY (INHALATION) at 07:10

## 2018-01-18 RX ADMIN — BUDESONIDE 500 MCG: 0.5 INHALANT RESPIRATORY (INHALATION) at 07:11

## 2018-01-18 RX ADMIN — CITALOPRAM HYDROBROMIDE 40 MG: 20 TABLET ORAL at 10:11

## 2018-01-18 RX ADMIN — INSULIN GLARGINE 10 UNITS: 100 INJECTION, SOLUTION SUBCUTANEOUS at 10:12

## 2018-01-18 RX ADMIN — LOSARTAN POTASSIUM 25 MG: 50 TABLET ORAL at 10:11

## 2018-01-18 RX ADMIN — PREDNISONE 20 MG: 20 TABLET ORAL at 10:12

## 2018-01-18 RX ADMIN — ASPIRIN 81 MG 81 MG: 81 TABLET ORAL at 21:29

## 2018-01-18 RX ADMIN — METRONIDAZOLE 500 MG: 250 TABLET ORAL at 16:20

## 2018-01-18 RX ADMIN — Medication 10 ML: at 14:00

## 2018-01-18 RX ADMIN — METRONIDAZOLE 500 MG: 250 TABLET ORAL at 21:28

## 2018-01-18 RX ADMIN — ATORVASTATIN CALCIUM 20 MG: 20 TABLET, FILM COATED ORAL at 21:28

## 2018-01-18 RX ADMIN — IPRATROPIUM BROMIDE AND ALBUTEROL SULFATE 3 ML: .5; 3 SOLUTION RESPIRATORY (INHALATION) at 20:48

## 2018-01-18 RX ADMIN — BUMETANIDE 1 MG: 1 TABLET ORAL at 10:11

## 2018-01-18 RX ADMIN — BUMETANIDE 1 MG: 1 TABLET ORAL at 17:55

## 2018-01-18 RX ADMIN — FAMOTIDINE 20 MG: 20 TABLET, FILM COATED ORAL at 10:11

## 2018-01-18 RX ADMIN — ALPRAZOLAM 0.25 MG: 0.25 TABLET ORAL at 17:55

## 2018-01-18 RX ADMIN — BUDESONIDE 500 MCG: 0.5 INHALANT RESPIRATORY (INHALATION) at 20:48

## 2018-01-18 RX ADMIN — DOXAZOSIN 2 MG: 2 TABLET ORAL at 10:12

## 2018-01-18 NOTE — PROGRESS NOTES
Occupational therapy note:  Orders received, chart reviewed. Attempted to see patient for OT treatment. Patient reports she just returned to bed after sitting up for a few hours and is very fatigued. Will follow up with patient at later time. Hermila Oliveros MS OTR/L

## 2018-01-18 NOTE — PROGRESS NOTES
Problem: Falls - Risk of  Goal: *Absence of Falls  Document Moy Fall Risk and appropriate interventions in the flowsheet.    Outcome: Progressing Towards Goal  Fall Risk Interventions:  Mobility Interventions: Bed/chair exit alarm, Patient to call before getting OOB         Medication Interventions: Bed/chair exit alarm, Patient to call before getting OOB    Elimination Interventions: Bed/chair exit alarm, Call light in reach    History of Falls Interventions: Bed/chair exit alarm

## 2018-01-18 NOTE — PROGRESS NOTES
Daily Progress Note: 1/18/2018  Phoebe Bailey MD    Assessment/Plan:   Acute hypoxic resp failure -likely mulitifactorial --  Hx asthma, obesity, Flu +  FLU B + -- finished tamiflu - imporved  wean steroids, cont nebs, supplemental oxygen as at home, mucinex  Bumex and K also re-started - monitor BMP outpt   LE dopplers neg, ECHO EF ok/mild-mod pul HTN  --pulmonary has seen     RAI on CPAP / Morbid obesity / Hypoventilation syndrome - Continue home CPAP.       CKD (chronic kidney disease) stage 4, GFR 15-29 ml/min - Cr at baseline  --renal following     DM type 2, uncontrolled, with renal complications - Diabetic diet and counseling. BS well controlled. --resume home meds - counseled about need for wt loss/diet     HTN (hypertension), benign - Stable. Resume home meds     Iron deficiency anemia - Stable.       Chronic bilateral low back pain without sciatica - Prn tylenol.       Anxiety and depression / Generalized anxiety disorder - At baseline  --Continue citalopram, xanax     Hyperlipidemia - Continue atorvastatin    CDIFF+  -- cont flagyl 1/14 outpt for 1mo    Dispo:  Family would now like to consider rehab again.          Problem List:  Problem List as of 1/18/2018  Date Reviewed: 1/12/2018          Codes Class Noted - Resolved    Respiratory failure with hypoxia [facility]) ICD-10-CM: J96.91  ICD-9-CM: 518.81  1/13/2018 - Present        * (Principal)Dyspnea ICD-10-CM: R06.00  ICD-9-CM: 786.09  1/12/2018 - Present        Morbid obesity ([facility] [address] ICD-10-CM: E66.01  ICD-9-CM: 278.01  Unknown - Present        Hypoventilation syndrome ICD-10-CM: R06.89  ICD-9-CM: 786.09  Unknown - Present        Hyperlipidemia ICD-10-CM: E78.5  ICD-9-CM: 272.4  Unknown - Present        RAI on CPAP ICD-10-CM: G47.33, Z99.89  ICD-9-CM: 327.23, V46.8  Unknown - Present        Anxiety and depression ICD-10-CM: F41.8  ICD-9-CM: 300.00, 311  Unknown - Present        Chronic bilateral low back pain without sciatica ICD-10-CM: M54.5, G89.29  ICD-9-CM: 724.2, 338.29  11/3/2016 - Present        Generalized anxiety disorder ICD-10-CM: F41.1  ICD-9-CM: 300.02  11/3/2016 - Present        CKD (chronic kidney disease) stage 4, GFR 15-29 ml/min (HCC) (Chronic) ICD-10-CM: N18.4  ICD-9-CM: 585.4  8/14/2016 - Present        HTN (hypertension), benign (Chronic) ICD-10-CM: I10  ICD-9-CM: 401.1  8/14/2016 - Present        DM type 2, uncontrolled, with renal complications (HCC) (Chronic) ICD-10-CM: E11.29, E11.65  ICD-9-CM: 250.42  8/14/2016 - Present        Iron deficiency anemia (Chronic) ICD-10-CM: D50.9  ICD-9-CM: 280.9  8/14/2016 - Present        RESOLVED: CKD stage 4 due to type 2 diabetes mellitus ([facility] ICD-10-CM: E11.22, N18.4  ICD-9-CM: 250.40, 585.4  Unknown - 1/12/2018        RESOLVED: DM type 2 causing renal disease ([facility] ICD-10-CM: E11.29  ICD-9-CM: 250.40  Unknown - 1/12/2018        RESOLVED: Hypertension ICD-10-CM: I10  ICD-9-CM: 401.9  Unknown - 1/12/2018        RESOLVED: Arteriovenous fistula infection ([facility] ICD-10-CM: T82. 7XXA  ICD-9-CM: 996.62  11/2/2017 - 1/12/2018        RESOLVED: Hypertension ICD-10-CM: I10  ICD-9-CM: 401.9  Unknown - 11/2/2017        RESOLVED: DM type 2 causing renal disease ([facility] ICD-10-CM: E11.29  ICD-9-CM: 250.40  Unknown - 11/2/2017        RESOLVED: CKD stage 4 due to type 2 diabetes mellitus ([facility] ICD-10-CM: E11.22, N18.4  ICD-9-CM: 250.40, 585.4  Unknown - 11/2/2017        RESOLVED: Leukocytosis ICD-10-CM: L62.853  ICD-9-CM: 288.60  11/2/2017 - 1/12/2018        RESOLVED: Hypoxia ICD-10-CM: R09.02  ICD-9-CM: 799.02  11/2/2017 - 1/12/2018        RESOLVED: Pleural effusion ICD-10-CM: J90  ICD-9-CM: 511.9  11/2/2017 - 1/12/2018        RESOLVED: Cellulitis of right lower extremity ICD-10-CM: Z87.435  ICD-9-CM: 682.6  6/2/2017 - 11/2/2017        RESOLVED: Asthma with acute exacerbation ICD-10-CM: J45.901  ICD-9-CM: 493.92  6/2/2017 - 11/2/2017        RESOLVED: Asthma ICD-10-CM: J45.909  ICD-9-CM: 493.90  Unknown - 1/12/2018        RESOLVED: CKD stage 4 due to type 2 diabetes mellitus ([facility] ICD-10-CM: E11.22, N18.4  ICD-9-CM: 250.40, 585.4  Unknown - 2/11/2017        RESOLVED: DM type 2 causing renal disease ([facility] ICD-10-CM: E11.29  ICD-9-CM: 250.40  Unknown - 2/11/2017        RESOLVED: Hypoglycemia ICD-10-CM: E16.2  ICD-9-CM: 251.2  2/11/2017 - 11/2/2017        RESOLVED: Hyperkalemia ICD-10-CM: E87.5  ICD-9-CM: 276.7  11/3/2016 - 2/11/2017        RESOLVED: Elevated serum creatinine ICD-10-CM: R79.89  ICD-9-CM: 790.99  11/3/2016 - 2/11/2017        RESOLVED: Left leg cellulitis ICD-10-CM: L03.116  ICD-9-CM: 682.6  8/14/2016 - 2/11/2017        RESOLVED: Sepsis ([facility] ICD-10-CM: A41.9  ICD-9-CM: 038.9, 995.91  8/14/2016 - 2/11/2017        RESOLVED: Acute renal failure (ARF) ([facility] ICD-10-CM: N17.9  ICD-9-CM: 584.9  8/14/2016 - 11/2/2017        RESOLVED: RAI (obstructive sleep apnea) (Chronic) ICD-10-CM: T35.87  ICD-9-CM: 327.23  8/14/2016 - 2/11/2017              Subjective:   1/13: She feels slightly better. Still with dry cough, SOB. Nausea has resolved and she is hungry. Denies pain. 1/14: Breathing isn't much better. Says it is difficult to eat due to SOB. Some cough, not able to bring up much sputum. No CP or myalgias. Nebs help but benefit is temporary. 1/15:  Breathing better. Less SOB. Still coughing but better. Few loose stools. She thinks she is feeling better. C diff still positive - On flagyl po for C diff. On Tamiflu for Influenza. 1/16:  She reports she is feeling much better and wants to go home. Very little cough. No SOB. No HA or new myalgias other than her chronic back pain. Nurse report soft stools for last 2 days and no further watery stools. She will need to have recheck of labs outpt. Discussed diabetic diet and wt loss. Discussed f/u with PCP and need for recheck of lab this wk.      1/17:  Continues to grad improve. Little cough. Looks comfortable on 2 liters at this moment. Pt reports she wants to go home but family wants her to go back to rehab - will consult case management to see if Nanci will take her back. Continues to improve. Wean to po Pred today. :  Continues to grad improve. She was up with PT yesterday. Cont to wean steroids. No further loose stools. Stable for rehab. Review of Systems:   A comprehensive review of systems was negative except for that written in the HPI. Objective:   Physical Exam:     Visit Vitals    /67 (BP 1 Location: Right arm, BP Patient Position: At rest)    Pulse 92    Temp 98.1 °F (36.7 °C)    Resp 16    Ht 5' 1\" (1.549 m)    Wt 104.9 kg (231 lb 4.2 oz)    SpO2 95%    BMI 43.7 kg/m2    O2 Flow Rate (L/min): 2 l/min O2 Device: CPAP mask    Temp (24hrs), Av.9 °F (36.6 °C), Min:97.6 °F (36.4 °C), Max:98.2 °F (36.8 °C)         1901 -  0700  In: 480 [P.O.:480]  Out: -     General:  Alert, cooperative, no distress, morbidly obese   Head:  Normocephalic, without obvious abnormality, atraumatic. Eyes:  Conjunctivae/corneas clear. PERRL, EOMs intact. Nose: Nares normal. Septum midline. Mucosa normal. No drainage or sinus tenderness. Throat: Lips, mucosa, and tongue moist..   Neck: Supple, symmetrical, trachea midline   Lungs:   A few scattered rhonchi  - diffuse, improved air movement   Heart:  Regular rate and rhythm, S1, S2 normal, no murmur, click, rub or gallop. Abdomen:   Soft, non-tender. Bowel sounds normal. No masses,  No organomegaly. Extremities: Trace ankle edema, no calf ttp or cords   Pulses: 2+ and symmetric all extremities. Skin: Skin color, texture, turgor normal.    Neurologic: CNII-XII intact. Alert and oriented X 3.         Data Review:       Recent Days:  Recent Labs      18   0301  18   0410   WBC  4.8  5.0   HGB  10.9*  11.9   HCT  34.8*  38.9   PLT  196  170     Recent Labs      18   0301  18   0410   NA 145  143   K  4.6  5.4*   CL  108  107   CO2  32  28   GLU  124*  98   BUN  71*  69*   CREA  2.21*  2.32*   CA  8.8  8.9   ALB  2.4*  2.5*   SGOT  22  37   ALT  12  8*     24 Hour Results:  Recent Results (from the past 24 hour(s))   GLUCOSE, POC    Collection Time: 01/17/18  9:45 AM   Result Value Ref Range    Glucose (POC) 47 (LL) 65 - 100 mg/dL    Performed by Ila Mage (PCT)    GLUCOSE, POC    Collection Time: 01/17/18  9:47 AM   Result Value Ref Range    Glucose (POC) 42 (LL) 65 - 100 mg/dL    Performed by Ila Mage (PCT)    GLUCOSE, POC    Collection Time: 01/17/18  9:59 AM   Result Value Ref Range    Glucose (POC) 73 65 - 100 mg/dL    Performed by Myrl Expose    GLUCOSE, POC    Collection Time: 01/17/18 10:05 AM   Result Value Ref Range    Glucose (POC) 75 65 - 100 mg/dL    Performed by Myrl Expose    GLUCOSE, POC    Collection Time: 01/17/18 10:20 AM   Result Value Ref Range    Glucose (POC) 82 65 - 100 mg/dL    Performed by Myrl Expose    GLUCOSE, POC    Collection Time: 01/17/18 11:27 AM   Result Value Ref Range    Glucose (POC) 105 (H) 65 - 100 mg/dL    Performed by Ila Mage (PCT)    GLUCOSE, POC    Collection Time: 01/17/18  4:35 PM   Result Value Ref Range    Glucose (POC) 50 (L) 65 - 100 mg/dL    Performed by Ila Mage (PCT)    GLUCOSE, POC    Collection Time: 01/17/18  4:36 PM   Result Value Ref Range    Glucose (POC) 56 (L) 65 - 100 mg/dL    Performed by Ila Mage (PCT)    GLUCOSE, POC    Collection Time: 01/17/18  5:04 PM   Result Value Ref Range    Glucose (POC) 70 65 - 100 mg/dL    Performed by Ila Mage (PCT)    GLUCOSE, POC    Collection Time: 01/17/18  5:39 PM   Result Value Ref Range    Glucose (POC) 117 (H) 65 - 100 mg/dL    Performed by Ila Mage (PCT)    GLUCOSE, POC    Collection Time: 01/17/18  9:29 PM   Result Value Ref Range    Glucose (POC) 116 (H) 65 - 100 mg/dL    Performed by [address], COMPREHENSIVE    Collection Time: 01/18/18  3:01 AM   Result Value Ref Range    Sodium 145 136 - 145 mmol/L    Potassium 4.6 3.5 - 5.1 mmol/L    Chloride 108 97 - 108 mmol/L    CO2 32 21 - 32 mmol/L    Anion gap 5 5 - 15 mmol/L    Glucose 124 (H) 65 - 100 mg/dL    BUN 71 (H) 6 - 20 MG/DL    Creatinine 2.21 (H) 0.55 - 1.02 MG/DL    BUN/Creatinine ratio 32 (H) 12 - 20      GFR est AA 27 (L) >60 ml/min/1.73m2    GFR est non-AA 22 (L) >60 ml/min/1.73m2    Calcium 8.8 8.5 - 10.1 MG/DL    Bilirubin, total 0.6 0.2 - 1.0 MG/DL    ALT (SGPT) 12 12 - 78 U/L    AST (SGOT) 22 15 - 37 U/L    Alk. phosphatase 42 (L) 45 - 117 U/L    Protein, total 5.7 (L) 6.4 - 8.2 g/dL    Albumin 2.4 (L) 3.5 - 5.0 g/dL    Globulin 3.3 2.0 - 4.0 g/dL    A-G Ratio 0.7 (L) 1.1 - 2.2     CBC WITH AUTOMATED DIFF    Collection Time: 01/18/18  3:01 AM   Result Value Ref Range    WBC 4.8 3.6 - 11.0 K/uL    RBC 3.75 (L) 3.80 - 5.20 M/uL    HGB 10.9 (L) 11.5 - 16.0 g/dL    HCT 34.8 (L) 35.0 - 47.0 %    MCV 92.8 80.0 - 99.0 FL    MCH 29.1 26.0 - 34.0 PG    MCHC 31.3 30.0 - 36.5 g/dL    RDW 16.3 (H) 11.5 - 14.5 %    PLATELET 196 152 - 631 K/uL    NEUTROPHILS 89 (H) 32 - 75 %    LYMPHOCYTES 7 (L) 12 - 49 %    MONOCYTES 4 (L) 5 - 13 %    EOSINOPHILS 0 0 - 7 %    BASOPHILS 0 0 - 1 %    ABS. NEUTROPHILS 4.3 1.8 - 8.0 K/UL    ABS. LYMPHOCYTES 0.4 (L) 0.8 - 3.5 K/UL    ABS. MONOCYTES 0.2 0.0 - 1.0 K/UL    ABS. EOSINOPHILS 0.0 0.0 - 0.4 K/UL    ABS.  BASOPHILS 0.0 0.0 - 0.1 K/UL     Medications reviewed  Current Facility-Administered Medications   Medication Dose Route Frequency    predniSONE (DELTASONE) tablet 20 mg  20 mg Oral BID WITH MEALS    insulin glargine (LANTUS) injection 10 Units  10 Units SubCUTAneous DAILY    influenza vaccine 2017-18 (3 yrs+)(PF) (FLUZONE QUAD/FLUARIX QUAD) injection 0.5 mL  0.5 mL IntraMUSCular PRIOR TO DISCHARGE    famotidine (PEPCID) tablet 20 mg  20 mg Oral DAILY    benzonatate (TESSALON) capsule 100 mg  100 mg Oral TID PRN    metroNIDAZOLE (FLAGYL) tablet 500 mg  500 mg Oral TID    sodium chloride (NS) flush 5-10 mL  5-10 mL IntraVENous PRN    ALPRAZolam (XANAX) tablet 0.25 mg  0.25 mg Oral BID    aspirin chewable tablet 81 mg  81 mg Oral QHS    atorvastatin (LIPITOR) tablet 20 mg  20 mg Oral QHS    carvedilol (COREG) tablet 3.125 mg  3.125 mg Oral BID WITH MEALS    citalopram (CELEXA) tablet 40 mg  40 mg Oral DAILY    doxazosin (CARDURA) tablet 2 mg  2 mg Oral DAILY    losartan (COZAAR) tablet 25 mg  25 mg Oral DAILY    senna-docusate (PERICOLACE) 8.6-50 mg per tablet 2 Tab  2 Tab Oral DAILY    loratadine (CLARITIN) tablet 10 mg  10 mg Oral DAILY    sodium chloride (NS) flush 5-10 mL  5-10 mL IntraVENous Q8H    sodium chloride (NS) flush 5-10 mL  5-10 mL IntraVENous PRN    glucose chewable tablet 16 g  4 Tab Oral PRN    dextrose (D50W) injection syrg 12.5-25 g  12.5-25 g IntraVENous PRN    glucagon (GLUCAGEN) injection 1 mg  1 mg IntraMUSCular PRN    acetaminophen (TYLENOL) tablet 650 mg  650 mg Oral Q4H PRN    diphenhydrAMINE (BENADRYL) capsule 25 mg  25 mg Oral Q4H PRN    ondansetron (ZOFRAN) injection 4 mg  4 mg IntraVENous Q4H PRN    heparin (porcine) injection 5,000 Units  5,000 Units SubCUTAneous Q8H    insulin lispro (HUMALOG) injection   SubCUTAneous AC&HS    albuterol-ipratropium (DUO-NEB) 2.5 MG-0.5 MG/3 ML  3 mL Nebulization Q4H RT    budesonide (PULMICORT) 500 mcg/2 ml nebulizer suspension  500 mcg Nebulization BID RT    arformoterol (BROVANA) neb solution 15 mcg  15 mcg Nebulization BID RT    guaiFENesin ER (MUCINEX) tablet 600 mg  600 mg Oral Q12H    bumetanide (BUMEX) tablet 1 mg  1 mg Oral BID     Care Plan discussed with: Nathalie Kapoor MD

## 2018-01-18 NOTE — PROGRESS NOTES
Problem: Mobility Impaired (Adult and Pediatric)  Goal: *Acute Goals and Plan of Care (Insert Text)  Physical Therapy Goals  Initiated 1/16/2018  1. Patient will move from supine to sit and sit to supine  in bed with supervision/set-up within 7 day(s). 2.  Patient will transfer from bed to chair and chair to bed with minimal assistance/contact guard assist using the least restrictive device within 7 day(s). 3.  Patient will perform sit to stand with minimal assistance/contact guard assist within 7 day(s). 4.  Patient will ambulate with minimal assistance/contact guard assist for 50 feet with the least restrictive device within 7 day(s). physical Therapy TREATMENT  Patient: Shane Fregoso (77 y.o. female)  Date: 1/18/2018  Diagnosis: Dyspnea  Respiratory failure with hypoxia ([address] Dyspnea       Precautions: Fall  Chart, physical therapy assessment, plan of care and goals were reviewed. ASSESSMENT:  Pt comes to sit with mod assist of 2. Pt to stand with mod assist of 2. Pt took 4 labored steps to bedside chair with RW min to mod assist of 2. Pt is unsteady and is high risk for falls. Pt left sitting in chair with akash net in place. Pt progressing slowly. Continue goals. Progression toward goals:  []      Improving appropriately and progressing toward goals  []      Improving slowly and progressing toward goals  []      Not making progress toward goals and plan of care will be adjusted     PLAN:  Patient continues to benefit from skilled intervention to address the above impairments. Continue treatment per established plan of care.   Discharge Recommendations:  Ayan Wasserman  Further Equipment Recommendations for Discharge:  rolling walker     SUBJECTIVE:       OBJECTIVE DATA SUMMARY:   Critical Behavior:  Neurologic State: Alert  Orientation Level: Oriented X4  Cognition: Follows commands  Safety/Judgement: Insight into deficits  Functional Mobility Training:  Bed Mobility:     Supine to Sit: Moderate assistance;Assist x2               Transfers:  Sit to Stand: Moderate assistance;Assist x2                                Balance:  Sitting: High guard  Standing: With support; Impaired  Standing - Static: Fair  Ambulation/Gait Training:  Distance (ft): 2 Feet (ft)  Assistive Device: Walker, rolling;Gait belt  Ambulation - Level of Assistance: Moderate assistance;Minimal assistance;Assist x2        Gait Abnormalities: Decreased step clearance              Speed/Rina: Pace decreased (<100 feet/min)  Step Length: Left shortened;Right shortened                    Pain:  Pain Scale 1: Numeric (0 - 10)  Pain Intensity 1: 0              Activity Tolerance:   Pt tolerated treatment fair. Please refer to the flowsheet for vital signs taken during this treatment.   After treatment:   [x] Patient left in no apparent distress sitting up in chair  [] Patient left in no apparent distress in bed  [] Call bell left within reach  [] Nursing notified  [] Caregiver present  [] Bed alarm activated    COMMUNICATION/COLLABORATION:   The patients plan of care was discussed with: Physical Therapist    Janusz Mayer PTA   Time Calculation: 23 mins

## 2018-01-18 NOTE — DIABETES MGMT
Progress Note     Chart reviewed for low blood glucose ( < 80 mg/dL x 1 in the past 24 hours) . Recommendations/ Comments: Noted Lantus 10 units resumed this morning. Fasting glucose today: 99 mg/dL (per am lab). Required 0 units of correction in the last 24 hours. Receiving prednisone 20mg twice a day with meals. TDD = 40mg    Inpatient medications for glucose management:  1. Correction Scale: Lispro (Humalog) Normal Sensitivity scale to cover for glucose > 139 mg/dL before meals and for glucose >199 at bedtime        2. Lantus 10 units every morning     POC Glucose last 24hrs:   Lab Results   Component Value Date/Time     (H) 01/18/2018 03:01 AM    GLUCPOC 137 (H) 01/18/2018 11:18 AM    GLUCPOC 99 01/18/2018 07:46 AM    GLUCPOC 116 (H) 01/17/2018 09:29 PM        Estimated Creatinine Clearance: 26 mL/min (based on Cr of 2.21). Diet order:   Active Orders   Diet    DIET CARDIAC Mechanical Soft; 2 GM NA (House Low NA); Consistent Carb 1800kcal; FR 1800ML      PO intake:   Patient Vitals for the past 72 hrs:   % Diet Eaten   01/17/18 0935 75 %       History of Diabetes:   Natalie Flores is a 71 y.o. female with a past medical history significant for DM per Dr. Mikal Lafleur MD's H&P dated 1/12/2017. Prior to admission medications for diabetes: per patient and past medical records   -Humalog 5 units before meals     A1C:   Lab Results   Component Value Date/Time    Hemoglobin A1c 5.8 01/12/2018 03:53 PM    Hemoglobin A1c 5.7 11/02/2017 05:12 AM       Reference range*:  Increased risk for diabetes: 5.7 - 6.4%  Diabetes: >6.4%  Glycemic control for adults with diabetes: <7.0 %    *CHARLOTTE SAMSON (2014). Diagnosis and classification of diabetes mellitus. Diabetes care, 37, S81. Thank you. Abbey Juarez.  Beau MPH, RN, BSN, [address]   [phone]    -For most hospitalized persons with hyperglycemia in the intensive care unit (ICU), a glucose range of 140 to 180 mg/dL is recommended, provided this target can be safely achieved. *  - For general medicine and surgery patients in non-ICU settings, a premeal glucose target <140 mg/dL and a random blood glucose <180 mg/dL are recommended. *    LIVIA Palafxo, Kevin Montero., Harlan Deras., ... & Zakiya Mustafa ([id number]). [employer]ICAN ASSOC[employer]ATION OF [employer]ICAL ENDOCRINOLOGISTS AND AMERICAN COLLEGE OF ENDOCRINOLOGY-CLINICAL PRACTICE GUIDELINES FOR DEVELOPING A DIABETES MELLITUS COMPREHENSIVE CARE PLAN-2015-EXECUTIVE SUMMARY: Complete guidelines are available at https://www. aace. com/publications/guidelines. Endocrine Practice, 21(4), E0682065.

## 2018-01-18 NOTE — PROGRESS NOTES
Patient now taking good PO without s/s aspiration or c/o dysphagia from patient or RN. Will f/u next week if needed.

## 2018-01-18 NOTE — PROGRESS NOTES
CM Note:  [facility] denied pt for IP rehab. Sent updated clinicals to [facility].   VIKA Donis

## 2018-01-18 NOTE — PROGRESS NOTES
Sent message through [employer] to [facility] liaison that attending is ready for discharge and to please let us know if the patient will be accepted or if an alternate plan needed to be developed/ Ronnie Link of Case Management

## 2018-01-18 NOTE — ROUTINE PROCESS
Bedside shift change report given to Machelle Pineda RN (oncoming nurse) by Ngiha Robertson   (offgoing nurse). Report included the following information SBAR, Kardex and MAR.

## 2018-01-18 NOTE — PROGRESS NOTES
Bedside and Verbal shift change report given to Nasima eVlez RN (oncoming nurse) by Mitch Horowitz RN (offgoing nurse). Report included the following information SBAR, Kardex, Intake/Output, MAR, Recent Results and Med Rec Status.

## 2018-01-19 LAB
ALBUMIN SERPL-MCNC: 2.4 G/DL (ref 3.5–5)
ALBUMIN/GLOB SERPL: 0.7 {RATIO} (ref 1.1–2.2)
ALP SERPL-CCNC: 47 U/L (ref 45–117)
ALT SERPL-CCNC: 12 U/L (ref 12–78)
ANION GAP SERPL CALC-SCNC: 6 MMOL/L (ref 5–15)
AST SERPL-CCNC: 17 U/L (ref 15–37)
BASOPHILS # BLD: 0 K/UL (ref 0–0.1)
BASOPHILS NFR BLD: 0 % (ref 0–1)
BILIRUB SERPL-MCNC: 0.7 MG/DL (ref 0.2–1)
BUN SERPL-MCNC: 72 MG/DL (ref 6–20)
BUN/CREAT SERPL: 33 (ref 12–20)
CALCIUM SERPL-MCNC: 8.7 MG/DL (ref 8.5–10.1)
CHLORIDE SERPL-SCNC: 108 MMOL/L (ref 97–108)
CO2 SERPL-SCNC: 31 MMOL/L (ref 21–32)
CREAT SERPL-MCNC: 2.18 MG/DL (ref 0.55–1.02)
EOSINOPHIL # BLD: 0 K/UL (ref 0–0.4)
EOSINOPHIL NFR BLD: 0 % (ref 0–7)
ERYTHROCYTE [DISTWIDTH] IN BLOOD BY AUTOMATED COUNT: 16.2 % (ref 11.5–14.5)
GLOBULIN SER CALC-MCNC: 3.3 G/DL (ref 2–4)
GLUCOSE BLD STRIP.AUTO-MCNC: 131 MG/DL (ref 65–100)
GLUCOSE BLD STRIP.AUTO-MCNC: 161 MG/DL (ref 65–100)
GLUCOSE BLD STRIP.AUTO-MCNC: 166 MG/DL (ref 65–100)
GLUCOSE BLD STRIP.AUTO-MCNC: 188 MG/DL (ref 65–100)
GLUCOSE SERPL-MCNC: 207 MG/DL (ref 65–100)
HCT VFR BLD AUTO: 35.4 % (ref 35–47)
HGB BLD-MCNC: 10.8 G/DL (ref 11.5–16)
LYMPHOCYTES # BLD: 0.3 K/UL (ref 0.8–3.5)
LYMPHOCYTES NFR BLD: 5 % (ref 12–49)
MCH RBC QN AUTO: 28.3 PG (ref 26–34)
MCHC RBC AUTO-ENTMCNC: 30.5 G/DL (ref 30–36.5)
MCV RBC AUTO: 92.7 FL (ref 80–99)
MONOCYTES # BLD: 0.2 K/UL (ref 0–1)
MONOCYTES NFR BLD: 2 % (ref 5–13)
NEUTS SEG # BLD: 6.2 K/UL (ref 1.8–8)
NEUTS SEG NFR BLD: 93 % (ref 32–75)
PLATELET # BLD AUTO: 191 K/UL (ref 150–400)
POTASSIUM SERPL-SCNC: 4.4 MMOL/L (ref 3.5–5.1)
PROT SERPL-MCNC: 5.7 G/DL (ref 6.4–8.2)
RBC # BLD AUTO: 3.82 M/UL (ref 3.8–5.2)
SERVICE CMNT-IMP: ABNORMAL
SODIUM SERPL-SCNC: 145 MMOL/L (ref 136–145)
WBC # BLD AUTO: 6.6 K/UL (ref 3.6–11)

## 2018-01-19 PROCEDURE — 74011000250 HC RX REV CODE- 250: Performed by: NURSE PRACTITIONER

## 2018-01-19 PROCEDURE — 77030038269 HC DRN EXT URIN PURWCK BARD -A

## 2018-01-19 PROCEDURE — 65660000000 HC RM CCU STEPDOWN

## 2018-01-19 PROCEDURE — 80053 COMPREHEN METABOLIC PANEL: CPT | Performed by: FAMILY MEDICINE

## 2018-01-19 PROCEDURE — 74011636637 HC RX REV CODE- 636/637: Performed by: INTERNAL MEDICINE

## 2018-01-19 PROCEDURE — 74011000250 HC RX REV CODE- 250: Performed by: INTERNAL MEDICINE

## 2018-01-19 PROCEDURE — 74011250637 HC RX REV CODE- 250/637: Performed by: FAMILY MEDICINE

## 2018-01-19 PROCEDURE — 74011250637 HC RX REV CODE- 250/637: Performed by: NURSE PRACTITIONER

## 2018-01-19 PROCEDURE — 74011250636 HC RX REV CODE- 250/636: Performed by: INTERNAL MEDICINE

## 2018-01-19 PROCEDURE — 85025 COMPLETE CBC W/AUTO DIFF WBC: CPT | Performed by: FAMILY MEDICINE

## 2018-01-19 PROCEDURE — 82962 GLUCOSE BLOOD TEST: CPT

## 2018-01-19 PROCEDURE — 74011636637 HC RX REV CODE- 636/637: Performed by: FAMILY MEDICINE

## 2018-01-19 PROCEDURE — 94640 AIRWAY INHALATION TREATMENT: CPT

## 2018-01-19 PROCEDURE — 36415 COLL VENOUS BLD VENIPUNCTURE: CPT | Performed by: FAMILY MEDICINE

## 2018-01-19 PROCEDURE — 74011250637 HC RX REV CODE- 250/637: Performed by: INTERNAL MEDICINE

## 2018-01-19 RX ADMIN — BUMETANIDE 1 MG: 1 TABLET ORAL at 08:47

## 2018-01-19 RX ADMIN — IPRATROPIUM BROMIDE AND ALBUTEROL SULFATE 3 ML: .5; 3 SOLUTION RESPIRATORY (INHALATION) at 01:00

## 2018-01-19 RX ADMIN — IPRATROPIUM BROMIDE AND ALBUTEROL SULFATE 3 ML: .5; 3 SOLUTION RESPIRATORY (INHALATION) at 20:03

## 2018-01-19 RX ADMIN — CITALOPRAM HYDROBROMIDE 40 MG: 20 TABLET ORAL at 08:47

## 2018-01-19 RX ADMIN — BUDESONIDE 500 MCG: 0.5 INHALANT RESPIRATORY (INHALATION) at 07:36

## 2018-01-19 RX ADMIN — BUMETANIDE 1 MG: 1 TABLET ORAL at 19:07

## 2018-01-19 RX ADMIN — Medication 10 ML: at 05:51

## 2018-01-19 RX ADMIN — IPRATROPIUM BROMIDE AND ALBUTEROL SULFATE 3 ML: .5; 3 SOLUTION RESPIRATORY (INHALATION) at 07:37

## 2018-01-19 RX ADMIN — INSULIN GLARGINE 10 UNITS: 100 INJECTION, SOLUTION SUBCUTANEOUS at 08:49

## 2018-01-19 RX ADMIN — ALPRAZOLAM 0.25 MG: 0.25 TABLET ORAL at 19:07

## 2018-01-19 RX ADMIN — LOSARTAN POTASSIUM 25 MG: 50 TABLET ORAL at 08:47

## 2018-01-19 RX ADMIN — INSULIN LISPRO 2 UNITS: 100 INJECTION, SOLUTION INTRAVENOUS; SUBCUTANEOUS at 19:07

## 2018-01-19 RX ADMIN — PREDNISONE 20 MG: 20 TABLET ORAL at 19:07

## 2018-01-19 RX ADMIN — GUAIFENESIN 600 MG: 600 TABLET, EXTENDED RELEASE ORAL at 22:15

## 2018-01-19 RX ADMIN — GUAIFENESIN 600 MG: 600 TABLET, EXTENDED RELEASE ORAL at 08:48

## 2018-01-19 RX ADMIN — CARVEDILOL 3.12 MG: 3.12 TABLET, FILM COATED ORAL at 08:47

## 2018-01-19 RX ADMIN — INSULIN LISPRO 2 UNITS: 100 INJECTION, SOLUTION INTRAVENOUS; SUBCUTANEOUS at 08:49

## 2018-01-19 RX ADMIN — METRONIDAZOLE 500 MG: 250 TABLET ORAL at 19:07

## 2018-01-19 RX ADMIN — CARVEDILOL 3.12 MG: 3.12 TABLET, FILM COATED ORAL at 19:07

## 2018-01-19 RX ADMIN — METRONIDAZOLE 500 MG: 250 TABLET ORAL at 22:15

## 2018-01-19 RX ADMIN — FAMOTIDINE 20 MG: 20 TABLET, FILM COATED ORAL at 08:48

## 2018-01-19 RX ADMIN — DOXAZOSIN 2 MG: 2 TABLET ORAL at 08:48

## 2018-01-19 RX ADMIN — IPRATROPIUM BROMIDE AND ALBUTEROL SULFATE 3 ML: .5; 3 SOLUTION RESPIRATORY (INHALATION) at 13:45

## 2018-01-19 RX ADMIN — LORATADINE 10 MG: 10 TABLET ORAL at 08:48

## 2018-01-19 RX ADMIN — HEPARIN SODIUM 5000 UNITS: 5000 INJECTION, SOLUTION INTRAVENOUS; SUBCUTANEOUS at 19:07

## 2018-01-19 RX ADMIN — ASPIRIN 81 MG 81 MG: 81 TABLET ORAL at 22:15

## 2018-01-19 RX ADMIN — HEPARIN SODIUM 5000 UNITS: 5000 INJECTION, SOLUTION INTRAVENOUS; SUBCUTANEOUS at 01:34

## 2018-01-19 RX ADMIN — PREDNISONE 20 MG: 20 TABLET ORAL at 08:49

## 2018-01-19 RX ADMIN — METRONIDAZOLE 500 MG: 250 TABLET ORAL at 08:48

## 2018-01-19 RX ADMIN — ALPRAZOLAM 0.25 MG: 0.25 TABLET ORAL at 08:48

## 2018-01-19 RX ADMIN — BUDESONIDE 500 MCG: 0.5 INHALANT RESPIRATORY (INHALATION) at 20:03

## 2018-01-19 RX ADMIN — ATORVASTATIN CALCIUM 20 MG: 20 TABLET, FILM COATED ORAL at 22:15

## 2018-01-19 NOTE — PROGRESS NOTES
Bedside and Verbal shift change report given to [facility], RN (oncoming nurse) by Odilon Marie RN (offgoing nurse). Report included the following information SBAR, Kardex, Intake/Output, MAR, Recent Results and Med Rec Status.

## 2018-01-19 NOTE — CDMP QUERY
1. Please clarify if this patient is (was) being treated/managed for:     => Severe Acute Malnutrition as evidenced by weight loss (18% in 7 months) , moderate/severe edema (2/6 Aspen criteria)   => Other explanation of clinical findings  => Unable to determine (no explanation for clinical findings)    The medical record reflects the following clinical findings, treatment, and risk factors. Risk Factors:      Clinical Indicators:  1/19 RD consult: At current wt of 228 lbs, pt has lost 51 lbs (18%) in ~7 months, along with edema of pitting 2+BLE/4+LUE qualifies pt for malnutrition. Meets Criteria for Severe Acute Malnutrition as evidenced by:  1. Weight loss of >1-2% in 1 week, >5% in 1 month, >7.5% in 3 months, or >10% in 6 months  2. Moderate-severe edema    Treatment: RD consult; ensure HP bid, monitor intake, weight    Please clarify and document your clinical opinion in the progress notes and discharge summary including the definitive and/or presumptive diagnosis, (suspected or probable), related to the above clinical findings. Please include clinical findings supporting your diagnosis.     Thank you,  Rosa Dorsey, MSN, [facility], [facility]

## 2018-01-19 NOTE — PROGRESS NOTES
Bedside and Verbal shift change report given to Hossein Lay (oncoming nurse) by Radha San (offgoing nurse). Report included the following information SBAR, Kardex, Intake/Output, MAR and Recent Results.

## 2018-01-19 NOTE — PROGRESS NOTES
Nutrition Assessment:    RECOMMENDATIONS/INTERVENTION(S):   Continue current diet- mech soft (per SLP)/2gmNa/Diabetic 1800 FR  Monitor PO intakes, wt, BG  Add Ensure HP -BID- snacks. ASSESSMENT:   1/19: Follow up. Diet changed to Mech Soft/2gm Na/Diabetic/1800 FR. Pt appetite is fair/good. Eating ~75% of most meals. K+ WNL, , 124, 98 mg/dL. Trending up. Cr 2.18 slowly trending down. BLE Pitting 2+, LUE 4+. Stage 2 p/i sacrum. Continue ONS BID for wound healing. Monitor PO intakes, wt, BG. Wt down 13 kg since admission? Unsure if admission wt was pt stated or bed or standing scale. Only one output recorded as 300 mL. At current wt of 228 lbs, pt has lost 51 lbs (18%) in ~7 months, along with edema of pitting 2+BLE/4+LUE qualifies pt for malnutrition. Meets Criteria for Severe Acute Malnutrition as evidenced by:   [] Moderate muscle wasting, loss of subcutaneous fat   [] Nutritional intake of <50% of recommended intake for >5 days   [x] Weight loss of >1-2% in 1 week, >5% in 1 month, >7.5% in 3 months, or >10% in 6 months   [x] Moderate-severe edema       1/15: 71 yr old female admitted for dypsnea. PMHx: CKD4, HTN, DM, morbid obesity. Pt on Cardiac/Diabetic Consistent Carb 1800 Fluid Restriction- continue diet as is. Intakes documented as 75%. Pt appetite improving as breathing becomes easier. BG elevated, on Solumedrol. Pt had BM today. C. Diff test positive. Pt with 2+ BLE, LUE. Stage 2 sacrum pressure ulcer. Added Ensure HP BID for snacks for added protein for wound healing without as many added kcal. Monitor skin integrity. GFR 19, BUN 66.      SUBJECTIVE/OBJECTIVE:   Diet Order: Consistent carb, Cardiac  % Eaten:    Patient Vitals for the past 168 hrs:   % Diet Eaten   01/17/18 0935 75 %   01/14/18 0930 75 %     Pertinent Medications: [x] Reviewed    Past Medical History:   Diagnosis Date    Anxiety and depression     Asthma     CKD stage 4 due to type 2 diabetes mellitus [facility]     DM type 2 causing renal disease [facility]     Hyperlipidemia     Hypertension     Hypoventilation syndrome     Morbid obesity (HCC)     RAI on CPAP         Chemistries:  Lab Results   Component Value Date/Time    Sodium 145 01/19/2018 01:31 AM    Potassium 4.4 01/19/2018 01:31 AM    Chloride 108 01/19/2018 01:31 AM    CO2 31 01/19/2018 01:31 AM    Anion gap 6 01/19/2018 01:31 AM    Glucose 207 01/19/2018 01:31 AM    BUN 72 01/19/2018 01:31 AM    Creatinine 2.18 01/19/2018 01:31 AM    BUN/Creatinine ratio 33 01/19/2018 01:31 AM    GFR est AA 27 01/19/2018 01:31 AM    GFR est non-AA 22 01/19/2018 01:31 AM    Calcium 8.7 01/19/2018 01:31 AM    AST (SGOT) 17 01/19/2018 01:31 AM    Alk. phosphatase 47 01/19/2018 01:31 AM    Protein, total 5.7 01/19/2018 01:31 AM    Albumin 2.4 01/19/2018 01:31 AM    Globulin 3.3 01/19/2018 01:31 AM    A-G Ratio 0.7 01/19/2018 01:31 AM    ALT (SGPT) 12 01/19/2018 01:31 AM      Anthropometrics: Height: 5' 1\" (154.9 cm) Weight: 103.5 kg (228 lb 3.2 oz)    IBW (%IBW):   ( ) UBW (%UBW):   (  %)    BMI: Body mass index is 43.12 kg/(m^2). This BMI is indicative of:     [] Underweight    [] Normal    [] Overweight    []  Obesity    [x]  Extreme Obesity (BMI>40)    Estimated Nutrition Needs (Based on): 1654 Kcals/day (BMR(1491x1.3)-250) , 104 g (125g/day(1.0-1.2g/kg)) Protein  Carbohydrate: At Least 130 g/day  Fluids: 1650mL/day    Last BM: 1/18   [x]Active     []Hyperactive  []Hypoactive       [] Absent   BS  Skin:    [] Intact   [] Incision  [x] Breakdown- Stage 2 sacrum  [] DTI   [] Tears/Excoriation/Abrasion  []Edema [] Other:    Wt Readings from Last 30 Encounters:   01/19/18 103.5 kg (228 lb 3.2 oz)   11/15/17 105.7 kg (233 lb)   11/02/17 105.7 kg (233 lb)   06/10/17 126.9 kg (279 lb 12.2 oz)   02/11/17 126.6 kg (279 lb)   11/11/16 126.6 kg (279 lb 3.2 oz)   08/15/16 125.6 kg (277 lb)      NUTRITION DIAGNOSES:   Problem:  Altered nutrition-related lab values     Etiology: related to endocrine dysfunction     Signs/Symptoms: as evidenced by , 152, 150, 158 mg/dL      NUTRITION INTERVENTIONS:  Meals/Snacks: General/healthful diet   Supplements: Commercial supplement              GOAL:   Pt will consume >50% of meals w BG < 150 mg/dL w/i 3-5 days    Cultural, Latter day, or Ethnic Dietary Needs: None     LEARNING NEEDS (Diet, Food/Nutrient-Drug Interaction):    [x] None Identified   [] Identified and Education Provided/Documented   [] Identified and Pt declined/was not appropriate      [x] Interdisciplinary Care Plan Reviewed/Documented    [x] Discharge Needs:    TBD   [] No Nutrition Related Discharge Needs    NUTRITION RISK:   Pt Is At Nutrition Risk  [x]     No Nutrition Risk Identified  []       PT SEEN FOR:    []  MD Consult: []Calorie Count      []Diabetic Diet Education        []Diet Education     []Electrolyte Management     []General Nutrition Management and Supplements     []Management of Tube Feeding     []TPN Recommendations    []  RN Referral:  []MST score >=2     []Enteral/Parenteral Nutrition PTA     []Pregnant: Gestational DM or Multigestation                 [] Pressure Ulcer      []  Low BMI      []  Length of Stay       [] Dysphagia Diet     [] Ventilator      [x] Follow-Up        Previous Recommendations:   [x] Implemented          [] Not Implemented          [] Not Applicable    Previous Goal:   [] Met              [x] Progressing Towards Goal              [] Not Progressing Towards Goal   [] Not Applicable              Alise Obrien [address] [place] [address]  Pager: [phone]  Office: [phone]

## 2018-01-19 NOTE — PROGRESS NOTES
[address]  YOB: 1945          Assessment & Plan:   CKD 4  · Stable    · Creatinine at baseline    Hyperkalemia  · Dc KCL  · Labs in 1 week out pt    Multifactorial Respiratory insufficiency  · Steroids, nebs, Bipap  · Continue Bumex, she was off of it a couple days  · Flu +, on tamiflu    HTN  · Reasonably controlled    AVF in E with access arm edema  · Follows with vascular center       Subjective:   CC: CKD  HPI: Renal function stable today.  On Tamiflu for FluB, K ok  On steroids  ROS: +cough +sob  Current Facility-Administered Medications   Medication Dose Route Frequency    albuterol-ipratropium (DUO-NEB) 2.5 MG-0.5 MG/3 ML  3 mL Nebulization Q6H RT    predniSONE (DELTASONE) tablet 20 mg  20 mg Oral BID WITH MEALS    insulin glargine (LANTUS) injection 10 Units  10 Units SubCUTAneous DAILY    influenza vaccine 2017-18 (3 yrs+)(PF) (FLUZONE QUAD/FLUARIX QUAD) injection 0.5 mL  0.5 mL IntraMUSCular PRIOR TO DISCHARGE    famotidine (PEPCID) tablet 20 mg  20 mg Oral DAILY    benzonatate (TESSALON) capsule 100 mg  100 mg Oral TID PRN    metroNIDAZOLE (FLAGYL) tablet 500 mg  500 mg Oral TID    sodium chloride (NS) flush 5-10 mL  5-10 mL IntraVENous PRN    ALPRAZolam (XANAX) tablet 0.25 mg  0.25 mg Oral BID    aspirin chewable tablet 81 mg  81 mg Oral QHS    atorvastatin (LIPITOR) tablet 20 mg  20 mg Oral QHS    carvedilol (COREG) tablet 3.125 mg  3.125 mg Oral BID WITH MEALS    citalopram (CELEXA) tablet 40 mg  40 mg Oral DAILY    doxazosin (CARDURA) tablet 2 mg  2 mg Oral DAILY    losartan (COZAAR) tablet 25 mg  25 mg Oral DAILY    senna-docusate (PERICOLACE) 8.6-50 mg per tablet 2 Tab  2 Tab Oral DAILY    loratadine (CLARITIN) tablet 10 mg  10 mg Oral DAILY    sodium chloride (NS) flush 5-10 mL  5-10 mL IntraVENous Q8H    sodium chloride (NS) flush 5-10 mL  5-10 mL IntraVENous PRN    glucose chewable tablet 16 g  4 Tab Oral PRN    dextrose (D50W) injection syrg 12.5-25 g  12.5-25 g IntraVENous PRN    glucagon (GLUCAGEN) injection 1 mg  1 mg IntraMUSCular PRN    acetaminophen (TYLENOL) tablet 650 mg  650 mg Oral Q4H PRN    diphenhydrAMINE (BENADRYL) capsule 25 mg  25 mg Oral Q4H PRN    ondansetron (ZOFRAN) injection 4 mg  4 mg IntraVENous Q4H PRN    heparin (porcine) injection 5,000 Units  5,000 Units SubCUTAneous Q8H    insulin lispro (HUMALOG) injection   SubCUTAneous AC&HS    budesonide (PULMICORT) 500 mcg/2 ml nebulizer suspension  500 mcg Nebulization BID RT    arformoterol (BROVANA) neb solution 15 mcg  15 mcg Nebulization BID RT    guaiFENesin ER (MUCINEX) tablet 600 mg  600 mg Oral Q12H    bumetanide (BUMEX) tablet 1 mg  1 mg Oral BID          Objective:     Vitals:  Blood pressure (!) 151/0, pulse 91, temperature 97.9 °F (36.6 °C), resp. rate 20, height 5' 1\" (1.549 m), weight 103.5 kg (228 lb 3.2 oz), SpO2 99 %. Temp (24hrs), Av.5 °F (36.4 °C), Min:96.6 °F (35.9 °C), Max:97.9 °F (36.6 °C)      Intake and Output:          Physical Exam:               GENERAL ASSESSMENT: chronically ill, obese  CHEST: B wheezes and rhonchi  HEART: S1S2  ABDOMEN: Soft,NT  EXTREMITY: Chronic LE EDEMA; LUE AVF +t/b LUE edema          ECG/rhythm:    Data Review      No results for input(s): TNIPOC in the last 72 hours. No lab exists for component: ITNL   No results for input(s): CPK, CKMB, TROIQ in the last 72 hours. Recent Labs      18   0131  18   0301   NA  145  145   K  4.4  4.6   CL  108  108   CO2  31  32   BUN  72*  71*   CREA  2.18*  2.21*   GLU  207*  124*   CA  8.7  8.8   ALB  2.4*  2.4*   WBC  6.6  4.8   HGB  10.8*  10.9*   HCT  35.4  34.8*   PLT  191  196      No results for input(s): INR, PTP, APTT in the last 72 hours.     No lab exists for component: INREXT, INREXT  Needs: urine analysis, urine sodium, protein and creatinine  Lab Results   Component Value Date/Time Sodium urine, random 50 11/03/2016 12:55 PM    Creatinine, urine 53.84 11/03/2016 12:55 PM           : Katherin Gaviria MD  1/19/2018        [facility]:  [email or web address]  Cecilia [place] office:  [address], [address] [place] [address],[address]  [place], [address] [place] [address]  Phone: [phone]  Fax :     [phone]    [place] office:  [address] [facility], [address] [place] [address]  Phone - [phone]  Fax - [phone]

## 2018-01-19 NOTE — PROGRESS NOTES
1655:  Pt denied by [facility] for IP rehab. Pt agreeable to going back to [facility]. A referral was sent in CC link.   VIKA Donis

## 2018-01-19 NOTE — PROGRESS NOTES
01/18/18 8722   Chart and Patient  Assessment   Pulmonary History 0   Surgical History 1   Chest X-ray 2   Respiratory Pattern 0   Mental Status 0   Breath Sounds 2   Cough 0   Level of Activity/Mobility 1   Respiratory Assessment Total Score 6   Date Last Assessed 01/18/18

## 2018-01-19 NOTE — PROGRESS NOTES
Occupational therapy note: Attempted to see patient for OT treatment session x 2 today. Patient initially reporting she is soiled and requires clean up. Patient reporting need for clean up with second attempt as well. Will follow up with patient for OOB activity at later time. Hermila Oliveros MS OTR/L

## 2018-01-19 NOTE — PROGRESS NOTES
Daily Progress Note: 1/19/2018  Yelena Styles MD    Assessment/Plan:   Acute hypoxic resp failure -likely mulitifactorial --  Hx asthma, obesity, Flu +  FLU B + -- finished tamiflu - imporved  wean steroids, cont nebs, supplemental oxygen as at home, mucinex  Bumex and K also re-started - monitor BMP outpt   LE dopplers neg, ECHO EF ok/mild-mod pul HTN  --pulmonary has seen     RAI on CPAP / Morbid obesity / Hypoventilation syndrome - Continue home CPAP.       CKD (chronic kidney disease) stage 4, GFR 15-29 ml/min - Cr at baseline  --renal following     DM type 2, uncontrolled, with renal complications - Diabetic diet and counseling. BS well controlled. --resume home meds - counseled about need for wt loss/diet     HTN (hypertension), benign - Stable. Resume home meds     Iron deficiency anemia - Stable.       Chronic bilateral low back pain without sciatica - Prn tylenol.       Anxiety and depression / Generalized anxiety disorder - At baseline  --Continue citalopram, xanax     Hyperlipidemia - Continue atorvastatin    CDIFF+  -- cont flagyl 1/14 outpt for 1mo    Dispo:  Family would now like to consider rehab again.          Problem List:  Problem List as of 1/19/2018  Date Reviewed: 1/12/2018          Codes Class Noted - Resolved    Respiratory failure with hypoxia [facility]) ICD-10-CM: J96.91  ICD-9-CM: 518.81  1/13/2018 - Present        * (Principal)Dyspnea ICD-10-CM: R06.00  ICD-9-CM: 786.09  1/12/2018 - Present        Morbid obesity ([facility] ICD-10-CM: E66.01  ICD-9-CM: 278.01  Unknown - Present        Hypoventilation syndrome ICD-10-CM: R06.89  ICD-9-CM: 786.09  Unknown - Present        Hyperlipidemia ICD-10-CM: E78.5  ICD-9-CM: 272.4  Unknown - Present        RAI on CPAP ICD-10-CM: G47.33, Z99.89  ICD-9-CM: 327.23, V46.8  Unknown - Present        Anxiety and depression ICD-10-CM: F41.8  ICD-9-CM: 300.00, 311  Unknown - Present        Chronic bilateral low back pain without sciatica ICD-10-CM: M54.5, G89.29  ICD-9-CM: 724.2, 338.29  11/3/2016 - Present        Generalized anxiety disorder ICD-10-CM: F41.1  ICD-9-CM: 300.02  11/3/2016 - Present        CKD (chronic kidney disease) stage 4, GFR 15-29 ml/min (HCC) (Chronic) ICD-10-CM: N18.4  ICD-9-CM: 585.4  8/14/2016 - Present        HTN (hypertension), benign (Chronic) ICD-10-CM: I10  ICD-9-CM: 401.1  8/14/2016 - Present        DM type 2, uncontrolled, with renal complications (HCC) (Chronic) ICD-10-CM: E11.29, E11.65  ICD-9-CM: 250.42  8/14/2016 - Present        Iron deficiency anemia (Chronic) ICD-10-CM: D50.9  ICD-9-CM: 280.9  8/14/2016 - Present        RESOLVED: CKD stage 4 due to type 2 diabetes mellitus ([facility] ICD-10-CM: E11.22, N18.4  ICD-9-CM: 250.40, 585.4  Unknown - 1/12/2018        RESOLVED: DM type 2 causing renal disease ([facility] ICD-10-CM: E11.29  ICD-9-CM: 250.40  Unknown - 1/12/2018        RESOLVED: Hypertension ICD-10-CM: I10  ICD-9-CM: 401.9  Unknown - 1/12/2018        RESOLVED: Arteriovenous fistula infection ([facility] ICD-10-CM: T82. 7XXA  ICD-9-CM: 996.62  11/2/2017 - 1/12/2018        RESOLVED: Hypertension ICD-10-CM: I10  ICD-9-CM: 401.9  Unknown - 11/2/2017        RESOLVED: DM type 2 causing renal disease ([facility] ICD-10-CM: E11.29  ICD-9-CM: 250.40  Unknown - 11/2/2017        RESOLVED: CKD stage 4 due to type 2 diabetes mellitus ([facility] ICD-10-CM: E11.22, N18.4  ICD-9-CM: 250.40, 585.4  Unknown - 11/2/2017        RESOLVED: Leukocytosis ICD-10-CM: Y19.712  ICD-9-CM: 288.60  11/2/2017 - 1/12/2018        RESOLVED: Hypoxia ICD-10-CM: R09.02  ICD-9-CM: 799.02  11/2/2017 - 1/12/2018        RESOLVED: Pleural effusion ICD-10-CM: J90  ICD-9-CM: 511.9  11/2/2017 - 1/12/2018        RESOLVED: Cellulitis of right lower extremity ICD-10-CM: A89.447  ICD-9-CM: 682.6  6/2/2017 - 11/2/2017        RESOLVED: Asthma with acute exacerbation ICD-10-CM: J45.901  ICD-9-CM: 493.92  6/2/2017 - 11/2/2017        RESOLVED: Asthma ICD-10-CM: J45.909  ICD-9-CM: 493.90  Unknown - 1/12/2018        RESOLVED: CKD stage 4 due to type 2 diabetes mellitus ([facility] ICD-10-CM: E11.22, N18.4  ICD-9-CM: 250.40, 585.4  Unknown - 2/11/2017        RESOLVED: DM type 2 causing renal disease ([facility] ICD-10-CM: E11.29  ICD-9-CM: 250.40  Unknown - 2/11/2017        RESOLVED: Hypoglycemia ICD-10-CM: E16.2  ICD-9-CM: 251.2  2/11/2017 - 11/2/2017        RESOLVED: Hyperkalemia ICD-10-CM: E87.5  ICD-9-CM: 276.7  11/3/2016 - 2/11/2017        RESOLVED: Elevated serum creatinine ICD-10-CM: R79.89  ICD-9-CM: 790.99  11/3/2016 - 2/11/2017        RESOLVED: Left leg cellulitis ICD-10-CM: L03.116  ICD-9-CM: 682.6  8/14/2016 - 2/11/2017        RESOLVED: Sepsis ([facility] ICD-10-CM: A41.9  ICD-9-CM: 038.9, 995.91  8/14/2016 - 2/11/2017        RESOLVED: Acute renal failure (ARF) ([facility] ICD-10-CM: N17.9  ICD-9-CM: 584.9  8/14/2016 - 11/2/2017        RESOLVED: RAI (obstructive sleep apnea) (Chronic) ICD-10-CM: J18.54  ICD-9-CM: 327.23  8/14/2016 - 2/11/2017              Subjective:   1/13: She feels slightly better. Still with dry cough, SOB. Nausea has resolved and she is hungry. Denies pain. 1/14: Breathing isn't much better. Says it is difficult to eat due to SOB. Some cough, not able to bring up much sputum. No CP or myalgias. Nebs help but benefit is temporary. 1/15:  Breathing better. Less SOB. Still coughing but better. Few loose stools. She thinks she is feeling better. C diff still positive - On flagyl po for C diff. On Tamiflu for Influenza. 1/16:  She reports she is feeling much better and wants to go home. Very little cough. No SOB. No HA or new myalgias other than her chronic back pain. Nurse report soft stools for last 2 days and no further watery stools. She will need to have recheck of labs outpt. Discussed diabetic diet and wt loss. Discussed f/u with PCP and need for recheck of lab this wk.      1/17:  Continues to grad improve. Little cough. Looks comfortable on 2 liters at this moment. Pt reports she wants to go home but family wants her to go back to rehab - will consult case management to see if [facility] will take her back. Continues to improve. Wean to po Pred today. :  Continues to grad improve. She was up with PT yesterday. Cont to wean steroids. No further loose stools. Stable for rehab.     :  No new complaints. She has been getting up to chair. No further loose stools. Denied SAH. Pt reports she would be willing to go back to [address] [place] [address]. Review of Systems:   A comprehensive review of systems was negative except for that written in the HPI. Objective:   Physical Exam:     Visit Vitals    /67 (BP 1 Location: Right arm, BP Patient Position: At rest)    Pulse 81    Temp 97.9 °F (36.6 °C)    Resp 17    Ht 5' 1\" (1.549 m)    Wt 103.5 kg (228 lb 3.2 oz)    SpO2 100%    BMI 43.12 kg/m2    O2 Flow Rate (L/min): 2.5 l/min O2 Device: CPAP mask    Temp (24hrs), Av.6 °F (36.4 °C), Min:96.6 °F (35.9 °C), Max:97.9 °F (36.6 °C)         0701 -  1900  In: 480 [P.O.:480]  Out: -     General:  Alert, cooperative, no distress, morbidly obese   Head:  Normocephalic, without obvious abnormality, atraumatic. Eyes:  Conjunctivae/corneas clear. PERRL, EOMs intact. Nose: Nares normal. Septum midline. Mucosa normal. No drainage or sinus tenderness. Throat: Lips, mucosa, and tongue moist..   Neck: Supple, symmetrical, trachea midline   Lungs:   A few scattered rhonchi  - diffuse, improved air movement   Heart:  Regular rate and rhythm, S1, S2 normal, no murmur, click, rub or gallop. Abdomen:   Soft, non-tender. Bowel sounds normal. No masses,  No organomegaly. Extremities: Trace ankle edema, no calf ttp or cords   Pulses: 2+ and symmetric all extremities. Skin: Skin color, texture, turgor normal.    Neurologic: CNII-XII intact. Alert and oriented X 3.         Data Review:     Recent Days:  Recent Labs      01/19/18   0131  01/18/18   0301   WBC  6.6  4.8   HGB  10.8*  10.9*   HCT  35.4  34.8*   PLT  191  196     Recent Labs      01/19/18   0131  01/18/18   0301   NA  145  145   K  4.4  4.6   CL  108  108   CO2  31  32   GLU  207*  124*   BUN  72*  71*   CREA  2.18*  2.21*   CA  8.7  8.8   ALB  2.4*  2.4*   SGOT  17  22   ALT  12  12     24 Hour Results:  Recent Results (from the past 24 hour(s))   GLUCOSE, POC    Collection Time: 01/18/18  7:46 AM   Result Value Ref Range    Glucose (POC) 99 65 - 100 mg/dL    Performed by Loretta Redman (PCT)    GLUCOSE, POC    Collection Time: 01/18/18 11:18 AM   Result Value Ref Range    Glucose (POC) 137 (H) 65 - 100 mg/dL    Performed by Loretta Redman (PCT)    GLUCOSE, POC    Collection Time: 01/18/18  4:43 PM   Result Value Ref Range    Glucose (POC) 256 (H) 65 - 100 mg/dL    Performed by Ned Ren  PCT    GLUCOSE, POC    Collection Time: 01/18/18  9:34 PM   Result Value Ref Range    Glucose (POC) 168 (H) 65 - 100 mg/dL    Performed by Rafaela Bloom    CBC WITH AUTOMATED DIFF    Collection Time: 01/19/18  1:31 AM   Result Value Ref Range    WBC 6.6 3.6 - 11.0 K/uL    RBC 3.82 3.80 - 5.20 M/uL    HGB 10.8 (L) 11.5 - 16.0 g/dL    HCT 35.4 35.0 - 47.0 %    MCV 92.7 80.0 - 99.0 FL    MCH 28.3 26.0 - 34.0 PG    MCHC 30.5 30.0 - 36.5 g/dL    RDW 16.2 (H) 11.5 - 14.5 %    PLATELET 846 348 - 285 K/uL    NEUTROPHILS 93 (H) 32 - 75 %    LYMPHOCYTES 5 (L) 12 - 49 %    MONOCYTES 2 (L) 5 - 13 %    EOSINOPHILS 0 0 - 7 %    BASOPHILS 0 0 - 1 %    ABS. NEUTROPHILS 6.2 1.8 - 8.0 K/UL    ABS. LYMPHOCYTES 0.3 (L) 0.8 - 3.5 K/UL    ABS. MONOCYTES 0.2 0.0 - 1.0 K/UL    ABS. EOSINOPHILS 0.0 0.0 - 0.4 K/UL    ABS.  BASOPHILS 0.0 0.0 - 0.1 K/UL   METABOLIC PANEL, COMPREHENSIVE    Collection Time: 01/19/18  1:31 AM   Result Value Ref Range    Sodium 145 136 - 145 mmol/L    Potassium 4.4 3.5 - 5.1 mmol/L    Chloride 108 97 - 108 mmol/L    CO2 31 21 - 32 mmol/L    Anion gap 6 5 - 15 mmol/L    Glucose 207 (H) 65 - 100 mg/dL    BUN 72 (H) 6 - 20 MG/DL    Creatinine 2.18 (H) 0.55 - 1.02 MG/DL    BUN/Creatinine ratio 33 (H) 12 - 20      GFR est AA 27 (L) >60 ml/min/1.73m2    GFR est non-AA 22 (L) >60 ml/min/1.73m2    Calcium 8.7 8.5 - 10.1 MG/DL    Bilirubin, total 0.7 0.2 - 1.0 MG/DL    ALT (SGPT) 12 12 - 78 U/L    AST (SGOT) 17 15 - 37 U/L    Alk.  phosphatase 47 45 - 117 U/L    Protein, total 5.7 (L) 6.4 - 8.2 g/dL    Albumin 2.4 (L) 3.5 - 5.0 g/dL    Globulin 3.3 2.0 - 4.0 g/dL    A-G Ratio 0.7 (L) 1.1 - 2.2       Medications reviewed  Current Facility-Administered Medications   Medication Dose Route Frequency    albuterol-ipratropium (DUO-NEB) 2.5 MG-0.5 MG/3 ML  3 mL Nebulization Q6H RT    predniSONE (DELTASONE) tablet 20 mg  20 mg Oral BID WITH MEALS    insulin glargine (LANTUS) injection 10 Units  10 Units SubCUTAneous DAILY    influenza vaccine 2017-18 (3 yrs+)(PF) (FLUZONE QUAD/FLUARIX QUAD) injection 0.5 mL  0.5 mL IntraMUSCular PRIOR TO DISCHARGE    famotidine (PEPCID) tablet 20 mg  20 mg Oral DAILY    benzonatate (TESSALON) capsule 100 mg  100 mg Oral TID PRN    metroNIDAZOLE (FLAGYL) tablet 500 mg  500 mg Oral TID    sodium chloride (NS) flush 5-10 mL  5-10 mL IntraVENous PRN    ALPRAZolam (XANAX) tablet 0.25 mg  0.25 mg Oral BID    aspirin chewable tablet 81 mg  81 mg Oral QHS    atorvastatin (LIPITOR) tablet 20 mg  20 mg Oral QHS    carvedilol (COREG) tablet 3.125 mg  3.125 mg Oral BID WITH MEALS    citalopram (CELEXA) tablet 40 mg  40 mg Oral DAILY    doxazosin (CARDURA) tablet 2 mg  2 mg Oral DAILY    losartan (COZAAR) tablet 25 mg  25 mg Oral DAILY    senna-docusate (PERICOLACE) 8.6-50 mg per tablet 2 Tab  2 Tab Oral DAILY    loratadine (CLARITIN) tablet 10 mg  10 mg Oral DAILY    sodium chloride (NS) flush 5-10 mL  5-10 mL IntraVENous Q8H    sodium chloride (NS) flush 5-10 mL  5-10 mL IntraVENous PRN    glucose chewable tablet 16 g  4 Tab Oral PRN    dextrose (D50W) injection syrg 12.5-25 g  12.5-25 g IntraVENous PRN    glucagon (GLUCAGEN) injection 1 mg  1 mg IntraMUSCular PRN    acetaminophen (TYLENOL) tablet 650 mg  650 mg Oral Q4H PRN    diphenhydrAMINE (BENADRYL) capsule 25 mg  25 mg Oral Q4H PRN    ondansetron (ZOFRAN) injection 4 mg  4 mg IntraVENous Q4H PRN    heparin (porcine) injection 5,000 Units  5,000 Units SubCUTAneous Q8H    insulin lispro (HUMALOG) injection   SubCUTAneous AC&HS    budesonide (PULMICORT) 500 mcg/2 ml nebulizer suspension  500 mcg Nebulization BID RT    arformoterol (BROVANA) neb solution 15 mcg  15 mcg Nebulization BID RT    guaiFENesin ER (MUCINEX) tablet 600 mg  600 mg Oral Q12H    bumetanide (BUMEX) tablet 1 mg  1 mg Oral BID     Care Plan discussed with: Nani Ferro MD

## 2018-01-20 ENCOUNTER — APPOINTMENT (OUTPATIENT)
Dept: GENERAL RADIOLOGY | Age: 73
DRG: 193 | End: 2018-01-20
Attending: FAMILY MEDICINE
Payer: MEDICARE

## 2018-01-20 LAB
ALBUMIN SERPL-MCNC: 2.4 G/DL (ref 3.5–5)
ALBUMIN/GLOB SERPL: 0.7 {RATIO} (ref 1.1–2.2)
ALP SERPL-CCNC: 49 U/L (ref 45–117)
ALT SERPL-CCNC: 11 U/L (ref 12–78)
ANION GAP SERPL CALC-SCNC: 8 MMOL/L (ref 5–15)
AST SERPL-CCNC: 20 U/L (ref 15–37)
BILIRUB SERPL-MCNC: 0.7 MG/DL (ref 0.2–1)
BUN SERPL-MCNC: 74 MG/DL (ref 6–20)
BUN/CREAT SERPL: 34 (ref 12–20)
CALCIUM SERPL-MCNC: 8.8 MG/DL (ref 8.5–10.1)
CHLORIDE SERPL-SCNC: 108 MMOL/L (ref 97–108)
CO2 SERPL-SCNC: 28 MMOL/L (ref 21–32)
CREAT SERPL-MCNC: 2.16 MG/DL (ref 0.55–1.02)
GLOBULIN SER CALC-MCNC: 3.4 G/DL (ref 2–4)
GLUCOSE BLD STRIP.AUTO-MCNC: 114 MG/DL (ref 65–100)
GLUCOSE BLD STRIP.AUTO-MCNC: 133 MG/DL (ref 65–100)
GLUCOSE BLD STRIP.AUTO-MCNC: 141 MG/DL (ref 65–100)
GLUCOSE BLD STRIP.AUTO-MCNC: 152 MG/DL (ref 65–100)
GLUCOSE SERPL-MCNC: 178 MG/DL (ref 65–100)
POTASSIUM SERPL-SCNC: 4.5 MMOL/L (ref 3.5–5.1)
PROT SERPL-MCNC: 5.8 G/DL (ref 6.4–8.2)
SERVICE CMNT-IMP: ABNORMAL
SODIUM SERPL-SCNC: 144 MMOL/L (ref 136–145)

## 2018-01-20 PROCEDURE — 71045 X-RAY EXAM CHEST 1 VIEW: CPT

## 2018-01-20 PROCEDURE — 74011636637 HC RX REV CODE- 636/637: Performed by: FAMILY MEDICINE

## 2018-01-20 PROCEDURE — 94660 CPAP INITIATION&MGMT: CPT

## 2018-01-20 PROCEDURE — 77030038269 HC DRN EXT URIN PURWCK BARD -A

## 2018-01-20 PROCEDURE — 65660000000 HC RM CCU STEPDOWN

## 2018-01-20 PROCEDURE — 74011636637 HC RX REV CODE- 636/637: Performed by: INTERNAL MEDICINE

## 2018-01-20 PROCEDURE — 82962 GLUCOSE BLOOD TEST: CPT

## 2018-01-20 PROCEDURE — 94640 AIRWAY INHALATION TREATMENT: CPT

## 2018-01-20 PROCEDURE — 74011000250 HC RX REV CODE- 250: Performed by: INTERNAL MEDICINE

## 2018-01-20 PROCEDURE — 74011250637 HC RX REV CODE- 250/637: Performed by: FAMILY MEDICINE

## 2018-01-20 PROCEDURE — 74011250636 HC RX REV CODE- 250/636: Performed by: INTERNAL MEDICINE

## 2018-01-20 PROCEDURE — 97530 THERAPEUTIC ACTIVITIES: CPT

## 2018-01-20 PROCEDURE — 80053 COMPREHEN METABOLIC PANEL: CPT | Performed by: FAMILY MEDICINE

## 2018-01-20 PROCEDURE — 74011250637 HC RX REV CODE- 250/637: Performed by: INTERNAL MEDICINE

## 2018-01-20 PROCEDURE — 36415 COLL VENOUS BLD VENIPUNCTURE: CPT | Performed by: FAMILY MEDICINE

## 2018-01-20 PROCEDURE — 74011000250 HC RX REV CODE- 250: Performed by: NURSE PRACTITIONER

## 2018-01-20 PROCEDURE — 74011250637 HC RX REV CODE- 250/637: Performed by: NURSE PRACTITIONER

## 2018-01-20 RX ORDER — AZITHROMYCIN 250 MG/1
250 TABLET, FILM COATED ORAL DAILY
Status: DISCONTINUED | OUTPATIENT
Start: 2018-01-20 | End: 2018-01-23 | Stop reason: HOSPADM

## 2018-01-20 RX ADMIN — ALPRAZOLAM 0.25 MG: 0.25 TABLET ORAL at 08:53

## 2018-01-20 RX ADMIN — GUAIFENESIN 600 MG: 600 TABLET, EXTENDED RELEASE ORAL at 08:53

## 2018-01-20 RX ADMIN — METRONIDAZOLE 500 MG: 250 TABLET ORAL at 22:02

## 2018-01-20 RX ADMIN — FAMOTIDINE 20 MG: 20 TABLET, FILM COATED ORAL at 08:53

## 2018-01-20 RX ADMIN — Medication 10 ML: at 22:03

## 2018-01-20 RX ADMIN — INSULIN GLARGINE 10 UNITS: 100 INJECTION, SOLUTION SUBCUTANEOUS at 08:51

## 2018-01-20 RX ADMIN — BUDESONIDE 500 MCG: 0.5 INHALANT RESPIRATORY (INHALATION) at 19:37

## 2018-01-20 RX ADMIN — IPRATROPIUM BROMIDE AND ALBUTEROL SULFATE 3 ML: .5; 3 SOLUTION RESPIRATORY (INHALATION) at 01:01

## 2018-01-20 RX ADMIN — METRONIDAZOLE 500 MG: 250 TABLET ORAL at 08:53

## 2018-01-20 RX ADMIN — DOXAZOSIN 2 MG: 2 TABLET ORAL at 08:53

## 2018-01-20 RX ADMIN — HEPARIN SODIUM 5000 UNITS: 5000 INJECTION, SOLUTION INTRAVENOUS; SUBCUTANEOUS at 17:10

## 2018-01-20 RX ADMIN — AZITHROMYCIN 250 MG: 250 TABLET, FILM COATED ORAL at 14:15

## 2018-01-20 RX ADMIN — IPRATROPIUM BROMIDE AND ALBUTEROL SULFATE 3 ML: .5; 3 SOLUTION RESPIRATORY (INHALATION) at 07:45

## 2018-01-20 RX ADMIN — BUMETANIDE 1 MG: 1 TABLET ORAL at 17:10

## 2018-01-20 RX ADMIN — METRONIDAZOLE 500 MG: 250 TABLET ORAL at 17:10

## 2018-01-20 RX ADMIN — GUAIFENESIN 600 MG: 600 TABLET, EXTENDED RELEASE ORAL at 22:02

## 2018-01-20 RX ADMIN — ASPIRIN 81 MG 81 MG: 81 TABLET ORAL at 22:02

## 2018-01-20 RX ADMIN — Medication 10 ML: at 14:16

## 2018-01-20 RX ADMIN — HEPARIN SODIUM 5000 UNITS: 5000 INJECTION, SOLUTION INTRAVENOUS; SUBCUTANEOUS at 14:15

## 2018-01-20 RX ADMIN — HEPARIN SODIUM 5000 UNITS: 5000 INJECTION, SOLUTION INTRAVENOUS; SUBCUTANEOUS at 01:30

## 2018-01-20 RX ADMIN — ATORVASTATIN CALCIUM 20 MG: 20 TABLET, FILM COATED ORAL at 22:03

## 2018-01-20 RX ADMIN — BUMETANIDE 1 MG: 1 TABLET ORAL at 08:53

## 2018-01-20 RX ADMIN — INSULIN LISPRO 2 UNITS: 100 INJECTION, SOLUTION INTRAVENOUS; SUBCUTANEOUS at 14:15

## 2018-01-20 RX ADMIN — CITALOPRAM HYDROBROMIDE 40 MG: 20 TABLET ORAL at 08:53

## 2018-01-20 RX ADMIN — CARVEDILOL 3.12 MG: 3.12 TABLET, FILM COATED ORAL at 17:10

## 2018-01-20 RX ADMIN — PREDNISONE 20 MG: 20 TABLET ORAL at 08:53

## 2018-01-20 RX ADMIN — IPRATROPIUM BROMIDE AND ALBUTEROL SULFATE 3 ML: .5; 3 SOLUTION RESPIRATORY (INHALATION) at 14:49

## 2018-01-20 RX ADMIN — DOCUSATE SODIUM AND SENNOSIDES 2 TABLET: 8.6; 5 TABLET, FILM COATED ORAL at 08:53

## 2018-01-20 RX ADMIN — LOSARTAN POTASSIUM 25 MG: 50 TABLET ORAL at 08:54

## 2018-01-20 RX ADMIN — INSULIN LISPRO 2 UNITS: 100 INJECTION, SOLUTION INTRAVENOUS; SUBCUTANEOUS at 08:51

## 2018-01-20 RX ADMIN — Medication 10 ML: at 06:37

## 2018-01-20 RX ADMIN — ALPRAZOLAM 0.25 MG: 0.25 TABLET ORAL at 17:10

## 2018-01-20 RX ADMIN — IPRATROPIUM BROMIDE AND ALBUTEROL SULFATE 3 ML: .5; 3 SOLUTION RESPIRATORY (INHALATION) at 19:37

## 2018-01-20 RX ADMIN — CARVEDILOL 3.12 MG: 3.12 TABLET, FILM COATED ORAL at 08:53

## 2018-01-20 RX ADMIN — LORATADINE 10 MG: 10 TABLET ORAL at 08:53

## 2018-01-20 RX ADMIN — BUDESONIDE 500 MCG: 0.5 INHALANT RESPIRATORY (INHALATION) at 07:45

## 2018-01-20 RX ADMIN — PREDNISONE 20 MG: 20 TABLET ORAL at 17:10

## 2018-01-20 NOTE — PROGRESS NOTES
Problem: Mobility Impaired (Adult and Pediatric)  Goal: *Acute Goals and Plan of Care (Insert Text)  Physical Therapy Goals  Initiated 1/16/2018  1. Patient will move from supine to sit and sit to supine  in bed with supervision/set-up within 7 day(s). 2.  Patient will transfer from bed to chair and chair to bed with minimal assistance/contact guard assist using the least restrictive device within 7 day(s). 3.  Patient will perform sit to stand with minimal assistance/contact guard assist within 7 day(s). 4.  Patient will ambulate with minimal assistance/contact guard assist for 50 feet with the least restrictive device within 7 day(s). physical Therapy TREATMENT  Patient: Gilmar Ochoa (89 y.o. female)  Date: 1/20/2018  Diagnosis: Dyspnea  Respiratory failure with hypoxia ([address] Dyspnea       Precautions: Fall    ASSESSMENT:  Pt received in bed,  at bedside, agreeable to participate with physical therapy. Mod A for bed mobility>sitting EOB , HOB elevated. Audible wheezing noted with activity. Required prolonged seated rest break. 2x Sit<>stand usign RW with Mod A x2, verbal cues for safe technique. Incontinent of bowel upon standing. Total A for hygiene. Side stepping toward HOB with Mod A x2 using RW. PT fatigued with effort. Total A for sit>supine. Pt increasing anxiety with sit>supine and total A to position pt higher in bed. Bed placed in semi chair position. RN notified   Progression toward goals:  []    Improving appropriately and progressing toward goals  [x]    Improving slowly and progressing toward goals  []    Not making progress toward goals and plan of care will be adjusted     PLAN:  Patient continues to benefit from skilled intervention to address the above impairments. Continue treatment per established plan of care.   Discharge Recommendations:  Ayan Wasserman  Further Equipment Recommendations for Discharge:  none     SUBJECTIVE:   Patient stated .    OBJECTIVE DATA SUMMARY:   Critical Behavior:  Neurologic State: Alert  Orientation Level: Oriented X4  Cognition: Follows commands  Safety/Judgement: Insight into deficits  Functional Mobility Training:  Bed Mobility:     Supine to Sit: Moderate assistance; Additional time  Sit to Supine: Maximum assistance;Assist x2  Scooting: Moderate assistance; Additional time        Transfers:  Sit to Stand: Moderate assistance;Assist x2; Additional time  Stand to Sit: Moderate assistance;Assist x2                             Balance:  Sitting: Intact  Standing: Impaired; With support  Standing - Static: Constant support; Fair  Standing - Dynamic : Poor  Ambulation/Gait Training:  Distance (ft): 5 Feet (ft)  Assistive Device: Walker, rolling;Gait belt  Ambulation - Level of Assistance: Moderate assistance;Assist x2        Gait Abnormalities: Decreased step clearance; Path deviations        Base of Support: Widened                             Stairs:            Neuro Re-Education:    Therapeutic Exercises:     Pain:  Pain Scale 1: Numeric (0 - 10)  Pain Intensity 1: 0              Activity Tolerance:   Fair  Please refer to the flowsheet for vital signs taken during this treatment.   After treatment:   []    Patient left in no apparent distress sitting up in chair  [x]    Patient left in no apparent distress in bed  [x]    Call bell left within reach  [x]    Nursing notified  [x]    Caregiver present  []    Bed alarm activated    COMMUNICATION/COLLABORATION:   The patients plan of care was discussed with: Registered Nurse    Edda Jean Baptiste   Time Calculation: 30 mins

## 2018-01-20 NOTE — PROGRESS NOTES
Daily Progress Note: 1/20/2018  Phoebe Bailey MD    Assessment/Plan:   Acute hypoxic resp failure -likely mulitifactorial --  Hx asthma, obesity, Flu +  FLU B + -- finished tamiflu - imporved  wean steroids, cont nebs, supplemental oxygen as at home, mucinex  Bumex and K also re-started - monitor BMP outpt   LE dopplers neg, ECHO EF ok/mild-mod pul HTN  --pulmonary has seen     RAI on CPAP / Morbid obesity / Hypoventilation syndrome - Continue home CPAP.       CKD (chronic kidney disease) stage 4, GFR 15-29 ml/min - Cr at baseline  --renal following     DM type 2, uncontrolled, with renal complications - Diabetic diet and counseling. BS well controlled. --resume home meds - counseled about need for wt loss/diet     HTN (hypertension), benign - Stable. Resume home meds     Iron deficiency anemia - Stable.       Chronic bilateral low back pain without sciatica - Prn tylenol.       Anxiety and depression / Generalized anxiety disorder - At baseline  --Continue citalopram, xanax     Hyperlipidemia - Continue atorvastatin    CDIFF+  -- cont flagyl 1/14 outpt for 1mo    Dispo:  Family would now like to consider rehab again.          Problem List:  Problem List as of 1/20/2018  Date Reviewed: 1/12/2018          Codes Class Noted - Resolved    Respiratory failure with hypoxia [facility]) ICD-10-CM: J96.91  ICD-9-CM: 518.81  1/13/2018 - Present        * (Principal)Dyspnea ICD-10-CM: R06.00  ICD-9-CM: 786.09  1/12/2018 - Present        Morbid obesity ([facility] [address] ICD-10-CM: E66.01  ICD-9-CM: 278.01  Unknown - Present        Hypoventilation syndrome ICD-10-CM: R06.89  ICD-9-CM: 786.09  Unknown - Present        Hyperlipidemia ICD-10-CM: E78.5  ICD-9-CM: 272.4  Unknown - Present        RAI on CPAP ICD-10-CM: G47.33, Z99.89  ICD-9-CM: 327.23, V46.8  Unknown - Present        Anxiety and depression ICD-10-CM: F41.8  ICD-9-CM: 300.00, 311  Unknown - Present        Chronic bilateral low back pain without sciatica ICD-10-CM: M54.5, G89.29  ICD-9-CM: 724.2, 338.29  11/3/2016 - Present        Generalized anxiety disorder ICD-10-CM: F41.1  ICD-9-CM: 300.02  11/3/2016 - Present        CKD (chronic kidney disease) stage 4, GFR 15-29 ml/min (HCC) (Chronic) ICD-10-CM: N18.4  ICD-9-CM: 585.4  8/14/2016 - Present        HTN (hypertension), benign (Chronic) ICD-10-CM: I10  ICD-9-CM: 401.1  8/14/2016 - Present        DM type 2, uncontrolled, with renal complications (HCC) (Chronic) ICD-10-CM: E11.29, E11.65  ICD-9-CM: 250.42  8/14/2016 - Present        Iron deficiency anemia (Chronic) ICD-10-CM: D50.9  ICD-9-CM: 280.9  8/14/2016 - Present        RESOLVED: CKD stage 4 due to type 2 diabetes mellitus ([facility] ICD-10-CM: E11.22, N18.4  ICD-9-CM: 250.40, 585.4  Unknown - 1/12/2018        RESOLVED: DM type 2 causing renal disease ([facility] ICD-10-CM: E11.29  ICD-9-CM: 250.40  Unknown - 1/12/2018        RESOLVED: Hypertension ICD-10-CM: I10  ICD-9-CM: 401.9  Unknown - 1/12/2018        RESOLVED: Arteriovenous fistula infection ([facility] ICD-10-CM: T82. 7XXA  ICD-9-CM: 996.62  11/2/2017 - 1/12/2018        RESOLVED: Hypertension ICD-10-CM: I10  ICD-9-CM: 401.9  Unknown - 11/2/2017        RESOLVED: DM type 2 causing renal disease ([facility] ICD-10-CM: E11.29  ICD-9-CM: 250.40  Unknown - 11/2/2017        RESOLVED: CKD stage 4 due to type 2 diabetes mellitus ([facility] ICD-10-CM: E11.22, N18.4  ICD-9-CM: 250.40, 585.4  Unknown - 11/2/2017        RESOLVED: Leukocytosis ICD-10-CM: X05.900  ICD-9-CM: 288.60  11/2/2017 - 1/12/2018        RESOLVED: Hypoxia ICD-10-CM: R09.02  ICD-9-CM: 799.02  11/2/2017 - 1/12/2018        RESOLVED: Pleural effusion ICD-10-CM: J90  ICD-9-CM: 511.9  11/2/2017 - 1/12/2018        RESOLVED: Cellulitis of right lower extremity ICD-10-CM: V58.295  ICD-9-CM: 682.6  6/2/2017 - 11/2/2017        RESOLVED: Asthma with acute exacerbation ICD-10-CM: J45.901  ICD-9-CM: 493.92  6/2/2017 - 11/2/2017        RESOLVED: Asthma ICD-10-CM: J45.909  ICD-9-CM: 493.90  Unknown - 1/12/2018        RESOLVED: CKD stage 4 due to type 2 diabetes mellitus ([facility] ICD-10-CM: E11.22, N18.4  ICD-9-CM: 250.40, 585.4  Unknown - 2/11/2017        RESOLVED: DM type 2 causing renal disease ([facility] ICD-10-CM: E11.29  ICD-9-CM: 250.40  Unknown - 2/11/2017        RESOLVED: Hypoglycemia ICD-10-CM: E16.2  ICD-9-CM: 251.2  2/11/2017 - 11/2/2017        RESOLVED: Hyperkalemia ICD-10-CM: E87.5  ICD-9-CM: 276.7  11/3/2016 - 2/11/2017        RESOLVED: Elevated serum creatinine ICD-10-CM: R79.89  ICD-9-CM: 790.99  11/3/2016 - 2/11/2017        RESOLVED: Left leg cellulitis ICD-10-CM: L03.116  ICD-9-CM: 682.6  8/14/2016 - 2/11/2017        RESOLVED: Sepsis ([facility] ICD-10-CM: A41.9  ICD-9-CM: 038.9, 995.91  8/14/2016 - 2/11/2017        RESOLVED: Acute renal failure (ARF) ([facility] ICD-10-CM: N17.9  ICD-9-CM: 584.9  8/14/2016 - 11/2/2017        RESOLVED: RAI (obstructive sleep apnea) (Chronic) ICD-10-CM: Y88.65  ICD-9-CM: 327.23  8/14/2016 - 2/11/2017              Subjective:   1/13: She feels slightly better. Still with dry cough, SOB. Nausea has resolved and she is hungry. Denies pain. 1/14: Breathing isn't much better. Says it is difficult to eat due to SOB. Some cough, not able to bring up much sputum. No CP or myalgias. Nebs help but benefit is temporary. 1/15:  Breathing better. Less SOB. Still coughing but better. Few loose stools. She thinks she is feeling better. C diff still positive - On flagyl po for C diff. On Tamiflu for Influenza. 1/16:  She reports she is feeling much better and wants to go home. Very little cough. No SOB. No HA or new myalgias other than her chronic back pain. Nurse report soft stools for last 2 days and no further watery stools. She will need to have recheck of labs outpt. Discussed diabetic diet and wt loss. Discussed f/u with PCP and need for recheck of lab this wk.      1/17:  Continues to grad improve. Little cough. Looks comfortable on 2 liters at this moment. Pt reports she wants to go home but family wants her to go back to rehab - will consult case management to see if [facility] will take her back. Continues to improve. Wean to po Pred today. :  Continues to grad improve. She was up with PT yesterday. Cont to wean steroids. No further loose stools. Stable for rehab.     :  No new complaints. She has been getting up to chair. No further loose stools. Denied SAH. Pt reports she would be willing to go back to [facility].      :  She reports \"wheezing more today and more cough\" with occas yellow sputum. No fevers. Did not get up as much yesterday. Check CXR today. Cont scheduled nebs. Add empiric Zithromax. Review of Systems:   A comprehensive review of systems was negative except for that written in the HPI. Objective:   Physical Exam:     Visit Vitals    /66 (BP 1 Location: Right arm, BP Patient Position: At rest)    Pulse 88    Temp 98.1 °F (36.7 °C)    Resp 21    Ht 5' 1\" (1.549 m)    Wt 104.1 kg (229 lb 8 oz)    SpO2 95%    BMI 43.36 kg/m2    O2 Flow Rate (L/min): 2 l/min O2 Device: CPAP mask    Temp (24hrs), Av.1 °F (36.7 °C), Min:97.7 °F (36.5 °C), Max:98.3 °F (36.8 °C)         0701 -  1900  In: -   Out: 600 [Urine:600]    General:  Alert, cooperative, no distress, morbidly obese   Head:  Normocephalic, without obvious abnormality, atraumatic. Eyes:  Conjunctivae/corneas clear. PERRL, EOMs intact. Nose: Nares normal. Septum midline. Mucosa normal. No drainage or sinus tenderness. Throat: Lips, mucosa, and tongue moist..   Neck: Supple, symmetrical, trachea midline   Lungs:   A few scattered wheezes and more rhonchi  - diffuse, air movement a little worse. Heart:  Regular rate and rhythm, S1, S2 normal, no murmur, click, rub or gallop. Abdomen:   Soft, non-tender. Bowel sounds normal. No masses,  No organomegaly.    Extremities: Trace ankle edema, no calf ttp or cords   Pulses: 2+ and symmetric all extremities. Skin: Skin color, texture, turgor normal.    Neurologic: CNII-XII intact. Alert and oriented X 3. Data Review:     Recent Days:  Recent Labs      01/19/18   0131 01/18/18   0301   WBC  6.6  4.8   HGB  10.8*  10.9*   HCT  35.4  34.8*   PLT  191  196     Recent Labs      01/20/18   0132  01/19/18   0131  01/18/18   0301   NA  144  145  145   K  4.5  4.4  4.6   CL  108  108  108   CO2  28  31  32   GLU  178*  207*  124*   BUN  74*  72*  71*   CREA  2.16*  2.18*  2.21*   CA  8.8  8.7  8.8   ALB  2.4*  2.4*  2.4*   SGOT  20  17  22   ALT  11*  12  12     24 Hour Results:  Recent Results (from the past 24 hour(s))   GLUCOSE, POC    Collection Time: 01/19/18  7:05 AM   Result Value Ref Range    Glucose (POC) 161 (H) 65 - 100 mg/dL    Performed by [facility], POC    Collection Time: 01/19/18 11:51 AM   Result Value Ref Range    Glucose (POC) 131 (H) 65 - 100 mg/dL    Performed by Vuong Guise (PCT)    GLUCOSE, POC    Collection Time: 01/19/18  4:45 PM   Result Value Ref Range    Glucose (POC) 188 (H) 65 - 100 mg/dL    Performed by Raymundo Jones  PCT    GLUCOSE, POC    Collection Time: 01/19/18  9:28 PM   Result Value Ref Range    Glucose (POC) 166 (H) 65 - 100 mg/dL    Performed by [facility]    METABOLIC PANEL, COMPREHENSIVE    Collection Time: 01/20/18  1:32 AM   Result Value Ref Range    Sodium 144 136 - 145 mmol/L    Potassium 4.5 3.5 - 5.1 mmol/L    Chloride 108 97 - 108 mmol/L    CO2 28 21 - 32 mmol/L    Anion gap 8 5 - 15 mmol/L    Glucose 178 (H) 65 - 100 mg/dL    BUN 74 (H) 6 - 20 MG/DL    Creatinine 2.16 (H) 0.55 - 1.02 MG/DL    BUN/Creatinine ratio 34 (H) 12 - 20      GFR est AA 27 (L) >60 ml/min/1.73m2    GFR est non-AA 22 (L) >60 ml/min/1.73m2    Calcium 8.8 8.5 - 10.1 MG/DL    Bilirubin, total 0.7 0.2 - 1.0 MG/DL    ALT (SGPT) 11 (L) 12 - 78 U/L    AST (SGOT) 20 15 - 37 U/L    Alk.  phosphatase 49 45 - 117 U/L    Protein, total 5.8 (L) 6.4 - 8.2 g/dL    Albumin 2.4 (L) 3.5 - 5.0 g/dL    Globulin 3.4 2.0 - 4.0 g/dL    A-G Ratio 0.7 (L) 1.1 - 2.2       Medications reviewed  Current Facility-Administered Medications   Medication Dose Route Frequency    albuterol-ipratropium (DUO-NEB) 2.5 MG-0.5 MG/3 ML  3 mL Nebulization Q6H RT    predniSONE (DELTASONE) tablet 20 mg  20 mg Oral BID WITH MEALS    insulin glargine (LANTUS) injection 10 Units  10 Units SubCUTAneous DAILY    influenza vaccine 2017-18 (3 yrs+)(PF) (FLUZONE QUAD/FLUARIX QUAD) injection 0.5 mL  0.5 mL IntraMUSCular PRIOR TO DISCHARGE    famotidine (PEPCID) tablet 20 mg  20 mg Oral DAILY    benzonatate (TESSALON) capsule 100 mg  100 mg Oral TID PRN    metroNIDAZOLE (FLAGYL) tablet 500 mg  500 mg Oral TID    sodium chloride (NS) flush 5-10 mL  5-10 mL IntraVENous PRN    ALPRAZolam (XANAX) tablet 0.25 mg  0.25 mg Oral BID    aspirin chewable tablet 81 mg  81 mg Oral QHS    atorvastatin (LIPITOR) tablet 20 mg  20 mg Oral QHS    carvedilol (COREG) tablet 3.125 mg  3.125 mg Oral BID WITH MEALS    citalopram (CELEXA) tablet 40 mg  40 mg Oral DAILY    doxazosin (CARDURA) tablet 2 mg  2 mg Oral DAILY    losartan (COZAAR) tablet 25 mg  25 mg Oral DAILY    senna-docusate (PERICOLACE) 8.6-50 mg per tablet 2 Tab  2 Tab Oral DAILY    loratadine (CLARITIN) tablet 10 mg  10 mg Oral DAILY    sodium chloride (NS) flush 5-10 mL  5-10 mL IntraVENous Q8H    sodium chloride (NS) flush 5-10 mL  5-10 mL IntraVENous PRN    glucose chewable tablet 16 g  4 Tab Oral PRN    dextrose (D50W) injection syrg 12.5-25 g  12.5-25 g IntraVENous PRN    glucagon (GLUCAGEN) injection 1 mg  1 mg IntraMUSCular PRN    acetaminophen (TYLENOL) tablet 650 mg  650 mg Oral Q4H PRN    diphenhydrAMINE (BENADRYL) capsule 25 mg  25 mg Oral Q4H PRN    ondansetron (ZOFRAN) injection 4 mg  4 mg IntraVENous Q4H PRN    heparin (porcine) injection 5,000 Units  5,000 Units SubCUTAneous Q8H    insulin lispro (HUMALOG) injection   SubCUTAneous AC&HS    budesonide (PULMICORT) 500 mcg/2 ml nebulizer suspension  500 mcg Nebulization BID RT    arformoterol (BROVANA) neb solution 15 mcg  15 mcg Nebulization BID RT    guaiFENesin ER (MUCINEX) tablet 600 mg  600 mg Oral Q12H    bumetanide (BUMEX) tablet 1 mg  1 mg Oral BID     Care Plan discussed with: Mariana Alexandre MD

## 2018-01-20 NOTE — PROGRESS NOTES
Bedside and Verbal shift change report given to Ailyn Shetty (oncoming nurse) by Julius Del Toro (offgoing nurse). Report included the following information SBAR and MAR.

## 2018-01-21 LAB
ALBUMIN SERPL-MCNC: 2.5 G/DL (ref 3.5–5)
ALBUMIN/GLOB SERPL: 0.7 {RATIO} (ref 1.1–2.2)
ALP SERPL-CCNC: 51 U/L (ref 45–117)
ALT SERPL-CCNC: 12 U/L (ref 12–78)
ANION GAP SERPL CALC-SCNC: 11 MMOL/L (ref 5–15)
AST SERPL-CCNC: 18 U/L (ref 15–37)
BASOPHILS # BLD: 0 K/UL (ref 0–0.1)
BASOPHILS NFR BLD: 0 % (ref 0–1)
BILIRUB SERPL-MCNC: 0.8 MG/DL (ref 0.2–1)
BUN SERPL-MCNC: 74 MG/DL (ref 6–20)
BUN/CREAT SERPL: 34 (ref 12–20)
CALCIUM SERPL-MCNC: 8.8 MG/DL (ref 8.5–10.1)
CHLORIDE SERPL-SCNC: 107 MMOL/L (ref 97–108)
CO2 SERPL-SCNC: 28 MMOL/L (ref 21–32)
CREAT SERPL-MCNC: 2.16 MG/DL (ref 0.55–1.02)
EOSINOPHIL # BLD: 0 K/UL (ref 0–0.4)
EOSINOPHIL NFR BLD: 0 % (ref 0–7)
ERYTHROCYTE [DISTWIDTH] IN BLOOD BY AUTOMATED COUNT: 16.3 % (ref 11.5–14.5)
GLOBULIN SER CALC-MCNC: 3.4 G/DL (ref 2–4)
GLUCOSE BLD STRIP.AUTO-MCNC: 112 MG/DL (ref 65–100)
GLUCOSE BLD STRIP.AUTO-MCNC: 157 MG/DL (ref 65–100)
GLUCOSE BLD STRIP.AUTO-MCNC: 190 MG/DL (ref 65–100)
GLUCOSE BLD STRIP.AUTO-MCNC: 217 MG/DL (ref 65–100)
GLUCOSE SERPL-MCNC: 137 MG/DL (ref 65–100)
HCT VFR BLD AUTO: 36.4 % (ref 35–47)
HGB BLD-MCNC: 11.4 G/DL (ref 11.5–16)
LYMPHOCYTES # BLD: 0.4 K/UL (ref 0.8–3.5)
LYMPHOCYTES NFR BLD: 5 % (ref 12–49)
MCH RBC QN AUTO: 28.7 PG (ref 26–34)
MCHC RBC AUTO-ENTMCNC: 31.3 G/DL (ref 30–36.5)
MCV RBC AUTO: 91.7 FL (ref 80–99)
MONOCYTES # BLD: 0.4 K/UL (ref 0–1)
MONOCYTES NFR BLD: 4 % (ref 5–13)
NEUTS SEG # BLD: 7.3 K/UL (ref 1.8–8)
NEUTS SEG NFR BLD: 91 % (ref 32–75)
PLATELET # BLD AUTO: 211 K/UL (ref 150–400)
POTASSIUM SERPL-SCNC: 4.4 MMOL/L (ref 3.5–5.1)
PROT SERPL-MCNC: 5.9 G/DL (ref 6.4–8.2)
RBC # BLD AUTO: 3.97 M/UL (ref 3.8–5.2)
SERVICE CMNT-IMP: ABNORMAL
SODIUM SERPL-SCNC: 146 MMOL/L (ref 136–145)
WBC # BLD AUTO: 8 K/UL (ref 3.6–11)

## 2018-01-21 PROCEDURE — 77010033678 HC OXYGEN DAILY

## 2018-01-21 PROCEDURE — 74011000250 HC RX REV CODE- 250: Performed by: NURSE PRACTITIONER

## 2018-01-21 PROCEDURE — 74011636637 HC RX REV CODE- 636/637: Performed by: FAMILY MEDICINE

## 2018-01-21 PROCEDURE — 65660000000 HC RM CCU STEPDOWN

## 2018-01-21 PROCEDURE — 74011250637 HC RX REV CODE- 250/637: Performed by: FAMILY MEDICINE

## 2018-01-21 PROCEDURE — 36415 COLL VENOUS BLD VENIPUNCTURE: CPT | Performed by: FAMILY MEDICINE

## 2018-01-21 PROCEDURE — 82962 GLUCOSE BLOOD TEST: CPT

## 2018-01-21 PROCEDURE — 85025 COMPLETE CBC W/AUTO DIFF WBC: CPT | Performed by: FAMILY MEDICINE

## 2018-01-21 PROCEDURE — 74011636637 HC RX REV CODE- 636/637: Performed by: INTERNAL MEDICINE

## 2018-01-21 PROCEDURE — 94660 CPAP INITIATION&MGMT: CPT

## 2018-01-21 PROCEDURE — 74011250637 HC RX REV CODE- 250/637: Performed by: NURSE PRACTITIONER

## 2018-01-21 PROCEDURE — 74011000250 HC RX REV CODE- 250: Performed by: INTERNAL MEDICINE

## 2018-01-21 PROCEDURE — 80053 COMPREHEN METABOLIC PANEL: CPT | Performed by: FAMILY MEDICINE

## 2018-01-21 PROCEDURE — 74011250637 HC RX REV CODE- 250/637: Performed by: INTERNAL MEDICINE

## 2018-01-21 PROCEDURE — 77030038269 HC DRN EXT URIN PURWCK BARD -A

## 2018-01-21 PROCEDURE — 94640 AIRWAY INHALATION TREATMENT: CPT

## 2018-01-21 PROCEDURE — 74011250636 HC RX REV CODE- 250/636: Performed by: INTERNAL MEDICINE

## 2018-01-21 RX ADMIN — HEPARIN SODIUM 5000 UNITS: 5000 INJECTION, SOLUTION INTRAVENOUS; SUBCUTANEOUS at 18:01

## 2018-01-21 RX ADMIN — METRONIDAZOLE 500 MG: 250 TABLET ORAL at 22:37

## 2018-01-21 RX ADMIN — ALPRAZOLAM 0.25 MG: 0.25 TABLET ORAL at 18:01

## 2018-01-21 RX ADMIN — LORATADINE 10 MG: 10 TABLET ORAL at 10:31

## 2018-01-21 RX ADMIN — INSULIN GLARGINE 10 UNITS: 100 INJECTION, SOLUTION SUBCUTANEOUS at 10:29

## 2018-01-21 RX ADMIN — IPRATROPIUM BROMIDE AND ALBUTEROL SULFATE 3 ML: .5; 3 SOLUTION RESPIRATORY (INHALATION) at 01:13

## 2018-01-21 RX ADMIN — CARVEDILOL 3.12 MG: 3.12 TABLET, FILM COATED ORAL at 10:30

## 2018-01-21 RX ADMIN — GUAIFENESIN 600 MG: 600 TABLET, EXTENDED RELEASE ORAL at 10:31

## 2018-01-21 RX ADMIN — GUAIFENESIN 600 MG: 600 TABLET, EXTENDED RELEASE ORAL at 22:38

## 2018-01-21 RX ADMIN — BUMETANIDE 1 MG: 1 TABLET ORAL at 10:30

## 2018-01-21 RX ADMIN — Medication 10 ML: at 18:02

## 2018-01-21 RX ADMIN — BUDESONIDE 500 MCG: 0.5 INHALANT RESPIRATORY (INHALATION) at 07:33

## 2018-01-21 RX ADMIN — INSULIN LISPRO 2 UNITS: 100 INJECTION, SOLUTION INTRAVENOUS; SUBCUTANEOUS at 13:12

## 2018-01-21 RX ADMIN — METRONIDAZOLE 500 MG: 250 TABLET ORAL at 18:01

## 2018-01-21 RX ADMIN — ALPRAZOLAM 0.25 MG: 0.25 TABLET ORAL at 10:31

## 2018-01-21 RX ADMIN — BUMETANIDE 1 MG: 1 TABLET ORAL at 18:01

## 2018-01-21 RX ADMIN — CITALOPRAM HYDROBROMIDE 40 MG: 20 TABLET ORAL at 10:30

## 2018-01-21 RX ADMIN — ATORVASTATIN CALCIUM 20 MG: 20 TABLET, FILM COATED ORAL at 22:38

## 2018-01-21 RX ADMIN — HEPARIN SODIUM 5000 UNITS: 5000 INJECTION, SOLUTION INTRAVENOUS; SUBCUTANEOUS at 10:28

## 2018-01-21 RX ADMIN — FAMOTIDINE 20 MG: 20 TABLET, FILM COATED ORAL at 10:31

## 2018-01-21 RX ADMIN — PREDNISONE 20 MG: 20 TABLET ORAL at 18:01

## 2018-01-21 RX ADMIN — METRONIDAZOLE 500 MG: 250 TABLET ORAL at 10:30

## 2018-01-21 RX ADMIN — DOXAZOSIN 2 MG: 2 TABLET ORAL at 10:30

## 2018-01-21 RX ADMIN — INSULIN LISPRO 2 UNITS: 100 INJECTION, SOLUTION INTRAVENOUS; SUBCUTANEOUS at 22:38

## 2018-01-21 RX ADMIN — BUDESONIDE 500 MCG: 0.5 INHALANT RESPIRATORY (INHALATION) at 20:28

## 2018-01-21 RX ADMIN — ASPIRIN 81 MG 81 MG: 81 TABLET ORAL at 22:38

## 2018-01-21 RX ADMIN — IPRATROPIUM BROMIDE AND ALBUTEROL SULFATE 3 ML: .5; 3 SOLUTION RESPIRATORY (INHALATION) at 20:28

## 2018-01-21 RX ADMIN — INSULIN LISPRO 2 UNITS: 100 INJECTION, SOLUTION INTRAVENOUS; SUBCUTANEOUS at 18:01

## 2018-01-21 RX ADMIN — IPRATROPIUM BROMIDE AND ALBUTEROL SULFATE 3 ML: .5; 3 SOLUTION RESPIRATORY (INHALATION) at 07:33

## 2018-01-21 RX ADMIN — DOCUSATE SODIUM AND SENNOSIDES 2 TABLET: 8.6; 5 TABLET, FILM COATED ORAL at 10:30

## 2018-01-21 RX ADMIN — Medication 5 ML: at 22:00

## 2018-01-21 RX ADMIN — LOSARTAN POTASSIUM 25 MG: 50 TABLET ORAL at 10:30

## 2018-01-21 RX ADMIN — CARVEDILOL 3.12 MG: 3.12 TABLET, FILM COATED ORAL at 18:01

## 2018-01-21 RX ADMIN — AZITHROMYCIN 250 MG: 250 TABLET, FILM COATED ORAL at 10:31

## 2018-01-21 RX ADMIN — IPRATROPIUM BROMIDE AND ALBUTEROL SULFATE 3 ML: .5; 3 SOLUTION RESPIRATORY (INHALATION) at 13:14

## 2018-01-21 RX ADMIN — PREDNISONE 20 MG: 20 TABLET ORAL at 10:31

## 2018-01-21 RX ADMIN — HEPARIN SODIUM 5000 UNITS: 5000 INJECTION, SOLUTION INTRAVENOUS; SUBCUTANEOUS at 02:20

## 2018-01-21 NOTE — PROGRESS NOTES
Daily Progress Note: 1/21/2018  Kristine Mcdonnell MD    Assessment/Plan:   Acute hypoxic resp failure -likely mulitifactorial --  Hx asthma, obesity, Flu +  FLU B + -- finished tamiflu - imporved/resolved  wean steroids, cont nebs, supplemental oxygen as at home, mucinex  Bumex and K also re-started - monitor BMP outpt   LE dopplers neg, ECHO EF ok/mild-mod pul HTN  --pulmonary has seen     RAI on CPAP / Morbid obesity / Hypoventilation syndrome - Continue home CPAP.       CKD (chronic kidney disease) stage 4, GFR 15-29 ml/min - Cr at baseline  --renal following     DM type 2, uncontrolled, with renal complications - Diabetic diet and counseling. BS well controlled. --resume home meds - counseled about need for wt loss/diet     HTN (hypertension), benign - Stable. Resume home meds     Iron deficiency anemia - Stable.       Chronic bilateral low back pain without sciatica - Prn tylenol.       Anxiety and depression / Generalized anxiety disorder - At baseline  --Continue citalopram, xanax     Hyperlipidemia - Continue atorvastatin    CDIFF+  -- cont flagyl 1/14 outpt for 1mo    Dispo:  Family would now like to consider rehab again.          Problem List:  Problem List as of 1/21/2018  Date Reviewed: 1/12/2018          Codes Class Noted - Resolved    Respiratory failure with hypoxia [facility]) ICD-10-CM: J96.91  ICD-9-CM: 518.81  1/13/2018 - Present        * (Principal)Dyspnea ICD-10-CM: R06.00  ICD-9-CM: 786.09  1/12/2018 - Present        Morbid obesity ([facility] [address] ICD-10-CM: E66.01  ICD-9-CM: 278.01  Unknown - Present        Hypoventilation syndrome ICD-10-CM: R06.89  ICD-9-CM: 786.09  Unknown - Present        Hyperlipidemia ICD-10-CM: E78.5  ICD-9-CM: 272.4  Unknown - Present        ARI on CPAP ICD-10-CM: G47.33, Z99.89  ICD-9-CM: 327.23, V46.8  Unknown - Present        Anxiety and depression ICD-10-CM: F41.8  ICD-9-CM: 300.00, 311  Unknown - Present        Chronic bilateral low back pain without sciatica ICD-10-CM: M54.5, G89.29  ICD-9-CM: 724.2, 338.29  11/3/2016 - Present        Generalized anxiety disorder ICD-10-CM: F41.1  ICD-9-CM: 300.02  11/3/2016 - Present        CKD (chronic kidney disease) stage 4, GFR 15-29 ml/min (HCC) (Chronic) ICD-10-CM: N18.4  ICD-9-CM: 585.4  8/14/2016 - Present        HTN (hypertension), benign (Chronic) ICD-10-CM: I10  ICD-9-CM: 401.1  8/14/2016 - Present        DM type 2, uncontrolled, with renal complications (HCC) (Chronic) ICD-10-CM: E11.29, E11.65  ICD-9-CM: 250.42  8/14/2016 - Present        Iron deficiency anemia (Chronic) ICD-10-CM: D50.9  ICD-9-CM: 280.9  8/14/2016 - Present        RESOLVED: CKD stage 4 due to type 2 diabetes mellitus ([facility] ICD-10-CM: E11.22, N18.4  ICD-9-CM: 250.40, 585.4  Unknown - 1/12/2018        RESOLVED: DM type 2 causing renal disease ([facility] ICD-10-CM: E11.29  ICD-9-CM: 250.40  Unknown - 1/12/2018        RESOLVED: Hypertension ICD-10-CM: I10  ICD-9-CM: 401.9  Unknown - 1/12/2018        RESOLVED: Arteriovenous fistula infection ([facility] ICD-10-CM: T82. 7XXA  ICD-9-CM: 996.62  11/2/2017 - 1/12/2018        RESOLVED: Hypertension ICD-10-CM: I10  ICD-9-CM: 401.9  Unknown - 11/2/2017        RESOLVED: DM type 2 causing renal disease ([facility] ICD-10-CM: E11.29  ICD-9-CM: 250.40  Unknown - 11/2/2017        RESOLVED: CKD stage 4 due to type 2 diabetes mellitus ([facility] ICD-10-CM: E11.22, N18.4  ICD-9-CM: 250.40, 585.4  Unknown - 11/2/2017        RESOLVED: Leukocytosis ICD-10-CM: L58.892  ICD-9-CM: 288.60  11/2/2017 - 1/12/2018        RESOLVED: Hypoxia ICD-10-CM: R09.02  ICD-9-CM: 799.02  11/2/2017 - 1/12/2018        RESOLVED: Pleural effusion ICD-10-CM: J90  ICD-9-CM: 511.9  11/2/2017 - 1/12/2018        RESOLVED: Cellulitis of right lower extremity ICD-10-CM: A95.728  ICD-9-CM: 682.6  6/2/2017 - 11/2/2017        RESOLVED: Asthma with acute exacerbation ICD-10-CM: J45.901  ICD-9-CM: 493.92  6/2/2017 - 11/2/2017        RESOLVED: Asthma ICD-10-CM: J45.909  ICD-9-CM: 493.90  Unknown - 1/12/2018        RESOLVED: CKD stage 4 due to type 2 diabetes mellitus ([facility] ICD-10-CM: E11.22, N18.4  ICD-9-CM: 250.40, 585.4  Unknown - 2/11/2017        RESOLVED: DM type 2 causing renal disease ([facility] ICD-10-CM: E11.29  ICD-9-CM: 250.40  Unknown - 2/11/2017        RESOLVED: Hypoglycemia ICD-10-CM: E16.2  ICD-9-CM: 251.2  2/11/2017 - 11/2/2017        RESOLVED: Hyperkalemia ICD-10-CM: E87.5  ICD-9-CM: 276.7  11/3/2016 - 2/11/2017        RESOLVED: Elevated serum creatinine ICD-10-CM: R79.89  ICD-9-CM: 790.99  11/3/2016 - 2/11/2017        RESOLVED: Left leg cellulitis ICD-10-CM: L03.116  ICD-9-CM: 682.6  8/14/2016 - 2/11/2017        RESOLVED: Sepsis ([facility] ICD-10-CM: A41.9  ICD-9-CM: 038.9, 995.91  8/14/2016 - 2/11/2017        RESOLVED: Acute renal failure (ARF) ([facility] ICD-10-CM: N17.9  ICD-9-CM: 584.9  8/14/2016 - 11/2/2017        RESOLVED: RAI (obstructive sleep apnea) (Chronic) ICD-10-CM: Q45.97  ICD-9-CM: 327.23  8/14/2016 - 2/11/2017              Subjective:   1/13: She feels slightly better. Still with dry cough, SOB. Nausea has resolved and she is hungry. Denies pain. 1/14: Breathing isn't much better. Says it is difficult to eat due to SOB. Some cough, not able to bring up much sputum. No CP or myalgias. Nebs help but benefit is temporary. 1/15:  Breathing better. Less SOB. Still coughing but better. Few loose stools. She thinks she is feeling better. C diff still positive - On flagyl po for C diff. On Tamiflu for Influenza. 1/16:  She reports she is feeling much better and wants to go home. Very little cough. No SOB. No HA or new myalgias other than her chronic back pain. Nurse report soft stools for last 2 days and no further watery stools. She will need to have recheck of labs outpt. Discussed diabetic diet and wt loss. Discussed f/u with PCP and need for recheck of lab this wk.      1/17:  Continues to grad improve. Little cough. Looks comfortable on 2 liters at this moment. Pt reports she wants to go home but family wants her to go back to rehab - will consult case management to see if [facility] will take her back. Continues to improve. Wean to po Pred today. :  Continues to grad improve. She was up with PT yesterday. Cont to wean steroids. No further loose stools. Stable for rehab.     :  No new complaints. She has been getting up to chair. No further loose stools. Denied SAH. Pt reports she would be willing to go back to [facility].      :  She reports \"wheezing more today and more cough\" with occas yellow sputum. No fevers. Did not get up as much yesterday. Check CXR today. Cont scheduled nebs. Add empiric Zithromax. :  Wheezing and cough improved from yesterday pt repots. She looks better today. CXR yesterday was sl worse. Cont Zitromax. Review of Systems:   A comprehensive review of systems was negative except for that written in the HPI. Objective:   Physical Exam:     Visit Vitals    /85 (BP 1 Location: Right arm, BP Patient Position: At rest)    Pulse 96    Temp 97.8 °F (36.6 °C)    Resp 21    Ht 5' 1\" (1.549 m)    Wt 104.1 kg (229 lb 8 oz)    SpO2 94%    BMI 43.36 kg/m2    O2 Flow Rate (L/min): 2 l/min O2 Device: Nasal cannula    Temp (24hrs), Av.8 °F (36.6 °C), Min:97.8 °F (36.6 °C), Max:97.8 °F (36.6 °C)         190 -  0700  In: -   Out: 550 [Urine:550]    General:  Alert, cooperative, no distress, morbidly obese   Head:  Normocephalic, without obvious abnormality, atraumatic. Eyes:  Conjunctivae/corneas clear. PERRL, EOMs intact. Nose: Nares normal. Septum midline. Mucosa normal. No drainage or sinus tenderness.    Throat: Lips, mucosa, and tongue moist..   Neck: Supple, symmetrical, trachea midline   Lungs:   A few scattered wheezes and rhonchi  - diffuse, air movement back to usual.     Heart:  Regular rate and rhythm, S1, S2 normal, no murmur, click, rub or gallop. Abdomen:   Soft, non-tender. Bowel sounds normal. No masses,  No organomegaly. Extremities: Trace ankle edema, no calf ttp or cords   Pulses: 2+ and symmetric all extremities. Skin: Skin color, texture, turgor normal.    Neurologic: CNII-XII intact. Alert and oriented X 3. Data Review:   PORT CXR 1/20/18:   IMPRESSION: Increased left mid and lower pulmonary consolidation and pleural  effusion in the interval.     Recent Days:  Recent Labs      01/21/18   0505  01/19/18   0131   WBC  8.0  6.6   HGB  11.4*  10.8*   HCT  36.4  35.4   PLT  211  191     Recent Labs      01/21/18   0505  01/20/18   0132  01/19/18   0131   NA  146*  144  145   K  4.4  4.5  4.4   CL  107  108  108   CO2  28  28  31   GLU  137*  178*  207*   BUN  74*  74*  72*   CREA  2.16*  2.16*  2.18*   CA  8.8  8.8  8.7   ALB  2.5*  2.4*  2.4*   SGOT  18  20  17   ALT  12  11*  12     24 Hour Results:  Recent Results (from the past 24 hour(s))   GLUCOSE, POC    Collection Time: 01/20/18 12:40 PM   Result Value Ref Range    Glucose (POC) 141 (H) 65 - 100 mg/dL    Performed by Luis Renae (PCT)    GLUCOSE, POC    Collection Time: 01/20/18  5:08 PM   Result Value Ref Range    Glucose (POC) 114 (H) 65 - 100 mg/dL    Performed by Vicente Boston    GLUCOSE, POC    Collection Time: 01/20/18  9:24 PM   Result Value Ref Range    Glucose (POC) 133 (H) 65 - 100 mg/dL    Performed by Carmen Anderson (PCT)    METABOLIC PANEL, COMPREHENSIVE    Collection Time: 01/21/18  5:05 AM   Result Value Ref Range    Sodium 146 (H) 136 - 145 mmol/L    Potassium 4.4 3.5 - 5.1 mmol/L    Chloride 107 97 - 108 mmol/L    CO2 28 21 - 32 mmol/L    Anion gap 11 5 - 15 mmol/L    Glucose 137 (H) 65 - 100 mg/dL    BUN 74 (H) 6 - 20 MG/DL    Creatinine 2.16 (H) 0.55 - 1.02 MG/DL    BUN/Creatinine ratio 34 (H) 12 - 20      GFR est AA 27 (L) >60 ml/min/1.73m2    GFR est non-AA 22 (L) >60 ml/min/1.73m2    Calcium 8.8 8.5 - 10.1 MG/DL    Bilirubin, total 0.8 0.2 - 1.0 MG/DL    ALT (SGPT) 12 12 - 78 U/L    AST (SGOT) 18 15 - 37 U/L    Alk. phosphatase 51 45 - 117 U/L    Protein, total 5.9 (L) 6.4 - 8.2 g/dL    Albumin 2.5 (L) 3.5 - 5.0 g/dL    Globulin 3.4 2.0 - 4.0 g/dL    A-G Ratio 0.7 (L) 1.1 - 2.2     CBC WITH AUTOMATED DIFF    Collection Time: 01/21/18  5:05 AM   Result Value Ref Range    WBC 8.0 3.6 - 11.0 K/uL    RBC 3.97 3.80 - 5.20 M/uL    HGB 11.4 (L) 11.5 - 16.0 g/dL    HCT 36.4 35.0 - 47.0 %    MCV 91.7 80.0 - 99.0 FL    MCH 28.7 26.0 - 34.0 PG    MCHC 31.3 30.0 - 36.5 g/dL    RDW 16.3 (H) 11.5 - 14.5 %    PLATELET 273 420 - 508 K/uL    NEUTROPHILS 91 (H) 32 - 75 %    LYMPHOCYTES 5 (L) 12 - 49 %    MONOCYTES 4 (L) 5 - 13 %    EOSINOPHILS 0 0 - 7 %    BASOPHILS 0 0 - 1 %    ABS. NEUTROPHILS 7.3 1.8 - 8.0 K/UL    ABS. LYMPHOCYTES 0.4 (L) 0.8 - 3.5 K/UL    ABS. MONOCYTES 0.4 0.0 - 1.0 K/UL    ABS. EOSINOPHILS 0.0 0.0 - 0.4 K/UL    ABS.  BASOPHILS 0.0 0.0 - 0.1 K/UL   GLUCOSE, POC    Collection Time: 01/21/18  7:04 AM   Result Value Ref Range    Glucose (POC) 112 (H) 65 - 100 mg/dL    Performed by Alexandra Gonzalez (PCT)      Medications reviewed  Current Facility-Administered Medications   Medication Dose Route Frequency    azithromycin (ZITHROMAX) tablet 250 mg  250 mg Oral DAILY    albuterol-ipratropium (DUO-NEB) 2.5 MG-0.5 MG/3 ML  3 mL Nebulization Q6H RT    predniSONE (DELTASONE) tablet 20 mg  20 mg Oral BID WITH MEALS    insulin glargine (LANTUS) injection 10 Units  10 Units SubCUTAneous DAILY    influenza vaccine 2017-18 (3 yrs+)(PF) (FLUZONE QUAD/FLUARIX QUAD) injection 0.5 mL  0.5 mL IntraMUSCular PRIOR TO DISCHARGE    famotidine (PEPCID) tablet 20 mg  20 mg Oral DAILY    benzonatate (TESSALON) capsule 100 mg  100 mg Oral TID PRN    metroNIDAZOLE (FLAGYL) tablet 500 mg  500 mg Oral TID    sodium chloride (NS) flush 5-10 mL  5-10 mL IntraVENous PRN    ALPRAZolam (XANAX) tablet 0.25 mg  0.25 mg Oral BID    aspirin chewable tablet 81 mg 81 mg Oral QHS    atorvastatin (LIPITOR) tablet 20 mg  20 mg Oral QHS    carvedilol (COREG) tablet 3.125 mg  3.125 mg Oral BID WITH MEALS    citalopram (CELEXA) tablet 40 mg  40 mg Oral DAILY    doxazosin (CARDURA) tablet 2 mg  2 mg Oral DAILY    losartan (COZAAR) tablet 25 mg  25 mg Oral DAILY    senna-docusate (PERICOLACE) 8.6-50 mg per tablet 2 Tab  2 Tab Oral DAILY    loratadine (CLARITIN) tablet 10 mg  10 mg Oral DAILY    sodium chloride (NS) flush 5-10 mL  5-10 mL IntraVENous Q8H    sodium chloride (NS) flush 5-10 mL  5-10 mL IntraVENous PRN    glucose chewable tablet 16 g  4 Tab Oral PRN    dextrose (D50W) injection syrg 12.5-25 g  12.5-25 g IntraVENous PRN    glucagon (GLUCAGEN) injection 1 mg  1 mg IntraMUSCular PRN    acetaminophen (TYLENOL) tablet 650 mg  650 mg Oral Q4H PRN    diphenhydrAMINE (BENADRYL) capsule 25 mg  25 mg Oral Q4H PRN    ondansetron (ZOFRAN) injection 4 mg  4 mg IntraVENous Q4H PRN    heparin (porcine) injection 5,000 Units  5,000 Units SubCUTAneous Q8H    insulin lispro (HUMALOG) injection   SubCUTAneous AC&HS    budesonide (PULMICORT) 500 mcg/2 ml nebulizer suspension  500 mcg Nebulization BID RT    arformoterol (BROVANA) neb solution 15 mcg  15 mcg Nebulization BID RT    guaiFENesin ER (MUCINEX) tablet 600 mg  600 mg Oral Q12H    bumetanide (BUMEX) tablet 1 mg  1 mg Oral BID     Care Plan discussed with: Mone Keating MD

## 2018-01-21 NOTE — ROUTINE PROCESS
Bedside shift change report given to Leti Lugo (oncoming nurse) by Mark Harrison RN (offgoing nurse). Report included the following information SBAR and Kardex.

## 2018-01-21 NOTE — PROGRESS NOTES
Bedside and Verbal shift change report given to Anita Browne (oncoming nurse) by Renetta Bhatia (offgoing nurse). Report included the following information SBAR, MAR and Recent Results.

## 2018-01-22 LAB
ANION GAP SERPL CALC-SCNC: 6 MMOL/L (ref 5–15)
BUN SERPL-MCNC: 78 MG/DL (ref 6–20)
BUN/CREAT SERPL: 36 (ref 12–20)
CALCIUM SERPL-MCNC: 9 MG/DL (ref 8.5–10.1)
CHLORIDE SERPL-SCNC: 105 MMOL/L (ref 97–108)
CO2 SERPL-SCNC: 30 MMOL/L (ref 21–32)
CREAT SERPL-MCNC: 2.17 MG/DL (ref 0.55–1.02)
GLUCOSE BLD STRIP.AUTO-MCNC: 122 MG/DL (ref 65–100)
GLUCOSE BLD STRIP.AUTO-MCNC: 143 MG/DL (ref 65–100)
GLUCOSE BLD STRIP.AUTO-MCNC: 189 MG/DL (ref 65–100)
GLUCOSE BLD STRIP.AUTO-MCNC: 236 MG/DL (ref 65–100)
GLUCOSE SERPL-MCNC: 190 MG/DL (ref 65–100)
POTASSIUM SERPL-SCNC: 4.5 MMOL/L (ref 3.5–5.1)
SERVICE CMNT-IMP: ABNORMAL
SODIUM SERPL-SCNC: 141 MMOL/L (ref 136–145)

## 2018-01-22 PROCEDURE — 74011636637 HC RX REV CODE- 636/637: Performed by: FAMILY MEDICINE

## 2018-01-22 PROCEDURE — 74011250637 HC RX REV CODE- 250/637: Performed by: FAMILY MEDICINE

## 2018-01-22 PROCEDURE — 97535 SELF CARE MNGMENT TRAINING: CPT

## 2018-01-22 PROCEDURE — 74011000250 HC RX REV CODE- 250: Performed by: NURSE PRACTITIONER

## 2018-01-22 PROCEDURE — 74011250637 HC RX REV CODE- 250/637: Performed by: NURSE PRACTITIONER

## 2018-01-22 PROCEDURE — 74011250637 HC RX REV CODE- 250/637: Performed by: INTERNAL MEDICINE

## 2018-01-22 PROCEDURE — 36415 COLL VENOUS BLD VENIPUNCTURE: CPT | Performed by: FAMILY MEDICINE

## 2018-01-22 PROCEDURE — 74011250636 HC RX REV CODE- 250/636: Performed by: INTERNAL MEDICINE

## 2018-01-22 PROCEDURE — 97530 THERAPEUTIC ACTIVITIES: CPT

## 2018-01-22 PROCEDURE — 94640 AIRWAY INHALATION TREATMENT: CPT

## 2018-01-22 PROCEDURE — 74011636637 HC RX REV CODE- 636/637: Performed by: INTERNAL MEDICINE

## 2018-01-22 PROCEDURE — 80048 BASIC METABOLIC PNL TOTAL CA: CPT | Performed by: FAMILY MEDICINE

## 2018-01-22 PROCEDURE — 77030038269 HC DRN EXT URIN PURWCK BARD -A

## 2018-01-22 PROCEDURE — 77010033678 HC OXYGEN DAILY

## 2018-01-22 PROCEDURE — 82962 GLUCOSE BLOOD TEST: CPT

## 2018-01-22 PROCEDURE — 74011000250 HC RX REV CODE- 250: Performed by: INTERNAL MEDICINE

## 2018-01-22 PROCEDURE — 65660000000 HC RM CCU STEPDOWN

## 2018-01-22 RX ADMIN — ALPRAZOLAM 0.25 MG: 0.25 TABLET ORAL at 10:07

## 2018-01-22 RX ADMIN — ASPIRIN 81 MG 81 MG: 81 TABLET ORAL at 21:44

## 2018-01-22 RX ADMIN — ALPRAZOLAM 0.25 MG: 0.25 TABLET ORAL at 17:29

## 2018-01-22 RX ADMIN — CARVEDILOL 3.12 MG: 3.12 TABLET, FILM COATED ORAL at 10:07

## 2018-01-22 RX ADMIN — CITALOPRAM HYDROBROMIDE 40 MG: 20 TABLET ORAL at 10:07

## 2018-01-22 RX ADMIN — INSULIN GLARGINE 10 UNITS: 100 INJECTION, SOLUTION SUBCUTANEOUS at 10:09

## 2018-01-22 RX ADMIN — FAMOTIDINE 20 MG: 20 TABLET, FILM COATED ORAL at 10:07

## 2018-01-22 RX ADMIN — INSULIN LISPRO 2 UNITS: 100 INJECTION, SOLUTION INTRAVENOUS; SUBCUTANEOUS at 17:28

## 2018-01-22 RX ADMIN — BUDESONIDE 500 MCG: 0.5 INHALANT RESPIRATORY (INHALATION) at 07:28

## 2018-01-22 RX ADMIN — HEPARIN SODIUM 5000 UNITS: 5000 INJECTION, SOLUTION INTRAVENOUS; SUBCUTANEOUS at 17:28

## 2018-01-22 RX ADMIN — BUMETANIDE 1 MG: 1 TABLET ORAL at 10:07

## 2018-01-22 RX ADMIN — LORATADINE 10 MG: 10 TABLET ORAL at 10:07

## 2018-01-22 RX ADMIN — Medication 10 ML: at 17:30

## 2018-01-22 RX ADMIN — IPRATROPIUM BROMIDE AND ALBUTEROL SULFATE 3 ML: .5; 3 SOLUTION RESPIRATORY (INHALATION) at 13:24

## 2018-01-22 RX ADMIN — IPRATROPIUM BROMIDE AND ALBUTEROL SULFATE 3 ML: .5; 3 SOLUTION RESPIRATORY (INHALATION) at 01:26

## 2018-01-22 RX ADMIN — METRONIDAZOLE 500 MG: 250 TABLET ORAL at 17:29

## 2018-01-22 RX ADMIN — Medication 5 ML: at 06:00

## 2018-01-22 RX ADMIN — Medication 5 ML: at 22:00

## 2018-01-22 RX ADMIN — INSULIN LISPRO 2 UNITS: 100 INJECTION, SOLUTION INTRAVENOUS; SUBCUTANEOUS at 21:44

## 2018-01-22 RX ADMIN — CARVEDILOL 3.12 MG: 3.12 TABLET, FILM COATED ORAL at 17:30

## 2018-01-22 RX ADMIN — IPRATROPIUM BROMIDE AND ALBUTEROL SULFATE 3 ML: .5; 3 SOLUTION RESPIRATORY (INHALATION) at 20:12

## 2018-01-22 RX ADMIN — PREDNISONE 20 MG: 20 TABLET ORAL at 10:07

## 2018-01-22 RX ADMIN — GUAIFENESIN 600 MG: 600 TABLET, EXTENDED RELEASE ORAL at 10:06

## 2018-01-22 RX ADMIN — LOSARTAN POTASSIUM 25 MG: 50 TABLET ORAL at 10:08

## 2018-01-22 RX ADMIN — PREDNISONE 20 MG: 20 TABLET ORAL at 17:29

## 2018-01-22 RX ADMIN — HEPARIN SODIUM 5000 UNITS: 5000 INJECTION, SOLUTION INTRAVENOUS; SUBCUTANEOUS at 03:00

## 2018-01-22 RX ADMIN — ATORVASTATIN CALCIUM 20 MG: 20 TABLET, FILM COATED ORAL at 23:26

## 2018-01-22 RX ADMIN — DOXAZOSIN 2 MG: 2 TABLET ORAL at 10:07

## 2018-01-22 RX ADMIN — INSULIN LISPRO 2 UNITS: 100 INJECTION, SOLUTION INTRAVENOUS; SUBCUTANEOUS at 10:08

## 2018-01-22 RX ADMIN — GUAIFENESIN 600 MG: 600 TABLET, EXTENDED RELEASE ORAL at 21:44

## 2018-01-22 RX ADMIN — BUDESONIDE 500 MCG: 0.5 INHALANT RESPIRATORY (INHALATION) at 20:12

## 2018-01-22 RX ADMIN — METRONIDAZOLE 500 MG: 250 TABLET ORAL at 10:07

## 2018-01-22 RX ADMIN — DOCUSATE SODIUM AND SENNOSIDES 2 TABLET: 8.6; 5 TABLET, FILM COATED ORAL at 10:07

## 2018-01-22 RX ADMIN — BUMETANIDE 1 MG: 1 TABLET ORAL at 17:29

## 2018-01-22 RX ADMIN — HEPARIN SODIUM 5000 UNITS: 5000 INJECTION, SOLUTION INTRAVENOUS; SUBCUTANEOUS at 10:09

## 2018-01-22 RX ADMIN — METRONIDAZOLE 500 MG: 250 TABLET ORAL at 21:44

## 2018-01-22 RX ADMIN — IPRATROPIUM BROMIDE AND ALBUTEROL SULFATE 3 ML: .5; 3 SOLUTION RESPIRATORY (INHALATION) at 07:29

## 2018-01-22 RX ADMIN — AZITHROMYCIN 250 MG: 250 TABLET, FILM COATED ORAL at 10:07

## 2018-01-22 NOTE — ROUTINE PROCESS
Bedside and Verbal shift change report given to Lupillo Del Toro RN (oncoming nurse) by Bry Booth RN (offgoing nurse). Report included the following information SBAR, Kardex, Intake/Output, MAR, Recent Results, Med Rec Status and Cardiac Rhythm NSR, BBB.

## 2018-01-22 NOTE — PROGRESS NOTES
[address]  YOB: 1945          Assessment & Plan:   CKD 4  · Stable    Hyperkalemia  · better    Multifactorial Respiratory insufficiency  · Steroids, nebs, Bipap  ·  Bumex     HTN  · Mildly high  · Losartan,Bumex  · Monitor    AVF in LUE with access arm edema  · Follows with vascular center       Subjective:   CC: CKD  HPI: Renal function stable today.  BP on slightly high side  On ABX  ROS: +cough +sob  Current Facility-Administered Medications   Medication Dose Route Frequency    azithromycin (ZITHROMAX) tablet 250 mg  250 mg Oral DAILY    albuterol-ipratropium (DUO-NEB) 2.5 MG-0.5 MG/3 ML  3 mL Nebulization Q6H RT    predniSONE (DELTASONE) tablet 20 mg  20 mg Oral BID WITH MEALS    insulin glargine (LANTUS) injection 10 Units  10 Units SubCUTAneous DAILY    influenza vaccine 2017-18 (3 yrs+)(PF) (FLUZONE QUAD/FLUARIX QUAD) injection 0.5 mL  0.5 mL IntraMUSCular PRIOR TO DISCHARGE    famotidine (PEPCID) tablet 20 mg  20 mg Oral DAILY    benzonatate (TESSALON) capsule 100 mg  100 mg Oral TID PRN    metroNIDAZOLE (FLAGYL) tablet 500 mg  500 mg Oral TID    sodium chloride (NS) flush 5-10 mL  5-10 mL IntraVENous PRN    ALPRAZolam (XANAX) tablet 0.25 mg  0.25 mg Oral BID    aspirin chewable tablet 81 mg  81 mg Oral QHS    atorvastatin (LIPITOR) tablet 20 mg  20 mg Oral QHS    carvedilol (COREG) tablet 3.125 mg  3.125 mg Oral BID WITH MEALS    citalopram (CELEXA) tablet 40 mg  40 mg Oral DAILY    doxazosin (CARDURA) tablet 2 mg  2 mg Oral DAILY    losartan (COZAAR) tablet 25 mg  25 mg Oral DAILY    senna-docusate (PERICOLACE) 8.6-50 mg per tablet 2 Tab  2 Tab Oral DAILY    loratadine (CLARITIN) tablet 10 mg  10 mg Oral DAILY    sodium chloride (NS) flush 5-10 mL  5-10 mL IntraVENous Q8H    sodium chloride (NS) flush 5-10 mL  5-10 mL IntraVENous PRN    glucose chewable tablet 16 g  4 Tab Oral PRN    dextrose (D50W) injection syrg 12.5-25 g  12.5-25 g IntraVENous PRN    glucagon (GLUCAGEN) injection 1 mg  1 mg IntraMUSCular PRN    acetaminophen (TYLENOL) tablet 650 mg  650 mg Oral Q4H PRN    diphenhydrAMINE (BENADRYL) capsule 25 mg  25 mg Oral Q4H PRN    ondansetron (ZOFRAN) injection 4 mg  4 mg IntraVENous Q4H PRN    heparin (porcine) injection 5,000 Units  5,000 Units SubCUTAneous Q8H    insulin lispro (HUMALOG) injection   SubCUTAneous AC&HS    budesonide (PULMICORT) 500 mcg/2 ml nebulizer suspension  500 mcg Nebulization BID RT    arformoterol (BROVANA) neb solution 15 mcg  15 mcg Nebulization BID RT    guaiFENesin ER (MUCINEX) tablet 600 mg  600 mg Oral Q12H    bumetanide (BUMEX) tablet 1 mg  1 mg Oral BID          Objective:     Vitals:  Blood pressure 153/69, pulse 96, temperature 96.4 °F (35.8 °C), resp. rate 20, height 5' 1\" (1.549 m), weight 103.1 kg (227 lb 4.7 oz), SpO2 100 %. Temp (24hrs), Av.6 °F (36.4 °C), Min:96.4 °F (35.8 °C), Max:98.4 °F (36.9 °C)      Intake and Output:      1901 -  0700  In: 360 [P.O.:360]  Out: 700 [Urine:700]    Physical Exam:               GENERAL ASSESSMENT: chronically ill, obese  CHEST: B wheezes and rhonchi  HEART: S1S2  ABDOMEN: Soft,NT  EXTREMITY: Chronic LE EDEMA; LUE AVF +t/b LUE edema          ECG/rhythm:    Data Review      No results for input(s): TNIPOC in the last 72 hours. No lab exists for component: ITNL   No results for input(s): CPK, CKMB, TROIQ in the last 72 hours. Recent Labs      18   0305  18   0505  18   0132   NA  141  146*  144   K  4.5  4.4  4.5   CL  105  107  108   CO2  30  28  28   BUN  78*  74*  74*   CREA  2.17*  2.16*  2.16*   GLU  190*  137*  178*   CA  9.0  8.8  8.8   ALB   --   2.5*  2.4*   WBC   --   8.0   --    HGB   --   11.4*   --    HCT   --   36.4   --    PLT   --   211   --       No results for input(s): INR, PTP, APTT in the last 72 hours.     No lab exists for component: INREXT, INREXT  Needs: urine analysis, urine sodium, protein and creatinine  Lab Results   Component Value Date/Time    Sodium urine, random 50 11/03/2016 12:55 PM    Creatinine, urine 53.84 11/03/2016 12:55 PM           : Zaida Badillo MD  1/22/2018        [facility]:  [email or web address]  [facility] office:  [address] [address]  [address]  Phone: [phone]  Fax :     [phone]    [place] office:  [address] [facility]  [facility]  Phone - [phone]  Fax - [phone]

## 2018-01-22 NOTE — PROGRESS NOTES
Problem: Patient Education: Go to Patient Education Activity  Goal: Patient/Family Education  physical Therapy TREATMENT  Patient: Roselyn Husbands (7 y.o. female)  Date: 1/22/2018  Diagnosis: Dyspnea  Respiratory failure with hypoxia ([address] Dyspnea       Precautions: Fall  Chart, physical therapy assessment, plan of care and goals were reviewed. ASSESSMENT:  Pt comes to sit with mod assist of 2. Pt to stand with min to mod assist of 2. Pt took 4 side steps to recliner chair mod assist of 2. .Pt left sitting in chair on 4L of O2. Pt progressing slowly. Continue goals. Progression toward goals:   []      Improving appropriately and progressing toward goals  [x]      Improving slowly and progressing toward goals  []      Not making progress toward goals and plan of care will be adjusted     PLAN:  Patient continues to benefit from skilled intervention to address the above impairments. Continue treatment per established plan of care. Discharge Recommendations:  Ayan Wasserman  Further Equipment Recommendations for Discharge:  rolling walker     SUBJECTIVE:       OBJECTIVE DATA SUMMARY:   Critical Behavior:  Neurologic State: Alert  Orientation Level: Oriented X4  Cognition: Appropriate decision making, Appropriate for age attention/concentration, Appropriate safety awareness, Follows commands  Safety/Judgement: Insight into deficits  Functional Mobility Training:  Bed Mobility:     Supine to Sit: Moderate assistance;Assist x2               Transfers:  Sit to Stand: Moderate assistance;Minimum assistance;Assist x2  Stand to Sit: Minimum assistance;Assist x2                             Balance:  Sitting: Intact  Standing: With support  Ambulation/Gait Training:        Ambulation - Level of Assistance: Assist x2; Moderate assistance        Gait Abnormalities: Decreased step clearance        Base of Support: Widened        Step Length: Left shortened;Right shortened                    Pain:  Pain Scale 1: Numeric (0 - 10)  Pain Intensity 1: 0              Activity Tolerance:   Pt tolerated treatment well. Please refer to the flowsheet for vital signs taken during this treatment.   After treatment:   [x] Patient left in no apparent distress sitting up in chair  [] Patient left in no apparent distress in bed  [] Call bell left within reach  [] Nursing notified  [] Caregiver present  [] Bed alarm activated    COMMUNICATION/COLLABORATION:   The patients plan of care was discussed with: Physical Therapist    Daniele Triplett PTA   Time Calculation: 23 mins

## 2018-01-22 NOTE — ROUTINE PROCESS
Bedside shift change report given to Byron Hamilton RN (oncoming nurse) by Mena Rowan. Chikis Olsen   (offgoing nurse). Report included the following information SBAR, Kardex and MAR.

## 2018-01-22 NOTE — PROGRESS NOTES
Spoke on the phone with Dr. Ginette Holliday, notified him that pt had a 3 second run of SVT at around [phone]. Pt has no c/o CP, is c/o SOB, which has been an ongoing issue. Electrolytes were WDL yesterday. Per Dr. Ginette Holliday- okay to place order for BMP this AM, no other orders.

## 2018-01-22 NOTE — PROGRESS NOTES
01/22/18  2:30 PM    TC to MD to inform that [facility] accepted patient. He reported patient not ready until tomorrow. Informed LOBA and patient and . Ambulance set up for 1/23/18 at 11:00am. Informed MD, patient and .      Richa Arteaga, MSW

## 2018-01-22 NOTE — PROGRESS NOTES
Problem: Falls - Risk of  Goal: *Absence of Falls  Document Moy Fall Risk and appropriate interventions in the flowsheet.    Outcome: Progressing Towards Goal  Fall Risk Interventions:  Mobility Interventions: Bed/chair exit alarm, OT consult for ADLs, Patient to call before getting OOB, PT Consult for mobility concerns         Medication Interventions: Bed/chair exit alarm, Patient to call before getting OOB, Teach patient to arise slowly    Elimination Interventions: Bed/chair exit alarm, Call light in reach, Patient to call for help with toileting needs, Toileting schedule/hourly rounds    History of Falls Interventions: Bed/chair exit alarm, Door open when patient unattended

## 2018-01-22 NOTE — PROGRESS NOTES
Spoke with Trevor Lara in admissions at [facility], made her aware of the completion of Tamiflu. They are requesting up to date wound care notes and verification that patient is not on HD or palliative/comfort care and they can accept the patient. She will also need to bring CPAP when she comes.  Sent this to the current CM to call MD to see if ready for d/c today as they can take the patient today if she is medically ready for d/c per Trevor Lara. / Floyd Deal Manager of Case Management

## 2018-01-22 NOTE — PROGRESS NOTES
Problem: Self Care Deficits Care Plan (Adult)  Goal: *Acute Goals and Plan of Care (Insert Text)  Occupational Therapy Goals  Initiated 1/17/2018  1. Patient will perform lower body dressing with minimum assistance within 7 day(s). 2.  Patient will perform upper body dressing with supervision/set-up within 7 day(s). 3.  Patient will perform bathing with minimal assistance/contact guard assist within 7 day(s). 4.  Patient will perform toilet transfers with minimal assistance/contact guard assist within 7 day(s). 5.  Patient will perform all aspects of toileting with minimal assistance/contact guard assist within 7 day(s). Occupational Therapy TREATMENT  Patient: Kellie Mcgee (9 y.o. female)  Date: 1/22/2018  Diagnosis: Dyspnea  Respiratory failure with hypoxia ([address] Dyspnea       Precautions: Fall  Chart, occupational therapy assessment, plan of care, and goals were reviewed. ASSESSMENT:  Pt total assist to don socks, difficulty flexing knees to task. After sitting edge of bed pt dyspneic on 4 L O2. She transferred to chair with max assist x 2 stand pivot. Pt groomed with set-up. Ms Paris Onofre left seated in chair with call bell in place. Recommend SNF. Progression toward goals:  []          Improving appropriately and progressing toward goals  [x]          Improving slowly and progressing toward goals  []          Not making progress toward goals and plan of care will be adjusted     PLAN:  Patient continues to benefit from skilled intervention to address the above impairments. Continue treatment per established plan of care. Discharge Recommendations:  SNF  Further Equipment Recommendations for Discharge:  None     SUBJECTIVE:   Patient stated I was at [facility] before.     OBJECTIVE DATA SUMMARY:   Cognitive/Behavioral Status:  Neurologic State: Alert  Orientation Level: Oriented X4  Cognition: Appropriate decision making           Functional Mobility and Transfers for ADLs:   Bed Mobility:     Supine to Sit: Moderate assistance;Assist x2           Transfers:  Sit to Stand: Moderate assistance;Minimum assistance;Assist x2       Balance:  Sitting: Intact  Standing: With support  ADL Intervention:       Grooming  Grooming Assistance: Supervision/set up  Brushing Teeth: Supervision/set-up                   Lower Body Dressing Assistance  Dressing Assistance: Total assistance(dependent)  Socks: Total assistance (dependent)  Leg Crossed Method Used: No  Cues: Don;Physical assistance          Therapeutic Exercises:   Pt encouraged to exercise as tolerated to increase strength and endurance for functional tasks. Pain:  Pain Scale 1: Numeric (0 - 10)  Pain Intensity 1: 0                Activity Tolerance:    Fair  Please refer to the flowsheet for vital signs taken during this treatment.   After treatment:   [x]  Patient left in no apparent distress sitting up in chair  []  Patient left in no apparent distress in bed  [x]  Call bell left within reach  [x]  Nursing notified  []  Caregiver present  []  Bed alarm activated    COMMUNICATION/COLLABORATION:   The patients plan of care was discussed with: Physical Therapy Assistant and Occupational Therapist    LUISA Abdi  Time Calculation: 20 mins

## 2018-01-22 NOTE — PROGRESS NOTES
Daily Progress Note: 1/22/2018  Marty Olson MD    Assessment/Plan:   Acute hypoxic resp failure -likely mulitifactorial --  Hx asthma, obesity, Flu +  FLU B + -- finished tamiflu - imporved/resolved  wean steroids, cont nebs, supplemental oxygen as at home, mucinex  Bumex and K also re-started - monitor BMP outpt   LE dopplers neg, ECHO EF ok/mild-mod pul HTN  --pulmonary has seen     RAI on CPAP / Morbid obesity / Hypoventilation syndrome - Continue home CPAP.       CKD (chronic kidney disease) stage 4, GFR 15-29 ml/min - Cr at baseline  --renal following     DM type 2, uncontrolled, with renal complications - Diabetic diet and counseling. BS well controlled. --resume home meds - counseled about need for wt loss/diet     HTN (hypertension), benign - Stable. Resume home meds     Iron deficiency anemia - Stable.       Chronic bilateral low back pain without sciatica - Prn tylenol.       Anxiety and depression / Generalized anxiety disorder - At baseline  --Continue citalopram, xanax     Hyperlipidemia - Continue atorvastatin    CDIFF+  -- cont flagyl 1/14 outpt for 1mo    Dispo:  Family would now like to consider rehab again.          Problem List:  Problem List as of 1/22/2018  Date Reviewed: 1/12/2018          Codes Class Noted - Resolved    Respiratory failure with hypoxia [facility]) ICD-10-CM: J96.91  ICD-9-CM: 518.81  1/13/2018 - Present        * (Principal)Dyspnea ICD-10-CM: R06.00  ICD-9-CM: 786.09  1/12/2018 - Present        Morbid obesity ([facility] ICD-10-CM: E66.01  ICD-9-CM: 278.01  Unknown - Present        Hypoventilation syndrome ICD-10-CM: R06.89  ICD-9-CM: 786.09  Unknown - Present        Hyperlipidemia ICD-10-CM: E78.5  ICD-9-CM: 272.4  Unknown - Present        RAI on CPAP ICD-10-CM: G47.33, Z99.89  ICD-9-CM: 327.23, V46.8  Unknown - Present        Anxiety and depression ICD-10-CM: F41.8  ICD-9-CM: 300.00, 311  Unknown - Present        Chronic bilateral low back pain without sciatica ICD-10-CM: M54.5, G89.29  ICD-9-CM: 724.2, 338.29  11/3/2016 - Present        Generalized anxiety disorder ICD-10-CM: F41.1  ICD-9-CM: 300.02  11/3/2016 - Present        CKD (chronic kidney disease) stage 4, GFR 15-29 ml/min (HCC) (Chronic) ICD-10-CM: N18.4  ICD-9-CM: 585.4  8/14/2016 - Present        HTN (hypertension), benign (Chronic) ICD-10-CM: I10  ICD-9-CM: 401.1  8/14/2016 - Present        DM type 2, uncontrolled, with renal complications (HCC) (Chronic) ICD-10-CM: E11.29, E11.65  ICD-9-CM: 250.42  8/14/2016 - Present        Iron deficiency anemia (Chronic) ICD-10-CM: D50.9  ICD-9-CM: 280.9  8/14/2016 - Present        RESOLVED: CKD stage 4 due to type 2 diabetes mellitus ([facility] ICD-10-CM: E11.22, N18.4  ICD-9-CM: 250.40, 585.4  Unknown - 1/12/2018        RESOLVED: DM type 2 causing renal disease ([facility] ICD-10-CM: E11.29  ICD-9-CM: 250.40  Unknown - 1/12/2018        RESOLVED: Hypertension ICD-10-CM: I10  ICD-9-CM: 401.9  Unknown - 1/12/2018        RESOLVED: Arteriovenous fistula infection ([facility] ICD-10-CM: T82. 7XXA  ICD-9-CM: 996.62  11/2/2017 - 1/12/2018        RESOLVED: Hypertension ICD-10-CM: I10  ICD-9-CM: 401.9  Unknown - 11/2/2017        RESOLVED: DM type 2 causing renal disease ([facility] ICD-10-CM: E11.29  ICD-9-CM: 250.40  Unknown - 11/2/2017        RESOLVED: CKD stage 4 due to type 2 diabetes mellitus ([facility] ICD-10-CM: E11.22, N18.4  ICD-9-CM: 250.40, 585.4  Unknown - 11/2/2017        RESOLVED: Leukocytosis ICD-10-CM: P24.398  ICD-9-CM: 288.60  11/2/2017 - 1/12/2018        RESOLVED: Hypoxia ICD-10-CM: R09.02  ICD-9-CM: 799.02  11/2/2017 - 1/12/2018        RESOLVED: Pleural effusion ICD-10-CM: J90  ICD-9-CM: 511.9  11/2/2017 - 1/12/2018        RESOLVED: Cellulitis of right lower extremity ICD-10-CM: W69.693  ICD-9-CM: 682.6  6/2/2017 - 11/2/2017        RESOLVED: Asthma with acute exacerbation ICD-10-CM: J45.901  ICD-9-CM: 493.92  6/2/2017 - 11/2/2017        RESOLVED: Asthma ICD-10-CM: J45.909  ICD-9-CM: 493.90  Unknown - 1/12/2018        RESOLVED: CKD stage 4 due to type 2 diabetes mellitus ([facility] ICD-10-CM: E11.22, N18.4  ICD-9-CM: 250.40, 585.4  Unknown - 2/11/2017        RESOLVED: DM type 2 causing renal disease ([facility] ICD-10-CM: E11.29  ICD-9-CM: 250.40  Unknown - 2/11/2017        RESOLVED: Hypoglycemia ICD-10-CM: E16.2  ICD-9-CM: 251.2  2/11/2017 - 11/2/2017        RESOLVED: Hyperkalemia ICD-10-CM: E87.5  ICD-9-CM: 276.7  11/3/2016 - 2/11/2017        RESOLVED: Elevated serum creatinine ICD-10-CM: R79.89  ICD-9-CM: 790.99  11/3/2016 - 2/11/2017        RESOLVED: Left leg cellulitis ICD-10-CM: L03.116  ICD-9-CM: 682.6  8/14/2016 - 2/11/2017        RESOLVED: Sepsis ([facility] ICD-10-CM: A41.9  ICD-9-CM: 038.9, 995.91  8/14/2016 - 2/11/2017        RESOLVED: Acute renal failure (ARF) ([facility] ICD-10-CM: N17.9  ICD-9-CM: 584.9  8/14/2016 - 11/2/2017        RESOLVED: RAI (obstructive sleep apnea) (Chronic) ICD-10-CM: A30.15  ICD-9-CM: 327.23  8/14/2016 - 2/11/2017              Subjective:   1/13: She feels slightly better. Still with dry cough, SOB. Nausea has resolved and she is hungry. Denies pain. 1/14: Breathing isn't much better. Says it is difficult to eat due to SOB. Some cough, not able to bring up much sputum. No CP or myalgias. Nebs help but benefit is temporary. 1/15:  Breathing better. Less SOB. Still coughing but better. Few loose stools. She thinks she is feeling better. C diff still positive - On flagyl po for C diff. On Tamiflu for Influenza. 1/16:  She reports she is feeling much better and wants to go home. Very little cough. No SOB. No HA or new myalgias other than her chronic back pain. Nurse report soft stools for last 2 days and no further watery stools. She will need to have recheck of labs outpt. Discussed diabetic diet and wt loss. Discussed f/u with PCP and need for recheck of lab this wk.      1/17:  Continues to grad improve. Little cough. Looks comfortable on 2 liters at this moment. Pt reports she wants to go home but family wants her to go back to rehab - will consult case management to see if [facility] will take her back. Continues to improve. Wean to po Pred today. :  Continues to grad improve. She was up with PT yesterday. Cont to wean steroids. No further loose stools. Stable for rehab.     :  No new complaints. She has been getting up to chair. No further loose stools. Denied SAH. Pt reports she would be willing to go back to [facility].      :  She reports \"wheezing more today and more cough\" with occas yellow sputum. No fevers. Did not get up as much yesterday. Check CXR today. Cont scheduled nebs. Add empiric Zithromax. :  Wheezing and cough improved from yesterday pt repots. She looks better today. CXR yesterday was sl worse. Cont Zitromax. :  Little change. Continues to look improved. Review of Systems:   A comprehensive review of systems was negative except for that written in the HPI. Objective:   Physical Exam:     Visit Vitals    /72 (BP 1 Location: Right arm, BP Patient Position: At rest)    Pulse 99    Temp 98.2 °F (36.8 °C)    Resp 20    Ht 5' 1\" (1.549 m)    Wt 103.1 kg (227 lb 4.7 oz)    SpO2 96%    BMI 42.95 kg/m2    O2 Flow Rate (L/min): 4 l/min O2 Device: CPAP mask    Temp (24hrs), Av.9 °F (36.6 °C), Min:97.4 °F (36.3 °C), Max:98.4 °F (36.9 °C)    1901 -  0700  In: 240 [P.O.:240]  Out: 150 [Urine:150]    07 -  1900  In: 120 [P.O.:120]  Out: 550 [Urine:550]    General:  Alert, cooperative, no distress, morbidly obese   Head:  Normocephalic, without obvious abnormality, atraumatic. Eyes:  Conjunctivae/corneas clear. PERRL, EOMs intact. Nose: Nares normal. Septum midline. Mucosa normal. No drainage or sinus tenderness.    Throat: Lips, mucosa, and tongue moist..   Neck: Supple, symmetrical, trachea midline   Lungs: A few scattered wheezes and rhonchi  - diffuse, air movement back to usual.     Heart:  Regular rate and rhythm, S1, S2 normal, no murmur, click, rub or gallop. Abdomen:   Soft, non-tender. Bowel sounds normal. No masses,  No organomegaly. Extremities: Trace - 1+ ankle edema, no calf ttp or cords   Pulses: 2+ and symmetric all extremities. Skin: Skin color, texture, turgor normal.    Neurologic: CNII-XII intact. Alert and oriented X 3. Data Review:   PORT CXR 1/20/18:   IMPRESSION: Increased left mid and lower pulmonary consolidation and pleural  effusion in the interval.     Recent Days:  Recent Labs      01/21/18   0505   WBC  8.0   HGB  11.4*   HCT  36.4   PLT  211     Recent Labs      01/22/18   0305  01/21/18   0505  01/20/18   0132   NA  141  146*  144   K  4.5  4.4  4.5   CL  105  107  108   CO2  30  28  28   GLU  190*  137*  178*   BUN  78*  74*  74*   CREA  2.17*  2.16*  2.16*   CA  9.0  8.8  8.8   ALB   --   2.5*  2.4*   SGOT   --   18  20   ALT   --   12  11*     24 Hour Results:  Recent Results (from the past 24 hour(s))   GLUCOSE, POC    Collection Time: 01/21/18  7:04 AM   Result Value Ref Range    Glucose (POC) 112 (H) 65 - 100 mg/dL    Performed by Nadine Dexter (PCT)    GLUCOSE, POC    Collection Time: 01/21/18 12:57 PM   Result Value Ref Range    Glucose (POC) 190 (H) 65 - 100 mg/dL    Performed by Fidelia Grimaldo (PCT)    GLUCOSE, POC    Collection Time: 01/21/18  4:58 PM   Result Value Ref Range    Glucose (POC) 157 (H) 65 - 100 mg/dL    Performed by Jaz Stevens (PCT)    GLUCOSE, POC    Collection Time: 01/21/18  9:25 PM   Result Value Ref Range    Glucose (POC) 217 (H) 65 - 100 mg/dL    Performed by Nuris Carmen ([facility])    METABOLIC PANEL, BASIC    Collection Time: 01/22/18  3:05 AM   Result Value Ref Range    Sodium 141 136 - 145 mmol/L    Potassium 4.5 3.5 - 5.1 mmol/L    Chloride 105 97 - 108 mmol/L    CO2 30 21 - 32 mmol/L    Anion gap 6 5 - 15 mmol/L    Glucose 190 (H) 65 - 100 mg/dL    BUN 78 (H) 6 - 20 MG/DL    Creatinine 2.17 (H) 0.55 - 1.02 MG/DL    BUN/Creatinine ratio 36 (H) 12 - 20      GFR est AA 27 (L) >60 ml/min/1.73m2    GFR est non-AA 22 (L) >60 ml/min/1.73m2    Calcium 9.0 8.5 - 10.1 MG/DL     Medications reviewed  Current Facility-Administered Medications   Medication Dose Route Frequency    azithromycin (ZITHROMAX) tablet 250 mg  250 mg Oral DAILY    albuterol-ipratropium (DUO-NEB) 2.5 MG-0.5 MG/3 ML  3 mL Nebulization Q6H RT    predniSONE (DELTASONE) tablet 20 mg  20 mg Oral BID WITH MEALS    insulin glargine (LANTUS) injection 10 Units  10 Units SubCUTAneous DAILY    influenza vaccine 2017-18 (3 yrs+)(PF) (FLUZONE QUAD/FLUARIX QUAD) injection 0.5 mL  0.5 mL IntraMUSCular PRIOR TO DISCHARGE    famotidine (PEPCID) tablet 20 mg  20 mg Oral DAILY    benzonatate (TESSALON) capsule 100 mg  100 mg Oral TID PRN    metroNIDAZOLE (FLAGYL) tablet 500 mg  500 mg Oral TID    sodium chloride (NS) flush 5-10 mL  5-10 mL IntraVENous PRN    ALPRAZolam (XANAX) tablet 0.25 mg  0.25 mg Oral BID    aspirin chewable tablet 81 mg  81 mg Oral QHS    atorvastatin (LIPITOR) tablet 20 mg  20 mg Oral QHS    carvedilol (COREG) tablet 3.125 mg  3.125 mg Oral BID WITH MEALS    citalopram (CELEXA) tablet 40 mg  40 mg Oral DAILY    doxazosin (CARDURA) tablet 2 mg  2 mg Oral DAILY    losartan (COZAAR) tablet 25 mg  25 mg Oral DAILY    senna-docusate (PERICOLACE) 8.6-50 mg per tablet 2 Tab  2 Tab Oral DAILY    loratadine (CLARITIN) tablet 10 mg  10 mg Oral DAILY    sodium chloride (NS) flush 5-10 mL  5-10 mL IntraVENous Q8H    sodium chloride (NS) flush 5-10 mL  5-10 mL IntraVENous PRN    glucose chewable tablet 16 g  4 Tab Oral PRN    dextrose (D50W) injection syrg 12.5-25 g  12.5-25 g IntraVENous PRN    glucagon (GLUCAGEN) injection 1 mg  1 mg IntraMUSCular PRN    acetaminophen (TYLENOL) tablet 650 mg  650 mg Oral Q4H PRN    diphenhydrAMINE (BENADRYL) capsule 25 mg  25 mg Oral Q4H PRN    ondansetron (ZOFRAN) injection 4 mg  4 mg IntraVENous Q4H PRN    heparin (porcine) injection 5,000 Units  5,000 Units SubCUTAneous Q8H    insulin lispro (HUMALOG) injection   SubCUTAneous AC&HS    budesonide (PULMICORT) 500 mcg/2 ml nebulizer suspension  500 mcg Nebulization BID RT    arformoterol (BROVANA) neb solution 15 mcg  15 mcg Nebulization BID RT    guaiFENesin ER (MUCINEX) tablet 600 mg  600 mg Oral Q12H    bumetanide (BUMEX) tablet 1 mg  1 mg Oral BID     Care Plan discussed with: Angel Parrish MD

## 2018-01-22 NOTE — PROGRESS NOTES
Notified Dr. Ameya Avila that pt is still congested and coughing up a small amount of green, thick sputum.

## 2018-01-23 VITALS
BODY MASS INDEX: 42.59 KG/M2 | RESPIRATION RATE: 22 BRPM | TEMPERATURE: 97.9 F | HEART RATE: 95 BPM | OXYGEN SATURATION: 100 % | SYSTOLIC BLOOD PRESSURE: 143 MMHG | DIASTOLIC BLOOD PRESSURE: 66 MMHG | HEIGHT: 61 IN | WEIGHT: 225.6 LBS

## 2018-01-23 LAB
ALBUMIN SERPL-MCNC: 2.3 G/DL (ref 3.5–5)
ALBUMIN/GLOB SERPL: 0.7 {RATIO} (ref 1.1–2.2)
ALP SERPL-CCNC: 59 U/L (ref 45–117)
ALT SERPL-CCNC: 8 U/L (ref 12–78)
ANION GAP SERPL CALC-SCNC: 9 MMOL/L (ref 5–15)
AST SERPL-CCNC: 18 U/L (ref 15–37)
BASOPHILS # BLD: 0 K/UL (ref 0–0.1)
BASOPHILS NFR BLD: 0 % (ref 0–1)
BILIRUB SERPL-MCNC: 0.9 MG/DL (ref 0.2–1)
BUN SERPL-MCNC: 78 MG/DL (ref 6–20)
BUN/CREAT SERPL: 37 (ref 12–20)
CALCIUM SERPL-MCNC: 9.1 MG/DL (ref 8.5–10.1)
CHLORIDE SERPL-SCNC: 106 MMOL/L (ref 97–108)
CO2 SERPL-SCNC: 29 MMOL/L (ref 21–32)
CREAT SERPL-MCNC: 2.1 MG/DL (ref 0.55–1.02)
DIFFERENTIAL METHOD BLD: ABNORMAL
EOSINOPHIL # BLD: 0 K/UL (ref 0–0.4)
EOSINOPHIL NFR BLD: 0 % (ref 0–7)
ERYTHROCYTE [DISTWIDTH] IN BLOOD BY AUTOMATED COUNT: 16.4 % (ref 11.5–14.5)
GLOBULIN SER CALC-MCNC: 3.5 G/DL (ref 2–4)
GLUCOSE BLD STRIP.AUTO-MCNC: 159 MG/DL (ref 65–100)
GLUCOSE SERPL-MCNC: 181 MG/DL (ref 65–100)
HCT VFR BLD AUTO: 36.8 % (ref 35–47)
HGB BLD-MCNC: 11.7 G/DL (ref 11.5–16)
LYMPHOCYTES # BLD: 0.4 K/UL (ref 0.8–3.5)
LYMPHOCYTES NFR BLD: 4 % (ref 12–49)
MCH RBC QN AUTO: 29 PG (ref 26–34)
MCHC RBC AUTO-ENTMCNC: 31.8 G/DL (ref 30–36.5)
MCV RBC AUTO: 91.3 FL (ref 80–99)
MONOCYTES # BLD: 0.5 K/UL (ref 0–1)
MONOCYTES NFR BLD: 5 % (ref 5–13)
NEUTS SEG # BLD: 8.9 K/UL (ref 1.8–8)
NEUTS SEG NFR BLD: 91 % (ref 32–75)
PLATELET # BLD AUTO: 218 K/UL (ref 150–400)
POTASSIUM SERPL-SCNC: 4.2 MMOL/L (ref 3.5–5.1)
PROT SERPL-MCNC: 5.8 G/DL (ref 6.4–8.2)
RBC # BLD AUTO: 4.03 M/UL (ref 3.8–5.2)
RBC MORPH BLD: ABNORMAL
RBC MORPH BLD: ABNORMAL
SERVICE CMNT-IMP: ABNORMAL
SODIUM SERPL-SCNC: 144 MMOL/L (ref 136–145)
WBC # BLD AUTO: 9.8 K/UL (ref 3.6–11)

## 2018-01-23 PROCEDURE — 74011250636 HC RX REV CODE- 250/636: Performed by: INTERNAL MEDICINE

## 2018-01-23 PROCEDURE — 74011250637 HC RX REV CODE- 250/637: Performed by: FAMILY MEDICINE

## 2018-01-23 PROCEDURE — 74011000250 HC RX REV CODE- 250: Performed by: NURSE PRACTITIONER

## 2018-01-23 PROCEDURE — 93971 EXTREMITY STUDY: CPT

## 2018-01-23 PROCEDURE — 77010033678 HC OXYGEN DAILY

## 2018-01-23 PROCEDURE — 74011250637 HC RX REV CODE- 250/637: Performed by: NURSE PRACTITIONER

## 2018-01-23 PROCEDURE — 94640 AIRWAY INHALATION TREATMENT: CPT

## 2018-01-23 PROCEDURE — 85025 COMPLETE CBC W/AUTO DIFF WBC: CPT | Performed by: FAMILY MEDICINE

## 2018-01-23 PROCEDURE — 74011636637 HC RX REV CODE- 636/637: Performed by: INTERNAL MEDICINE

## 2018-01-23 PROCEDURE — 36415 COLL VENOUS BLD VENIPUNCTURE: CPT | Performed by: FAMILY MEDICINE

## 2018-01-23 PROCEDURE — 74011636637 HC RX REV CODE- 636/637: Performed by: FAMILY MEDICINE

## 2018-01-23 PROCEDURE — 82962 GLUCOSE BLOOD TEST: CPT

## 2018-01-23 PROCEDURE — 74011000250 HC RX REV CODE- 250: Performed by: INTERNAL MEDICINE

## 2018-01-23 PROCEDURE — 74011250637 HC RX REV CODE- 250/637: Performed by: INTERNAL MEDICINE

## 2018-01-23 PROCEDURE — 94660 CPAP INITIATION&MGMT: CPT

## 2018-01-23 PROCEDURE — 80053 COMPREHEN METABOLIC PANEL: CPT | Performed by: FAMILY MEDICINE

## 2018-01-23 RX ORDER — ALPRAZOLAM 0.25 MG/1
0.25 TABLET ORAL 2 TIMES DAILY
Qty: 4 TAB | Refills: 0 | Status: SHIPPED | OUTPATIENT
Start: 2018-01-23

## 2018-01-23 RX ORDER — AZITHROMYCIN 250 MG/1
500 TABLET, FILM COATED ORAL DAILY
Qty: 30 TAB | Refills: 0 | Status: SHIPPED | OUTPATIENT
Start: 2018-01-23

## 2018-01-23 RX ORDER — IPRATROPIUM BROMIDE AND ALBUTEROL SULFATE 2.5; .5 MG/3ML; MG/3ML
3 SOLUTION RESPIRATORY (INHALATION)
Qty: 120 NEBULE | Refills: 0 | Status: SHIPPED
Start: 2018-01-23

## 2018-01-23 RX ORDER — PREDNISONE 20 MG/1
20 TABLET ORAL 2 TIMES DAILY WITH MEALS
Qty: 60 TAB | Refills: 0 | Status: SHIPPED
Start: 2018-01-23

## 2018-01-23 RX ADMIN — DOCUSATE SODIUM AND SENNOSIDES 2 TABLET: 8.6; 5 TABLET, FILM COATED ORAL at 09:26

## 2018-01-23 RX ADMIN — BUDESONIDE 500 MCG: 0.5 INHALANT RESPIRATORY (INHALATION) at 08:18

## 2018-01-23 RX ADMIN — LOSARTAN POTASSIUM 25 MG: 50 TABLET ORAL at 09:25

## 2018-01-23 RX ADMIN — INSULIN LISPRO 2 UNITS: 100 INJECTION, SOLUTION INTRAVENOUS; SUBCUTANEOUS at 09:20

## 2018-01-23 RX ADMIN — CARVEDILOL 3.12 MG: 3.12 TABLET, FILM COATED ORAL at 09:24

## 2018-01-23 RX ADMIN — FAMOTIDINE 20 MG: 20 TABLET, FILM COATED ORAL at 09:24

## 2018-01-23 RX ADMIN — METRONIDAZOLE 500 MG: 250 TABLET ORAL at 09:24

## 2018-01-23 RX ADMIN — IPRATROPIUM BROMIDE AND ALBUTEROL SULFATE 3 ML: .5; 3 SOLUTION RESPIRATORY (INHALATION) at 01:35

## 2018-01-23 RX ADMIN — HEPARIN SODIUM 5000 UNITS: 5000 INJECTION, SOLUTION INTRAVENOUS; SUBCUTANEOUS at 09:21

## 2018-01-23 RX ADMIN — INSULIN GLARGINE 10 UNITS: 100 INJECTION, SOLUTION SUBCUTANEOUS at 09:21

## 2018-01-23 RX ADMIN — Medication 5 ML: at 06:00

## 2018-01-23 RX ADMIN — DOXAZOSIN 2 MG: 2 TABLET ORAL at 09:25

## 2018-01-23 RX ADMIN — CITALOPRAM HYDROBROMIDE 40 MG: 20 TABLET ORAL at 09:25

## 2018-01-23 RX ADMIN — BUMETANIDE 1 MG: 1 TABLET ORAL at 09:25

## 2018-01-23 RX ADMIN — AZITHROMYCIN 250 MG: 250 TABLET, FILM COATED ORAL at 09:25

## 2018-01-23 RX ADMIN — ALPRAZOLAM 0.25 MG: 0.25 TABLET ORAL at 09:24

## 2018-01-23 RX ADMIN — HEPARIN SODIUM 5000 UNITS: 5000 INJECTION, SOLUTION INTRAVENOUS; SUBCUTANEOUS at 02:36

## 2018-01-23 RX ADMIN — LORATADINE 10 MG: 10 TABLET ORAL at 09:25

## 2018-01-23 RX ADMIN — GUAIFENESIN 600 MG: 600 TABLET, EXTENDED RELEASE ORAL at 09:24

## 2018-01-23 RX ADMIN — PREDNISONE 20 MG: 20 TABLET ORAL at 09:24

## 2018-01-23 RX ADMIN — IPRATROPIUM BROMIDE AND ALBUTEROL SULFATE 3 ML: .5; 3 SOLUTION RESPIRATORY (INHALATION) at 08:18

## 2018-01-23 NOTE — ROUTINE PROCESS
Bedside shift change report given to Malvin Hernandez RN (oncoming nurse) by Ju Hernandez   (offgoing nurse). Report included the following information SBAR, Kardex and MAR.

## 2018-01-23 NOTE — PROGRESS NOTES
Daily Progress Note and Discharge Note: 1/23/2018  Yelena Styles MD    Assessment/Plan:   Acute hypoxic resp failure -likely mulitifactorial --  Hx asthma, obesity, Flu +  FLU B + -- finished tamiflu - imporved/resolved  wean steroids, cont nebs as needed at rehab, supplemental oxygen as needed at rehab, mucinex  Bumex and K also re-started - monitor BMP at rehab  LE dopplers neg, ECHO EF ok/mild-mod pul HTN  --pulmonary has seen     RAI on CPAP / Morbid obesity / Hypoventilation syndrome - Continue  CPAP.       CKD (chronic kidney disease) stage 4, GFR 15-29 ml/min - Cr at baseline  --follow up with renal as they direct     DM type 2, uncontrolled, with renal complications - Diabetic diet and counseling. BS well controlled. --resume home meds - counseled about need for wt loss/diet     HTN (hypertension), benign - Stable.  Resume home meds     Iron deficiency anemia - Stable.       Chronic bilateral low back pain without sciatica - Prn tylenol.       Anxiety and depression / Generalized anxiety disorder - At baseline  --Continue citalopram, xanax     Hyperlipidemia - Continue atorvastatin    CDIFF+  -- cont flagyl - started 1/14 - cont outpt for 1mo       Problem List:  Problem List as of 1/23/2018  Date Reviewed: 1/12/2018          Codes Class Noted - Resolved    Respiratory failure with hypoxia [facility]) ICD-10-CM: J96.91  ICD-9-CM: 518.81  1/13/2018 - Present        * (Principal)Dyspnea ICD-10-CM: R06.00  ICD-9-CM: 786.09  1/12/2018 - Present        Morbid obesity ([facility] ICD-10-CM: E66.01  ICD-9-CM: 278.01  Unknown - Present        Hypoventilation syndrome ICD-10-CM: R06.89  ICD-9-CM: 786.09  Unknown - Present        Hyperlipidemia ICD-10-CM: E78.5  ICD-9-CM: 272.4  Unknown - Present        RAI on CPAP ICD-10-CM: G47.33, Z99.89  ICD-9-CM: 327.23, V46.8  Unknown - Present        Anxiety and depression ICD-10-CM: F41.8  ICD-9-CM: 300.00, 311  Unknown - Present        Chronic bilateral low back pain without sciatica ICD-10-CM: M54.5, G89.29  ICD-9-CM: 724.2, 338.29  11/3/2016 - Present        Generalized anxiety disorder ICD-10-CM: F41.1  ICD-9-CM: 300.02  11/3/2016 - Present        CKD (chronic kidney disease) stage 4, GFR 15-29 ml/min (HCC) (Chronic) ICD-10-CM: N18.4  ICD-9-CM: 585.4  8/14/2016 - Present        HTN (hypertension), benign (Chronic) ICD-10-CM: I10  ICD-9-CM: 401.1  8/14/2016 - Present        DM type 2, uncontrolled, with renal complications (HCC) (Chronic) ICD-10-CM: E11.29, E11.65  ICD-9-CM: 250.42  8/14/2016 - Present        Iron deficiency anemia (Chronic) ICD-10-CM: D50.9  ICD-9-CM: 280.9  8/14/2016 - Present        RESOLVED: CKD stage 4 due to type 2 diabetes mellitus ([facility] ICD-10-CM: E11.22, N18.4  ICD-9-CM: 250.40, 585.4  Unknown - 1/12/2018        RESOLVED: DM type 2 causing renal disease ([facility] ICD-10-CM: E11.29  ICD-9-CM: 250.40  Unknown - 1/12/2018        RESOLVED: Hypertension ICD-10-CM: I10  ICD-9-CM: 401.9  Unknown - 1/12/2018        RESOLVED: Arteriovenous fistula infection ([facility] ICD-10-CM: T82. 7XXA  ICD-9-CM: 996.62  11/2/2017 - 1/12/2018        RESOLVED: Hypertension ICD-10-CM: I10  ICD-9-CM: 401.9  Unknown - 11/2/2017        RESOLVED: DM type 2 causing renal disease ([facility] ICD-10-CM: E11.29  ICD-9-CM: 250.40  Unknown - 11/2/2017        RESOLVED: CKD stage 4 due to type 2 diabetes mellitus ([facility] ICD-10-CM: E11.22, N18.4  ICD-9-CM: 250.40, 585.4  Unknown - 11/2/2017        RESOLVED: Leukocytosis ICD-10-CM: Y44.350  ICD-9-CM: 288.60  11/2/2017 - 1/12/2018        RESOLVED: Hypoxia ICD-10-CM: R09.02  ICD-9-CM: 799.02  11/2/2017 - 1/12/2018        RESOLVED: Pleural effusion ICD-10-CM: J90  ICD-9-CM: 511.9  11/2/2017 - 1/12/2018        RESOLVED: Cellulitis of right lower extremity ICD-10-CM: E83.924  ICD-9-CM: 682.6  6/2/2017 - 11/2/2017        RESOLVED: Asthma with acute exacerbation ICD-10-CM: J45.901  ICD-9-CM: 493.92  6/2/2017 - 11/2/2017        RESOLVED: Asthma ICD-10-CM: J45.909  ICD-9-CM: 493.90  Unknown - 1/12/2018        RESOLVED: CKD stage 4 due to type 2 diabetes mellitus ([facility] ICD-10-CM: E11.22, N18.4  ICD-9-CM: 250.40, 585.4  Unknown - 2/11/2017        RESOLVED: DM type 2 causing renal disease ([facility] ICD-10-CM: E11.29  ICD-9-CM: 250.40  Unknown - 2/11/2017        RESOLVED: Hypoglycemia ICD-10-CM: E16.2  ICD-9-CM: 251.2  2/11/2017 - 11/2/2017        RESOLVED: Hyperkalemia ICD-10-CM: E87.5  ICD-9-CM: 276.7  11/3/2016 - 2/11/2017        RESOLVED: Elevated serum creatinine ICD-10-CM: R79.89  ICD-9-CM: 790.99  11/3/2016 - 2/11/2017        RESOLVED: Left leg cellulitis ICD-10-CM: L03.116  ICD-9-CM: 682.6  8/14/2016 - 2/11/2017        RESOLVED: Sepsis ([facility] ICD-10-CM: A41.9  ICD-9-CM: 038.9, 995.91  8/14/2016 - 2/11/2017        RESOLVED: Acute renal failure (ARF) ([facility] ICD-10-CM: N17.9  ICD-9-CM: 584.9  8/14/2016 - 11/2/2017        RESOLVED: RAI (obstructive sleep apnea) (Chronic) ICD-10-CM: X55.32  ICD-9-CM: 327.23  8/14/2016 - 2/11/2017            Subjective:   1/13: She feels slightly better. Still with dry cough, SOB. Nausea has resolved and she is hungry. Denies pain. 1/14: Breathing isn't much better. Says it is difficult to eat due to SOB. Some cough, not able to bring up much sputum. No CP or myalgias. Nebs help but benefit is temporary. 1/15:  Breathing better. Less SOB. Still coughing but better. Few loose stools. She thinks she is feeling better. C diff still positive - On flagyl po for C diff. On Tamiflu for Influenza. 1/16:  She reports she is feeling much better and wants to go home. Very little cough. No SOB. No HA or new myalgias other than her chronic back pain. Nurse report soft stools for last 2 days and no further watery stools. She will need to have recheck of labs outpt. Discussed diabetic diet and wt loss. Discussed f/u with PCP and need for recheck of lab this wk.      1/17:  Continues to grad improve. Little cough. Looks comfortable on 2 liters at this moment. Pt reports she wants to go home but family wants her to go back to rehab - will consult case management to see if [facility] will take her back. Continues to improve. Wean to po Pred today. :  Continues to grad improve. She was up with PT yesterday. Cont to wean steroids. No further loose stools. Stable for rehab.     :  No new complaints. She has been getting up to chair. No further loose stools. Denied SAH. Pt reports she would be willing to go back to [facility].      :  She reports \"wheezing more today and more cough\" with occas yellow sputum. No fevers. Did not get up as much yesterday. Check CXR today. Cont scheduled nebs. Add empiric Zithromax. :  Wheezing and cough improved from yesterday pt repots. She looks better today. CXR yesterday was sl worse. Cont Zitromax. :  Little change. Continues to look improved. :  Stable for rehab. Wean steroids further at rehab. Cont Flagyl for total 28 days at rehab. Cont Zitromax at rehab for at least 2 more wks. Cont Nebs at rehab. Repeat CXR at rehab. Review of Systems:   A comprehensive review of systems was negative except for that written in the HPI. Objective:   Physical Exam:     Visit Vitals    /75 (BP 1 Location: Right arm, BP Patient Position: At rest)    Pulse 81    Temp 97.5 °F (36.4 °C)    Resp 20    Ht 5' 1\" (1.549 m)    Wt 102.3 kg (225 lb 9.6 oz)    SpO2 98%    BMI 42.63 kg/m2    O2 Flow Rate (L/min): 4 l/min O2 Device: Nasal cannula    Temp (24hrs), Av.4 °F (36.3 °C), Min:96.4 °F (35.8 °C), Max:97.8 °F (36.6 °C)         190 -  0700  In: 600 [P.O.:600]  Out: 900 [Urine:900]  General:  Alert, cooperative, no distress, morbidly obese   Head:  Normocephalic, without obvious abnormality, atraumatic. Eyes:  Conjunctivae/corneas clear. PERRL, EOMs intact.    Nose: Nares normal. Septum midline. Mucosa normal. No drainage or sinus tenderness. Throat: Lips, mucosa, and tongue moist.   Neck: Supple, symmetrical, trachea midline   Lungs:   A few scattered wheezes and rhonchi - diffuse, air movement back to usual.     Heart:  Regular rate and rhythm, S1, S2 normal, no murmur, click, rub or gallop. Abdomen:   Soft, non-tender. Bowel sounds normal. No masses,  No organomegaly. Extremities: 1+ LE edema and UE edema sl worse on left UE (likely chronic), no calf ttp or cords   Pulses: 2+ and symmetric all extremities. Skin: Skin color, texture, turgor normal.    Neurologic: CNII-XII intact. Alert and oriented X 3. Data Review:   PORT CXR 1/20/18: IMPRESSION: Increased left mid and lower pulmonary consolidation and pleural  effusion in the interval.    Doppler LUE 1/23/18  CONCLUSION: LIMITED EXAM  No deep vein thrombosis identified in left IJV, proximal subclavian,  and brachial vein. No evidence of thrombophlebitis. No evidence of  thrombus in contralateral right subclavian vein. Unable to evaluate  left distal subclavian, axillary, radial and ulnar veins due to severe   edema.  Left AV Fistula is widely patent.     Recent Days:  Recent Labs      01/21/18   0505   WBC  8.0   HGB  11.4*   HCT  36.4   PLT  211     Recent Labs      01/22/18   0305  01/21/18   0505   NA  141  146*   K  4.5  4.4   CL  105  107   CO2  30  28   GLU  190*  137*   BUN  78*  74*   CREA  2.17*  2.16*   CA  9.0  8.8   ALB   --   2.5*   SGOT   --   18   ALT   --   12     24 Hour Results:  Recent Results (from the past 24 hour(s))   GLUCOSE, POC    Collection Time: 01/22/18 12:12 PM   Result Value Ref Range    Glucose (POC) 122 (H) 65 - 100 mg/dL    Performed by Manfred Monroy, POC    Collection Time: 01/22/18  4:06 PM   Result Value Ref Range    Glucose (POC) 189 (H) 65 - 100 mg/dL    Performed by Danyell Asher    GLUCOSE, POC    Collection Time: 01/22/18  9:27 PM   Result Value Ref Range    Glucose (POC) 236 (H) 65 - 100 mg/dL    Performed by Rick Griffiths POC    Collection Time: 01/23/18  7:16 AM   Result Value Ref Range    Glucose (POC) 159 (H) 65 - 100 mg/dL    Performed by Abrahan Solano      Medications reviewed  Current Facility-Administered Medications   Medication Dose Route Frequency    azithromycin (ZITHROMAX) tablet 250 mg  250 mg Oral DAILY    albuterol-ipratropium (DUO-NEB) 2.5 MG-0.5 MG/3 ML  3 mL Nebulization Q6H RT    predniSONE (DELTASONE) tablet 20 mg  20 mg Oral BID WITH MEALS    insulin glargine (LANTUS) injection 10 Units  10 Units SubCUTAneous DAILY    influenza vaccine 2017-18 (3 yrs+)(PF) (FLUZONE QUAD/FLUARIX QUAD) injection 0.5 mL  0.5 mL IntraMUSCular PRIOR TO DISCHARGE    famotidine (PEPCID) tablet 20 mg  20 mg Oral DAILY    benzonatate (TESSALON) capsule 100 mg  100 mg Oral TID PRN    metroNIDAZOLE (FLAGYL) tablet 500 mg  500 mg Oral TID    sodium chloride (NS) flush 5-10 mL  5-10 mL IntraVENous PRN    ALPRAZolam (XANAX) tablet 0.25 mg  0.25 mg Oral BID    aspirin chewable tablet 81 mg  81 mg Oral QHS    atorvastatin (LIPITOR) tablet 20 mg  20 mg Oral QHS    carvedilol (COREG) tablet 3.125 mg  3.125 mg Oral BID WITH MEALS    citalopram (CELEXA) tablet 40 mg  40 mg Oral DAILY    doxazosin (CARDURA) tablet 2 mg  2 mg Oral DAILY    losartan (COZAAR) tablet 25 mg  25 mg Oral DAILY    senna-docusate (PERICOLACE) 8.6-50 mg per tablet 2 Tab  2 Tab Oral DAILY    loratadine (CLARITIN) tablet 10 mg  10 mg Oral DAILY    sodium chloride (NS) flush 5-10 mL  5-10 mL IntraVENous Q8H    sodium chloride (NS) flush 5-10 mL  5-10 mL IntraVENous PRN    glucose chewable tablet 16 g  4 Tab Oral PRN    dextrose (D50W) injection syrg 12.5-25 g  12.5-25 g IntraVENous PRN    glucagon (GLUCAGEN) injection 1 mg  1 mg IntraMUSCular PRN    acetaminophen (TYLENOL) tablet 650 mg  650 mg Oral Q4H PRN    diphenhydrAMINE (BENADRYL) capsule 25 mg  25 mg Oral Q4H PRN    ondansetron [facility]) injection 4 mg  4 mg IntraVENous Q4H PRN    heparin (porcine) injection 5,000 Units  5,000 Units SubCUTAneous Q8H    insulin lispro (HUMALOG) injection   SubCUTAneous AC&HS    budesonide (PULMICORT) 500 mcg/2 ml nebulizer suspension  500 mcg Nebulization BID RT    arformoterol (BROVANA) neb solution 15 mcg  15 mcg Nebulization BID RT    guaiFENesin ER (MUCINEX) tablet 600 mg  600 mg Oral Q12H    bumetanide (BUMEX) tablet 1 mg  1 mg Oral BID     Care Plan discussed with: Michelle Peña MD

## 2018-01-23 NOTE — ROUTINE PROCESS
1/23/18   8:02AM  PCP EDWARD appt scheduled with Dr. Deangelo Oneill on 2/13/18 at 1:15PM. Scheduled ~25 days out to allow for SNF rehab.  Appt added to AVS. Ama Linares CM Specialist

## 2018-01-23 NOTE — PROGRESS NOTES
Spoke with Dr. Renay Mccormick via telephone. Notified him that pt CBC stable, still waiting for BMP to result. Also notified him that pt L arm has had +4 pitting edema the past two days I have cared for her, but a nurse who cared for her last week mentioned that her L arm looks much more swollen then it did last week. Per DR. Renay Mccormick- will order a venous duplex of LUE to be done prior to DC today. Notified day shift nurse, Becky Way RN about Dr. Robbin Cohen orders.

## 2018-01-23 NOTE — PROCEDURES
[address]  *** FINAL REPORT ***    Name: Desi Naylor  MRN: [id number]    Inpatient  : 29 Sep 1945  HIS Order #: [id number]  [id number] [facility] Visit #: [id number]  Date: 2018    TYPE OF TEST: Peripheral Venous Testing    REASON FOR TEST  Edema    Left Arm:-  Deep venous thrombosis:           No  Superficial venous thrombosis:    No      INTERPRETATION/FINDINGS  PROCEDURE: LEFT UPPER EXTREMITY VENOUS DUPLEX: Evaluation of upper  extremity veins with ultrasound (B-mode imaging, pulsed Doppler, color   Doppler). Includes the internal jugular, subclavian, axillary,  brachial, radial, ulnar, basilic, and cephalic veins. FINDINGS: Muller Mimes scale and color flow duplex images of the veins of the  left upper extremity and bilateral subclavian veins demonstrate normal   compressibility, absence of filing defects, reflux or phlebitic  changes of the internal jugular, bilateral subclavian,, upper arm and  forearm veins of the left arm. CONCLUSION: LIMITED EXAM  No deep vein thrombosis identified in left IJV, proximal subclavian,  and brachial vein. No evidence of thrombophlebitis. No evidence of  thrombus in contralateral right subclavian vein. Unable to evaluate  left distal subclavian, axillary, radial and ulnar veins due to severe   edema. Left AV Fistula is widely patent. ADDITIONAL COMMENTS    I have personally reviewed the data relevant to the interpretation of  this  study. TECHNOLOGIST: BRITTANY Escamilla  Signed: 2018 11:22 AM    PHYSICIAN: Kezia Elder.  Nanette Alexis MD  Signed: 2018 11:01 AM

## 2018-01-23 NOTE — PROGRESS NOTES
Problem: Falls - Risk of  Goal: *Absence of Falls  Document Moy Fall Risk and appropriate interventions in the flowsheet.    Outcome: Progressing Towards Goal  Fall Risk Interventions:  Mobility Interventions: Bed/chair exit alarm, Communicate number of staff needed for ambulation/transfer, OT consult for ADLs, Patient to call before getting OOB, PT Consult for mobility concerns, PT Consult for assist device competence, Strengthening exercises (ROM-active/passive)    Mentation Interventions: Adequate sleep, hydration, pain control, Bed/chair exit alarm, Eyeglasses and hearing aids, Increase mobility    Medication Interventions: Bed/chair exit alarm, Evaluate medications/consider consulting pharmacy, Patient to call before getting OOB, Teach patient to arise slowly    Elimination Interventions: Bed/chair exit alarm, Call light in reach, Patient to call for help with toileting needs, Toileting schedule/hourly rounds    History of Falls Interventions: Bed/chair exit alarm, Consult care management for discharge planning, Room close to nurse's station

## 2018-01-23 NOTE — PROGRESS NOTES
[phone]:  Nursing to call report to [facility] at [phone]. [facility] called a re-scheduled pt  time for 12 noon. Nursing and pt's  notified. VIKA Donis    CM Note:  [facility] was called and pt placed on \"will call\" as she is having more testing done prior to d/c. KRISTINA Sauer

## 2018-01-23 NOTE — DISCHARGE SUMMARY
Physician Discharge Summary     Patient ID:    Archie Perez  [id number]  7 y.o.  1945  Wong Soto MD    Admit date: 1/12/2018  Discharge date and time: 1/23/2018  Admission Diagnoses: Dyspnea; Respiratory failure with hypoxia (HCC)  Chronic Diagnoses:    Problem List as of 1/23/2018  Date Reviewed: 1/12/2018          Codes Class Noted - Resolved    Respiratory failure with hypoxia [facility]) ICD-10-CM: J96.91  ICD-9-CM: 518.81  1/13/2018 - Present        * (Principal)Dyspnea ICD-10-CM: R06.00  ICD-9-CM: 786.09  1/12/2018 - Present        Morbid obesity ([address] ICD-10-CM: E66.01  ICD-9-CM: 278.01  Unknown - Present        Hypoventilation syndrome ICD-10-CM: R06.89  ICD-9-CM: 786.09  Unknown - Present        Hyperlipidemia ICD-10-CM: E78.5  ICD-9-CM: 272.4  Unknown - Present        RAI on CPAP ICD-10-CM: G47.33, Z99.89  ICD-9-CM: 327.23, V46.8  Unknown - Present        Anxiety and depression ICD-10-CM: F41.8  ICD-9-CM: 300.00, 311  Unknown - Present        Chronic bilateral low back pain without sciatica ICD-10-CM: M54.5, G89.29  ICD-9-CM: 724.2, 338.29  11/3/2016 - Present        Generalized anxiety disorder ICD-10-CM: F41.1  ICD-9-CM: 300.02  11/3/2016 - Present        CKD (chronic kidney disease) stage 4, GFR 15-29 ml/min (HCC) (Chronic) ICD-10-CM: N18.4  ICD-9-CM: 585.4  8/14/2016 - Present        HTN (hypertension), benign (Chronic) ICD-10-CM: I10  ICD-9-CM: 401.1  8/14/2016 - Present        DM type 2, uncontrolled, with renal complications (HCC) (Chronic) ICD-10-CM: E11.29, E11.65  ICD-9-CM: 250.42  8/14/2016 - Present        Iron deficiency anemia (Chronic) ICD-10-CM: D50.9  ICD-9-CM: 280.9  8/14/2016 - Present        RESOLVED: CKD stage 4 due to type 2 diabetes mellitus ([facility] ICD-10-CM: E11.22, N18.4  ICD-9-CM: 250.40, 585.4  Unknown - 1/12/2018        RESOLVED: DM type 2 causing renal disease ([facility].) ICD-10-CM: E11.29  ICD-9-CM: 250.40  Unknown - 1/12/2018        RESOLVED: Hypertension ICD-10-CM: I10  ICD-9-CM: 401.9  Unknown - 1/12/2018        RESOLVED: Arteriovenous fistula infection ([facility] ICD-10-CM: T82. 7XXA  ICD-9-CM: 996.62  11/2/2017 - 1/12/2018        RESOLVED: Hypertension ICD-10-CM: I10  ICD-9-CM: 401.9  Unknown - 11/2/2017        RESOLVED: DM type 2 causing renal disease ([facility] ICD-10-CM: E11.29  ICD-9-CM: 250.40  Unknown - 11/2/2017        RESOLVED: CKD stage 4 due to type 2 diabetes mellitus ([facility] ICD-10-CM: E11.22, N18.4  ICD-9-CM: 250.40, 585.4  Unknown - 11/2/2017        RESOLVED: Leukocytosis ICD-10-CM: D44.260  ICD-9-CM: 288.60  11/2/2017 - 1/12/2018        RESOLVED: Hypoxia ICD-10-CM: R09.02  ICD-9-CM: 799.02  11/2/2017 - 1/12/2018        RESOLVED: Pleural effusion ICD-10-CM: J90  ICD-9-CM: 511.9  11/2/2017 - 1/12/2018        RESOLVED: Cellulitis of right lower extremity ICD-10-CM: R79.136  ICD-9-CM: 682.6  6/2/2017 - 11/2/2017        RESOLVED: Asthma with acute exacerbation ICD-10-CM: J45.901  ICD-9-CM: 493.92  6/2/2017 - 11/2/2017        RESOLVED: Asthma ICD-10-CM: J45.909  ICD-9-CM: 493.90  Unknown - 1/12/2018        RESOLVED: CKD stage 4 due to type 2 diabetes mellitus ([facility] ICD-10-CM: E11.22, N18.4  ICD-9-CM: 250.40, 585.4  Unknown - 2/11/2017        RESOLVED: DM type 2 causing renal disease ([facility] ICD-10-CM: E11.29  ICD-9-CM: 250.40  Unknown - 2/11/2017        RESOLVED: Hypoglycemia ICD-10-CM: E16.2  ICD-9-CM: 251.2  2/11/2017 - 11/2/2017        RESOLVED: Hyperkalemia ICD-10-CM: E87.5  ICD-9-CM: 276.7  11/3/2016 - 2/11/2017        RESOLVED: Elevated serum creatinine ICD-10-CM: R79.89  ICD-9-CM: 790.99  11/3/2016 - 2/11/2017        RESOLVED: Left leg cellulitis ICD-10-CM: L03.116  ICD-9-CM: 682.6  8/14/2016 - 2/11/2017        RESOLVED: Sepsis ([facility] ICD-10-CM: A41.9  ICD-9-CM: 038.9, 995.91  8/14/2016 - 2/11/2017        RESOLVED: Acute renal failure (ARF) ([facility] ICD-10-CM: N17.9  ICD-9-CM: 584.9  8/14/2016 - 11/2/2017        RESOLVED: RAI (obstructive sleep apnea) (Chronic) ICD-10-CM: C32.27  ICD-9-CM: 327.23  8/14/2016 - 2/11/2017            Discharge Medications:   Current Discharge Medication List      START taking these medications    Details   albuterol-ipratropium (DUO-NEB) 2.5 mg-0.5 mg/3 ml nebu 3 mL by Nebulization route every six (6) hours as needed. Qty: 120 Nebule, Refills: 0      azithromycin (ZITHROMAX) 250 mg tablet Take 2 Tabs by mouth daily. Qty: 30 Tab, Refills: 0      predniSONE (DELTASONE) 20 mg tablet Take 1 Tab by mouth two (2) times daily (with meals). Qty: 60 Tab, Refills: 0      metroNIDAZOLE (FLAGYL) 500 mg tablet Take 1 Tab by mouth three (3) times daily. Qty: 90 Tab, Refills: 0      oseltamivir (TAMIFLU) 30 mg capsule Take 1 Cap by mouth daily. Qty: 3 Cap, Refills: 0         CONTINUE these medications which have CHANGED    Details   ALPRAZolam (XANAX) 0.25 mg tablet Take 1 Tab by mouth two (2) times a day. Max Daily Amount: 0.5 mg. Indications: anxiety  Qty: 4 Tab, Refills: 0    Associated Diagnoses: Anxiety and depression      bumetanide (BUMEX) 1 mg tablet Take 1 Tab by mouth two (2) times a day. Qty: 30 Tab, Refills: 0             CONTINUE these medications which have NOT CHANGED    Details          lactobacillus-acidophilus (LACTINEX) 100 million cell grpk Take 1 Packet by mouth two (2) times a day. insulin lispro (HUMALOG) 100 unit/mL injection 5 Units by SubCUTAneous route Before breakfast, lunch, dinner and at bedtime. For BS > 250 mg/dL      mupirocin (BACTROBAN) 2 % ointment Apply  to affected area two (2) times a day. Qty: 44 g, Refills: 3      nystatin (MYCOSTATIN) powder Apply  to affected area two (2) times a day. Qty: 1000 g, Refills: 3      amLODIPine (NORVASC) 2.5 mg tablet Take 2.5 mg by mouth daily. fluticasone-vilanterol (BREO ELLIPTA) 200-25 mcg/dose inhaler Take 1 Puff by inhalation daily.        ALPRAZolam (XANAX) 0.25 mg tablet Take 0.25 mg by mouth two (2) times a day if ordered by Rehab MD      atorvastatin (LIPITOR) 20 mg tablet Take 20 mg by mouth nightly. citalopram (CELEXA) 40 mg tablet Take 40 mg by mouth daily. doxazosin (CARDURA) 2 mg tablet Take 2 mg by mouth daily. losartan (COZAAR) 25 mg tablet Take 25 mg by mouth daily. multivitamin (ONE A DAY) tablet Take 1 Tab by mouth daily. acetaminophen (TYLENOL) 500 mg tablet Take 1,000 mg by mouth daily as needed for Pain. carvedilol (COREG) 3.125 mg tablet Take 3.125 mg by mouth two (2) times daily (with meals). calcium-cholecalciferol, D3, (CALTRATE 600+D) tablet Take 1 Tab by mouth two (2) times a day. fenofibrate nanocrystallized (TRICOR) 145 mg tablet Take 145 mg by mouth daily. loratadine (CLARITIN) 10 mg tablet Take 10 mg by mouth daily. Omeprazole delayed release (PRILOSEC D/R) 20 mg tablet Take 20 mg by mouth daily. calcium polycarbophil (FIBER LAXATIVE, CA POLYCARBO,) 625 mg tablet Take 625 mg by mouth daily. ergocalciferol (ERGOCALCIFEROL) 50,000 unit capsule Take 50,000 Units by mouth every month. First of the month      aspirin 81 mg chewable tablet Take 81 mg by mouth nightly. senna-docusate (SENNA-C) 8.6-50 mg per tablet Take 2 Tabs by mouth daily. albuterol (PROVENTIL HFA, VENTOLIN HFA) 90 mcg/actuation inhaler Take 2 Puffs by inhalation every four (4) hours as needed for Wheezing. Follow up Care:    1. Ashley Wakefield MD with in 1 weeks out of rehab  2. specialists as directed. Diet:  Diabetic Diet  Disposition:  Rehab.   Advanced Directive:  Discharge Exam:  [See today's progress note.]  CONSULTATIONS: Pulmonary/Intensive care and Nephrology  Significant Diagnostic Studies:   Recent Labs      01/23/18   0718  01/21/18   0505   WBC  9.8  8.0   HGB  11.7  11.4*   HCT  36.8  36.4   PLT  218  211     Recent Labs      01/23/18   0718  01/22/18   0305  01/21/18   0505   NA  144  141  146*   K  4.2  4.5  4.4   CL  106  105  107   CO2  29  30  28   BUN  78*  78*  74*   CREA  2.10* 2.17*  2.16*   GLU  181*  190*  137*   CA  9.1  9.0  8.8     Recent Labs      01/23/18   0718  01/21/18   0505   SGOT  18  18   ALT  8*  12   AP  59  51   TBILI  0.9  0.8   TP  5.8*  5.9*   ALB  2.3*  2.5*   GLOB  3.5  3.4     HOSPITAL COURSE & TREATMENT RENDERED:   1. See today's progress note:    Daily Progress Note and Discharge Note: 1/23/2018  Magda Mckeon MD         Assessment/Plan:   Acute hypoxic resp failure -likely mulitifactorial --  Hx asthma, obesity, Flu +  FLU B + -- finished tamiflu - imporved/resolved  wean steroids, cont nebs as needed at rehab, supplemental oxygen as needed at rehab, mucinex  Bumex and K also re-started - monitor BMP at rehab  LE dopplers neg, ECHO EF ok/mild-mod pul HTN  --pulmonary has seen      RAI on CPAP / Morbid obesity / Hypoventilation syndrome - Continue  CPAP.        CKD (chronic kidney disease) stage 4, GFR 15-29 ml/min - Cr at baseline  --follow up with renal as they direct      DM type 2, uncontrolled, with renal complications - Diabetic diet and counseling. BS well controlled. --resume home meds - counseled about need for wt loss/diet      HTN (hypertension), benign - Stable. Resume home meds      Iron deficiency anemia - Stable.        Chronic bilateral low back pain without sciatica - Prn tylenol.        Anxiety and depression / Generalized anxiety disorder - At baseline  --Continue citalopram, xanax      Hyperlipidemia - Continue atorvastatin     CDIFF+  -- cont flagyl - started 1/14 - cont outpt for 1mo      Subjective:   1/13: She feels slightly better. Still with dry cough, SOB. Nausea has resolved and she is hungry. Denies pain.       1/14: Breathing isn't much better. Says it is difficult to eat due to SOB. Some cough, not able to bring up much sputum. No CP or myalgias. Nebs help but benefit is temporary.     1/15:  Breathing better. Less SOB. Still coughing but better. Few loose stools.   She thinks she is feeling better. C diff still positive - On flagyl po for C diff. On Tamiflu for Influenza.          1/16:  She reports she is feeling much better and wants to go home. Very little cough. No SOB. No HA or new myalgias other than her chronic back pain. Nurse report soft stools for last 2 days and no further watery stools. She will need to have recheck of labs outpt. Discussed diabetic diet and wt loss. Discussed f/u with PCP and need for recheck of lab this wk.       1/17:  Continues to grad improve. Little cough. Looks comfortable on 2 liters at this moment. Pt reports she wants to go home but family wants her to go back to rehab - will consult case management to see if [facility] will take her back. Continues to improve. Wean to po Pred today.       1/18:  Continues to grad improve. She was up with PT yesterday. Cont to wean steroids. No further loose stools. Stable for rehab.      1/19:  No new complaints. She has been getting up to chair. No further loose stools. Denied SAH. Pt reports she would be willing to go back to [facility].       1/20:  She reports \"wheezing more today and more cough\" with occas yellow sputum. No fevers. Did not get up as much yesterday. Check CXR today. Cont scheduled nebs. Add empiric Zithromax.      1/21:  Wheezing and cough improved from yesterday pt repots. She looks better today. CXR yesterday was sl worse. Cont Zitromax.      1/22:  Little change. Continues to look improved.       1/23:  Stable for rehab. Wean steroids further at rehab. Cont Flagyl for total 28 days at rehab. Cont Zitromax at rehab for at least 2 more wks. Cont Nebs at rehab.   Repeat CXR at rehab.          Review of Systems:   A comprehensive review of systems was negative except for that written in the HPI.     Objective:   Physical Exam:           Visit Vitals    /75 (BP 1 Location: Right arm, BP Patient Position: At rest)    Pulse 81    Temp 97.5 °F (36.4 °C)    Resp 20  Ht 5' 1\" (1.549 m)    Wt 102.3 kg (225 lb 9.6 oz)    SpO2 98%    BMI 42.63 kg/m2    O2 Flow Rate (L/min): 4 l/min O2 Device: Nasal cannula     Temp (24hrs), Av.4 °F (36.3 °C), Min:96.4 °F (35.8 °C), Max:97.8 °F (36.6 °C)         190 -  0700  In: 600 [P.O.:600]  Out: 900 [Urine:900]  General:  Alert, cooperative, no distress, morbidly obese   Head:  Normocephalic, without obvious abnormality, atraumatic. Eyes:  Conjunctivae/corneas clear. PERRL, EOMs intact. Nose: Nares normal. Septum midline. Mucosa normal. No drainage or sinus tenderness. Throat: Lips, mucosa, and tongue moist.   Neck: Supple, symmetrical, trachea midline   Lungs:   A few scattered wheezes and rhonchi - diffuse, air movement back to usual.     Heart:  Regular rate and rhythm, S1, S2 normal, no murmur, click, rub or gallop. Abdomen:   Soft, non-tender. Bowel sounds normal. No masses,  No organomegaly. Extremities: 1+ LE edema and UE edema sl worse on left UE (likely chronic), no calf ttp or cords   Pulses: 2+ and symmetric all extremities. Skin: Skin color, texture, turgor normal.    Neurologic: CNII-XII intact. Alert and oriented X 3.         Data Review:   PORT CXR 18: IMPRESSION: Increased left mid and lower pulmonary consolidation and pleural  effusion in the interval.     Doppler LUE 18  CONCLUSION: LIMITED EXAM  No deep vein thrombosis identified in left IJV, proximal subclavian,  and brachial vein. No evidence of thrombophlebitis. No evidence of  thrombus in contralateral right subclavian vein. Unable to evaluate  left distal subclavian, axillary, radial and ulnar veins due to severe   edema. Left AV Fistula is widely patent.     Signed:  Byron Adames MD  2018  12:00 PM

## 2018-01-23 NOTE — DISCHARGE INSTRUCTIONS
Patient Discharge Instructions    Madyson Blackwell / [id number] : 1945    Admitted 2018 Discharged: 2018 12:00 PM     ACUTE DIAGNOSES:  Dyspnea  Respiratory failure with hypoxia [facility])    CHRONIC MEDICAL DIAGNOSES:  Problem List as of 2018  Date Reviewed: 2018          Codes Class Noted - Resolved    Respiratory failure with hypoxia [facility]) ICD-10-CM: J96.91  ICD-9-CM: 518.81  2018 - Present        * (Principal)Dyspnea ICD-10-CM: R06.00  ICD-9-CM: 786.09  2018 - Present        Morbid obesity ([address] ICD-10-CM: E66.01  ICD-9-CM: 278.01  Unknown - Present        Hypoventilation syndrome ICD-10-CM: R06.89  ICD-9-CM: 786.09  Unknown - Present        Hyperlipidemia ICD-10-CM: E78.5  ICD-9-CM: 272.4  Unknown - Present        RAI on CPAP ICD-10-CM: G47.33, Z99.89  ICD-9-CM: 327.23, V46.8  Unknown - Present        Anxiety and depression ICD-10-CM: F41.8  ICD-9-CM: 300.00, 311  Unknown - Present        Chronic bilateral low back pain without sciatica ICD-10-CM: M54.5, G89.29  ICD-9-CM: 724.2, 338.29  11/3/2016 - Present        Generalized anxiety disorder ICD-10-CM: F41.1  ICD-9-CM: 300.02  11/3/2016 - Present        CKD (chronic kidney disease) stage 4, GFR 15-29 ml/min (HCC) (Chronic) ICD-10-CM: N18.4  ICD-9-CM: 585.4  2016 - Present        HTN (hypertension), benign (Chronic) ICD-10-CM: I10  ICD-9-CM: 401.1  2016 - Present        DM type 2, uncontrolled, with renal complications (HCC) (Chronic) ICD-10-CM: E11.29, E11.65  ICD-9-CM: 250.42  2016 - Present        Iron deficiency anemia (Chronic) ICD-10-CM: D50.9  ICD-9-CM: 280.9  2016 - Present        RESOLVED: CKD stage 4 due to type 2 diabetes mellitus ([facility] ICD-10-CM: E11.22, N18.4  ICD-9-CM: 250.40, 585.4  Unknown - 2018        RESOLVED: DM type 2 causing renal disease ([facility] ICD-10-CM: E11.29  ICD-9-CM: 250.40  Unknown - 2018        RESOLVED: Hypertension ICD-10-CM: I10  ICD-9-CM: 401.9  Unknown - 1/12/2018        RESOLVED: Arteriovenous fistula infection ([email or web address] ICD-10-CM: T82. 7XXA  ICD-9-CM: 996.62  11/2/2017 - 1/12/2018        RESOLVED: Hypertension ICD-10-CM: I10  ICD-9-CM: 401.9  Unknown - 11/2/2017        RESOLVED: DM type 2 causing renal disease ([email or web address] ICD-10-CM: E11.29  ICD-9-CM: 250.40  Unknown - 11/2/2017        RESOLVED: CKD stage 4 due to type 2 diabetes mellitus ([email or web address] ICD-10-CM: E11.22, N18.4  ICD-9-CM: 250.40, 585.4  Unknown - 11/2/2017        RESOLVED: Leukocytosis ICD-10-CM: P95.296  ICD-9-CM: 288.60  11/2/2017 - 1/12/2018        RESOLVED: Hypoxia ICD-10-CM: R09.02  ICD-9-CM: 799.02  11/2/2017 - 1/12/2018        RESOLVED: Pleural effusion ICD-10-CM: J90  ICD-9-CM: 511.9  11/2/2017 - 1/12/2018        RESOLVED: Cellulitis of right lower extremity ICD-10-CM: O61.418  ICD-9-CM: 682.6  6/2/2017 - 11/2/2017        RESOLVED: Asthma with acute exacerbation ICD-10-CM: J45.901  ICD-9-CM: 493.92  6/2/2017 - 11/2/2017        RESOLVED: Asthma ICD-10-CM: J45.909  ICD-9-CM: 493.90  Unknown - 1/12/2018        RESOLVED: CKD stage 4 due to type 2 diabetes mellitus ([email or web address] ICD-10-CM: E11.22, N18.4  ICD-9-CM: 250.40, 585.4  Unknown - 2/11/2017        RESOLVED: DM type 2 causing renal disease ([email or web address].) ICD-10-CM: E11.29  ICD-9-CM: 250.40  Unknown - 2/11/2017        RESOLVED: Hypoglycemia ICD-10-CM: E16.2  ICD-9-CM: 251.2  2/11/2017 - 11/2/2017        RESOLVED: Hyperkalemia ICD-10-CM: E87.5  ICD-9-CM: 276.7  11/3/2016 - 2/11/2017        RESOLVED: Elevated serum creatinine ICD-10-CM: R79.89  ICD-9-CM: 790.99  11/3/2016 - 2/11/2017        RESOLVED: Left leg cellulitis ICD-10-CM: L03.116  ICD-9-CM: 682.6  8/14/2016 - 2/11/2017        RESOLVED: Sepsis ([email or web address] ICD-10-CM: A41.9  ICD-9-CM: 038.9, 995.91  8/14/2016 - 2/11/2017        RESOLVED: Acute renal failure (ARF) ([email or web address].) ICD-10-CM: N17.9  ICD-9-CM: 584.9  8/14/2016 - 11/2/2017        RESOLVED: RAI (obstructive sleep apnea) (Chronic) ICD-10-CM: V38.19  ICD-9-CM: 327.23  8/14/2016 - 2/11/2017              DISCHARGE MEDICATIONS:         · It is important that you take the medication exactly as they are prescribed. · Keep your medication in the bottles provided by the pharmacist and keep a list of the medication names, dosages, and times to be taken in your wallet. · Do not take other medications without consulting your doctor. DIET:  Diabetic Diet    ACTIVITY: Activity as tolerated    ADDITIONAL INFORMATION: If you experience any of the following symptoms then please call your primary care physician or return to the emergency room if you cannot get hold of your doctor: Fever, chills, nausea, vomiting, diarrhea, change in mentation, falling, bleeding, shortness of breath. FOLLOW UP CARE:  Dr. Ilan Josue MD  you are to call and set up an appointment to see them with in 1 week. Follow-up with specialists at directed by them      Information obtained by :  I understand that if any problems occur once I am at home I am to contact my physician. I understand and acknowledge receipt of the instructions indicated above.                                                                                                                                            Physician's or R.N.'s Signature                                                                  Date/Time                                                                                                                                              Patient or Representative Signature                                                          Date/Time

## 2018-01-23 NOTE — PROGRESS NOTES
Notified Dr. Masha Malin that pt's lung sounds very diminished in left lung. Dr in room to exam pt, no new orders.

## 2018-01-24 ENCOUNTER — PATIENT OUTREACH (OUTPATIENT)
Dept: CASE MANAGEMENT | Age: 73
End: 2018-01-24

## 2018-01-24 NOTE — PROGRESS NOTES
Community Care Team Documentation for Patient in [facility]  Initial Follow Up       Patient was admitted to [address] from 1/13 to 1/23. Patient was discharged to [address] [place] [address], on 1/23 (date). Community Care Team followed up to [address] [place] [address] during this transition. Hospital Discharge diagnosis:  Dyspnea; Respiratory failure with hypoxia    RRAT score: 33    Advance Medical Directive on file in EMR? Not on file    Total Hospitalizations/ED visits last 6 months? IP - 2; ED - 1    PCP : Giovanni Casas MD    Per Gerry Fraire and ALBERT at [facility], Reviewed [place] [facility] questions with SNF to ensure patient arrived with admission packet in order. Discussed upcoming PCP FU 2/13 at 1:15. Pt to evaluated by therapy today. PTA pt lived with . Community Care Team will follow up with Ayan Wasserman when patient is discharged. Medications were not reconciled and general patient assessment was not completed during this skilled nursing facility outreach.

## 2018-01-31 ENCOUNTER — PATIENT OUTREACH (OUTPATIENT)
Dept: CASE MANAGEMENT | Age: 73
End: 2018-01-31

## 2018-01-31 NOTE — PROGRESS NOTES
Community Care Team Documentation for Patient in [facility]  Readmission       Patient was readmitted Nicolas Blunt from [facility] ([facility]) on 1/28 (Date). See previous [place] Care Team documentation under Patient Outreach. Community Care Team will follow patient for disposition. If patient returns to [facility] ([facility]),  [employer] will continue to follow. Medications were not reconciled and general patient assessment was not completed during this skilled nursing facility outreach.

## 2019-09-17 NOTE — IP AVS SNAPSHOT
[address] 
[phone] Patient: Prakash Lund MRN: [id number] KWABENA:[birth year] About your hospitalization You were admitted on:  November 2, 2017 You last received care in the:  [facility] 5M1 MED SURG 1 You were discharged on:  November 10, 2017 Why you were hospitalized Your primary diagnosis was:  Not on File Your diagnoses also included: Arteriovenous Fistula Infection (Hcc), Leukocytosis, Hypoxia, Pleural Effusion, Saul On Cpap, Hyperlipidemia, Asthma, Generalized Anxiety Disorder, Chronic Bilateral Low Back Pain Without Sciatica, Iron Deficiency Anemia, Dm Type 2, Uncontrolled, With Renal Complications (Hcc), Htn (Hypertension), Benign, Ckd (Chronic Kidney Disease) Stage 4, Gfr 15-29 Ml/Min (Hcc), Anxiety And Depression Things You Need To Do (next 8 weeks) Tuesday Nov 14, 2017 Go to [address] [place] [address] follow-up appointment at 2:30PM. Please arrive 15 minutes early to check in. Phone:  [phone] Where:  [facility], [address] [place] [address] Discharge Orders None A check medina indicates which time of day the medication should be taken. My Medications TAKE these medications as instructed Instructions Each Dose to Equal  
 Morning Noon Evening Bedtime  
 acetaminophen 500 mg tablet Commonly known as:  TYLENOL Your last dose was: Your next dose is: Take 1,000 mg by mouth two (2) times daily as needed for Pain. 1000 mg  
    
   
   
   
  
 albuterol 90 mcg/actuation inhaler Commonly known as:  PROVENTIL HFA, VENTOLIN HFA, PROAIR HFA Your last dose was: Your next dose is: Take 2 Puffs by inhalation every four (4) hours as needed for Wheezing. 2 Puff ALPRAZolam 0.25 mg tablet Commonly known as:  Carletha Puller Your last dose was: Your next dose is: Take 0.25 mg by mouth two (2) times a day. 0.25 mg  
    
   
   
   
  
 amLODIPine 2.5 mg tablet Commonly known as:  Rut Silva Your last dose was: Your next dose is: Take 2.5 mg by mouth daily. 2.5 mg  
    
   
   
   
  
 aspirin 81 mg chewable tablet Your last dose was: Your next dose is: Take 81 mg by mouth nightly. 81 mg  
    
   
   
   
  
 atorvastatin 20 mg tablet Commonly known as:  LIPITOR Your last dose was: Your next dose is: Take 20 mg by mouth nightly. 20 mg  
    
   
   
   
  
 BREO ELLIPTA 200-25 mcg/dose inhaler Generic drug:  fluticasone-vilanterol Your last dose was: Your next dose is: Take 1 Puff by inhalation daily. 1 Puff  
    
   
   
   
  
 bumetanide 2 mg tablet Commonly known as:  Zaid Vickey Your last dose was: Your next dose is: Take 0.5 Tabs by mouth two (2) times a day. 1 mg  
    
   
   
   
  
 calcium-cholecalciferol (D3) tablet Commonly known as:  CALTRATE 600+D Your last dose was: Your next dose is: Take 1 Tab by mouth two (2) times a day. 1 Tab  
    
   
   
   
  
 citalopram 40 mg tablet Commonly known as:  Viona Creamer Your last dose was: Your next dose is: Take 40 mg by mouth daily. 40 mg COREG 3.125 mg tablet Generic drug:  carvedilol Your last dose was: Your next dose is: Take 3.125 mg by mouth two (2) times daily (with meals). 3.125 mg  
    
   
   
   
  
 doxazosin 2 mg tablet Commonly known as:  CARDURA Your last dose was: Your next dose is: Take 2 mg by mouth daily. 2 mg  
    
   
   
   
  
 ergocalciferol 50,000 unit capsule Commonly known as:  ERGOCALCIFEROL Your last dose was: Your next dose is: Take 50,000 Units by mouth every month. First of the month 31647 Units  
    
   
   
   
  
 fenofibrate nanocrystallized 145 mg tablet Commonly known as:  [employer] Your last dose was: Your next dose is: Take 145 mg by mouth daily. 145 mg  
    
   
   
   
  
 FIBER LAXATIVE (CA POLYCARBO) 625 mg tablet Generic drug:  calcium polycarbophil Your last dose was: Your next dose is: Take 625 mg by mouth daily. 625 mg  
    
   
   
   
  
 lactobacillus-acidophilus 100 million cell Grpk Commonly known as:  Tristin Smith Your last dose was: Your next dose is: Take 1 Packet by mouth two (2) times a day. May also get over-the-counter probiotic in place of this 1 Packet  
    
   
   
   
  
 loratadine 10 mg tablet Commonly known as:  Thad Britton Your last dose was: Your next dose is: Take 10 mg by mouth daily. 10 mg  
    
   
   
   
  
 losartan 25 mg tablet Commonly known as:  COZAAR Your last dose was: Your next dose is: Take 25 mg by mouth daily. 25 mg  
    
   
   
   
  
 metroNIDAZOLE 500 mg tablet Commonly known as:  FLAGYL Your last dose was: Your next dose is: Take 1 Tab by mouth three (3) times daily (with meals). Indications: Clostridium difficile infection 500 mg  
    
   
   
   
  
 multivitamin tablet Commonly known as:  ONE A DAY Your last dose was: Your next dose is: Take 1 Tab by mouth daily. 1 Tab  
    
   
   
   
  
 mupirocin 2 % ointment Commonly known as:  [employer] Your last dose was: Your next dose is:    
   
   
 Apply  to affected area two (2) times a day. nystatin powder Commonly known as:  MYCOSTATIN Your last dose was: Your next dose is:    
   
   
 Apply  to affected area two (2) times a day. Omeprazole delayed release 20 mg tablet Commonly known as:  PRILOSEC D/R Your last dose was: Your next dose is: Take 20 mg by mouth daily. 20 mg  
    
   
   
   
  
 potassium chloride 10 mEq tablet Commonly known as:  KLOR-CON Your last dose was: Your next dose is: Take 1 Tab by mouth two (2) times a day. 10 mEq Senna-C 8.6-50 mg per tablet Generic drug:  senna-docusate Your last dose was: Your next dose is: Take 2 Tabs by mouth daily. 2 Tab TOUJEO SOLOSTAR 300 unit/mL (1.5 mL) Inpn Generic drug:  insulin glargine Your last dose was: Your next dose is:    
   
   
 24 Units by SubCUTAneous route daily. 24 Units Where to Get Your Medications Information on where to get these meds will be given to you by the nurse or doctor. ! Ask your nurse or doctor about these medications  
  bumetanide 2 mg tablet  
 lactobacillus-acidophilus 100 million cell Grpk  
 metroNIDAZOLE 500 mg tablet  
 mupirocin 2 % ointment  
 nystatin powder  
 potassium chloride 10 mEq tablet Discharge Instructions Patient Discharge Instructions Michael Parish / [id number] : 1945 Admitted 2017 Discharged: 11/10/2017 7:16 AM  
 
ACUTE DIAGNOSES: 
Arteriovenous fistula infection ([facility] CHRONIC MEDICAL DIAGNOSES: 
Problem List as of 11/10/2017  Date Reviewed: 2017 Codes Class Noted - Resolved Arteriovenous fistula infection ([facility] ICD-10-CM: T82. 7XXA ICD-9-CM: 996.62  2017 - Present Leukocytosis ICD-10-CM: K76.584 ICD-9-CM: 288.60  2017 - Present Hypoxia ICD-10-CM: R09.02 
ICD-9-CM: 799.02  2017 - Present Pleural effusion ICD-10-CM: J90 ICD-9-CM: 511.9  2017 - Present Asthma ICD-10-CM: P94.503 ICD-9-CM: 493.90  Unknown - Present Hyperlipidemia ICD-10-CM: E78.5 ICD-9-CM: 272.4  Unknown - Present RAI on CPAP ICD-10-CM: G47.33, Z99.89 ICD-9-CM: 327.23, V46.8  Unknown - Present Anxiety and depression ICD-10-CM: F41.8 ICD-9-CM: 300.00, 311  Unknown - Present Chronic bilateral low back pain without sciatica ICD-10-CM: M54.5, G89.29 ICD-9-CM: 724.2, 338.29  11/3/2016 - Present Generalized anxiety disorder ICD-10-CM: F41.1 ICD-9-CM: 300.02  11/3/2016 - Present CKD (chronic kidney disease) stage 4, GFR 15-29 ml/min (HCC) (Chronic) ICD-10-CM: N18.4 ICD-9-CM: 585.4  8/14/2016 - Present HTN (hypertension), benign (Chronic) ICD-10-CM: I10 
ICD-9-CM: 401.1  8/14/2016 - Present DM type 2, uncontrolled, with renal complications (HCC) (Chronic) ICD-10-CM: E11.29, E11.65 ICD-9-CM: 250.42  8/14/2016 - Present Iron deficiency anemia (Chronic) ICD-10-CM: D50.9 ICD-9-CM: 280.9  8/14/2016 - Present RESOLVED: Hypertension ICD-10-CM: I10 
ICD-9-CM: 401.9  Unknown - 11/2/2017 RESOLVED: DM type 2 causing renal disease ([facility] ICD-10-CM: E11.29 ICD-9-CM: 250.40  Unknown - 11/2/2017 RESOLVED: CKD stage 4 due to type 2 diabetes mellitus ([facility] ICD-10-CM: E11.22, N18.4 ICD-9-CM: 250.40, 585.4  Unknown - 11/2/2017 RESOLVED: Cellulitis of right lower extremity ICD-10-CM: L03.115 ICD-9-CM: 682.6  6/2/2017 - 11/2/2017 RESOLVED: Asthma with acute exacerbation ICD-10-CM: J45.901 ICD-9-CM: 493.92  6/2/2017 - 11/2/2017 RESOLVED: CKD stage 4 due to type 2 diabetes mellitus ([facility] ICD-10-CM: E11.22, N18.4 ICD-9-CM: 250.40, 585.4  Unknown - 2/11/2017 RESOLVED: DM type 2 causing renal disease ([facility] ICD-10-CM: E11.29 ICD-9-CM: 250.40  Unknown - 2/11/2017 RESOLVED: Hypoglycemia ICD-10-CM: E16.2 ICD-9-CM: 251.2  2/11/2017 - 11/2/2017 RESOLVED: Hyperkalemia ICD-10-CM: E87.5 ICD-9-CM: 276.7  11/3/2016 - 2/11/2017 RESOLVED: Elevated serum creatinine ICD-10-CM: R79.89 ICD-9-CM: 790.99  11/3/2016 - 2/11/2017 RESOLVED: Left leg cellulitis ICD-10-CM: L03.116 ICD-9-CM: 682.6  8/14/2016 - 2/11/2017 RESOLVED: Sepsis ([address] ICD-10-CM: A41.9 ICD-9-CM: 038.9, 995.91  8/14/2016 - 2/11/2017 RESOLVED: Acute renal failure (ARF) (HCC) ICD-10-CM: N17.9 ICD-9-CM: 584.9  8/14/2016 - 11/2/2017 RESOLVED: RAI (obstructive sleep apnea) (Chronic) ICD-10-CM: N74.04 
ICD-9-CM: 327.23  8/14/2016 - 2/11/2017 DISCHARGE MEDICATIONS:  
 
 
 
· It is important that you take the medication exactly as they are prescribed. · Keep your medication in the bottles provided by the pharmacist and keep a list of the medication names, dosages, and times to be taken in your wallet. · Do not take other medications without consulting your doctor. DIET:  Diabetic Diet ACTIVITY: Activity as tolerated ADDITIONAL INFORMATION: If you experience any of the following symptoms then please call your primary care physician or return to the emergency room if you cannot get hold of your doctor: Fever, chills, nausea, vomiting, diarrhea, change in mentation, falling, bleeding, shortness of breath. FOLLOW UP CARE: 
Dr. Talon Rodriguez MD  you are to call and set up an appointment to see them with in 1 week. Follow-up with specialists at directed by them Information obtained by : 
I understand that if any problems occur once I am at home I am to contact my physician. I understand and acknowledge receipt of the instructions indicated above. Physician's or R.N.'s Signature                                                                  Date/Time Patient or Representative Signature                                                          Date/Time [employer] Announcement We are excited to announce that we are making your provider's discharge notes available to you in [employer]. You will see these notes when they are completed and signed by the physician that discharged you from your recent hospital stay. If you have any questions or concerns about any information you see in [employer], please call the Health Information Department where you were seen or reach out to your Primary Care Provider for more information about your plan of care. Introducing [facility]! Raphael Aldrich introduces [employer] patient portal. Now you can access parts of your medical record, email your doctor's office, and request medication refills online. 1. In your internet browser, go to [email or web address] 2. Click on the First Time User? Click Here link in the Sign In box. You will see the New Member Sign Up page. 3. Enter your [employer] Access Code exactly as it appears below. You will not need to use this code after youve completed the sign-up process. If you do not sign up before the expiration date, you must request a new code. · [employer] Access Code: [email or web address] Expires: 2/8/2018  8:18 AM 
 
4. Enter the last four digits of your Social Security Number (xxxx) and Date of Birth (mm/dd/yyyy) as indicated and click Submit. You will be taken to the next sign-up page. 5. Create a [employer] ID. This will be your [employer] login ID and cannot be changed, so think of one that is secure and easy to remember. 6. Create a [employer] password. You can change your password at any time. 7. Enter your Password Reset Question and Answer. This can be used at a later time if you forget your password. 8. Enter your e-mail address.  You will receive e-mail notification when new information is available in [employer]. 9. Click Sign Up. You can now view and download portions of your medical record. 10. Click the Download Summary menu link to download a portable copy of your medical information. If you have questions, please visit the Frequently Asked Questions section of the [employer] website. Remember, [employer] is NOT to be used for urgent needs. For medical emergencies, dial 911. Now available from your iPhone and Android! Providers Seen During Your Hospitalization Provider Specialty Primary office phone Nieves Guillaume MD Emergency Medicine [phone] Win John MD [facility] Practice [phone] Your Primary Care Physician (PCP) Primary Care Physician Office Phone Office Fax Remington Marking [phone] You are allergic to the following Allergen Reactions Latex, Natural Rubber Rash Per pt, had a reaction to latex gloves when an RN administered lotion Darvon (Propoxyphene) Itching Peanut Cough Pineapple Itching Sulfa (Sulfonamide Antibiotics) Swelling Tape (Adhesive) Itching Recent Documentation Height Weight BMI OB Status Smoking Status 1.549 m 105.7 kg 44.02 kg/m2 Hysterectomy Never Smoker Emergency Contacts Name Discharge Info Relation Home Work Mobile Ronnie Campbell DISCHARGE CAREGIVER [3] Spouse [3] Patient Belongings The following personal items are in your possession at time of discharge: 
  Dental Appliances: None  Visual Aid: None      Home Medications: None   Jewelry: None  Clothing: None    Other Valuables: None Please provide this summary of care documentation to your next provider. Signatures-by signing, you are acknowledging that this After Visit Summary has been reviewed with you and you have received a copy.   
  
 
  
    
    
 Patient Signature: ____________________________________________________________ Date:  ____________________________________________________________  
  
Amalia Oakwood Provider Signature:  ____________________________________________________________ Date:  ____________________________________________________________ 99

## 2021-09-30 NOTE — ED NOTES
Meds admin per mar order. Pt denies any needs. Call bell in reach. Patient states she cannot recall medications, states her  can update them when he arrives.

## 2023-05-16 NOTE — PROGRESS NOTES
Primary Nurse Amaris Castanon RN and VIKA Bojorquez performed a dual skin assessment on this patient Impairment noted- see wound doc flow sheet  Luis Felipe score is 15    Discolored, blanching sacrum  Scabs all over body, arms, legs, back, stomach r/t constant scratching  Red RLE r/t cellulitis  Excoriation under pannus and L hip that was cleaned and dried- with foul odor You can access the FollowMyHealth Patient Portal offered by [facility] by registering at the following website: [email or web address] By joining [employer]’s FollowMyHealth portal, you will also be able to view your health information using other applications (apps) compatible with our system.
